# Patient Record
Sex: FEMALE | Race: BLACK OR AFRICAN AMERICAN | NOT HISPANIC OR LATINO | Employment: UNEMPLOYED | URBAN - METROPOLITAN AREA
[De-identification: names, ages, dates, MRNs, and addresses within clinical notes are randomized per-mention and may not be internally consistent; named-entity substitution may affect disease eponyms.]

---

## 2018-05-15 ENCOUNTER — HOSPITAL ENCOUNTER (EMERGENCY)
Facility: HOSPITAL | Age: 68
Discharge: HOME | End: 2018-05-15
Attending: EMERGENCY MEDICINE
Payer: MEDICARE

## 2018-05-15 VITALS
OXYGEN SATURATION: 100 % | WEIGHT: 225 LBS | BODY MASS INDEX: 36.16 KG/M2 | SYSTOLIC BLOOD PRESSURE: 169 MMHG | TEMPERATURE: 99 F | HEART RATE: 82 BPM | RESPIRATION RATE: 20 BRPM | HEIGHT: 66 IN | DIASTOLIC BLOOD PRESSURE: 78 MMHG

## 2018-05-15 DIAGNOSIS — B37.9 YEAST INFECTION: ICD-10-CM
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
## 2018-05-15 DIAGNOSIS — R53.1 WEAKNESS: ICD-10-CM

## 2018-05-15 LAB
ANION GAP SERPL CALC-SCNC: 9 MMOL/L
BACTERIA #/AREA URNS HPF: ABNORMAL /HPF
BASOPHILS # BLD AUTO: 0.01 K/UL
BASOPHILS NFR BLD: 0.1 %
BILIRUB UR QL STRIP: NEGATIVE
BNP SERPL-MCNC: 25 PG/ML
BUN SERPL-MCNC: 22 MG/DL
CALCIUM SERPL-MCNC: 9.6 MG/DL
CHLORIDE SERPL-SCNC: 108 MMOL/L
CLARITY UR: CLEAR
CO2 SERPL-SCNC: 24 MMOL/L
COLOR UR: YELLOW
CREAT SERPL-MCNC: 1 MG/DL
DIFFERENTIAL METHOD: ABNORMAL
EOSINOPHIL # BLD AUTO: 0 K/UL
EOSINOPHIL NFR BLD: 0.4 %
ERYTHROCYTE [DISTWIDTH] IN BLOOD BY AUTOMATED COUNT: 13.8 %
EST. GFR  (AFRICAN AMERICAN): >60 ML/MIN/1.73 M^2
EST. GFR  (NON AFRICAN AMERICAN): 59 ML/MIN/1.73 M^2
GLUCOSE SERPL-MCNC: 118 MG/DL
GLUCOSE UR QL STRIP: NEGATIVE
HCT VFR BLD AUTO: 37 %
HGB BLD-MCNC: 12.7 G/DL
HGB UR QL STRIP: ABNORMAL
KETONES UR QL STRIP: NEGATIVE
LEUKOCYTE ESTERASE UR QL STRIP: ABNORMAL
LYMPHOCYTES # BLD AUTO: 2 K/UL
LYMPHOCYTES NFR BLD: 23.9 %
MAGNESIUM SERPL-MCNC: 2.1 MG/DL
MCH RBC QN AUTO: 26.7 PG
MCHC RBC AUTO-ENTMCNC: 34.3 G/DL
MCV RBC AUTO: 78 FL
MICROSCOPIC COMMENT: ABNORMAL
MONOCYTES # BLD AUTO: 0.5 K/UL
MONOCYTES NFR BLD: 6.4 %
NEUTROPHILS # BLD AUTO: 5.8 K/UL
NEUTROPHILS NFR BLD: 69.2 %
NITRITE UR QL STRIP: NEGATIVE
PH UR STRIP: 6 [PH] (ref 5–8)
PLATELET # BLD AUTO: 269 K/UL
PMV BLD AUTO: 9.8 FL
POTASSIUM SERPL-SCNC: 3.7 MMOL/L
PROT UR QL STRIP: NEGATIVE
RBC # BLD AUTO: 4.75 M/UL
RBC #/AREA URNS HPF: 2 /HPF (ref 0–4)
SODIUM SERPL-SCNC: 141 MMOL/L
SP GR UR STRIP: 1.02 (ref 1–1.03)
TROPONIN I SERPL DL<=0.01 NG/ML-MCNC: <0.006 NG/ML
URN SPEC COLLECT METH UR: ABNORMAL
UROBILINOGEN UR STRIP-ACNC: NEGATIVE EU/DL
WBC # BLD AUTO: 8.32 K/UL
WBC #/AREA URNS HPF: 1 /HPF (ref 0–5)
YEAST URNS QL MICRO: ABNORMAL

## 2018-05-15 PROCEDURE — 25000003 PHARM REV CODE 250: Performed by: EMERGENCY MEDICINE

## 2018-05-15 PROCEDURE — 85025 COMPLETE CBC W/AUTO DIFF WBC: CPT

## 2018-05-15 PROCEDURE — 93010 ELECTROCARDIOGRAM REPORT: CPT | Mod: ,,, | Performed by: INTERNAL MEDICINE

## 2018-05-15 PROCEDURE — 83880 ASSAY OF NATRIURETIC PEPTIDE: CPT

## 2018-05-15 PROCEDURE — 84484 ASSAY OF TROPONIN QUANT: CPT

## 2018-05-15 PROCEDURE — 99284 EMERGENCY DEPT VISIT MOD MDM: CPT | Mod: 25

## 2018-05-15 PROCEDURE — 83735 ASSAY OF MAGNESIUM: CPT

## 2018-05-15 PROCEDURE — 93005 ELECTROCARDIOGRAM TRACING: CPT

## 2018-05-15 PROCEDURE — 81000 URINALYSIS NONAUTO W/SCOPE: CPT

## 2018-05-15 PROCEDURE — 51701 INSERT BLADDER CATHETER: CPT

## 2018-05-15 PROCEDURE — 80048 BASIC METABOLIC PNL TOTAL CA: CPT

## 2018-05-15 RX ORDER — FLUCONAZOLE 100 MG/1
200 TABLET ORAL
Status: COMPLETED | OUTPATIENT
Start: 2018-05-15 | End: 2018-05-15

## 2018-05-15 RX ADMIN — FLUCONAZOLE 200 MG: 100 TABLET ORAL at 08:05

## 2018-05-15 NOTE — ED PROVIDER NOTES
"Encounter Date: 5/15/2018    SCRIBE #1 NOTE: I, Deb Minal, am scribing for, and in the presence of,  Aleida Ashraf MD. I have scribed the following portions of the note - Other sections scribed: HPI, ROS, PE.       History     Chief Complaint   Patient presents with    Fall     fell from WC onto her butt today. No pain. No LOC. Left sided deficit from previosu CVA.      CC: Fall    HPI: This 67 y.o. Female, with a medical history of essential hypertension, obesity and stroke, presents to the ED via EMS transportation accompanied by her daughter s/p a fall that occurred today. Pt states, "I fell on my but", noting that "my legs gave out; they got heavy and numb". Pt's daughter notes that pt was holding onto the wall while walking away from the toilet when she fell. Pt denies pain at this time. Daughter reports that pt is experiencing chronic leg swelling. She notes that she is concerned as pt has not taken her blood pressure medication for over 1x year and does not consume water regularly. Pt denies nausea, fever, chills, lightheadedness, dizziness, chest pain, shortness of breath, numbness, urinary symptoms, hematuria and blood in stool. No other associated symptoms.         The history is provided by the patient and a relative. No  was used.     Review of patient's allergies indicates:  No Known Allergies  Past Medical History:   Diagnosis Date    Essential hypertension 6/12/2015    Obesity (BMI 35.0-39.9) 6/12/2015    Stroke      Past Surgical History:   Procedure Laterality Date    HYSTERECTOMY       Family History   Problem Relation Age of Onset    Stroke Mother     Cancer Father      Social History   Substance Use Topics    Smoking status: Never Smoker    Smokeless tobacco: Never Used    Alcohol use No     Review of Systems   Cardiovascular: Positive for leg swelling.   All other systems reviewed and are negative.   Review of Systems as per HPI " otherwise:  Constitutional: Negative for activity change, appetite change, diaphoresis and unexpected weight change.   HENT: Negative for dental problem, drooling, ear pain and hearing loss.    Eyes: Negative for pain, discharge, redness and itching.   Musculoskeletal: Negative for arthralgias, gait problem, joint swelling and neck stiffness.   Skin: Negative for color change, pallor, rash and wound.   Allergic/Immunologic: Negative for environmental allergies, food allergies and immunocompromised state.   Neurological: Negative for tremors, seizures, facial asymmetry and speech difficulty.   Hematological: Negative for adenopathy. Does not bruise/bleed easily.   Psychiatric/Behavioral: Negative for agitation and dysphoric mood.   All other systems reviewed and are negative.    Physical Exam     Initial Vitals [05/15/18 1637]   BP Pulse Resp Temp SpO2   (!) 142/84 100 18 -- 100 %      MAP       103.33         Physical Exam    Vital signs and nursing assessment noted:  Mildly elevated blood pressure    GEN:   NAD, A & Ox3, atraumatic, well appearing, nontoxic appearing  HEENT:  PERRLA, EOMI, moist membranes, nl conjunctiva, no scleral icterus, no nystagmus, no nodes/nodules, soft, supple, FROM, no tracheal deviation, nexus negative  CV:   RRR no m/r/g, 2+ radial pulses, <2sec cap refill, no obvious JVD  RESP:  CTA B, no w/r/r, equal and bilateral chest rise, no respiratory distress  ABD:   soft, Nontender, Nondistended, +BS, no guarding/rebound  BACK:  FROM, no midline tenderness, no paraspinal tenderness  :   Deferred  EXT:   FROM, MUNOZ x 4, lymphedema of the lower extremities, no calf tenderness  LYMPH:  no gross adenopathy  NEURO:  CN II-XII grossly intact, no obvious motor/sensory deficit, no tremor, negative Romberg,  nl gait/coordination  PSYCH:   no SI/HI, no anxiety, nl mood/affect, nl judgement/thought process  SKIN:  Warm, dry, intact, no rashes/lesions or masses, nl color, no pallor  ED Course    Procedures  Labs Reviewed   BASIC METABOLIC PANEL - Abnormal; Notable for the following:        Result Value    Glucose 118 (*)     eGFR if non  59 (*)     All other components within normal limits   CBC W/ AUTO DIFFERENTIAL - Abnormal; Notable for the following:     MCV 78 (*)     MCH 26.7 (*)     All other components within normal limits   URINALYSIS - Abnormal; Notable for the following:     Occult Blood UA 1+ (*)     Leukocytes, UA 1+ (*)     All other components within normal limits   URINALYSIS MICROSCOPIC - Abnormal; Notable for the following:     Bacteria, UA Moderate (*)     Yeast, UA Few (*)     All other components within normal limits   MAGNESIUM   TROPONIN I   B-TYPE NATRIURETIC PEPTIDE                        Scribe Attestation:   Scribe #1: I performed the above scribed service and the documentation accurately describes the services I performed. I attest to the accuracy of the note.    Attending Attestation:           Physician Attestation for Scribe:  Physician Attestation Statement for Scribe #1: I, Aleida Ashraf MD, reviewed documentation, as scribed by Deb Fontaine in my presence, and it is both accurate and complete.          MEDICAL DECISION MAKIN-year-old female with past medical history of hypertension presents with what appears to be a mechanical fall from her wheelchair when she felt like her legs gave out from underneath her.  Patient is without complaints but family is insisting on having the patient evaluated. Exam is benign and nonfocal.    Weakness differential includes but not exclusive to: anemia, metabolic disturbances,  poisoning, medication side effect, UTI, myocardial infarction, dehydration, peripheral nerve disorder, deconditioning, malingering.  Unlikely CVA.    Treatment plan includes physical exam, cardiac monitoring, labs, imaging studies,  and supportive care.  Labs  reviewed and independently interpreted:        ED Course as of May 15 1953    Tue May 15, 2018   1826 Troponin I: <0.006 [NO]   1826 Unremarkable BNP: 25 [NO]   1826 Unremarkable BMP Creatinine: 1.0 [NO]   1826 Unremarkable Hemoglobin: 12.7 [NO]   1826 Unremarkable CBC WBC: 8.32 [NO]   1952 Abnormal Yeast, UA: (!) Few [NO]   1952 Abnormal Leukocytes, UA: (!) 1+ [NO]   1953 Abnormal Bacteria, UA: (!) Moderate [NO]      ED Course User Index  [NO] Aleida Ashraf MD     Patient improved after treatment and tolerating PO.    Family and patient updated on care.  Pt agrees with assessment, disposition and treatment plan and has no further questions or complaints at this time. Given return precautions and demonstrates understanding.    Patient will  follow up with primary care physician as an outpatient.  Prescription given:  None (Educated about over-the-counter medications that can be used for discomfort.)    Clinical Impression:   The primary encounter diagnosis was Fall, initial encounter. Diagnoses of Weakness and Yeast infection were also pertinent to this visit.    Disposition:   Disposition: Discharged  Condition: Stable                        Aleida Ashraf MD  05/15/18 1953

## 2018-05-15 NOTE — ED TRIAGE NOTES
Pt arrived to ED via EMS after fall occurring today. Pt states she went to sit in her wheelchair and missed, landing on her sacrum. Pt denies any pain or LOC. She is aao x 4 and in no apparent distress.

## 2018-05-16 ENCOUNTER — HOSPITAL ENCOUNTER (EMERGENCY)
Facility: HOSPITAL | Age: 68
Discharge: HOME | End: 2018-05-16
Attending: EMERGENCY MEDICINE
Payer: MEDICARE

## 2018-05-16 VITALS
SYSTOLIC BLOOD PRESSURE: 162 MMHG | TEMPERATURE: 98 F | WEIGHT: 225 LBS | BODY MASS INDEX: 36.16 KG/M2 | HEART RATE: 76 BPM | HEIGHT: 66 IN | DIASTOLIC BLOOD PRESSURE: 79 MMHG | OXYGEN SATURATION: 96 % | RESPIRATION RATE: 18 BRPM
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
## 2018-05-16 LAB
ANION GAP SERPL CALC-SCNC: 8 MMOL/L
BASOPHILS # BLD AUTO: 0.01 K/UL
BASOPHILS NFR BLD: 0.2 %
BUN SERPL-MCNC: 13 MG/DL
CALCIUM SERPL-MCNC: 9.8 MG/DL
CHLORIDE SERPL-SCNC: 106 MMOL/L
CO2 SERPL-SCNC: 27 MMOL/L
CREAT SERPL-MCNC: 0.8 MG/DL
DIFFERENTIAL METHOD: ABNORMAL
EOSINOPHIL # BLD AUTO: 0.1 K/UL
EOSINOPHIL NFR BLD: 1 %
ERYTHROCYTE [DISTWIDTH] IN BLOOD BY AUTOMATED COUNT: 13.8 %
EST. GFR  (AFRICAN AMERICAN): >60 ML/MIN/1.73 M^2
EST. GFR  (NON AFRICAN AMERICAN): >60 ML/MIN/1.73 M^2
GLUCOSE SERPL-MCNC: 92 MG/DL
HCT VFR BLD AUTO: 37 %
HGB BLD-MCNC: 12.8 G/DL
INR PPP: 1
LYMPHOCYTES # BLD AUTO: 2.1 K/UL
LYMPHOCYTES NFR BLD: 33.2 %
MAGNESIUM SERPL-MCNC: 2.2 MG/DL
MCH RBC QN AUTO: 26.7 PG
MCHC RBC AUTO-ENTMCNC: 34.6 G/DL
MCV RBC AUTO: 77 FL
MONOCYTES # BLD AUTO: 0.6 K/UL
MONOCYTES NFR BLD: 10 %
NEUTROPHILS # BLD AUTO: 3.5 K/UL
NEUTROPHILS NFR BLD: 55.6 %
PLATELET # BLD AUTO: 255 K/UL
PMV BLD AUTO: 9.4 FL
POTASSIUM SERPL-SCNC: 3.7 MMOL/L
PROTHROMBIN TIME: 10.7 SEC
RBC # BLD AUTO: 4.79 M/UL
SODIUM SERPL-SCNC: 141 MMOL/L
TROPONIN I SERPL DL<=0.01 NG/ML-MCNC: <0.006 NG/ML
WBC # BLD AUTO: 6.23 K/UL

## 2018-05-16 PROCEDURE — 85025 COMPLETE CBC W/AUTO DIFF WBC: CPT

## 2018-05-16 PROCEDURE — 99285 EMERGENCY DEPT VISIT HI MDM: CPT | Mod: 25

## 2018-05-16 PROCEDURE — 87086 URINE CULTURE/COLONY COUNT: CPT

## 2018-05-16 PROCEDURE — 83735 ASSAY OF MAGNESIUM: CPT

## 2018-05-16 PROCEDURE — 25000003 PHARM REV CODE 250: Performed by: EMERGENCY MEDICINE

## 2018-05-16 PROCEDURE — 85610 PROTHROMBIN TIME: CPT

## 2018-05-16 PROCEDURE — 84484 ASSAY OF TROPONIN QUANT: CPT

## 2018-05-16 PROCEDURE — 87186 SC STD MICRODIL/AGAR DIL: CPT | Mod: 59

## 2018-05-16 PROCEDURE — 80048 BASIC METABOLIC PNL TOTAL CA: CPT

## 2018-05-16 PROCEDURE — 87088 URINE BACTERIA CULTURE: CPT

## 2018-05-16 PROCEDURE — 87077 CULTURE AEROBIC IDENTIFY: CPT

## 2018-05-16 RX ORDER — HYDROCODONE BITARTRATE AND ACETAMINOPHEN 5; 325 MG/1; MG/1
1 TABLET ORAL
Status: COMPLETED | OUTPATIENT
Start: 2018-05-16 | End: 2018-05-16

## 2018-05-16 RX ORDER — AMLODIPINE BESYLATE 5 MG/1
5 TABLET ORAL DAILY
Qty: 30 TABLET | Refills: 0 | OUTPATIENT
Start: 2018-05-16 | End: 2018-11-09

## 2018-05-16 RX ADMIN — HYDROCODONE BITARTRATE AND ACETAMINOPHEN 1 TABLET: 5; 325 TABLET ORAL at 03:05

## 2018-05-16 NOTE — PLAN OF CARE
Patient from home with daughter and needs assistance with ADLs.  Patient has a RW which she does not use per daughter in that she prefers to use her wheelchair.  Patient had not seen a PCP in 2 years per daughter.  KATARZYNA contacted [facility] ([phone]) and scheduled patient's ED followup visit for Friday, 5/18/2018 at 9:00 a.m.  Appt date and time reviewed with patient and daughter.  Patient's preferred pharmacy is [facility].  Daughter is caregiver and helps at home.  Patient needs wheelchair repaired.  Patient's daughter will call KATARZYNA tomorrow with the name of the DME provider so that  might determine if patient can get wheelchair repaired or replaced.         05/16/18 2744   Discharge Assessment   Assessment Type Discharge Planning Assessment   Confirmed/corrected address and phone number on facesheet? Yes   Assessment information obtained from? Caregiver;Patient   Prior to hospitilization cognitive status: Alert/Oriented   Prior to hospitalization functional status: Needs Assistance;Assistive Equipment   Current cognitive status: Alert/Oriented   Current Functional Status: Assistive Equipment;Needs Assistance   Facility Arrived From: home   Lives With child(marvin), adult   Able to Return to Prior Arrangements yes   Is patient able to care for self after discharge? No  (Needs assistance with ADLs.)   Patient's perception of discharge disposition home or selfcare   Readmission Within The Last 30 Days no previous admission in last 30 days   Patient currently being followed by outpatient case management? No   Patient currently receives any other outside agency services? No   Equipment Currently Used at Home bedside commode;wheelchair;walker, rolling   Do you have any problems affording any of your prescribed medications? No   Is the patient taking medications as prescribed? yes   Does the patient have transportation home? Yes   Transportation Available family or friend will provide    Dialysis Name and Scheduled days n/a   Discharge Plan A Home with family   Discharge Plan B Home with family   Patient/Family In Agreement With Plan yes   Soco Lima LMSW, ACEDDA-KATARZYNA, CCM  5/16/2018

## 2018-05-16 NOTE — ED PROVIDER NOTES
"Encounter Date: 5/16/2018    SCRIBE #1 NOTE: I, Zelalem Mandujano, am scribing for, and in the presence of,  Vance Man MD. I have scribed the following portions of the note - Other sections scribed: ROS, HPI.       History     Chief Complaint   Patient presents with    Loss of Consciousness     Daughter states heard loud bump in room.  Daughter found mom on floor unresponsive.  EMS called.  Upon EMS arrival they found pt A&O x 2 normal mentation.  complaints of left knee pain.     CC: Loss of Consciousness    HPI: Patient is a 67 y.o. F with a past medical history of frequent falls, Essential hypertension; Obesity; and Stroke who presents to the ED via EMS for evaluation after an unwitnessed syncopal episode prior to arrival. Patient's daughter reports hearing a loud "thud" while she was in the kitchen, and found her mother unconscious on her bedroom floor. Patient complains of a 1-month history of bilateral lower extremity edema. She also reports intermittent chest pain and shortness of breath, but denies experiencing these symptoms currently and prior to losing consciousness. No symptomatic treatment prior to arrival. Patient denies fever, chest pain, shortness of breath, dizziness, light-headedness, and/or headache.        The history is provided by the patient and a relative. No  was used.     Review of patient's allergies indicates:  No Known Allergies  Past Medical History:   Diagnosis Date    Essential hypertension 6/12/2015    Obesity (BMI 35.0-39.9) 6/12/2015    Stroke      Past Surgical History:   Procedure Laterality Date    HYSTERECTOMY       Family History   Problem Relation Age of Onset    Stroke Mother     Cancer Father      Social History   Substance Use Topics    Smoking status: Never Smoker    Smokeless tobacco: Never Used    Alcohol use No     Review of Systems   Constitutional: Negative for fever.   HENT: Negative for sore throat.    Eyes: Negative for visual " disturbance.   Respiratory: Negative for shortness of breath.    Cardiovascular: Positive for leg swelling (bilaterally). Negative for chest pain.   Gastrointestinal: Negative for abdominal pain, diarrhea, nausea and vomiting.   Genitourinary: Negative for dysuria.   Musculoskeletal: Negative for back pain.   Skin: Negative for wound.   Neurological: Positive for syncope. Negative for dizziness, light-headedness and headaches.       Physical Exam     Initial Vitals [05/16/18 1418]   BP Pulse Resp Temp SpO2   (!) 162/84 99 18 98.1 °F (36.7 °C) 97 %      MAP       110         Physical Exam    Nursing note and vitals reviewed.  Constitutional: She appears well-developed and well-nourished. She is not diaphoretic. No distress.   HENT:   Head: Normocephalic and atraumatic.   Nose: Nose normal.   Eyes: EOM are normal. Pupils are equal, round, and reactive to light. No scleral icterus.   Neck: Normal range of motion. Neck supple.   Cardiovascular: Normal rate, regular rhythm, normal heart sounds and intact distal pulses. Exam reveals no gallop and no friction rub.    No murmur heard.  Pulmonary/Chest: Breath sounds normal. No stridor. No respiratory distress. She has no wheezes. She has no rhonchi. She has no rales.   Abdominal: Soft. Normal appearance and bowel sounds are normal. She exhibits no distension. There is no tenderness. There is no rebound and no guarding.   Musculoskeletal: Normal range of motion. She exhibits edema (symmetrical pitting BLE edema). She exhibits no tenderness.        Left knee: She exhibits no deformity (able to flex and extend).   Neurological: She is oriented to person, place, and time. No cranial nerve deficit.   Skin: Skin is warm and dry. No rash noted.   Psychiatric: She has a normal mood and affect. Her behavior is normal.         ED Course   Procedures  Labs Reviewed   CBC W/ AUTO DIFFERENTIAL - Abnormal; Notable for the following:        Result Value    MCV 77 (*)     MCH 26.7 (*)      All other components within normal limits   CULTURE, URINE   BASIC METABOLIC PANEL   TROPONIN I   MAGNESIUM   PROTIME-INR          X-Rays:   Independently Interpreted Readings:   Head CT: No hemorrhage.  No skull fracture.  No acute stroke.     Medical Decision Making:   History:   Old Medical Records: I decided to obtain old medical records.  Differential Diagnosis:   Mechanical fall  CVA  Electrolyte abnormality  arrhythmia  ACS  Independently Interpreted Test(s):   I have ordered and independently interpreted X-rays - see prior notes.  Clinical Tests:   Lab Tests: Ordered and Reviewed  Radiological Study: Ordered and Reviewed  Medical Tests: Reviewed  ED Management:  Afebrile, NAD, no acute neurodefecits,  CT head negative, labs within acceptable limits No fractures on xrays. Discussed extensively with daughter strategies for preventing falls and the importance of close follow up with patients PMD for further assistance with assistive devices and PT referral etc.             Scribe Attestation:   Scribe #1: I performed the above scribed service and the documentation accurately describes the services I performed. I attest to the accuracy of the note.    Attending Attestation:           Physician Attestation for Scribe:  Physician Attestation Statement for Scribe #1: I, Vance Man MD, reviewed documentation, as scribed by Zelalem Mandujano in my presence, and it is both accurate and complete.                    Clinical Impression:   The encounter diagnosis was Fall.    Disposition:   Disposition: Discharged  Condition: Stable                        Vance Man MD  05/25/18 1310

## 2018-05-16 NOTE — ED TRIAGE NOTES
Pt to the ED via EMS with c/o LOC. Daughter reports she was lying on the floor with a small tic, and eyes were rolled back. Pt is unavailable to recall event. Pt denies tripping, reports fall. Odor noted to pt. Upon current assessment pt is AAOx3. No acute distress noted. Pt placed on cardiac monitor.  Pt denies SOB, chest pain, or dizziness. Pt was seen yesterday in the ED with c/o fall. Dr. Man at bedside.

## 2018-05-16 NOTE — DISCHARGE INSTRUCTIONS
CT scan of the brain did not demonstrate intracranial injury. Lab values are within acceptable limits.  Is very important that she make an appointment to see her primary physician as soon as possible for a general checkup.  She should resume taking her blood pressure medication as it was prescribed.  Return to the emergency room for changes in mental status, fever or any new or worsening symptoms.

## 2018-05-16 NOTE — ED NOTES
No iv present to L hand, noted, 2x2 with tape to area, family reports iv come out when she came into room with nurse

## 2018-05-16 NOTE — PROGRESS NOTES
See your followup appointment at [facility]:    Follow-up Information     [facility]. Go on 5/18/2018.    Why:  9:00 a.m. go to Primary Care for your followup appointment. Please arrive 15 minutes early and bring your ID and insurance card.  Contact information:  [address]  [place]  [address] [phone]               If you have any questions, please contact me at [phone].    Sincerely,    Soco   II  [facility] [address] [place]  [id number]

## 2018-05-18 ENCOUNTER — TELEPHONE (OUTPATIENT)
Dept: EMERGENCY MEDICINE | Facility: HOSPITAL | Age: 68
End: 2018-05-18

## 2018-05-18 LAB — BACTERIA UR CULT: NORMAL

## 2018-05-18 RX ORDER — SULFAMETHOXAZOLE AND TRIMETHOPRIM 800; 160 MG/1; MG/1
1 TABLET ORAL 2 TIMES DAILY
Qty: 10 TABLET | Refills: 0 | Status: SHIPPED | OUTPATIENT
Start: 2018-05-18 | End: 2018-05-23

## 2018-05-18 NOTE — TELEPHONE ENCOUNTER
Urine culture with E. Cloacae.  Patient discharged home without any antibiotics.  Called and discussed case with patient's daughter, Lana.  Will treat patient's UTI with Bactrim.

## 2018-11-09 ENCOUNTER — HOSPITAL ENCOUNTER (EMERGENCY)
Facility: HOSPITAL | Age: 68
Discharge: HOME | End: 2018-11-09
Attending: EMERGENCY MEDICINE
Payer: MEDICARE

## 2018-11-09 VITALS
HEART RATE: 81 BPM | RESPIRATION RATE: 18 BRPM | DIASTOLIC BLOOD PRESSURE: 76 MMHG | BODY MASS INDEX: 36.32 KG/M2 | WEIGHT: 225 LBS | OXYGEN SATURATION: 99 % | SYSTOLIC BLOOD PRESSURE: 162 MMHG | TEMPERATURE: 98 F

## 2018-11-09 DIAGNOSIS — I10 HYPERTENSION, UNSPECIFIED TYPE: Primary | ICD-10-CM

## 2018-11-09 DIAGNOSIS — M79.89 RIGHT LEG SWELLING: ICD-10-CM

## 2018-11-09 LAB
ALBUMIN SERPL BCP-MCNC: 3.7 G/DL
ALP SERPL-CCNC: 108 U/L
ALT SERPL W/O P-5'-P-CCNC: 13 U/L
ANION GAP SERPL CALC-SCNC: 8 MMOL/L
AST SERPL-CCNC: 17 U/L
BACTERIA #/AREA URNS HPF: ABNORMAL /HPF
BASOPHILS # BLD AUTO: 0.02 K/UL
BASOPHILS NFR BLD: 0.2 %
BILIRUB SERPL-MCNC: 0.3 MG/DL
BILIRUB UR QL STRIP: NEGATIVE
BUN SERPL-MCNC: 15 MG/DL
CALCIUM SERPL-MCNC: 9.5 MG/DL
CHLORIDE SERPL-SCNC: 105 MMOL/L
CLARITY UR: ABNORMAL
CO2 SERPL-SCNC: 27 MMOL/L
COLOR UR: YELLOW
CREAT SERPL-MCNC: 0.8 MG/DL
DIFFERENTIAL METHOD: ABNORMAL
EOSINOPHIL # BLD AUTO: 0.1 K/UL
EOSINOPHIL NFR BLD: 1.2 %
ERYTHROCYTE [DISTWIDTH] IN BLOOD BY AUTOMATED COUNT: 14 %
EST. GFR  (AFRICAN AMERICAN): >60 ML/MIN/1.73 M^2
EST. GFR  (NON AFRICAN AMERICAN): >60 ML/MIN/1.73 M^2
GLUCOSE SERPL-MCNC: 106 MG/DL
GLUCOSE UR QL STRIP: NEGATIVE
HCT VFR BLD AUTO: 37 %
HGB BLD-MCNC: 12.6 G/DL
HGB UR QL STRIP: ABNORMAL
KETONES UR QL STRIP: NEGATIVE
LEUKOCYTE ESTERASE UR QL STRIP: ABNORMAL
LYMPHOCYTES # BLD AUTO: 2.6 K/UL
LYMPHOCYTES NFR BLD: 28.2 %
MCH RBC QN AUTO: 26.1 PG
MCHC RBC AUTO-ENTMCNC: 34.1 G/DL
MCV RBC AUTO: 77 FL
MICROSCOPIC COMMENT: ABNORMAL
MONOCYTES # BLD AUTO: 0.9 K/UL
MONOCYTES NFR BLD: 9.3 %
NEUTROPHILS # BLD AUTO: 5.6 K/UL
NEUTROPHILS NFR BLD: 60.8 %
NITRITE UR QL STRIP: NEGATIVE
PH UR STRIP: 6 [PH] (ref 5–8)
PLATELET # BLD AUTO: 284 K/UL
PMV BLD AUTO: 9.9 FL
POTASSIUM SERPL-SCNC: 3.5 MMOL/L
PROT SERPL-MCNC: 7.8 G/DL
PROT UR QL STRIP: NEGATIVE
RBC # BLD AUTO: 4.83 M/UL
RBC #/AREA URNS HPF: 3 /HPF (ref 0–4)
SODIUM SERPL-SCNC: 140 MMOL/L
SP GR UR STRIP: 1.02 (ref 1–1.03)
SQUAMOUS #/AREA URNS HPF: 6 /HPF
TRICHOMONAS UR QL MICRO: ABNORMAL
URN SPEC COLLECT METH UR: ABNORMAL
UROBILINOGEN UR STRIP-ACNC: ABNORMAL EU/DL
WBC # BLD AUTO: 9.24 K/UL
WBC #/AREA URNS HPF: 3 /HPF (ref 0–5)
YEAST URNS QL MICRO: ABNORMAL

## 2018-11-09 PROCEDURE — 99284 EMERGENCY DEPT VISIT MOD MDM: CPT

## 2018-11-09 PROCEDURE — 81000 URINALYSIS NONAUTO W/SCOPE: CPT

## 2018-11-09 PROCEDURE — 25000003 PHARM REV CODE 250: Performed by: EMERGENCY MEDICINE

## 2018-11-09 PROCEDURE — 80053 COMPREHEN METABOLIC PANEL: CPT

## 2018-11-09 PROCEDURE — 85025 COMPLETE CBC W/AUTO DIFF WBC: CPT

## 2018-11-09 RX ORDER — AMLODIPINE BESYLATE 5 MG/1
5 TABLET ORAL DAILY
Qty: 30 TABLET | Refills: 0 | Status: SHIPPED | OUTPATIENT
Start: 2018-11-09 | End: 2018-11-09 | Stop reason: SDUPTHER

## 2018-11-09 RX ORDER — AMLODIPINE BESYLATE 5 MG/1
5 TABLET ORAL DAILY
Qty: 30 TABLET | Refills: 0 | Status: ON HOLD | OUTPATIENT
Start: 2018-11-09 | End: 2019-05-27 | Stop reason: HOSPADM

## 2018-11-09 RX ORDER — HYDROCODONE BITARTRATE AND ACETAMINOPHEN 5; 325 MG/1; MG/1
1 TABLET ORAL EVERY 4 HOURS PRN
Qty: 30 TABLET | Refills: 0 | Status: SHIPPED | OUTPATIENT
Start: 2018-11-09 | End: 2020-01-04 | Stop reason: CLARIF

## 2018-11-09 RX ORDER — CETIRIZINE HYDROCHLORIDE 10 MG/1
10 TABLET ORAL DAILY
Qty: 30 TABLET | Refills: 0 | Status: ON HOLD | OUTPATIENT
Start: 2018-11-09 | End: 2021-09-10 | Stop reason: CLARIF

## 2018-11-09 RX ORDER — ASPIRIN 325 MG
325 TABLET ORAL DAILY
Qty: 30 TABLET | Refills: 11 | Status: SHIPPED | OUTPATIENT
Start: 2018-11-09 | End: 2018-11-09 | Stop reason: SDUPTHER

## 2018-11-09 RX ORDER — ATORVASTATIN CALCIUM 40 MG/1
40 TABLET, FILM COATED ORAL DAILY
Qty: 30 TABLET | Refills: 11 | Status: SHIPPED | OUTPATIENT
Start: 2018-11-09 | End: 2018-11-09 | Stop reason: SDUPTHER

## 2018-11-09 RX ORDER — ATORVASTATIN CALCIUM 40 MG/1
40 TABLET, FILM COATED ORAL DAILY
Qty: 30 TABLET | Refills: 11 | Status: SHIPPED | OUTPATIENT
Start: 2018-11-09 | End: 2023-12-12 | Stop reason: DRUGHIGH

## 2018-11-09 RX ORDER — ASPIRIN 325 MG
325 TABLET ORAL DAILY
Qty: 30 TABLET | Refills: 11 | Status: ON HOLD | OUTPATIENT
Start: 2018-11-09 | End: 2021-09-12 | Stop reason: HOSPADM

## 2018-11-09 RX ORDER — AMLODIPINE BESYLATE 5 MG/1
5 TABLET ORAL
Status: COMPLETED | OUTPATIENT
Start: 2018-11-09 | End: 2018-11-09

## 2018-11-09 RX ADMIN — AMLODIPINE BESYLATE 5 MG: 5 TABLET ORAL at 07:11

## 2018-11-09 NOTE — ED PROVIDER NOTES
Encounter Date: 11/9/2018    SCRIBE #1 NOTE: I, Clement Wolfe, am scribing for, and in the presence of,  Zoey Fitzgerald MD. I have scribed the following portions of the note - Other sections scribed: HPI/ROS.       History     Chief Complaint   Patient presents with    Leg Swelling     Bilateral leg swelling since 2013. Pt also reports HTN. Denies SOB, chest pain     CC: Leg Swelling     HPI: This 68 y.o. female with a medical hx of essential HTN, obesity, and stroke presents to the ED via EMS accompanied by daughter for an evaluation of acute onset R leg swelling with associated pain worse x 2 days. Pt states that she has to sleep with her R leg hanging off of the bed. Pt reports she has been falling frequently for the last couple of months. Daughter reports pt had a stroke recently and is noncompliant with medications and therapy. She is uncooperative and eats with foods with salt. She states that the pt does not want to do anything. Daughter reports that patient refuses to go to doctor so does not have a primary care.  On the contrary, pt reports she has been out of medications which is why she has not taken them. No alleviating factors. No prior tx. Otherwise, pt denies fever, chills, abdominal pain, n/v/d, and any other associated symptoms.      The history is provided by the patient and a relative. No  was used.     Review of patient's allergies indicates:  No Known Allergies  Past Medical History:   Diagnosis Date    Essential hypertension 6/12/2015    Obesity (BMI 35.0-39.9) 6/12/2015    Stroke      Past Surgical History:   Procedure Laterality Date    HYSTERECTOMY       Family History   Problem Relation Age of Onset    Stroke Mother     Cancer Father      Social History     Tobacco Use    Smoking status: Never Smoker    Smokeless tobacco: Never Used   Substance Use Topics    Alcohol use: No    Drug use: No     Review of Systems   Constitutional: Negative for chills and  fever.   HENT: Negative for congestion, ear pain, rhinorrhea and sore throat.    Eyes: Negative for pain and visual disturbance.   Respiratory: Negative for cough and shortness of breath.    Cardiovascular: Positive for leg swelling (R; with pain). Negative for chest pain.   Gastrointestinal: Negative for abdominal pain, diarrhea, nausea and vomiting.   Genitourinary: Negative for dysuria.   Musculoskeletal: Negative for back pain and neck pain.   Skin: Negative for rash.   Neurological: Negative for headaches.   All other systems reviewed and are negative.      Physical Exam     Initial Vitals [11/09/18 1313]   BP Pulse Resp Temp SpO2   122/89 88 16 98.3 °F (36.8 °C) 100 %      MAP       --         Physical Exam   Constitutional: Well-developed, obese, No acute distressed, Alert  HENT: Normocephalic, Atraumatic, Moist mucous membranes  Eyes: Conjunctiva normal  Neck: Supple, ROM normal  Cardiac: RRR  Pulmonary/Chest wall: No respiratory distress, CTAB, no chest wall tenderness  Abdomen: Soft, nontender, nondistended, no rebound, no guarding  Musc: Normal ROM, No obvious joint swelling  Lymph: + lower extremity edema (R>>L)  Neuro: oriented x 3, L sided residual weakness  Skin: Pink, warm, dry.  No rashes  Psych: Behavior normal, Mood and affect normal    Previous medical record and nursing documentation reviewed where available.          ED Course   Procedures  Labs Reviewed   CBC W/ AUTO DIFFERENTIAL - Abnormal; Notable for the following components:       Result Value    MCV 77 (*)     MCH 26.1 (*)     All other components within normal limits   URINALYSIS - Abnormal; Notable for the following components:    Appearance, UA Cloudy (*)     Occult Blood UA 1+ (*)     Urobilinogen, UA 4.0-6.0 (*)     Leukocytes, UA Trace (*)     All other components within normal limits   URINALYSIS MICROSCOPIC - Abnormal; Notable for the following components:    Bacteria, UA Few (*)     Yeast, UA Occasional (*)     Trichomonas, UA  Few (*)     All other components within normal limits   COMPREHENSIVE METABOLIC PANEL    Narrative:     Recoll. [id number] by TWW at 11/09/2018 18:22, reason: Specimen   grossly hemolyzed  Tube has been discarded          Imaging Results          US Lower Extremity Veins Right (Final result)  Result time 11/09/18 15:38:41    Final result by Steve Galvan MD (11/09/18 15:38:41)                 Impression:      No evidence of deep venous thrombosis in the right lower extremity.      Electronically signed by: Steve Galvan MD  Date:    11/09/2018  Time:    15:38             Narrative:    EXAMINATION:  US LOWER EXTREMITY VEINS RIGHT    CLINICAL HISTORY:  Other specified soft tissue disorders    TECHNIQUE:  Duplex and color flow Doppler evaluation and graded compression of the right lower extremity veins was performed.    COMPARISON:  None    FINDINGS:  Right thigh veins: The common femoral, femoral, popliteal, upper greater saphenous, and deep femoral veins are patent and free of thrombus. The veins are normally compressible and have normal phasic flow and augmentation response.    Right calf veins: The visualized calf veins are patent.    Miscellaneous: None                                 Medical Decision Making:   Clinical Tests:   Lab Tests: Ordered and Reviewed  Radiological Study: Ordered and Reviewed  ED Management:  Patient is a 68 year old female with history of previous CVA with residual L sided weakness who presents to the ED with RLE swelling and pain.  Symptoms seem to be somewhat worsened over the last few days to months but have been present ultimately for years.  Daughter ultimately brings her here because she does not know what to do with her mom - patient apparently refuses to take her medication, refuses therapy, refuses to go to primary care doctor.  Daughter can force meds as long as she has them but then she runs out and patient refuses to go to PCP.  I unfortunately cannot make the patient  compliant with treatment.  Blood pressure is slightly high today but no signs of end organ dysfunction or hypertensive urgency.  RLE is swollen but no signs of cellulitis or DVT.  Screening labs are essentially unremarkable.  I have consulted  to visit with daughter and discuss resources available including wheelchair transport.  Home health and further therapy may be an option but would be best managed by PCP.  I have encouraged patient to take medication as prescribed and to establish with primary care.             Scribe Attestation:   Scribe #1: I performed the above scribed service and the documentation accurately describes the services I performed. I attest to the accuracy of the note.    Attending Attestation:           Physician Attestation for Scribe:  Physician Attestation Statement for Scribe #1: I, Zoey Fitzgerald MD, reviewed documentation, as scribed by Clement Wolfe in my presence, and it is both accurate and complete.                    Clinical Impression:   The primary encounter diagnosis was Hypertension, unspecified type. A diagnosis of Right leg swelling was also pertinent to this visit.                             Zoey Fitzgerald MD  11/09/18 7129

## 2018-11-09 NOTE — CONSULTS
KATARZYNA met with pt and pt daughter Lana to discuss discharge plans. Lana asked about resources to assist pt with home care services. KATARZYNA provided pt with a resources guide but explained, most of the resources in the guide are out of pocket pay. KATARZYNA also explained Medicaid Long term care assistance. KATARZYNA explained Medicaid provides PCA's for pt needing extra assistance. KATARZYNA explained, Medicaid usually provides six visits but pt will then be required to submit medical documentation supporting the need for a PCA. Lana verbalized understanding, and KATARZYNA wrote information for medicaid in resource guide.

## 2019-01-21 ENCOUNTER — HOSPITAL ENCOUNTER (EMERGENCY)
Facility: HOSPITAL | Age: 69
Discharge: HOME | End: 2019-01-21
Attending: EMERGENCY MEDICINE
Payer: MEDICARE

## 2019-01-21 VITALS
HEIGHT: 67 IN | TEMPERATURE: 98 F | RESPIRATION RATE: 20 BRPM | SYSTOLIC BLOOD PRESSURE: 175 MMHG | DIASTOLIC BLOOD PRESSURE: 98 MMHG | OXYGEN SATURATION: 99 % | BODY MASS INDEX: 35.31 KG/M2 | HEART RATE: 81 BPM | WEIGHT: 225 LBS

## 2019-01-21 DIAGNOSIS — R51.9 ACUTE NONINTRACTABLE HEADACHE, UNSPECIFIED HEADACHE TYPE: ICD-10-CM

## 2019-01-21 DIAGNOSIS — R07.9 CHEST PAIN: ICD-10-CM

## 2019-01-21 DIAGNOSIS — R11.2 NON-INTRACTABLE VOMITING WITH NAUSEA, UNSPECIFIED VOMITING TYPE: Primary | ICD-10-CM

## 2019-01-21 LAB
ALBUMIN SERPL BCP-MCNC: 4 G/DL
ALP SERPL-CCNC: 137 U/L
ALT SERPL W/O P-5'-P-CCNC: 22 U/L
ANION GAP SERPL CALC-SCNC: 12 MMOL/L
AST SERPL-CCNC: 22 U/L
BACTERIA #/AREA URNS HPF: ABNORMAL /HPF
BASOPHILS # BLD AUTO: 0.01 K/UL
BASOPHILS NFR BLD: 0.1 %
BILIRUB SERPL-MCNC: 0.7 MG/DL
BILIRUB UR QL STRIP: NEGATIVE
BNP SERPL-MCNC: 37 PG/ML
BUN SERPL-MCNC: 13 MG/DL
CALCIUM SERPL-MCNC: 9.6 MG/DL
CHLORIDE SERPL-SCNC: 102 MMOL/L
CLARITY UR: CLEAR
CO2 SERPL-SCNC: 27 MMOL/L
COLOR UR: YELLOW
CREAT SERPL-MCNC: 0.8 MG/DL
DIFFERENTIAL METHOD: ABNORMAL
EOSINOPHIL # BLD AUTO: 0 K/UL
EOSINOPHIL NFR BLD: 0.1 %
ERYTHROCYTE [DISTWIDTH] IN BLOOD BY AUTOMATED COUNT: 14.1 %
EST. GFR  (AFRICAN AMERICAN): >60 ML/MIN/1.73 M^2
EST. GFR  (NON AFRICAN AMERICAN): >60 ML/MIN/1.73 M^2
GLUCOSE SERPL-MCNC: 102 MG/DL
GLUCOSE UR QL STRIP: NEGATIVE
HCT VFR BLD AUTO: 39.6 %
HGB BLD-MCNC: 13.6 G/DL
HGB UR QL STRIP: ABNORMAL
INR PPP: 1
KETONES UR QL STRIP: NEGATIVE
LEUKOCYTE ESTERASE UR QL STRIP: ABNORMAL
LYMPHOCYTES # BLD AUTO: 1.2 K/UL
LYMPHOCYTES NFR BLD: 11.2 %
MCH RBC QN AUTO: 26.5 PG
MCHC RBC AUTO-ENTMCNC: 34.3 G/DL
MCV RBC AUTO: 77 FL
MICROSCOPIC COMMENT: ABNORMAL
MONOCYTES # BLD AUTO: 0.5 K/UL
MONOCYTES NFR BLD: 5.2 %
NEUTROPHILS # BLD AUTO: 8.6 K/UL
NEUTROPHILS NFR BLD: 83.4 %
NITRITE UR QL STRIP: NEGATIVE
PH UR STRIP: 5 [PH] (ref 5–8)
PLATELET # BLD AUTO: 264 K/UL
PMV BLD AUTO: 9.4 FL
POTASSIUM SERPL-SCNC: 3.4 MMOL/L
PROT SERPL-MCNC: 8.5 G/DL
PROT UR QL STRIP: NEGATIVE
PROTHROMBIN TIME: 10.9 SEC
RBC # BLD AUTO: 5.13 M/UL
RBC #/AREA URNS HPF: 2 /HPF (ref 0–4)
SODIUM SERPL-SCNC: 141 MMOL/L
SP GR UR STRIP: 1.02 (ref 1–1.03)
SQUAMOUS #/AREA URNS HPF: 4 /HPF
TROPONIN I SERPL DL<=0.01 NG/ML-MCNC: <0.006 NG/ML
URN SPEC COLLECT METH UR: ABNORMAL
UROBILINOGEN UR STRIP-ACNC: ABNORMAL EU/DL
WBC # BLD AUTO: 10.28 K/UL
WBC #/AREA URNS HPF: 15 /HPF (ref 0–5)

## 2019-01-21 PROCEDURE — 96360 HYDRATION IV INFUSION INIT: CPT

## 2019-01-21 PROCEDURE — 85610 PROTHROMBIN TIME: CPT

## 2019-01-21 PROCEDURE — 85025 COMPLETE CBC W/AUTO DIFF WBC: CPT

## 2019-01-21 PROCEDURE — 80053 COMPREHEN METABOLIC PANEL: CPT

## 2019-01-21 PROCEDURE — 93010 ELECTROCARDIOGRAM REPORT: CPT | Mod: ,,, | Performed by: INTERNAL MEDICINE

## 2019-01-21 PROCEDURE — 87086 URINE CULTURE/COLONY COUNT: CPT

## 2019-01-21 PROCEDURE — 81000 URINALYSIS NONAUTO W/SCOPE: CPT

## 2019-01-21 PROCEDURE — 87077 CULTURE AEROBIC IDENTIFY: CPT

## 2019-01-21 PROCEDURE — 87147 CULTURE TYPE IMMUNOLOGIC: CPT

## 2019-01-21 PROCEDURE — 87088 URINE BACTERIA CULTURE: CPT

## 2019-01-21 PROCEDURE — 93005 ELECTROCARDIOGRAM TRACING: CPT

## 2019-01-21 PROCEDURE — 93010 EKG 12-LEAD: ICD-10-PCS | Mod: ,,, | Performed by: INTERNAL MEDICINE

## 2019-01-21 PROCEDURE — 25000003 PHARM REV CODE 250: Performed by: EMERGENCY MEDICINE

## 2019-01-21 PROCEDURE — 87186 SC STD MICRODIL/AGAR DIL: CPT

## 2019-01-21 PROCEDURE — 84484 ASSAY OF TROPONIN QUANT: CPT

## 2019-01-21 PROCEDURE — 83880 ASSAY OF NATRIURETIC PEPTIDE: CPT

## 2019-01-21 PROCEDURE — 99284 EMERGENCY DEPT VISIT MOD MDM: CPT | Mod: 25

## 2019-01-21 RX ORDER — ASPIRIN 325 MG
325 TABLET ORAL
Status: COMPLETED | OUTPATIENT
Start: 2019-01-21 | End: 2019-01-21

## 2019-01-21 RX ORDER — AMLODIPINE BESYLATE 5 MG/1
10 TABLET ORAL DAILY
Qty: 30 TABLET | Refills: 0 | Status: ON HOLD | OUTPATIENT
Start: 2019-01-21 | End: 2021-09-10 | Stop reason: CLARIF

## 2019-01-21 RX ORDER — NITROFURANTOIN 25; 75 MG/1; MG/1
100 CAPSULE ORAL 2 TIMES DAILY
Qty: 14 CAPSULE | Refills: 0 | Status: SHIPPED | OUTPATIENT
Start: 2019-01-21 | End: 2019-01-28

## 2019-01-21 RX ADMIN — ASPIRIN 325 MG ORAL TABLET 325 MG: 325 PILL ORAL at 04:01

## 2019-01-21 RX ADMIN — SODIUM CHLORIDE 500 ML: 0.9 INJECTION, SOLUTION INTRAVENOUS at 05:01

## 2019-01-21 NOTE — ED TRIAGE NOTES
Pt arrived to Ed with c/o chest pain, HA/N/V x 1 day. Pt now denies chest pain. No acute distress noted. Pt connected to continuous cardiac monitor, bp cuff, and pulse ox. Call light within reach.

## 2019-01-21 NOTE — ED PROVIDER NOTES
Encounter Date: 1/21/2019    SCRIBE #1 NOTE: I, YoungSneha Cj, am scribing for, and in the presence of,  Darci Ojeda MD. I have scribed the following portions of the note - Other sections scribed: HPI, ROS, PE.       History     Chief Complaint   Patient presents with    Vomiting     pt reports emesis x 1, HA and weakness this AM when waking    Weakness    Chest Pain     mid-steral CP lasting a few seconds 1 hour PTA     CC: Nausea    69 y/o female with essential HTN, obesity, and stroke presents to the ED via EMS for emergent evaluation of acute onset nausea and emesis that started this morning when she woke up. The patient also reports HA, and dizziness when she woke up that has all since alleviated on its own. The patient only reports emesis currently. The patient has been using a wheelchair for the past 2 yrs. The patient denies abdominal pain, fever, or chills. No other symptoms reported. She states she had some 'chest burning' after vomiting that resolved quickly. No other complaints.       The history is provided by the patient. No  was used.     Review of patient's allergies indicates:  No Known Allergies  Past Medical History:   Diagnosis Date    Essential hypertension 6/12/2015    Obesity (BMI 35.0-39.9) 6/12/2015    Stroke      Past Surgical History:   Procedure Laterality Date    HYSTERECTOMY       Family History   Problem Relation Age of Onset    Stroke Mother     Cancer Father      Social History     Tobacco Use    Smoking status: Never Smoker    Smokeless tobacco: Never Used   Substance Use Topics    Alcohol use: No    Drug use: No     Review of Systems   Constitutional: Negative for chills and fever.   HENT: Negative for congestion, ear pain, rhinorrhea and sore throat.    Eyes: Negative for redness.   Respiratory: Negative for cough and shortness of breath.    Cardiovascular: Chest pain: currently resolved.        Resolved chest burning   Gastrointestinal:  Positive for nausea and vomiting. Negative for abdominal pain and diarrhea.   Genitourinary: Negative for decreased urine volume, difficulty urinating, dysuria, frequency, hematuria and urgency.   Musculoskeletal: Negative for back pain and neck pain.   Skin: Negative for rash.   Neurological: Positive for dizziness (currently resolved) and headaches (currently resolved).   Psychiatric/Behavioral: Negative for confusion.   All other systems reviewed and are negative.      Physical Exam     Initial Vitals [01/21/19 1341]   BP Pulse Resp Temp SpO2   (!) 170/102 97 20 98.5 °F (36.9 °C) 98 %      MAP       --         Physical Exam    Nursing note and vitals reviewed.  Constitutional: She appears well-developed and well-nourished.  Non-toxic appearance. She does not appear ill.   Overweight. Fully lucid and linear. Calm. NAD   HENT:   Head: Normocephalic and atraumatic.   Eyes: Conjunctivae and EOM are normal.   Neck: Neck supple.   Cardiovascular: Normal rate and regular rhythm.   Pulmonary/Chest: Effort normal and breath sounds normal. No respiratory distress. She has no wheezes.   Abdominal: Soft. Normal appearance and bowel sounds are normal. She exhibits no distension. There is no tenderness. There is no rebound.   Musculoskeletal: She exhibits no edema or tenderness.   Neurological: She is alert.   3/5 strength to b/l LE. 5/5 strength to b/l UE.    Skin: Skin is warm and dry.   Psychiatric: She has a normal mood and affect. Thought content normal.         ED Course   Procedures  Labs Reviewed   CBC W/ AUTO DIFFERENTIAL - Abnormal; Notable for the following components:       Result Value    MCV 77 (*)     MCH 26.5 (*)     Gran # (ANC) 8.6 (*)     Gran% 83.4 (*)     Lymph% 11.2 (*)     All other components within normal limits   COMPREHENSIVE METABOLIC PANEL - Abnormal; Notable for the following components:    Potassium 3.4 (*)     Total Protein 8.5 (*)     Alkaline Phosphatase 137 (*)     All other components within  normal limits   URINALYSIS, REFLEX TO URINE CULTURE - Abnormal; Notable for the following components:    Occult Blood UA 2+ (*)     Urobilinogen, UA 2.0-3.0 (*)     Leukocytes, UA 2+ (*)     All other components within normal limits    Narrative:     Preferred Collection Type->Urine, Clean Catch   URINALYSIS MICROSCOPIC - Abnormal; Notable for the following components:    WBC, UA 15 (*)     Bacteria, UA Many (*)     All other components within normal limits    Narrative:     Preferred Collection Type->Urine, Clean Catch   CULTURE, URINE    Narrative:     Preferred Collection Type->Urine, Clean Catch   B-TYPE NATRIURETIC PEPTIDE   TROPONIN I   PROTIME-INR        ECG Results          EKG 12-lead (Final result)  Result time 01/22/19 21:42:39    Final result by Interface, Lab In [facility] (01/22/19 21:42:39)                 Narrative:    Test Reason : R07.9,  Blood Pressure :  mmHG  Vent. Rate : 087 BPM     Atrial Rate : 087 BPM     P-R Int : 166 ms          QRS Dur : 082 ms      QT Int : 414 ms       P-R-T Axes : 017 -16 029 degrees     QTc Int : 498 ms    Normal sinus rhythm  Prolonged QT  Abnormal ECG  When compared with ECG of 15-MAY-2018 17:33,  No significant change was found  Confirmed by Jaya Medina MD ([id number]) on 1/22/2019 9:42:31 PM    Referred By: DEVIN   SELF           Confirmed By:Jaya Medina MD                            Imaging Results          CT Head Without Contrast (Final result)  Result time 01/21/19 16:20:57    Final result by Claude Morrow MD (01/21/19 16:20:57)                 Impression:      No evidence of intracranial hemorrhage or large territory infarction.    There is volume loss and extensive microvascular changes as well as remote infarcts.  If there is concern for acute infarct, MRI recommended.      Electronically signed by: Claude Morrow MD  Date:    01/21/2019  Time:    16:20             Narrative:    EXAMINATION:  CT HEAD WITHOUT CONTRAST    CLINICAL  HISTORY:  Headache, acute, norm neuro exam;HA, nausea, vomiting, dizziness;    TECHNIQUE:  Low dose axial images were obtained through the head.  Coronal and sagittal reformations were also performed. Contrast was not administered.    COMPARISON:  05/16/2018    FINDINGS:  There is diffuse parenchymal volume loss with ex vacuo ventricular dilatation.  No evidence of acute intracranial blood products.  No extra-axial blood or masses.  Extensive periventricular and subcortical white matter hypodensities.  Focal hypodensities in the bilateral basal ganglia, right thalamus and periventricular white matter are unchanged.  No new large territory infarction.    Osseous structures show no acute fracture.  Visualized mastoid air cells and sinuses are clear.                               X-Ray Chest 1 View (Final result)  Result time 01/21/19 15:56:20   Procedure changed from X-Ray Chest PA And Lateral     Final result by Ranjit Powers MD (01/21/19 15:56:20)                 Impression:      Stable chest.  No active process.      Electronically signed by: Ranjit Powers MD  Date:    01/21/2019  Time:    15:56             Narrative:    EXAMINATION:  XR CHEST 1 VIEW    CLINICAL HISTORY:  Chest Pain;    TECHNIQUE:  Single frontal view of the chest was performed.    COMPARISON:  Two thousand fifteen    FINDINGS:  Heart normal.  Lungs clear.  Apical lordotic positioning.                                 Medical Decision Making:   Clinical Tests:   Lab Tests: Ordered and Reviewed  Radiological Study: Ordered and Reviewed  Medical Tests: Ordered and Reviewed  ED Management:  Ms Kidd has been stable during her time in the ER. Her sxs have all resolved and she adamantly wants to go home. We discussed home care and worrisome signs that should prompt need to return to er should they occur. Discussed ddx that includes tia, vertigo, cva, hypoglycemic episode. Family wants to take her home. I am amenable to this. She is happy  and well and at baseline at time of d/c.             Scribe Attestation:   Scribe #1: I performed the above scribed service and the documentation accurately describes the services I performed. I attest to the accuracy of the note.    Attending Attestation:           Physician Attestation for Scribe:  Physician Attestation Statement for Scribe #1: I, Darci Ojeda MD, reviewed documentation, as scribed by Joslyn Corona in my presence, and it is both accurate and complete.                    Clinical Impression:   The primary encounter diagnosis was Non-intractable vomiting with nausea, unspecified vomiting type. Diagnoses of Chest pain and Acute nonintractable headache, unspecified headache type were also pertinent to this visit.                             Darci Ojeda MD  01/25/19 0533

## 2019-01-22 NOTE — DISCHARGE INSTRUCTIONS
Follow a bland diet as discussed. Rest. Drink plenty of fluids including water and sprite. Return for any new or acute problems or concerns.

## 2019-01-23 LAB
BACTERIA UR CULT: NORMAL
BACTERIA UR CULT: NORMAL

## 2019-05-23 ENCOUNTER — HOSPITAL ENCOUNTER (INPATIENT)
Facility: HOSPITAL | Age: 69
LOS: 4 days | Discharge: HOME HEALTH | DRG: 065 | End: 2019-05-27
Attending: EMERGENCY MEDICINE | Admitting: HOSPITALIST
Payer: MEDICARE

## 2019-05-23 DIAGNOSIS — E87.6 HYPOKALEMIA: ICD-10-CM

## 2019-05-23 DIAGNOSIS — I63.9 ACUTE CVA (CEREBROVASCULAR ACCIDENT): ICD-10-CM

## 2019-05-23 DIAGNOSIS — N39.0 URINARY TRACT INFECTION WITHOUT HEMATURIA, SITE UNSPECIFIED: ICD-10-CM

## 2019-05-23 DIAGNOSIS — G45.9 TIA (TRANSIENT ISCHEMIC ATTACK): ICD-10-CM

## 2019-05-23 DIAGNOSIS — I67.9 CEREBROVASCULAR DISEASE: Primary | ICD-10-CM

## 2019-05-23 PROBLEM — N30.00 ACUTE CYSTITIS WITHOUT HEMATURIA: Status: ACTIVE | Noted: 2019-05-23

## 2019-05-23 PROBLEM — Z86.73 HISTORY OF STROKE: Status: ACTIVE | Noted: 2019-05-23

## 2019-05-23 PROBLEM — Z99.3 WHEELCHAIR DEPENDENCE: Status: ACTIVE | Noted: 2019-05-23

## 2019-05-23 LAB
ALBUMIN SERPL BCP-MCNC: 3.4 G/DL (ref 3.5–5.2)
ALP SERPL-CCNC: 96 U/L (ref 55–135)
ALT SERPL W/O P-5'-P-CCNC: 13 U/L (ref 10–44)
ANION GAP SERPL CALC-SCNC: 8 MMOL/L (ref 8–16)
AST SERPL-CCNC: 13 U/L (ref 10–40)
BACTERIA #/AREA URNS HPF: ABNORMAL /HPF
BASOPHILS # BLD AUTO: 0.01 K/UL (ref 0–0.2)
BASOPHILS NFR BLD: 0.2 % (ref 0–1.9)
BILIRUB SERPL-MCNC: 0.3 MG/DL (ref 0.1–1)
BILIRUB UR QL STRIP: NEGATIVE
BUN SERPL-MCNC: 11 MG/DL (ref 8–23)
CALCIUM SERPL-MCNC: 8.7 MG/DL (ref 8.7–10.5)
CHLORIDE SERPL-SCNC: 110 MMOL/L (ref 95–110)
CLARITY UR: ABNORMAL
CO2 SERPL-SCNC: 23 MMOL/L (ref 23–29)
COLOR UR: YELLOW
CREAT SERPL-MCNC: 0.7 MG/DL (ref 0.5–1.4)
DIFFERENTIAL METHOD: ABNORMAL
EOSINOPHIL # BLD AUTO: 0 K/UL (ref 0–0.5)
EOSINOPHIL NFR BLD: 0.3 % (ref 0–8)
ERYTHROCYTE [DISTWIDTH] IN BLOOD BY AUTOMATED COUNT: 13.5 % (ref 11.5–14.5)
EST. GFR  (AFRICAN AMERICAN): >60 ML/MIN/1.73 M^2
EST. GFR  (NON AFRICAN AMERICAN): >60 ML/MIN/1.73 M^2
GLUCOSE SERPL-MCNC: 88 MG/DL (ref 70–110)
GLUCOSE UR QL STRIP: NEGATIVE
HCT VFR BLD AUTO: 29.7 % (ref 37–48.5)
HGB BLD-MCNC: 9.9 G/DL (ref 12–16)
HGB UR QL STRIP: ABNORMAL
HYALINE CASTS #/AREA URNS LPF: 0 /LPF
KETONES UR QL STRIP: NEGATIVE
LEUKOCYTE ESTERASE UR QL STRIP: ABNORMAL
LYMPHOCYTES # BLD AUTO: 1.1 K/UL (ref 1–4.8)
LYMPHOCYTES NFR BLD: 17.9 % (ref 18–48)
MAGNESIUM SERPL-MCNC: 1.9 MG/DL (ref 1.6–2.6)
MCH RBC QN AUTO: 26 PG (ref 27–31)
MCHC RBC AUTO-ENTMCNC: 33.3 G/DL (ref 32–36)
MCV RBC AUTO: 78 FL (ref 82–98)
MICROSCOPIC COMMENT: ABNORMAL
MONOCYTES # BLD AUTO: 0.6 K/UL (ref 0.3–1)
MONOCYTES NFR BLD: 9.7 % (ref 4–15)
NEUTROPHILS # BLD AUTO: 4.4 K/UL (ref 1.8–7.7)
NEUTROPHILS NFR BLD: 71.7 % (ref 38–73)
NITRITE UR QL STRIP: POSITIVE
PH UR STRIP: 5 [PH] (ref 5–8)
PLATELET # BLD AUTO: 252 K/UL (ref 150–350)
PMV BLD AUTO: 9.7 FL (ref 9.2–12.9)
POTASSIUM SERPL-SCNC: 2.9 MMOL/L (ref 3.5–5.1)
PROT SERPL-MCNC: 7.1 G/DL (ref 6–8.4)
PROT UR QL STRIP: ABNORMAL
RBC # BLD AUTO: 3.81 M/UL (ref 4–5.4)
RBC #/AREA URNS HPF: 2 /HPF (ref 0–4)
SODIUM SERPL-SCNC: 141 MMOL/L (ref 136–145)
SP GR UR STRIP: 1.02 (ref 1–1.03)
TROPONIN I SERPL DL<=0.01 NG/ML-MCNC: 0.01 NG/ML (ref 0–0.03)
URN SPEC COLLECT METH UR: ABNORMAL
UROBILINOGEN UR STRIP-ACNC: NEGATIVE EU/DL
WBC # BLD AUTO: 6.16 K/UL (ref 3.9–12.7)
WBC #/AREA URNS HPF: 20 /HPF (ref 0–5)

## 2019-05-23 PROCEDURE — 63600175 PHARM REV CODE 636 W HCPCS: Performed by: EMERGENCY MEDICINE

## 2019-05-23 PROCEDURE — 93010 EKG 12-LEAD: ICD-10-PCS | Mod: ,,, | Performed by: INTERNAL MEDICINE

## 2019-05-23 PROCEDURE — 96365 THER/PROPH/DIAG IV INF INIT: CPT

## 2019-05-23 PROCEDURE — 87186 SC STD MICRODIL/AGAR DIL: CPT

## 2019-05-23 PROCEDURE — 83735 ASSAY OF MAGNESIUM: CPT

## 2019-05-23 PROCEDURE — 93010 ELECTROCARDIOGRAM REPORT: CPT | Mod: ,,, | Performed by: INTERNAL MEDICINE

## 2019-05-23 PROCEDURE — 87147 CULTURE TYPE IMMUNOLOGIC: CPT

## 2019-05-23 PROCEDURE — 81000 URINALYSIS NONAUTO W/SCOPE: CPT

## 2019-05-23 PROCEDURE — 93005 ELECTROCARDIOGRAM TRACING: CPT

## 2019-05-23 PROCEDURE — 84484 ASSAY OF TROPONIN QUANT: CPT

## 2019-05-23 PROCEDURE — 87086 URINE CULTURE/COLONY COUNT: CPT

## 2019-05-23 PROCEDURE — 80053 COMPREHEN METABOLIC PANEL: CPT

## 2019-05-23 PROCEDURE — 87077 CULTURE AEROBIC IDENTIFY: CPT

## 2019-05-23 PROCEDURE — 99285 EMERGENCY DEPT VISIT HI MDM: CPT | Mod: 25

## 2019-05-23 PROCEDURE — 85025 COMPLETE CBC W/AUTO DIFF WBC: CPT

## 2019-05-23 PROCEDURE — 25000003 PHARM REV CODE 250: Performed by: EMERGENCY MEDICINE

## 2019-05-23 PROCEDURE — 11000001 HC ACUTE MED/SURG PRIVATE ROOM

## 2019-05-23 PROCEDURE — 87088 URINE BACTERIA CULTURE: CPT

## 2019-05-23 RX ORDER — SODIUM CHLORIDE 0.9 % (FLUSH) 0.9 %
10 SYRINGE (ML) INJECTION
Status: DISCONTINUED | OUTPATIENT
Start: 2019-05-23 | End: 2019-05-27 | Stop reason: HOSPADM

## 2019-05-23 RX ORDER — ATORVASTATIN CALCIUM 40 MG/1
40 TABLET, FILM COATED ORAL DAILY
Status: DISCONTINUED | OUTPATIENT
Start: 2019-05-24 | End: 2019-05-27 | Stop reason: HOSPADM

## 2019-05-23 RX ORDER — ASPIRIN 325 MG
325 TABLET ORAL DAILY
Status: DISCONTINUED | OUTPATIENT
Start: 2019-05-24 | End: 2019-05-24

## 2019-05-23 RX ORDER — POTASSIUM CHLORIDE 20 MEQ/1
40 TABLET, EXTENDED RELEASE ORAL
Status: COMPLETED | OUTPATIENT
Start: 2019-05-23 | End: 2019-05-23

## 2019-05-23 RX ORDER — AMLODIPINE BESYLATE 5 MG/1
10 TABLET ORAL DAILY
Status: DISCONTINUED | OUTPATIENT
Start: 2019-05-24 | End: 2019-05-27 | Stop reason: HOSPADM

## 2019-05-23 RX ADMIN — POTASSIUM CHLORIDE 40 MEQ: 1500 TABLET, EXTENDED RELEASE ORAL at 02:05

## 2019-05-23 RX ADMIN — CEFTRIAXONE 1 G: 1 INJECTION, SOLUTION INTRAVENOUS at 02:05

## 2019-05-23 NOTE — ED NOTES
Pt resting comfortably and independently repositioned in stretcher with bed locked in lowest position for safety. NAD noted at this time. Respirations even and unlabored and visible chest rise noted. Collected urine sample. Pt instructed to call if assistance is needed. Pt on continuous cardiac, BP, and O2 monitoring. Call light within reach. Family at bedside. No needs at this time. Will continue to monitor.

## 2019-05-23 NOTE — ED TRIAGE NOTES
"Patient reports to ED via EMS from home with c/o unresponsiveness PTA. Per family, patient was sitting in wheelchair, was noted to be awake but started drooling. Family states they attempted to arouse but pt not responding. Pt states she could hear and feel her family but felt "slow to respond." Episode lasted approx 5 minutes. On arrival to ED, patient is at baseline orientation. States, "my head feels funny," otherwise, denies complaints. Family also states that patient has not been taking any of her medications since last visit to hospital.     Patient identifiers verified and correct for Holly Kidd.    LOC: The patient is awake, alert and aware of environment with an appropriate affect, the patient is oriented x 3 and speaking appropriately.  APPEARANCE: Patient resting comfortably and in no acute distress, patient is clean and well groomed, patient's clothing is properly fastened.  SKIN: The skin is warm and dry, color consistent with ethnicity, patient has normal skin turgor and pale and dry mucus membranes, skin intact, no breakdown or bruising noted.  MUSCULOSKELETAL: Patient moving all extremities spontaneously, no obvious swelling or deformities noted. Generalized weakness noted.  RESPIRATORY: Airway is open and patent, respirations are spontaneous, patient has a normal effort and rate, no accessory muscle use noted, bilateral breath sounds diminished.  CARDIAC: Patient has a normal rate and regular rhythm, no periphreal edema noted, capillary refill < 3 seconds.  ABDOMEN: Soft and non tender to palpation, no distention noted, normoactive bowel sounds present in all four quadrants.  NEUROLOGIC: PERRL, 3mm bilaterally, eyes open spontaneously, behavior appropriate to situation, follows commands, facial expression symmetrical, purposeful motor response noted, normal sensation in all extremities when touched with a finger. Residual left sided weakness from previous stroke.     "

## 2019-05-23 NOTE — ED NOTES
Pt resting comfortably and independently repositioned in stretcher with bed locked in lowest position for safety. NAD noted at this time. Respirations even and unlabored and visible chest rise noted.  Patient offered bathroom assistance and denies need at this time. Pt instructed to call if assistance is needed. Pt on continuous cardiac, BP, and O2 monitoring. Call light within reach. No needs at this time. Will continue to monitor.

## 2019-05-23 NOTE — ED PROVIDER NOTES
"Encounter Date: 5/23/2019    SCRIBE #1 NOTE: I, Sandra Leone, am scribing for, and in the presence of,  Isra Arzola MD. I have scribed the following portions of the note - Other sections scribed: HPI, ROS.       History     Chief Complaint   Patient presents with    Behavior Change Episode     pt here from home via [facility] for episode of behavior change. pt reports she was sitting in wheelchair when she began drooling, her family was asking "do you know who I am". pt reports she remembers event, "was just slow to answer. like I felt silly". symptoms resolved. pt aaox4 at this time. hx stroke, weakness.      CC: Behavior Change     HPI: This 68 y.o female who has HTN and CVA presents to the ED for an evaluation for an acute onset change in activity that occurred prior to arrival.  Patient reports while seated in her wheelchair, she suddenly slumped over and reports hearing her family call out to her.  She reports as they were calling her name, she was not able to answer them.  She reports the episode lasting approximately 5 minutes in duration.  Patient reports since the episode she feels her face drooping and reports "feeling silly in the head".  Patient denies fever, chills, nausea, emesis, diarrhea, abdominal pain, chest pain, shortness of breath, dysuria, or any other associated symptoms.  No prior tx.  No exacerbating factors.     The history is provided by the patient. No  was used.     Review of patient's allergies indicates:  No Known Allergies  Past Medical History:   Diagnosis Date    Essential hypertension 6/12/2015    Obesity (BMI 35.0-39.9) 6/12/2015    Stroke      Past Surgical History:   Procedure Laterality Date    HYSTERECTOMY       Family History   Problem Relation Age of Onset    Stroke Mother     Cancer Father      Social History     Tobacco Use    Smoking status: Never Smoker    Smokeless tobacco: Never Used   Substance Use Topics    Alcohol use: No    Drug use: " No     Review of Systems   Constitutional: Positive for activity change. Negative for chills and fever.   HENT: Negative for ear pain and sore throat.    Eyes: Negative for pain.   Respiratory: Negative for cough and shortness of breath.    Cardiovascular: Negative for chest pain.   Gastrointestinal: Negative for abdominal pain, diarrhea, nausea and vomiting.   Genitourinary: Negative for dysuria.   Musculoskeletal: Negative for back pain.   Skin: Negative for rash.   Neurological: Negative for headaches.       Physical Exam     Initial Vitals [05/23/19 1200]   BP Pulse Resp Temp SpO2   (!) 172/78 72 18 98.1 °F (36.7 °C) 95 %      MAP       --         Physical Exam    Nursing note and vitals reviewed.  HENT:   Head: Atraumatic.   Eyes: Conjunctivae and EOM are normal.   Neck: Normal range of motion.   Cardiovascular: Exam reveals no gallop and no friction rub.    No murmur heard.  Pulmonary/Chest: Breath sounds normal. No respiratory distress.   Abdominal: Soft. There is no tenderness.   Musculoskeletal: Normal range of motion. She exhibits no edema.   Neurological: She is alert and oriented to person, place, and time. No cranial nerve deficit or sensory deficit. GCS score is 15. GCS eye subscore is 4. GCS verbal subscore is 5. GCS motor subscore is 6.   Residual L side weakness   Psychiatric: She has a normal mood and affect.         ED Course   Procedures  Labs Reviewed   CBC W/ AUTO DIFFERENTIAL - Abnormal; Notable for the following components:       Result Value    RBC 3.81 (*)     Hemoglobin 9.9 (*)     Hematocrit 29.7 (*)     Mean Corpuscular Volume 78 (*)     Mean Corpuscular Hemoglobin 26.0 (*)     Lymph% 17.9 (*)     All other components within normal limits   COMPREHENSIVE METABOLIC PANEL - Abnormal; Notable for the following components:    Potassium 2.9 (*)     Albumin 3.4 (*)     All other components within normal limits   URINALYSIS, REFLEX TO URINE CULTURE - Abnormal; Notable for the following  components:    Appearance, UA Hazy (*)     Protein, UA 1+ (*)     Occult Blood UA 1+ (*)     Nitrite, UA Positive (*)     Leukocytes, UA 2+ (*)     All other components within normal limits    Narrative:     Preferred Collection Type->Urine, Clean Catch   URINALYSIS MICROSCOPIC - Abnormal; Notable for the following components:    WBC, UA 20 (*)     Bacteria Moderate (*)     All other components within normal limits    Narrative:     Preferred Collection Type->Urine, Clean Catch   CULTURE, URINE   TROPONIN I   MAGNESIUM          Imaging Results          CT Head Without Contrast (Final result)  Result time 05/23/19 13:10:07    Final result by Jonas Milner MD (05/23/19 13:10:07)                 Impression:      #1.  No significant acute intracranial abnormalities are identified.      Electronically signed by: Jonas Milner MD  Date:    05/23/2019  Time:    13:10             Narrative:    EXAMINATION:  CT HEAD WITHOUT CONTRAST    CLINICAL HISTORY:  tia;    TECHNIQUE:  Low dose axial images were obtained through the head.  Coronal and sagittal reformations were also performed. Contrast was not administered.    COMPARISON:  CT head 01/21/2019    FINDINGS:  There is a stable appearing somewhat linear left-sided corona radiata/left basal ganglia infarct.  There is chronic white matter ischemic change.  The brain parenchyma is otherwise unremarkable with no evidence of hemorrhage, mass, or acute infarct.  Other than compensatory enlargement, the ventricular system demonstrates no distortion by mass effect.    No extra-axial hemorrhage or mass.  There is carotid and vertebral artery atherosclerosis.  The extracranial structures are otherwise unremarkable.  The osseous structures are unremarkable.                                 Medical Decision Making:   Initial Assessment:   68-year-old female with a history of stroke presents with a 15 min episode of difficulty getting words out.  On exam now she is able to speak and  answer questions easily.  She has no neurological deficits.  Her abdomen is benign.  Vital signs are unremarkable. EKG reviewed interpreted myself shows no evidence of acute ischemia.  CT brain shows no acute intracranial process.  Urinalysis shows UTI which I will treat with ceftriaxone.  Labs only notable for low potassium with a normal magnesium.  Oral supplementation has been given.  Symptoms suspicious for TIA.  History of stroke.  Will admit.                        Clinical Impression:       ICD-10-CM ICD-9-CM   1. TIA (transient ischemic attack) G45.9 435.9   2. Urinary tract infection without hematuria, site unspecified N39.0 599.0   3. Hypokalemia E87.6 276.8                                Isra Arzola MD  05/23/19 1522       Isra Arzola MD  05/23/19 1621

## 2019-05-24 LAB
ALBUMIN SERPL BCP-MCNC: 3.7 G/DL (ref 3.5–5.2)
ALP SERPL-CCNC: 96 U/L (ref 55–135)
ALT SERPL W/O P-5'-P-CCNC: 11 U/L (ref 10–44)
ANION GAP SERPL CALC-SCNC: 9 MMOL/L (ref 8–16)
AORTIC ROOT ANNULUS: 3.65 CM
AORTIC VALVE CUSP SEPERATION: 1.99 CM
ASCENDING AORTA: 2.89 CM
AST SERPL-CCNC: 14 U/L (ref 10–40)
AV INDEX (PROSTH): 0.87
AV MEAN GRADIENT: 3.89 MMHG
AV PEAK GRADIENT: 6.86 MMHG
AV VALVE AREA: 2.76 CM2
AV VELOCITY RATIO: 0.86
BASOPHILS # BLD AUTO: 0.01 K/UL (ref 0–0.2)
BASOPHILS NFR BLD: 0.1 % (ref 0–1.9)
BILIRUB SERPL-MCNC: 0.5 MG/DL (ref 0.1–1)
BSA FOR ECHO PROCEDURE: 2.2 M2
BUN SERPL-MCNC: 11 MG/DL (ref 8–23)
CALCIUM SERPL-MCNC: 9.6 MG/DL (ref 8.7–10.5)
CHLORIDE SERPL-SCNC: 106 MMOL/L (ref 95–110)
CHOLEST SERPL-MCNC: 150 MG/DL (ref 120–199)
CHOLEST/HDLC SERPL: 4.5 {RATIO} (ref 2–5)
CO2 SERPL-SCNC: 25 MMOL/L (ref 23–29)
CREAT SERPL-MCNC: 0.8 MG/DL (ref 0.5–1.4)
CV ECHO LV RWT: 0.69 CM
DIFFERENTIAL METHOD: ABNORMAL
DOP CALC AO PEAK VEL: 1.31 M/S
DOP CALC AO VTI: 26.2 CM
DOP CALC LVOT AREA: 3.17 CM2
DOP CALC LVOT DIAMETER: 2.01 CM
DOP CALC LVOT PEAK VEL: 1.13 M/S
DOP CALC LVOT STROKE VOLUME: 72.25 CM3
DOP CALCLVOT PEAK VEL VTI: 22.78 CM
E WAVE DECELERATION TIME: 285.58 MSEC
E/A RATIO: 0.71
E/E' RATIO: 12.73
ECHO LV POSTERIOR WALL: 1.33 CM (ref 0.6–1.1)
EOSINOPHIL # BLD AUTO: 0 K/UL (ref 0–0.5)
EOSINOPHIL NFR BLD: 0.5 % (ref 0–8)
ERYTHROCYTE [DISTWIDTH] IN BLOOD BY AUTOMATED COUNT: 13.7 % (ref 11.5–14.5)
EST. GFR  (AFRICAN AMERICAN): >60 ML/MIN/1.73 M^2
EST. GFR  (NON AFRICAN AMERICAN): >60 ML/MIN/1.73 M^2
ESTIMATED AVG GLUCOSE: 120 MG/DL (ref 68–131)
FRACTIONAL SHORTENING: 34 % (ref 28–44)
GLUCOSE SERPL-MCNC: 100 MG/DL (ref 70–110)
HBA1C MFR BLD HPLC: 5.8 % (ref 4–5.6)
HCT VFR BLD AUTO: 36 % (ref 37–48.5)
HDLC SERPL-MCNC: 33 MG/DL (ref 40–75)
HDLC SERPL: 22 % (ref 20–50)
HGB BLD-MCNC: 12.3 G/DL (ref 12–16)
INTERVENTRICULAR SEPTUM: 1.36 CM (ref 0.6–1.1)
IVRT: 0.12 MSEC
LA MAJOR: 4.78 CM
LA MINOR: 4.88 CM
LA WIDTH: 3.27 CM
LDLC SERPL CALC-MCNC: 98.8 MG/DL (ref 63–159)
LEFT ATRIUM SIZE: 2.47 CM
LEFT ATRIUM VOLUME INDEX: 15.6 ML/M2
LEFT ATRIUM VOLUME: 33.16 CM3
LEFT INTERNAL DIMENSION IN SYSTOLE: 2.52 CM (ref 2.1–4)
LEFT VENTRICLE DIASTOLIC VOLUME INDEX: 29.88 ML/M2
LEFT VENTRICLE DIASTOLIC VOLUME: 63.53 ML
LEFT VENTRICLE MASS INDEX: 87.1 G/M2
LEFT VENTRICLE SYSTOLIC VOLUME INDEX: 10.7 ML/M2
LEFT VENTRICLE SYSTOLIC VOLUME: 22.72 ML
LEFT VENTRICULAR INTERNAL DIMENSION IN DIASTOLE: 3.84 CM (ref 3.5–6)
LEFT VENTRICULAR MASS: 185.16 G
LV LATERAL E/E' RATIO: 10
LV SEPTAL E/E' RATIO: 17.5
LYMPHOCYTES # BLD AUTO: 2.9 K/UL (ref 1–4.8)
LYMPHOCYTES NFR BLD: 36.9 % (ref 18–48)
MAGNESIUM SERPL-MCNC: 2.1 MG/DL (ref 1.6–2.6)
MCH RBC QN AUTO: 26.2 PG (ref 27–31)
MCHC RBC AUTO-ENTMCNC: 34.2 G/DL (ref 32–36)
MCV RBC AUTO: 77 FL (ref 82–98)
MONOCYTES # BLD AUTO: 0.9 K/UL (ref 0.3–1)
MONOCYTES NFR BLD: 12.1 % (ref 4–15)
MV PEAK A VEL: 0.99 M/S
MV PEAK E VEL: 0.7 M/S
NEUTROPHILS # BLD AUTO: 3.9 K/UL (ref 1.8–7.7)
NEUTROPHILS NFR BLD: 50.3 % (ref 38–73)
NONHDLC SERPL-MCNC: 117 MG/DL
PHOSPHATE SERPL-MCNC: 4 MG/DL (ref 2.7–4.5)
PLATELET # BLD AUTO: 333 K/UL (ref 150–350)
PMV BLD AUTO: 10 FL (ref 9.2–12.9)
POTASSIUM SERPL-SCNC: 3.4 MMOL/L (ref 3.5–5.1)
PROT SERPL-MCNC: 7.5 G/DL (ref 6–8.4)
PULM VEIN S/D RATIO: 1.48
PV PEAK D VEL: 0.31 M/S
PV PEAK S VEL: 0.46 M/S
PV PEAK VELOCITY: 0.97 CM/S
RA MAJOR: 4.27 CM
RA WIDTH: 2.18 CM
RBC # BLD AUTO: 4.7 M/UL (ref 4–5.4)
RIGHT VENTRICULAR END-DIASTOLIC DIMENSION: 2.53 CM
RV TISSUE DOPPLER FREE WALL SYSTOLIC VELOCITY 1 (APICAL 4 CHAMBER VIEW): 20.07 M/S
SINUS: 3.61 CM
SODIUM SERPL-SCNC: 140 MMOL/L (ref 136–145)
STJ: 2.92 CM
TDI LATERAL: 0.07
TDI SEPTAL: 0.04
TDI: 0.06
TRICUSPID ANNULAR PLANE SYSTOLIC EXCURSION: 1.95 CM
TRIGL SERPL-MCNC: 91 MG/DL (ref 30–150)
WBC # BLD AUTO: 7.8 K/UL (ref 3.9–12.7)

## 2019-05-24 PROCEDURE — 92610 EVALUATE SWALLOWING FUNCTION: CPT

## 2019-05-24 PROCEDURE — 11000001 HC ACUTE MED/SURG PRIVATE ROOM

## 2019-05-24 PROCEDURE — 85025 COMPLETE CBC W/AUTO DIFF WBC: CPT

## 2019-05-24 PROCEDURE — 99222 PR INITIAL HOSPITAL CARE,LEVL II: ICD-10-PCS | Mod: ,,, | Performed by: PSYCHIATRY & NEUROLOGY

## 2019-05-24 PROCEDURE — 80053 COMPREHEN METABOLIC PANEL: CPT

## 2019-05-24 PROCEDURE — 83036 HEMOGLOBIN GLYCOSYLATED A1C: CPT

## 2019-05-24 PROCEDURE — 63600175 PHARM REV CODE 636 W HCPCS: Performed by: HOSPITALIST

## 2019-05-24 PROCEDURE — 80061 LIPID PANEL: CPT

## 2019-05-24 PROCEDURE — 99222 1ST HOSP IP/OBS MODERATE 55: CPT | Mod: ,,, | Performed by: PSYCHIATRY & NEUROLOGY

## 2019-05-24 PROCEDURE — 84100 ASSAY OF PHOSPHORUS: CPT

## 2019-05-24 PROCEDURE — 36415 COLL VENOUS BLD VENIPUNCTURE: CPT

## 2019-05-24 PROCEDURE — 83735 ASSAY OF MAGNESIUM: CPT

## 2019-05-24 PROCEDURE — 25000003 PHARM REV CODE 250: Performed by: HOSPITALIST

## 2019-05-24 RX ADMIN — AMLODIPINE BESYLATE 10 MG: 5 TABLET ORAL at 09:05

## 2019-05-24 RX ADMIN — CEFTRIAXONE 1 G: 1 INJECTION, SOLUTION INTRAVENOUS at 04:05

## 2019-05-24 RX ADMIN — ATORVASTATIN CALCIUM 40 MG: 40 TABLET, FILM COATED ORAL at 09:05

## 2019-05-24 NOTE — ASSESSMENT & PLAN NOTE
R caudate stroke.  Likely from small vessel disease from hypertension. Stroke work up. Would hold off on ASA or more due to micro hemorrhages. Will follow neuro recs.  Permissive hypertension for now. .

## 2019-05-24 NOTE — HOSPITAL COURSE
Holly Kidd is a 68 y.o. female that (in part)  has a past medical history of Essential hypertension, Obesity (BMI 35.0-39.9), Stroke, and Wheelchair dependence.  has a past surgical history that includes Hysterectomy. Presents to [facility] presents from home by EMS due to acute change in her mental status.  She was sitting in her wheelchair when she slumped over and spaced out.  Patient was experiencing what family describe as expressive aphasia.  Patient found by imaging to have a R caudate acute stroke.  Neuro was consulted as well as PT/OT and speech.  Rec H/H and bed side commode.  The rest of the hospital course was unremarkable and the patient will be discharge to home today.  Diet- low NA. Follow up with Neuro in one week. PCP in one week. Activity as tolerated.

## 2019-05-24 NOTE — PLAN OF CARE
05/24/19 1618   Discharge Assessment   Assessment Type Discharge Planning Assessment   TN to patient 's room to complete Dc needs assessment.  Nurse at doctor at bedside.  TN to follow up at a later time.

## 2019-05-24 NOTE — PLAN OF CARE
Problem: Adult Inpatient Plan of Care  Goal: Plan of Care Review  Outcome: Ongoing (interventions implemented as appropriate)     05/24/19 0332   Plan of Care Review   Plan of Care Reviewed With patient   Pt remains free of falls and injuries. Bed alarm on for safety. Pt AAOx4. Neuro checks intact. No drift noted to upper extrem, no weakness or facial drooping noted. Incontinence care provided as needed. No skin breakdown noted. SCDs to BLE. Denies any pain. No other complaints at this time.

## 2019-05-24 NOTE — PT/OT/SLP PROGRESS
Occupational Therapy      Patient Name:  Holly Kidd   MRN:  [id number]    Patient not seen today secondary to Unavailable (Comment)(off the floor at ECHO). Will follow-up as able.    SOPHIA Rico, MS  5/24/2019

## 2019-05-24 NOTE — NURSING
Pt Holly Kidd MRN [id number] with tele box #[id number] confirmed and verified on tele box and monitor with off going RN.

## 2019-05-24 NOTE — HPI
Holly Kidd is a 68 y.o. female that (in part)  has a past medical history of Essential hypertension, Obesity (BMI 35.0-39.9), Stroke, and Wheelchair dependence.  has a past surgical history that includes Hysterectomy. Presents to [facility] presents from home by EMS due to acute change in her mental status.  She was sitting in her wheelchair when she slumped over and spaced out.  Patient was experiencing what family describe as expressive aphasia.  She was subsequently slow to answer questions and appear confused.  Patient reports recalling this event states she just felt silly.  There was spontaneous resolution of her symptoms prior to the patient arriving at the hospital.  Short duration of approximately 5 min before returning to patient's baseline.  She reports similar feelings in the past when she had previous strokes.  Reports compliance with her home medication regimen, but the family reports that she has not taken any medications since her last visit to the hospital.  Chronically wheelchair dependent due to previous CVA and mobility issues.  No specific exacerbating factors.  No relieving factors.  Generalized radiation and location without focal deficit.  Moderate severely.    In the emergency department routine laboratory studies, urinalysis, and stat head CT were performed.  She had evidence of UTI hypokalemia, but no abnormalities on head CT.  However given her history and presenting symptoms, an MRI of the brain was ordered which revealed evidence of acute stroke and several micro hemorrhages consistent with hypertensive microvascular disease of the brain.    Hospital medicine has been asked to admit for further evaluation and treatment.

## 2019-05-24 NOTE — PT/OT/SLP PROGRESS
Missed Physical Therapy      Patient Name:  Holly Kidd   MRN:  [id number]    Patient not seen today secondary to Unavailable (Comment)(- Pt is Off-of-the-Unit for an Echo (cardio). ).     Nurse Aubree notified.      Will follow-up at later time/day.    Darrell Bernabe, PT   05/24/2019

## 2019-05-24 NOTE — PROGRESS NOTES
[facility] Hospital Medicine  Progress Note    Patient Name: Holly Kidd  MRN: [id number]  Patient Class: IP- Inpatient   Admission Date: 5/23/2019  Length of Stay: 1 days  Attending Physician: Claude Olivarez MD  Primary Care Provider: To Obtain Unable        Subjective:     Principal Problem:Acute ischemic stroke    HPI:  Holly Kidd is a 68 y.o. female that (in part)  has a past medical history of Essential hypertension, Obesity (BMI 35.0-39.9), Stroke, and Wheelchair dependence.  has a past surgical history that includes Hysterectomy. Presents to [facility] Emergency Department presents from home by EMS due to acute change in her mental status.  She was sitting in her wheelchair when she slumped over and spaced out.  Patient was experiencing what family describe as expressive aphasia.  She was subsequently slow to answer questions and appear confused.  Patient reports recalling this event states she just felt silly.  There was spontaneous resolution of her symptoms prior to the patient arriving at the hospital.  Short duration of approximately 5 min before returning to patient's baseline.  She reports similar feelings in the past when she had previous strokes.  Reports compliance with her home medication regimen, but the family reports that she has not taken any medications since her last visit to the hospital.  Chronically wheelchair dependent due to previous CVA and mobility issues.  No specific exacerbating factors.  No relieving factors.  Generalized radiation and location without focal deficit.  Moderate severely.    In the emergency department routine laboratory studies, urinalysis, and stat head CT were performed.  She had evidence of UTI hypokalemia, but no abnormalities on head CT.  However given her history and presenting symptoms, an MRI of the brain was ordered which revealed evidence of acute stroke and several micro hemorrhages consistent  with hypertensive microvascular disease of the brain.    Hospital medicine has been asked to admit for further evaluation and treatment.     Hospital Course:  Holly Kidd is a 68 y.o. female that (in part)  has a past medical history of Essential hypertension, Obesity (BMI 35.0-39.9), Stroke, and Wheelchair dependence.  has a past surgical history that includes Hysterectomy. Presents to [facility] presents from home by EMS due to acute change in her mental status.  She was sitting in her wheelchair when she slumped over and spaced out.  Patient was experiencing what family describe as expressive aphasia.  Patient found by imaging to have a R caudate acute stroke.  Neuro was consulted as well as PT/OT and speech.     Interval History: no new issues.      Review of Systems   Constitutional: Negative for activity change.   HENT: Negative for congestion.    Respiratory: Negative for chest tightness and wheezing.    Cardiovascular: Negative for chest pain.   Genitourinary: Negative for difficulty urinating.   Neurological: Negative for dizziness, speech difficulty, light-headedness and numbness.     Objective:     Vital Signs (Most Recent):  Temp: 97.8 °F (36.6 °C) (05/24/19 0728)  Pulse: 78 (05/24/19 0728)  Resp: 17 (05/24/19 0728)  BP: (!) 153/74 (05/24/19 0728)  SpO2: (!) 94 % (05/24/19 0728) Vital Signs (24h Range):  Temp:  [97.8 °F (36.6 °C)-99.4 °F (37.4 °C)] 97.8 °F (36.6 °C)  Pulse:  [68-83] 78  Resp:  [15-22] 17  SpO2:  [94 %-100 %] 94 %  BP: (135-188)/(68-82) 153/74     Weight: 102.1 kg (225 lb)  Body mass index is 35.24 kg/m².    Intake/Output Summary (Last 24 hours) at 5/24/2019 0902  Last data filed at 5/24/2019 0100  Gross per 24 hour   Intake 530 ml   Output --   Net 530 ml      Physical Exam   Constitutional: She appears well-developed and well-nourished.   HENT:   Head: Normocephalic and atraumatic.   Neurological: She is alert. She displays normal  reflexes. No cranial nerve deficit or sensory deficit.   Skin: Skin is warm and dry.   Psychiatric: She has a normal mood and affect.   Nursing note and vitals reviewed.      Significant Labs:   BMP:   Recent Labs   Lab 05/24/19  0403         K 3.4*      CO2 25   BUN 11   CREATININE 0.8   CALCIUM 9.6   MG 2.1     CBC:   Recent Labs   Lab 05/23/19  1315 05/24/19  0403   WBC 6.16 7.80   HGB 9.9* 12.3   HCT 29.7* 36.0*    333       Significant Imaging: MRI as noted.       Assessment/Plan:      * Acute ischemic stroke  R caudate stroke.  Likely from small vessel disease from hypertension. Stroke work up. Would hold off on ASA or more due to micro hemorrhages. Will follow neuro recs.  Permissive hypertension for now. .       History of stroke  No acute issues       Wheelchair dependence  PT/OT speech       Acute cystitis without hematuria  On Abx. Follow urine cultures       Obesity (BMI 35.0-39.9)  Body mass index is 35.24 kg/m².  Weight loss as out patient      Hypokalemia  Will replace.       Essential hypertension  Permissive hypertension for now.         VTE Risk Mitigation (From admission, onward)        Ordered     IP VTE HIGH RISK PATIENT  Once      05/23/19 1918     Place LUKE hose  Until discontinued      05/23/19 1918     Place sequential compression device  Until discontinued      05/23/19 1918              Claude Herman MD  Department of Hospital Medicine   [facility]

## 2019-05-24 NOTE — SUBJECTIVE & OBJECTIVE
Interval History: no new issues.      Review of Systems   Constitutional: Negative for activity change.   HENT: Negative for congestion.    Respiratory: Negative for chest tightness and wheezing.    Cardiovascular: Negative for chest pain.   Genitourinary: Negative for difficulty urinating.   Neurological: Negative for dizziness, speech difficulty, light-headedness and numbness.     Objective:     Vital Signs (Most Recent):  Temp: 97.8 °F (36.6 °C) (05/24/19 0728)  Pulse: 78 (05/24/19 0728)  Resp: 17 (05/24/19 0728)  BP: (!) 153/74 (05/24/19 0728)  SpO2: (!) 94 % (05/24/19 0728) Vital Signs (24h Range):  Temp:  [97.8 °F (36.6 °C)-99.4 °F (37.4 °C)] 97.8 °F (36.6 °C)  Pulse:  [68-83] 78  Resp:  [15-22] 17  SpO2:  [94 %-100 %] 94 %  BP: (135-188)/(68-82) 153/74     Weight: 102.1 kg (225 lb)  Body mass index is 35.24 kg/m².    Intake/Output Summary (Last 24 hours) at 5/24/2019 0902  Last data filed at 5/24/2019 0100  Gross per 24 hour   Intake 530 ml   Output --   Net 530 ml      Physical Exam   Constitutional: She appears well-developed and well-nourished.   HENT:   Head: Normocephalic and atraumatic.   Neurological: She is alert. She displays normal reflexes. No cranial nerve deficit or sensory deficit.   Skin: Skin is warm and dry.   Psychiatric: She has a normal mood and affect.   Nursing note and vitals reviewed.      Significant Labs:   BMP:   Recent Labs   Lab 05/24/19  0403         K 3.4*      CO2 25   BUN 11   CREATININE 0.8   CALCIUM 9.6   MG 2.1     CBC:   Recent Labs   Lab 05/23/19  1315 05/24/19  0403   WBC 6.16 7.80   HGB 9.9* 12.3   HCT 29.7* 36.0*    333       Significant Imaging: MRI as noted.

## 2019-05-24 NOTE — PT/OT/SLP EVAL
Speech Language Pathology Evaluation  Bedside Swallow    Patient Name:  Holly Kidd   MRN:  [id number]  Admitting Diagnosis: Acute ischemic stroke    Recommendations:                 General Recommendations:  Dysphagia therapy  Diet recommendations:  Regular(per request), Thin   Aspiration Precautions: 1 bite/sip at a time   General Precautions: Standard,    Communication strategies:  provide increased time to answer    History:     Past Medical History:   Diagnosis Date    Essential hypertension 6/12/2015    Obesity (BMI 35.0-39.9) 6/12/2015    Stroke 06/2015    Left side weakness    Wheelchair dependence        Past Surgical History:   Procedure Laterality Date    HYSTERECTOMY       Social History: Patient lives with family.    Modified Barium Swallow: n/a    Chest X-Rays: 5/24/19 Heart normal.  Lungs clear.  Apical lordotic positioning.    Prior diet: unrestricted    Subjective   Pt seen upright in bed, family reporting Pt cognitive linguistic skills are at baseline, family reporting trace slur below baseline.   Patient goals: continue current diet    Pain/Comfort:  · Pain Rating 1: 0/10    Objective:     Oral Musculature Evaluation  · Oral Musculature: general weakness  · Dentition: teeth in poor condition  · Secretion Management: adequate  · Mandibular Strength and Mobility: WNL  · Oral Labial Strength and Mobility: impaired seal  · Lingual Strength and Mobility: WFL  · Velar Elevation: WFL  · Buccal Strength and Mobility: WFL  · Voice Prior to PO Intake: wfl  · Oral Musculature Comments: premorbid left droop     Bedside Swallow Eval:   Consistencies Assessed:  · Thin liquids 4+ via straw  · Puree X3  · Solids X3     Oral Phase:   · WFL    Pharyngeal Phase:   · no overt clinical signs/symptoms of aspiration    Compensatory Strategies  · None    Treatment: please note silent aspiration cannot be r/o at bedside     Assessment:     Holly Kidd is a 68 y.o. female with dx Acute ischemic stroke she  presents with functional swallow at reported baseline level of function and trace dysarthria c/b slur below baseline.     Goals:   Multidisciplinary Problems     SLP Goals        Problem: SLP Goal    Goal Priority Disciplines Outcome   SLP Goal    Low SLP Ongoing (interventions implemented as appropriate)   Description:  STGs  1. Pt will tolerate regular with thin liquids without overt s/s of aspiration.   2. Pt will (I)ly utilized speech intelligibility strategies at level of conversation.                    Plan:     · Patient to be seen:  3 x/week   · Plan of Care reviewed with:  patient   · SLP Follow-Up:  Yes       Discharge recommendations:  other (see comments)(TBD)   Barriers to Discharge:  None    Time Tracking:     SLP Treatment Date:   05/24/19  Speech Start Time:  1230  Speech Stop Time:  1240     Speech Total Time (min):  10 min    Billable Minutes: Eval Swallow and Oral Function 10    Marcie Chase CCC-SLP  05/24/2019

## 2019-05-24 NOTE — PLAN OF CARE
Problem: SLP Goal  Goal: SLP Goal  STGs  1. Pt will tolerate regular with thin liquids without overt s/s of aspiration.   2. Pt will (I)ly utilized speech intelligibility strategies at level of conversation.   Outcome: Ongoing (interventions implemented as appropriate)  5/24/19 ST RECS: continue current diet, ST to follow to ensure diet tolerance. Marcie Chase, CCC-SLP

## 2019-05-24 NOTE — CONSULTS
"[facility]  Neurology  Consult Note    Patient Name: Holly Kidd  MRN: [id number]  Admission Date: 5/23/2019  Hospital Length of Stay: 1 days  Code Status: Full Code   Attending Provider: Claude Olivarez MD   Consulting Provider: Mick Mccallum MD  Primary Care Physician: To Obtain Unable  Principal Problem:Acute ischemic stroke    Consults  Subjective:     Chief Complaint/Reason for consult: Stroke     HPI: 69 y/o female with medical Hx as listed below comes to ED due to AMS. Apparently pt was found slumped over in her wheelchair and staring. Pt slowly recovered but appeared confused. Ms. Kidd says "I don't know why I am here". She does reports a stroke 4 years ago. No headaches, vertigo, unilateral weakness. Pt does not walk and mobilized through wheelchair. No apparent aggravating or alleviating factors.    Past Medical History:   Diagnosis Date    Essential hypertension 6/12/2015    Obesity (BMI 35.0-39.9) 6/12/2015    Stroke 06/2015    Left side weakness    Wheelchair dependence        Past Surgical History:   Procedure Laterality Date    HYSTERECTOMY         Review of patient's allergies indicates:  No Known Allergies    Current Neurological Medications:     No current facility-administered medications on file prior to encounter.      Current Outpatient Medications on File Prior to Encounter   Medication Sig    amLODIPine (NORVASC) 5 MG tablet Take 1 tablet (5 mg total) by mouth once daily.    amLODIPine (NORVASC) 5 MG tablet Take 2 tablets (10 mg total) by mouth once daily.    aspirin 325 MG tablet Take 1 tablet (325 mg total) by mouth once daily.    atorvastatin (LIPITOR) 40 MG tablet Take 1 tablet (40 mg total) by mouth once daily.    cetirizine (ZYRTEC) 10 MG tablet Take 1 tablet (10 mg total) by mouth once daily.    HYDROcodone-acetaminophen (NORCO) 5-325 mg per tablet Take 1 tablet by mouth every 4 (four) hours as needed.      Family History     Problem Relation " (Age of Onset)    Cancer Father    Stroke Mother        Tobacco Use    Smoking status: Never Smoker    Smokeless tobacco: Never Used   Substance and Sexual Activity    Alcohol use: No    Drug use: No    Sexual activity: Not on file     Review of Systems   Constitutional: Negative for fever.   HENT: Negative for trouble swallowing.    Eyes: Negative for photophobia.   Respiratory: Negative for shortness of breath.    Cardiovascular: Negative for chest pain.   Gastrointestinal: Negative for abdominal pain.   Genitourinary: Negative for dysuria.   Musculoskeletal: Negative for back pain.   Neurological: Negative for headaches.     Objective:     Vital Signs (Most Recent):  Temp: 97.8 °F (36.6 °C) (05/24/19 0728)  Pulse: 78 (05/24/19 0728)  Resp: 17 (05/24/19 0728)  BP: (!) 153/74 (05/24/19 0728)  SpO2: (!) 94 % (05/24/19 0728) Vital Signs (24h Range):  Temp:  [97.8 °F (36.6 °C)-99.4 °F (37.4 °C)] 97.8 °F (36.6 °C)  Pulse:  [68-83] 78  Resp:  [15-22] 17  SpO2:  [94 %-100 %] 94 %  BP: (135-188)/(68-82) 153/74     Weight: 102.1 kg (225 lb)  Body mass index is 35.24 kg/m².    Physical Exam   HENT:   Head: Normocephalic.   Eyes: Right eye exhibits no discharge. Left eye exhibits discharge.   Neck: Normal range of motion.   Cardiovascular: Regular rhythm.   Pulmonary/Chest: Breath sounds normal.   Abdominal: Bowel sounds are normal.   Musculoskeletal: She exhibits no tenderness.   Skin: She is not diaphoretic.   Psychiatric: Her speech is normal.       NEUROLOGICAL EXAMINATION:     MENTAL STATUS   Speech: speech is normal   Level of consciousness: alert       Oriented to person, place, not time     CRANIAL NERVES     CN II   Left visual field deficit: upper temporal and lower temporal quadrant(s)    CN III, IV, VI   Right pupil: Shape: regular.   Left pupil: Shape: regular.   Nystagmus: none   Conjugate gaze: present    CN VII   Right facial weakness: none  Left facial weakness: none    CN XII   Tongue deviation:  none    MOTOR EXAM        Elevates UE's and LE's against gravity; no dirft     SENSORY EXAM   Right arm light touch: normal  Left arm light touch: normal  Right leg light touch: normal  Left leg light touch: normal      Significant Labs:   CBC:   Recent Labs   Lab 05/23/19  1315 05/24/19  0403   WBC 6.16 7.80   HGB 9.9* 12.3   HCT 29.7* 36.0*    333     CMP:   Recent Labs   Lab 05/23/19  1315 05/24/19  0403   GLU 88 100    140   K 2.9* 3.4*    106   CO2 23 25   BUN 11 11   CREATININE 0.7 0.8   CALCIUM 8.7 9.6   MG 1.9 2.1   PROT 7.1 7.5   ALBUMIN 3.4* 3.7   BILITOT 0.3 0.5   ALKPHOS 96 96   AST 13 14   ALT 13 11   ANIONGAP 8 9   EGFRNONAA >60 >60       Significant Imaging:    MRI brain  Impression       Focus of diffusion restriction within the right caudate body, consistent with acute infarction.  Hemosiderin deposition in the region of the infarction, suggestive of small associated microhemorrhage.    Extensive areas of microhemorrhages throughout the supratentorial brain.  Overall findings are most suggestive of hypertensive microhemorrhages.    Changes of extensive chronic small vessel ischemic disease and cerebral volume loss.    This report was flagged in Epic as abnormal.      Electronically signed by: Gonzalez Flores MD  Date: 05/23/2019  Time: 16:08           Assessment and Plan:     67 y/o female with acute stroke    1. Stroke: area of infarction on right caudate. No major deficits from this stroke.   There seems to be a very small hemorrhage associated to it. On MRI several areas of hemosiderin deposits throughout hemispherescan be seen on GRE sequences suggestive of amyloid angiopathy. This and extensive microvascular ischemia can be cause of dementia; may put her at risk for lobar hemorrhages as well. Also. Pt seems to have left temporal visual deficit that probably is chronic as a caudate stroke will not result in this deficits.   -OK for -170's.   -Likely ASA in AM.       Active  Diagnoses:    Diagnosis Date Noted POA    PRINCIPAL PROBLEM:  Acute ischemic stroke [I63.9] 05/23/2019 Yes    Acute cystitis without hematuria [N30.00] 05/23/2019 Yes    Wheelchair dependence [Z99.3] 05/23/2019 Not Applicable    History of stroke [Z86.73] 05/23/2019 Not Applicable    Essential hypertension [I10] 06/12/2015 Yes     Chronic    Obesity (BMI 35.0-39.9) [E66.9] 06/12/2015 Yes     Chronic    Hypokalemia [E87.6] 06/12/2015 Yes      Problems Resolved During this Admission:       VTE Risk Mitigation (From admission, onward)        Ordered     IP VTE HIGH RISK PATIENT  Once      05/23/19 1918     Place LUKE hose  Until discontinued      05/23/19 1918     Place sequential compression device  Until discontinued      05/23/19 1918          Thank you for your consult. I will follow-up with patient. Please contact us if you have any additional questions.    Mick Mccallum MD  Neurology  [facility]

## 2019-05-25 LAB
BACTERIA UR CULT: NORMAL
BACTERIA UR CULT: NORMAL
POTASSIUM SERPL-SCNC: 3.5 MMOL/L (ref 3.5–5.1)

## 2019-05-25 PROCEDURE — 36415 COLL VENOUS BLD VENIPUNCTURE: CPT

## 2019-05-25 PROCEDURE — 11000001 HC ACUTE MED/SURG PRIVATE ROOM

## 2019-05-25 PROCEDURE — 25000003 PHARM REV CODE 250: Performed by: INTERNAL MEDICINE

## 2019-05-25 PROCEDURE — 25000003 PHARM REV CODE 250: Performed by: HOSPITALIST

## 2019-05-25 PROCEDURE — 84132 ASSAY OF SERUM POTASSIUM: CPT

## 2019-05-25 RX ORDER — AMOXICILLIN AND CLAVULANATE POTASSIUM 875; 125 MG/1; MG/1
1 TABLET, FILM COATED ORAL EVERY 12 HOURS
Status: DISCONTINUED | OUTPATIENT
Start: 2019-05-25 | End: 2019-05-26

## 2019-05-25 RX ADMIN — AMLODIPINE BESYLATE 10 MG: 5 TABLET ORAL at 08:05

## 2019-05-25 RX ADMIN — AMOXICILLIN AND CLAVULANATE POTASSIUM 1 TABLET: 875; 125 TABLET, FILM COATED ORAL at 10:05

## 2019-05-25 RX ADMIN — ATORVASTATIN CALCIUM 40 MG: 40 TABLET, FILM COATED ORAL at 08:05

## 2019-05-25 RX ADMIN — AMOXICILLIN AND CLAVULANATE POTASSIUM 1 TABLET: 875; 125 TABLET, FILM COATED ORAL at 08:05

## 2019-05-25 NOTE — PLAN OF CARE
Problem: Adult Inpatient Plan of Care  Goal: Plan of Care Review  Outcome: Ongoing (interventions implemented as appropriate)  Patient remains free from injury and falls. Denies pain. AAOx4. Neuro checks performed as ordered with no deficits. Patient assisted with turning every 2 hours. Pure wick catheter in place. Patient resting throughout shift. Current plan of care continued.

## 2019-05-25 NOTE — PROGRESS NOTES
[facility] Hospital Medicine  Progress Note    Patient Name: Holly Kidd  MRN: [id number]  Patient Class: IP- Inpatient   Admission Date: 5/23/2019  Length of Stay: 2 days  Attending Physician: Claude Olivarez MD  Primary Care Provider: To Obtain Unable        Subjective:     Principal Problem:Acute ischemic stroke    HPI:  Holly Kidd is a 68 y.o. female that (in part)  has a past medical history of Essential hypertension, Obesity (BMI 35.0-39.9), Stroke, and Wheelchair dependence.  has a past surgical history that includes Hysterectomy. Presents to [facility] Emergency Department presents from home by EMS due to acute change in her mental status.  She was sitting in her wheelchair when she slumped over and spaced out.  Patient was experiencing what family describe as expressive aphasia.  She was subsequently slow to answer questions and appear confused.  Patient reports recalling this event states she just felt silly.  There was spontaneous resolution of her symptoms prior to the patient arriving at the hospital.  Short duration of approximately 5 min before returning to patient's baseline.  She reports similar feelings in the past when she had previous strokes.  Reports compliance with her home medication regimen, but the family reports that she has not taken any medications since her last visit to the hospital.  Chronically wheelchair dependent due to previous CVA and mobility issues.  No specific exacerbating factors.  No relieving factors.  Generalized radiation and location without focal deficit.  Moderate severely.    In the emergency department routine laboratory studies, urinalysis, and stat head CT were performed.  She had evidence of UTI hypokalemia, but no abnormalities on head CT.  However given her history and presenting symptoms, an MRI of the brain was ordered which revealed evidence of acute stroke and several micro hemorrhages consistent  with hypertensive microvascular disease of the brain.    Hospital medicine has been asked to admit for further evaluation and treatment.     Hospital Course:  Holly Kidd is a 68 y.o. female that (in part)  has a past medical history of Essential hypertension, Obesity (BMI 35.0-39.9), Stroke, and Wheelchair dependence.  has a past surgical history that includes Hysterectomy. Presents to [facility] presents from home by EMS due to acute change in her mental status.  She was sitting in her wheelchair when she slumped over and spaced out.  Patient was experiencing what family describe as expressive aphasia.  Patient found by imaging to have a R caudate acute stroke.  Neuro was consulted as well as PT/OT and speech.      Interval History: No new issues.     Review of Systems   Constitutional: Negative for activity change.   HENT: Negative for congestion.    Respiratory: Negative for chest tightness and wheezing.    Cardiovascular: Negative for chest pain.   Genitourinary: Negative for difficulty urinating.   Neurological: Negative for dizziness, speech difficulty, light-headedness and numbness.     Objective:     Vital Signs (Most Recent):  Temp: 97.9 °F (36.6 °C) (05/25/19 0734)  Pulse: 68 (05/25/19 0734)  Resp: 17 (05/25/19 0734)  BP: 134/67 (05/25/19 0734)  SpO2: 98 % (05/25/19 0734) Vital Signs (24h Range):  Temp:  [97.9 °F (36.6 °C)-98.6 °F (37 °C)] 97.9 °F (36.6 °C)  Pulse:  [68-84] 68  Resp:  [17-20] 17  SpO2:  [96 %-99 %] 98 %  BP: (133-156)/(67-86) 134/67     Weight: 102.1 kg (225 lb)  Body mass index is 35.24 kg/m².    Intake/Output Summary (Last 24 hours) at 5/25/2019 1019  Last data filed at 5/25/2019 1000  Gross per 24 hour   Intake 480 ml   Output 500 ml   Net -20 ml      Physical Exam   Constitutional: She appears well-developed and well-nourished.   HENT:   Head: Normocephalic and atraumatic.   Neurological: She is alert. She displays normal reflexes. No  cranial nerve deficit or sensory deficit.   Skin: Skin is warm and dry.   Psychiatric: She has a normal mood and affect.   Nursing note and vitals reviewed.      Significant Labs:   BMP:   Recent Labs   Lab 05/24/19  0403         K 3.4*      CO2 25   BUN 11   CREATININE 0.8   CALCIUM 9.6   MG 2.1     CBC:   Recent Labs   Lab 05/23/19  1315 05/24/19  0403   WBC 6.16 7.80   HGB 9.9* 12.3   HCT 29.7* 36.0*    333       Significant Imaging:    Assessment/Plan:      * Acute ischemic stroke  R caudate stroke.  Likely from small vessel disease from hypertension. Stroke work up. Would hold off on ASA or more due to micro hemorrhages. Will follow neuro recs.  Permissive hypertension for now. .   Will add back ASA per neuro today       History of stroke  No acute issues       Wheelchair dependence  PT/OT speech       Acute cystitis without hematuria  On Abx. Follow urine cultures   Ecoli and group b strep.  Consider Abx     Obesity (BMI 35.0-39.9)  Body mass index is 35.24 kg/m².  Weight loss as out patient      Hypokalemia  Will replace.       Essential hypertension  Permissive hypertension for now.         VTE Risk Mitigation (From admission, onward)        Ordered     IP VTE HIGH RISK PATIENT  Once      05/23/19 1918     Place LUKE hose  Until discontinued      05/23/19 1918     Place sequential compression device  Until discontinued      05/23/19 1918        Add ASA. PT/OT consult pending      Claude Herman MD  Department of Hospital Medicine   [facility]

## 2019-05-25 NOTE — ASSESSMENT & PLAN NOTE
R caudate stroke.  Likely from small vessel disease from hypertension. Stroke work up. Would hold off on ASA or more due to micro hemorrhages. Will follow neuro recs.  Permissive hypertension for now. .   Will add back ASA per neuro today

## 2019-05-25 NOTE — PT/OT/SLP PROGRESS
Physical Therapy      Patient Name:  Holly Kidd   MRN:  [id number]    Patient not seen today secondary to Patient unwilling to participate(- patient is currently sleeping.  ).     Nurse Vera notified.     Will follow-up at later time/day.    Darrell Bernabe, PT   05/25/2019

## 2019-05-25 NOTE — PT/OT/SLP PROGRESS
Physical Therapy      Patient Name:  Holly Kidd   MRN:  [id number]    Patient not seen today secondary to Nursing care(- pt is currently on a bed prieto.  ).     Nurse Vera notified.      Will follow-up at later time/day.    Darrell Bernabe, PT   05/25/2019

## 2019-05-25 NOTE — PLAN OF CARE
Problem: Adult Inpatient Plan of Care  Goal: Plan of Care Review  Outcome: Ongoing (interventions implemented as appropriate)  Pt free from falls, injury or any further trauma throughout shift. AAOx2. Continued medications as ordered. No complaints of pain throughout shift. Pt turned q2hrs. Pt in no distress. Will cont to monitor.    05/25/19 8926   Plan of Care Review   Plan of Care Reviewed With patient

## 2019-05-25 NOTE — SUBJECTIVE & OBJECTIVE
Interval History: No new issues.     Review of Systems   Constitutional: Negative for activity change.   HENT: Negative for congestion.    Respiratory: Negative for chest tightness and wheezing.    Cardiovascular: Negative for chest pain.   Genitourinary: Negative for difficulty urinating.   Neurological: Negative for dizziness, speech difficulty, light-headedness and numbness.     Objective:     Vital Signs (Most Recent):  Temp: 97.9 °F (36.6 °C) (05/25/19 0734)  Pulse: 68 (05/25/19 0734)  Resp: 17 (05/25/19 0734)  BP: 134/67 (05/25/19 0734)  SpO2: 98 % (05/25/19 0734) Vital Signs (24h Range):  Temp:  [97.9 °F (36.6 °C)-98.6 °F (37 °C)] 97.9 °F (36.6 °C)  Pulse:  [68-84] 68  Resp:  [17-20] 17  SpO2:  [96 %-99 %] 98 %  BP: (133-156)/(67-86) 134/67     Weight: 102.1 kg (225 lb)  Body mass index is 35.24 kg/m².    Intake/Output Summary (Last 24 hours) at 5/25/2019 1019  Last data filed at 5/25/2019 1000  Gross per 24 hour   Intake 480 ml   Output 500 ml   Net -20 ml      Physical Exam   Constitutional: She appears well-developed and well-nourished.   HENT:   Head: Normocephalic and atraumatic.   Neurological: She is alert. She displays normal reflexes. No cranial nerve deficit or sensory deficit.   Skin: Skin is warm and dry.   Psychiatric: She has a normal mood and affect.   Nursing note and vitals reviewed.      Significant Labs:   BMP:   Recent Labs   Lab 05/24/19  0403         K 3.4*      CO2 25   BUN 11   CREATININE 0.8   CALCIUM 9.6   MG 2.1     CBC:   Recent Labs   Lab 05/23/19  1315 05/24/19  0403   WBC 6.16 7.80   HGB 9.9* 12.3   HCT 29.7* 36.0*    333       Significant Imaging:

## 2019-05-25 NOTE — PLAN OF CARE
Problem: Fall Injury Risk  Goal: Absence of Fall and Fall-Related Injury  Outcome: Ongoing (interventions implemented as appropriate)  Intervention: Identify and Manage Contributors to Fall Injury Risk  No falls or injuries sits at side of bed with assistance      Problem: Adult Inpatient Plan of Care  Goal: Plan of Care Review     05/24/19 1700   Plan of Care Review   Plan of Care Reviewed With patient   Progress improving   Patient awake,oriented x 4. Denies pain  vital signs stable.follows simple command    Problem: Skin Injury Risk Increased  Goal: Skin Health and Integrity  Outcome: Ongoing (interventions implemented as appropriate)  Skin intact.turned every 2 hours and PRN. No skin issues noted    Problem: Adjustment to Illness (Stroke, Ischemic/Transient Ischemic Attack)  Goal: Optimal Coping  Outcome: Ongoing (interventions implemented as appropriate)  Calm able to express feelings    Problem: Eating/Swallowing Impairment (Stroke, Ischemic/Transient Ischemic Attack)  Goal: Oral Intake without Aspiration  Outcome: Ongoing (interventions implemented as appropriate)  Eats well.Swallow without problems    Comments: No falls or injuries

## 2019-05-25 NOTE — PT/OT/SLP PROGRESS
Occupational Therapy      Patient Name:  Holly Kidd   MRN:  [id number]    Patient not seen today secondary to Unavailable (Comment)(multiple attempts- 1042 on bedpan, 1206 still on bedpan, 1518 patient asleep unable to arouse). Will follow-up as able.    SOPHIA Rico, MS  5/25/2019

## 2019-05-25 NOTE — PT/OT/SLP PROGRESS
Missed Physical Therapy      Patient Name:  Holly Kidd   MRN:  [id number]    Patient not seen today secondary to Nursing care(- Pt is still on bed pain.  pt reports not using bed pan yet, but still needs to go. ).     Nurse Vera notified of 2nd attempt.      Will follow-up at later time/day.    Darrell Bernabe, PT   05/25/2019

## 2019-05-26 PROCEDURE — 99232 SBSQ HOSP IP/OBS MODERATE 35: CPT | Mod: ,,, | Performed by: PSYCHIATRY & NEUROLOGY

## 2019-05-26 PROCEDURE — 63600175 PHARM REV CODE 636 W HCPCS: Performed by: INTERNAL MEDICINE

## 2019-05-26 PROCEDURE — 97165 OT EVAL LOW COMPLEX 30 MIN: CPT

## 2019-05-26 PROCEDURE — 99232 PR SUBSEQUENT HOSPITAL CARE,LEVL II: ICD-10-PCS | Mod: ,,, | Performed by: PSYCHIATRY & NEUROLOGY

## 2019-05-26 PROCEDURE — 25000003 PHARM REV CODE 250: Performed by: HOSPITALIST

## 2019-05-26 PROCEDURE — 11000001 HC ACUTE MED/SURG PRIVATE ROOM

## 2019-05-26 PROCEDURE — 25000003 PHARM REV CODE 250: Performed by: INTERNAL MEDICINE

## 2019-05-26 PROCEDURE — 97161 PT EVAL LOW COMPLEX 20 MIN: CPT | Performed by: PHYSICAL THERAPIST

## 2019-05-26 RX ORDER — CIPROFLOXACIN 500 MG/1
500 TABLET ORAL EVERY 12 HOURS
Status: DISCONTINUED | OUTPATIENT
Start: 2019-05-26 | End: 2019-05-27 | Stop reason: HOSPADM

## 2019-05-26 RX ORDER — NAPROXEN SODIUM 220 MG/1
81 TABLET, FILM COATED ORAL DAILY
Status: DISCONTINUED | OUTPATIENT
Start: 2019-05-27 | End: 2019-05-27 | Stop reason: HOSPADM

## 2019-05-26 RX ORDER — ENOXAPARIN SODIUM 100 MG/ML
40 INJECTION SUBCUTANEOUS EVERY 24 HOURS
Status: DISCONTINUED | OUTPATIENT
Start: 2019-05-26 | End: 2019-05-27 | Stop reason: HOSPADM

## 2019-05-26 RX ORDER — POLYETHYLENE GLYCOL 3350 17 G/17G
17 POWDER, FOR SOLUTION ORAL 2 TIMES DAILY
Status: DISCONTINUED | OUTPATIENT
Start: 2019-05-26 | End: 2019-05-27 | Stop reason: HOSPADM

## 2019-05-26 RX ADMIN — ATORVASTATIN CALCIUM 40 MG: 40 TABLET, FILM COATED ORAL at 08:05

## 2019-05-26 RX ADMIN — POLYETHYLENE GLYCOL 3350 17 G: 17 POWDER, FOR SOLUTION ORAL at 08:05

## 2019-05-26 RX ADMIN — ENOXAPARIN SODIUM 40 MG: 100 INJECTION SUBCUTANEOUS at 05:05

## 2019-05-26 RX ADMIN — CIPROFLOXACIN HYDROCHLORIDE 500 MG: 500 TABLET, FILM COATED ORAL at 08:05

## 2019-05-26 RX ADMIN — AMLODIPINE BESYLATE 10 MG: 5 TABLET ORAL at 08:05

## 2019-05-26 NOTE — NURSING
MD notified that pt has not had a BM since prior to admit, new orders given. Will cont to monitor.

## 2019-05-26 NOTE — PLAN OF CARE
Problem: Physical Therapy Goal  Goal: Physical Therapy Goal  Goals to be met by: 2019     Patient will increase functional independence with mobility by performin. Supine to sit with Modified [place]  2. Sit to supine with Modified [place]  3. Sit to stand transfer with Minimal Assistance  4. Bed to chair transfer with Minimal Assistance using HHA  5. Sitting at edge of bed x25 minutes with [place]  6. Lower extremity exercise program x25 reps per handout, with supervision  7. Wheelchair = to be assess at later time.     Recommend: HHPT at time of discharge.       Outcome: Ongoing (interventions implemented as appropriate)  Darrell Bernabe,PT  2019

## 2019-05-26 NOTE — PLAN OF CARE
Problem: Occupational Therapy Goal  Goal: Occupational Therapy Goal  Goals to be met by: 6/9/19    Patient will increase functional independence with ADLs by performing:    Feeding with Modified [place] and Set-up Assistance.  UE Dressing with Contact Guard Assistance.  Grooming while seated with Modified [place] and Set-up Assistance.  Toileting from bedside commode with Moderate Assistance for hygiene and clothing management.   Supine to sit with Contact Guard Assistance.  Squat pivot transfers with Minimal Assistance.  Toilet transfer to bedside commode with Minimal Assistance.  Upper extremity exercise program x10 reps per handout, with assistance as needed.    Outcome: Ongoing (interventions implemented as appropriate)  Patient will benefit from OT to address functional deficits.    Comments: The patient will benefit from  OT.

## 2019-05-26 NOTE — PLAN OF CARE
05/26/19 0818   Discharge Assessment   Assessment Type Discharge Planning Assessment   Confirmed/corrected address and phone number on facesheet? Yes   Assessment information obtained from? Patient;Medical Record   Prior to hospitilization cognitive status: Alert/Oriented   Prior to hospitalization functional status: Wheelchair Bound   Current cognitive status: Alert/Oriented   Current Functional Status: Wheelchair Bound   Lives With child(marvin), adult   Able to Return to Prior Arrangements yes   Is patient able to care for self after discharge? No   Who are your caregiver(s) and their phone number(s)? Lana Malone [phone]   Patient's perception of discharge disposition admitted as an inpatient   Readmission Within the Last 30 Days no previous admission in last 30 days   Patient currently being followed by outpatient case management? No   Patient currently receives any other outside agency services? No   Equipment Currently Used at Home wheelchair   Do you have any problems affording any of your prescribed medications? No   Is the patient taking medications as prescribed? yes   Does the patient have transportation home? Yes   Does the patient receive services at the [facility]? No   Discharge Plan A Home with family   Discharge Plan B Home with family   DME Needed Upon Discharge  wheelchair   Patient/Family in Agreement with Plan yes

## 2019-05-26 NOTE — PLAN OF CARE
Problem: Adult Inpatient Plan of Care  Goal: Plan of Care Review  Outcome: Ongoing (interventions implemented as appropriate)  Patient remains free from injury and falls. Denies pain. Patient assisted with turning throughout shift. Medications administered as ordered. Resting throughout shift. Current plan of care continued.

## 2019-05-26 NOTE — SUBJECTIVE & OBJECTIVE
Interval History: No new complaints. Awake and alert     Review of Systems   Constitutional: Negative for activity change.   HENT: Negative for congestion.    Respiratory: Negative for chest tightness and wheezing.    Cardiovascular: Negative for chest pain.   Genitourinary: Negative for difficulty urinating.   Neurological: Negative for dizziness, speech difficulty, light-headedness and numbness.     Objective:     Vital Signs (Most Recent):  Temp: 97.8 °F (36.6 °C) (05/26/19 0424)  Pulse: 80 (05/26/19 0424)  Resp: 20 (05/26/19 0424)  BP: 128/77 (05/26/19 0424)  SpO2: 97 % (05/26/19 0424) Vital Signs (24h Range):  Temp:  [97.8 °F (36.6 °C)-98.3 °F (36.8 °C)] 97.8 °F (36.6 °C)  Pulse:  [68-86] 80  Resp:  [17-20] 20  SpO2:  [97 %-100 %] 97 %  BP: (120-154)/(67-84) 128/77     Weight: 102.1 kg (225 lb)  Body mass index is 35.24 kg/m².    Intake/Output Summary (Last 24 hours) at 5/26/2019 0653  Last data filed at 5/26/2019 0600  Gross per 24 hour   Intake 720 ml   Output 800 ml   Net -80 ml      Physical Exam   Constitutional: She appears well-developed and well-nourished.   HENT:   Head: Normocephalic and atraumatic.   Neurological: She is alert. She displays normal reflexes. No cranial nerve deficit or sensory deficit.   Skin: Skin is warm and dry.   Psychiatric: She has a normal mood and affect.   Nursing note and vitals reviewed.      Significant Labs:   BMP:   Recent Labs   Lab 05/25/19  1246   K 3.5     CBC: No results for input(s): WBC, HGB, HCT, PLT in the last 48 hours.    Significant Imaging:

## 2019-05-26 NOTE — PT/OT/SLP EVAL
Physical Therapy Evaluation    Patient Name:  Holly Kidd   MRN:  [id number]    Recommendations:     Discharge Recommendations:  home health PT   Discharge Equipment Recommendations: none   Barriers to discharge: Inaccessible home    Assessment:     Holly Kidd is a 68 y.o. female admitted with a medical diagnosis of Acute ischemic stroke.  She presents with the following impairments/functional limitations:  weakness, impaired endurance, impaired functional mobilty, impaired balance, decreased coordination, decreased safety awareness, decreased lower extremity function, edema.    Rehab Prognosis: Good; patient would benefit from acute skilled PT services to address these deficits and reach maximum level of function.    Recent Surgery: * No surgery found *      Plan:     During this hospitalization, patient to be seen 3 x/week to address the identified rehab impairments via therapeutic activities, therapeutic exercises, wheelchair management/training and progress toward the following goals:    · Plan of Care Expires:  06/08/19    Subjective     Chief Complaint: I don't wanna fall.  Pt with a hx of multiple falls with transfers while at home.   Patient/Family Comments/goals: to return to PLOF    Pain/Comfort:  · Pain Rating 1: 0/10    Patients cultural, spiritual, Islam conflicts given the current situation:      Living Environment:  Pt lives with Daughter in a mobile home with 5 VANDANA and bilateral hand rails.   Prior to admission, patients level of function was SBA for wheelchair mobility.  Equipment used at home: wheelchair, 3-in-1 commode.  DME owned (not currently used): none.  Upon discharge, patient will have assistance from daughter and self.    Objective:     Communicated with Nurse Gamez prior to session.  Patient found supine with SCD, telemetry, peripheral IV  upon PT entry to room.    General Precautions: Standard, fall   Orthopedic Precautions:Full weight bearing   Braces: N/A      Exams:  · Cognitive Exam:  Patient is oriented to Person, Place and Situation  · Gross Motor Coordination:  WFL  · Postural Exam:  Patient presented with the following abnormalities:    · -       Rounded shoulders  · -       Forward head  · -       Abnormal trunk flexion  · Skin Integrity/Edema:      · -       Skin integrity: Visible skin intact  · -       Edema: Mild B LE; L>R  · RLE ROM: WFL  · RLE Strength: Deficits: Quad = 4-/5  · LLE ROM: WFL  · LLE Strength: Deficits: Quad = 4/5    Functional Mobility:  · Bed Mobility:     · Rolling Left:  minimum assistance  · Rolling Right: minimum assistance  · Supine to Sit: moderate assistance  · Sit to Supine: moderate assistance  · Transfers:     · Bed to Chair: maximal assistance with  hand-held assist  using  Stand Pivot  · Wheelchair Propulsion: to be determined at a later time    AM-PAC 6 CLICK MOBILITY  Total Score:10     Patient left supine with all lines intact and call button in reach.    GOALS:   Multidisciplinary Problems     Physical Therapy Goals        Problem: Physical Therapy Goal    Goal Priority Disciplines Outcome Goal Variances Interventions   Physical Therapy Goal     PT, PT/OT Ongoing (interventions implemented as appropriate)     Description:  Goals to be met by: 2019     Patient will increase functional independence with mobility by performin. Supine to sit with Modified [place]  2. Sit to supine with Modified [place]  3. Sit to stand transfer with Minimal Assistance  4. Bed to chair transfer with Minimal Assistance using HHA  5. Sitting at edge of bed x25 minutes with [place]  6. Lower extremity exercise program x25 reps per handout, with supervision  7. Wheelchair = to be assess at later time.     Recommend: HHPT at time of discharge.                         History:     Past Medical History:   Diagnosis Date    Essential hypertension 2015    Obesity (BMI 35.0-39.9) 2015    Stroke 2015    Left side  weakness    Wheelchair dependence        Past Surgical History:   Procedure Laterality Date    HYSTERECTOMY         Time Tracking:     PT Received On: 05/26/19(- co treated with OT. )  PT Start Time: 1053     PT Stop Time: 1106  PT Total Time (min): 13 min     Billable Minutes: Evaluation 13 min      Darrell Bernabe, PT  05/26/2019

## 2019-05-26 NOTE — PT/OT/SLP EVAL
Occupational Therapy   Evaluation    Name: Holly Kidd  MRN: [id number]  Admitting Diagnosis:  Acute ischemic stroke      Recommendations:     Discharge Recommendations: home health OT(with family assist)  Discharge [place] Recommendations[place]  [place]  Barriers to discharge:  None    Assessment:     Holly Kidd is a 68 y.o. female with a medical diagnosis of Acute ischemic stroke.  She presents with self care and functional mobility deficits. The patient will benefit from OT to maximize the patient's ability to participate in self care. Performance deficits affecting function: weakness, impaired endurance, impaired functional mobilty, impaired self care skills, decreased upper extremity function, impaired balance, decreased safety awareness, impaired cardiopulmonary response to activity, edema.      Rehab Prognosis: Fair; patient would benefit from acute skilled OT services to address these deficits and reach maximum level of function.       Plan:     Patient to be seen 3 x/week to address the above listed problems via self-care/home management, therapeutic activities, therapeutic exercises  · Plan of Care Expires: 06/09/19  · Plan of Care Reviewed with: patient    Subjective     Chief Complaint: fear of falling  Patient/Family Comments/goals: to return to PLOF    Occupational Profile:  Living Environment: Pt lives with Daughter in a mobile home with 5 VANDANA and bilateral hand rails.   Previous level of function: The patient received assist to bathe and dress from her daughter. The patient required assist to get in/out of bed and to transfer to a W/C.  Roles and Routines: The patient required assist from her daughter and grandchildren to propel her W/C  Equipment Used at Home:  raised toilet, wheelchair  Assistance upon Discharge: daughter    Pain/Comfort:  · Pain Rating 1: 0/10    Patients cultural, spiritual, Taoist conflicts given the current situation: no    Objective:     Communicated with: nurse prior to  session.  Patient found HOB elevated with bed alarm, peripheral IV, telemetry, SCD upon OT entry to room.    General Precautions: Standard, fall   Orthopedic Precautions:N/A   Braces: N/A     Occupational Performance:    Bed Mobility:    · Patient completed Rolling/Turning to Left with  minimum assistance  · Patient completed Rolling/Turning to Right with minimum assistance  · Patient completed Supine to Sit with moderate assistance  · Patient completed Sit to Supine with moderate assistance and with leg lift    Functional Mobility/Transfers:  · Patient completed Bed <> Chair Transfer using Stand Pivot technique with maximal assistance with hand-held assist  · Functional Mobility: bed<>chair only    Activities of Daily Living:  · Upper Body Dressing: maximal assistance    · Lower Body Dressing: dependence      Cognitive/Visual Perceptual:  Cognitive/Psychosocial Skills:     -       Oriented to: Person   -       Follows Commands/attention:Follows one-step commands  -       Communication: clear/fluent  -       Memory: Impaired STM  -       Safety awareness/insight to disability: impaired   -       Mood/Affect/Coping skills/emotional control: Appropriate to situation  Visual/Perceptual:      -Intact      Physical Exam:  Balance: -       fair sitting  Postural examination/scapula alignment:    -       Rounded shoulders  -       Forward head  Skin integrity: Visible skin intact  Edema:  None noted  Upper Extremity Range of Motion:     -       Right Upper Extremity: WFL  -       Left Upper Extremity: WFL  Upper Extremity Strength:    -       Right Upper Extremity: WFL  -       Left Upper Extremity: WFL    AMPAC 6 Click ADL:  AMPAC Total Score: 15    Treatment & Education:  The patient participated in OT eval. No family was present to provide a functional history.  Education:    Patient left HOB elevated with all lines intact, call button in reach and bed alarm on    GOALS:   Multidisciplinary Problems     Occupational  Therapy Goals        Problem: Occupational Therapy Goal    Goal Priority Disciplines Outcome Interventions   Occupational Therapy Goal     OT, PT/OT Ongoing (interventions implemented as appropriate)    Description:  Goals to be met by: 6/9/19    Patient will increase functional independence with ADLs by performing:    Feeding with Modified [place] and Set-up Assistance.  UE Dressing with Contact Guard Assistance.  Grooming while seated with Modified [place] and Set-up Assistance.  Toileting from bedside commode with Moderate Assistance for hygiene and clothing management.   Supine to sit with Contact Guard Assistance.  Squat pivot transfers with Minimal Assistance.  Toilet transfer to bedside commode with Minimal Assistance.  Upper extremity exercise program x10 reps per handout, with assistance as needed.                      History:     Past Medical History:   Diagnosis Date    Essential hypertension 6/12/2015    Obesity (BMI 35.0-39.9) 6/12/2015    Stroke 06/2015    Left side weakness    Wheelchair dependence        Past Surgical History:   Procedure Laterality Date    HYSTERECTOMY         Time Tracking:     OT Date of Treatment: 05/26/19  OT Start Time: 1054  OT Stop Time: 1105  OT Total Time (min): 11 min    Billable Minutes:Evaluation 11 (with PT)    Melyssa Myles OT  5/26/2019

## 2019-05-26 NOTE — PROGRESS NOTES
[facility] Hospital Medicine  Progress Note    Patient Name: Holly Kidd  MRN: [id number]  Patient Class: IP- Inpatient   Admission Date: 5/23/2019  Length of Stay: 3 days  Attending Physician: Claude Olivarez MD  Primary Care Provider: To Obtain Unable        Subjective:     Principal Problem:Acute ischemic stroke    HPI:  Holly Kidd is a 68 y.o. female that (in part)  has a past medical history of Essential hypertension, Obesity (BMI 35.0-39.9), Stroke, and Wheelchair dependence.  has a past surgical history that includes Hysterectomy. Presents to [facility] Emergency Department presents from home by EMS due to acute change in her mental status.  She was sitting in her wheelchair when she slumped over and spaced out.  Patient was experiencing what family describe as expressive aphasia.  She was subsequently slow to answer questions and appear confused.  Patient reports recalling this event states she just felt silly.  There was spontaneous resolution of her symptoms prior to the patient arriving at the hospital.  Short duration of approximately 5 min before returning to patient's baseline.  She reports similar feelings in the past when she had previous strokes.  Reports compliance with her home medication regimen, but the family reports that she has not taken any medications since her last visit to the hospital.  Chronically wheelchair dependent due to previous CVA and mobility issues.  No specific exacerbating factors.  No relieving factors.  Generalized radiation and location without focal deficit.  Moderate severely.    In the emergency department routine laboratory studies, urinalysis, and stat head CT were performed.  She had evidence of UTI hypokalemia, but no abnormalities on head CT.  However given her history and presenting symptoms, an MRI of the brain was ordered which revealed evidence of acute stroke and several micro hemorrhages consistent  with hypertensive microvascular disease of the brain.    Hospital medicine has been asked to admit for further evaluation and treatment.     Hospital Course:  Holly Kidd is a 68 y.o. female that (in part)  has a past medical history of Essential hypertension, Obesity (BMI 35.0-39.9), Stroke, and Wheelchair dependence.  has a past surgical history that includes Hysterectomy. Presents to [facility] presents from home by EMS due to acute change in her mental status.  She was sitting in her wheelchair when she slumped over and spaced out.  Patient was experiencing what family describe as expressive aphasia.  Patient found by imaging to have a R caudate acute stroke.  Neuro was consulted as well as PT/OT and speech.      Interval History: No new complaints. Awake and alert     Review of Systems   Constitutional: Negative for activity change.   HENT: Negative for congestion.    Respiratory: Negative for chest tightness and wheezing.    Cardiovascular: Negative for chest pain.   Genitourinary: Negative for difficulty urinating.   Neurological: Negative for dizziness, speech difficulty, light-headedness and numbness.     Objective:     Vital Signs (Most Recent):  Temp: 97.8 °F (36.6 °C) (05/26/19 0424)  Pulse: 80 (05/26/19 0424)  Resp: 20 (05/26/19 0424)  BP: 128/77 (05/26/19 0424)  SpO2: 97 % (05/26/19 0424) Vital Signs (24h Range):  Temp:  [97.8 °F (36.6 °C)-98.3 °F (36.8 °C)] 97.8 °F (36.6 °C)  Pulse:  [68-86] 80  Resp:  [17-20] 20  SpO2:  [97 %-100 %] 97 %  BP: (120-154)/(67-84) 128/77     Weight: 102.1 kg (225 lb)  Body mass index is 35.24 kg/m².    Intake/Output Summary (Last 24 hours) at 5/26/2019 0653  Last data filed at 5/26/2019 0600  Gross per 24 hour   Intake 720 ml   Output 800 ml   Net -80 ml      Physical Exam   Constitutional: She appears well-developed and well-nourished.   HENT:   Head: Normocephalic and atraumatic.   Neurological: She is alert. She  displays normal reflexes. No cranial nerve deficit or sensory deficit.   Skin: Skin is warm and dry.   Psychiatric: She has a normal mood and affect.   Nursing note and vitals reviewed.      Significant Labs:   BMP:   Recent Labs   Lab 05/25/19  1246   K 3.5     CBC: No results for input(s): WBC, HGB, HCT, PLT in the last 48 hours.    Significant Imaging:     Assessment/Plan:      * Acute ischemic stroke  R caudate stroke.  Likely from small vessel disease from hypertension. Stroke work up. Would hold off on ASA or more due to micro hemorrhages. Will follow neuro recs.  Permissive hypertension for now. .   Will add back ASA per neuro today     Neuro following.   PT/OT consults pending Likely will need SNF       History of stroke  No acute issues       Wheelchair dependence  PT/OT speech       Acute cystitis without hematuria  On Abx. Follow urine cultures   Ecoli and group b strep.  Consider Abx   Cipro for now.     Obesity (BMI 35.0-39.9)  Body mass index is 35.24 kg/m².  Weight loss as out patient      Hypokalemia  Will replace.       Essential hypertension  Permissive hypertension for now.         VTE Risk Mitigation (From admission, onward)        Ordered     IP VTE HIGH RISK PATIENT  Once      05/23/19 1918     Place LUKE hose  Until discontinued      05/23/19 1918     Place sequential compression device  Until discontinued      05/23/19 1918              Claude Herman MD  Department of Hospital Medicine   [facility]

## 2019-05-26 NOTE — ASSESSMENT & PLAN NOTE
R caudate stroke.  Likely from small vessel disease from hypertension. Stroke work up. Would hold off on ASA or more due to micro hemorrhages. Will follow neuro recs.  Permissive hypertension for now. .   Will add back ASA per neuro today     Neuro following.   PT/OT consults pending Likely will need SNF

## 2019-05-26 NOTE — PLAN OF CARE
Problem: Adult Inpatient Plan of Care  Goal: Plan of Care Review  Outcome: Ongoing (interventions implemented as appropriate)  Pt free from falls, injury or any further trauma throughout shift. AAOx2. Continued medications as ordered. No complaints of pain throughout shift. Pt turned q2hrs. Purewick in use. Pt in no distress. Will cont to monitor.    05/26/19 5267   Plan of Care Review   Plan of Care Reviewed With patient

## 2019-05-26 NOTE — PROGRESS NOTES
"[facility]  Neurology  Progress Note    Patient Name: Holly Kidd  MRN: [id number]  Admission Date: 5/23/2019  Hospital Length of Stay: 3 days  Code Status: Full Code   Attending Provider: Claude Olivarez MD  Primary Care Physician: To Obtain Unable   Principal Problem:Acute ischemic stroke    Subjective:     Interval History: 69 y/o female with medical Hx as listed below comes to ED due to AMS. Apparently pt was found slumped over in her wheelchair and staring. Pt slowly recovered but appeared confused. Ms. Kidd says "I don't know why I am here". She does reports a stroke 4 years ago. No headaches, vertigo, unilateral weakness. Pt does not walk and mobilized through wheelchair. No apparent aggravating or alleviating factors.    -5/26/19: Pt with no acute issues. Denies new weakness or numbness. No headaches or speech difficulties.    Current Neurological Medications:     Current Facility-Administered Medications   Medication Dose Route Frequency Provider Last Rate Last Dose    amLODIPine tablet 10 mg  10 mg Oral Daily Jaya Unger MD   10 mg at 05/26/19 0849    atorvastatin tablet 40 mg  40 mg Oral Daily Jaya Unger MD   40 mg at 05/26/19 0849    ciprofloxacin HCl tablet 500 mg  500 mg Oral Q12H Claude Olivarez MD   500 mg at 05/26/19 0849    enoxaparin injection 40 mg  40 mg Subcutaneous Daily Claude Olivarez MD        polyethylene glycol packet 17 g  17 g Oral BID Claude Olivarez MD   17 g at 05/26/19 0849    sodium chloride 0.9% flush 10 mL  10 mL Intravenous PRN Jaya Unger MD           Review of Systems   Constitutional: Negative for fever.   HENT: Negative for trouble swallowing.    Eyes: Negative for photophobia.   Respiratory: Negative for shortness of breath.    Cardiovascular: Negative for chest pain.   Gastrointestinal: Negative for abdominal pain.   Genitourinary: Negative for dysuria.   Musculoskeletal: Negative for back pain. "   Neurological: Negative for headaches.     Objective:     Vital Signs (Most Recent):  Temp: 98 °F (36.7 °C) (05/26/19 1155)  Pulse: 77 (05/26/19 1155)  Resp: 18 (05/26/19 1155)  BP: 132/71 (05/26/19 1155)  SpO2: 99 % (05/26/19 1155) Vital Signs (24h Range):  Temp:  [97.8 °F (36.6 °C)-98.3 °F (36.8 °C)] 98 °F (36.7 °C)  Pulse:  [75-86] 77  Resp:  [18-20] 18  SpO2:  [97 %-100 %] 99 %  BP: (120-154)/(71-87) 132/71     Weight: 102.1 kg (225 lb)  Body mass index is 35.24 kg/m².    Physical Exam  HENT:   Head: Normocephalic.   Eyes: Right eye exhibits no discharge. Left eye exhibits discharge.   Neck: Normal range of motion.   Cardiovascular: Regular rhythm.   Pulmonary/Chest: Breath sounds normal.   Abdominal: Bowel sounds are normal.   Musculoskeletal: She exhibits no tenderness.   Skin: She is not diaphoretic.   Psychiatric: Her speech is normal.         NEUROLOGICAL EXAMINATION:      MENTAL STATUS   Speech: speech is normal   Level of consciousness: alert       Oriented to person, place, not time      CRANIAL NERVES      CN II   Left visual field deficit: upper temporal and lower temporal quadrant(s)     CN III, IV, VI   Right pupil: Shape: regular.   Left pupil: Shape: regular.   Nystagmus: none   Conjugate gaze: present     CN VII   Right facial weakness: none  Left facial weakness: none     CN XII   Tongue deviation: none     MOTOR EXAM        Elevates UE's and LE's against gravity; no dirft      SENSORY EXAM   Right arm light touch: normal  Left arm light touch: normal  Right leg light touch: normal  Left leg light touch: normal          Significant Labs:   CBC: No results for input(s): WBC, HGB, HCT, PLT in the last 48 hours.  CMP:   Recent Labs   Lab 05/25/19  1246   K 3.5         Assessment and Plan:     69 y/o female with acute stroke     1. Stroke: area of infarction on right caudate. No major deficits from this stroke.           There seems to be a very small hemorrhage associated to it. On MRI several  areas of hemosiderin deposits throughout hemispherescan be seen on GRE sequences suggestive of amyloid angiopathy. This and extensive microvascular ischemia can be cause of dementia; may put her at risk for lobar hemorrhages as well. Also. Pt seems to have left temporal visual deficit that probably is chronic as a caudate stroke will not result in this deficits.           -OK for -170's.           -ASA and statin.   -PT/OT: home health.    Active Diagnoses:    Diagnosis Date Noted POA    PRINCIPAL PROBLEM:  Acute ischemic stroke [I63.9] 05/23/2019 Yes    Acute cystitis without hematuria [N30.00] 05/23/2019 Yes    Wheelchair dependence [Z99.3] 05/23/2019 Not Applicable    History of stroke [Z86.73] 05/23/2019 Not Applicable    Essential hypertension [I10] 06/12/2015 Yes     Chronic    Obesity (BMI 35.0-39.9) [E66.9] 06/12/2015 Yes     Chronic    Hypokalemia [E87.6] 06/12/2015 Yes      Problems Resolved During this Admission:       VTE Risk Mitigation (From admission, onward)        Ordered     enoxaparin injection 40 mg  Daily      05/26/19 0655     IP VTE HIGH RISK PATIENT  Once      05/23/19 1918     Place LUKE hose  Until discontinued      05/23/19 1918     Place sequential compression device  Until discontinued      05/23/19 1918          Mick Mccallum MD  Neurology  [facility] [place]

## 2019-05-27 VITALS
WEIGHT: 225 LBS | BODY MASS INDEX: 35.31 KG/M2 | TEMPERATURE: 98 F | HEART RATE: 75 BPM | OXYGEN SATURATION: 98 % | DIASTOLIC BLOOD PRESSURE: 69 MMHG | HEIGHT: 67 IN | SYSTOLIC BLOOD PRESSURE: 132 MMHG | RESPIRATION RATE: 19 BRPM

## 2019-05-27 PROBLEM — N30.00 ACUTE CYSTITIS WITHOUT HEMATURIA: Status: RESOLVED | Noted: 2019-05-23 | Resolved: 2019-05-27

## 2019-05-27 PROCEDURE — 25000003 PHARM REV CODE 250: Performed by: PSYCHIATRY & NEUROLOGY

## 2019-05-27 PROCEDURE — 25000003 PHARM REV CODE 250: Performed by: INTERNAL MEDICINE

## 2019-05-27 PROCEDURE — 25000003 PHARM REV CODE 250: Performed by: HOSPITALIST

## 2019-05-27 PROCEDURE — 92526 ORAL FUNCTION THERAPY: CPT

## 2019-05-27 RX ADMIN — AMLODIPINE BESYLATE 10 MG: 5 TABLET ORAL at 08:05

## 2019-05-27 RX ADMIN — CIPROFLOXACIN HYDROCHLORIDE 500 MG: 500 TABLET, FILM COATED ORAL at 08:05

## 2019-05-27 RX ADMIN — ASPIRIN 81 MG 81 MG: 81 TABLET ORAL at 08:05

## 2019-05-27 RX ADMIN — ATORVASTATIN CALCIUM 40 MG: 40 TABLET, FILM COATED ORAL at 08:05

## 2019-05-27 NOTE — PLAN OF CARE
Problem: Adult Inpatient Plan of Care  Goal: Plan of Care Review  Outcome: Ongoing (interventions implemented as appropriate)  Patient remains free from injury and falls. Denies pain. Patient assisted with turning throughout shift. Medications administered as ordered. Pure wick catheter in place. Resting throughout shift. Hopefully home today with home health. Current plan of care continued.

## 2019-05-27 NOTE — NURSING
Spoke with patients mackenzie briseno whom stated patients sister would be there to pick patient up at dinnertime.

## 2019-05-27 NOTE — ASSESSMENT & PLAN NOTE
R caudate stroke.  Likely from small vessel disease from hypertension. Stroke work up. Would hold off on ASA or more due to micro hemorrhages. Will follow neuro recs.  Permissive hypertension for now. .   Will add back ASA per neuro today     Neuro following.   PT/OT consults pending Likely will need SNF     Rec.  H/H and BSC.

## 2019-05-27 NOTE — PLAN OF CARE
05/27/19 1155   Medicare Message   Important Message from Medicare regarding Discharge Appeal Rights Given to patient/caregiver;Explained to patient/caregiver;Signed/date by patient/caregiver

## 2019-05-27 NOTE — PT/OT/SLP PROGRESS
Speech Language Pathology Treatment    Patient Name:  Holly Kidd   MRN:  [id number]  Admitting Diagnosis: Acute ischemic stroke    Recommendations:                 General Recommendations:  Follow-up not indicated  Diet recommendations:  Regular(per request), Liquid Diet Level: Thin   Aspiration Precautions: 1 bite/sip at a time   General Precautions: Standard,    Communication strategies:  provide increased time to answer    Subjective   Pt seen upright in bed self feeding diet of regular consistencies with thin liquids.   Patient goals: discharge    Pain/Comfort:  ·  0    Objective:     Has the patient been evaluated by SLP for swallowing?   Yes  Keep patient NPO? No   Current Respiratory Status: nasal cannula      Multiple trials of self presented thin liquids and solids were unremarkable. Pt with changes of extensive chronic small vessel ischemic disease on MRI and PMHx of CVA, speech was grossly intelligible, slur was not noted, Pt reports variable slur worse with fatigue but given PMHx this is likely baseline level of function, no further ST is warranted.     Assessment:     Holly Kidd is a 68 y.o. female with dx of CVA she presents with functional oral and pharyngeal dysphagia and trace dysarthria at reported baseline level of function. Family reports mild cognitive deficits are at baseline.     Goals:   Multidisciplinary Problems     SLP Goals     Not on file          Multidisciplinary Problems (Resolved)        Problem: SLP Goal    Goal Priority Disciplines Outcome   SLP Goal   (Resolved)    Low SLP Outcome(s) achieved   Description:  STGs  1. Pt will tolerate regular with thin liquids without overt s/s of aspiration. Goal met 5/27/19  2. Pt will (I)ly utilized speech intelligibility strategies at level of conversation. Goal met 5/27/19                    Plan:     · Patient to be seen:  3 x/week   · Plan of Care reviewed with:  patient   · SLP Follow-Up:  Yes       Discharge recommendations:  other  (see comments)(TBD)   Barriers to Discharge:  None    Time Tracking:     SLP Treatment Date:   05/27/19  Speech Start Time:  1310  Speech Stop Time:  1318     Speech Total Time (min):  8 min    Billable Minutes: Treatment Swallowing Dysfunction 8    Marcie Chase CCC-SLP  05/27/2019

## 2019-05-27 NOTE — NURSING
Patient taken home at 1600 by family. Iv removed, catheter intact. Tele removed. Patient requested wheelchair escort to car and was provided with this. Patient had all belongings, verbalizing understanding of all discharge instruction. Patient instructed to call pharmacy to refill medication as needed at home as there were no changed to medication regimen.

## 2019-05-27 NOTE — PLAN OF CARE
[facility]    HOME HEALTH ORDERS  FACE TO FACE ENCOUNTER    Patient Name: Holly Kidd  YOB: 1950    PCP: To Obtain Unable   PCP Address: None  PCP Phone Number: None  PCP Fax: None    Encounter Date: 05/27/2019    Admit to Home Health    Diagnoses:  Active Hospital Problems    Diagnosis  POA    *Acute ischemic stroke [I63.9]  Yes     Priority: 1 - High    Wheelchair dependence [Z99.3]  Not Applicable    History of stroke [Z86.73]  Not Applicable    Essential hypertension [I10]  Yes     Chronic    Obesity (BMI 35.0-39.9) [E66.9]  Yes     Chronic      Resolved Hospital Problems    Diagnosis Date Resolved POA    Acute cystitis without hematuria [N30.00] 05/27/2019 Yes    Hypokalemia [E87.6] 05/27/2019 Yes       No future appointments.  Follow-up Information     To Obtain Unable In 1 week.           Gerry Mayes MD In 1 week.    Specialty:  Neurology  Contact information:  [address] [facility] [address]  [address]  [facility] [id number] [phone]                     I have seen and examined this patient face to face today. My clinical findings that support the need for the home health skilled services and home bound status are the following:  Weakness/numbness causing balance and gait disturbance due to Stroke making it taxing to leave home.    Allergies:Review of patient's allergies indicates:  No Known Allergies    Diet: 2 gram sodium diet    Activities: activity as tolerated    Nursing:   SN to complete comprehensive assessment including routine vital signs. Instruct on disease process and s/s of complications to report to MD. Review/verify medication list sent home with the patient at time of discharge  and instruct patient/caregiver as needed. Frequency may be adjusted depending on start of care date.    Notify MD if SBP > 160 or < 90; DBP > 90 or < 50; HR > 120 or < 50; Temp > 101; Other:       CONSULTS:    Physical Therapy to evaluate and treat. Evaluate for home safety and  equipment needs; Establish/upgrade home exercise program. Perform / instruct on therapeutic exercises, gait training, transfer training, and Range of Motion.  Occupational Therapy to evaluate and treat. Evaluate home environment for safety and equipment needs. Perform/Instruct on transfers, ADL training, ROM, and therapeutic exercises.  Aide to provide assistance with personal care, ADLs, and vital signs.          Medications: Review discharge medications with patient and family and provide education.      Current Discharge Medication List      CONTINUE these medications which have NOT CHANGED    Details   amLODIPine (NORVASC) 5 MG tablet Take 2 tablets (10 mg total) by mouth once daily.  Qty: 30 tablet, Refills: 0      aspirin 325 MG tablet Take 1 tablet (325 mg total) by mouth once daily.  Qty: 30 tablet, Refills: 11      atorvastatin (LIPITOR) 40 MG tablet Take 1 tablet (40 mg total) by mouth once daily.  Qty: 30 tablet, Refills: 11      cetirizine (ZYRTEC) 10 MG tablet Take 1 tablet (10 mg total) by mouth once daily.  Qty: 30 tablet, Refills: 0      HYDROcodone-acetaminophen (NORCO) 5-325 mg per tablet Take 1 tablet by mouth every 4 (four) hours as needed.  Qty: 30 tablet, Refills: 0             I certify that this patient is confined to her home and needs intermittent skilled nursing care, physical therapy and occupational therapy.

## 2019-05-27 NOTE — PLAN OF CARE
Problem: SLP Goal  Goal: SLP Goal  STGs  1. Pt will tolerate regular with thin liquids without overt s/s of aspiration. Goal met 5/27/19  2. Pt will (I)ly utilized speech intelligibility strategies at level of conversation. Goal met 5/27/19  Outcome: Outcome(s) achieved Date Met: 05/27/19  Goals met

## 2019-05-27 NOTE — PROGRESS NOTES
[facility] CASE MANAGEMENT                  WRITTEN DISCHARGE INFORMATION      APPOINTMENTS AND RESOURCES TO HELP YOU MANAGE YOUR CARE AT HOME BASED ON YOUR PREFERENCES:  (If an appointment is not scheduled for you when you leave the hospital, call your doctor to schedule a follow up visit within a week)    Follow-up Information     [facility].    Specialties:  DME Provider, Home Health Services  Why:  Home Health  Contact information:  [address]  [address]  [place] [address]  [phone]             Cady Blanchard,  On 6/5/2019.    Specialty:  Neurology  Why:  @8:40am FOR NEUROLOGY  FOLLOW UP  Contact information:  [address]  [address]  [place] [address]  [phone]             To Obtain Unable. Schedule an appointment as soon as possible for a visit in 1 week.    Why:  for Hospital Follow up                 Healthy Living Instructions to HELP MANAGE YOUR CARE AT HOME:  Things You are responsible for:  1.    Getting your prescriptions filled   2.    Taking your medications as directed, DO NOT MISS ANY DOSES!  3.    Following the diet and exercise recommended by your doctor  4.    Going to your follow-up doctor appointment. This is important because it allows the doctor to monitor your progress and determine if any changes need to made to your treatment plan.  5. If you have any questions about MANAGING YOUR CARE AT HOME Call the Nurse Care Line for 24/7 Assistance [phone]       Please answer any calls you may receive from [facility]. We want to continue to support you as you manage your healthcare needs. [facility] is happy to have the opportunity to serve you.      Thank you for choosing [facility] for your healthcare needs!  Your [facility] Case Management Team,

## 2019-05-27 NOTE — PLAN OF CARE
05/27/19 1508   Final Note   Assessment Type Final Discharge Note   Anticipated Discharge Disposition Home-Health   What phone number can be called within the next 1-3 days to see how you are doing after discharge?   ([phone])   Hospital Follow Up  Appt(s) scheduled? Yes   Discharge plans and expectations educations in teach back method with documentation complete? Yes   Right Care Referral Info   Post Acute Recommendation Home-care   Referral Type    Facility Name [facility]

## 2019-05-27 NOTE — SUBJECTIVE & OBJECTIVE
Interval History:  No new issues.       Review of Systems   Constitutional: Negative for activity change.   HENT: Negative for congestion.    Respiratory: Negative for chest tightness and wheezing.    Cardiovascular: Negative for chest pain.   Genitourinary: Negative for difficulty urinating.   Neurological: Negative for dizziness, speech difficulty, light-headedness and numbness.     Objective:     Vital Signs (Most Recent):  Temp: 97.5 °F (36.4 °C) (05/27/19 0724)  Pulse: 67 (05/27/19 0724)  Resp: 19 (05/27/19 0724)  BP: (!) 154/89 (05/27/19 0724)  SpO2: 97 % (05/27/19 0724) Vital Signs (24h Range):  Temp:  [97.5 °F (36.4 °C)-98.2 °F (36.8 °C)] 97.5 °F (36.4 °C)  Pulse:  [67-83] 67  Resp:  [18-20] 19  SpO2:  [97 %-99 %] 97 %  BP: (127-159)/(63-89) 154/89     Weight: 102.1 kg (225 lb)  Body mass index is 35.24 kg/m².    Intake/Output Summary (Last 24 hours) at 5/27/2019 0838  Last data filed at 5/27/2019 0538  Gross per 24 hour   Intake 360 ml   Output 400 ml   Net -40 ml      Physical Exam   Constitutional: She appears well-developed and well-nourished.   HENT:   Head: Normocephalic and atraumatic.   Neurological: She is alert. She displays normal reflexes. No cranial nerve deficit or sensory deficit.   Skin: Skin is warm and dry.   Psychiatric: She has a normal mood and affect.   Nursing note and vitals reviewed.      Significant Labs:   BMP:   Recent Labs   Lab 05/25/19  1246   K 3.5     CBC: No results for input(s): WBC, HGB, HCT, PLT in the last 48 hours.    Significant Imaging:

## 2019-05-27 NOTE — PROGRESS NOTES
[facility] Hospital Medicine  Progress Note    Patient Name: Holly Kidd  MRN: [id number]  Patient Class: IP- Inpatient   Admission Date: 5/23/2019  Length of Stay: 4 days  Attending Physician: Claude Olivarez MD  Primary Care Provider: To Obtain Unable        Subjective:     Principal Problem:Acute ischemic stroke    HPI:  Holly Kidd is a 68 y.o. female that (in part)  has a past medical history of Essential hypertension, Obesity (BMI 35.0-39.9), Stroke, and Wheelchair dependence.  has a past surgical history that includes Hysterectomy. Presents to [facility] Emergency Department presents from home by EMS due to acute change in her mental status.  She was sitting in her wheelchair when she slumped over and spaced out.  Patient was experiencing what family describe as expressive aphasia.  She was subsequently slow to answer questions and appear confused.  Patient reports recalling this event states she just felt silly.  There was spontaneous resolution of her symptoms prior to the patient arriving at the hospital.  Short duration of approximately 5 min before returning to patient's baseline.  She reports similar feelings in the past when she had previous strokes.  Reports compliance with her home medication regimen, but the family reports that she has not taken any medications since her last visit to the hospital.  Chronically wheelchair dependent due to previous CVA and mobility issues.  No specific exacerbating factors.  No relieving factors.  Generalized radiation and location without focal deficit.  Moderate severely.    In the emergency department routine laboratory studies, urinalysis, and stat head CT were performed.  She had evidence of UTI hypokalemia, but no abnormalities on head CT.  However given her history and presenting symptoms, an MRI of the brain was ordered which revealed evidence of acute stroke and several micro hemorrhages consistent  with hypertensive microvascular disease of the brain.    Hospital medicine has been asked to admit for further evaluation and treatment.     Hospital Course:  Holly Kidd is a 68 y.o. female that (in part)  has a past medical history of Essential hypertension, Obesity (BMI 35.0-39.9), Stroke, and Wheelchair dependence.  has a past surgical history that includes Hysterectomy. Presents to [facility] Emergency Department presents from home by EMS due to acute change in her mental status.  She was sitting in her wheelchair when she slumped over and spaced out.  Patient was experiencing what family describe as expressive aphasia.  Patient found by imaging to have a R caudate acute stroke.  Neuro was consulted as well as PT/OT and speech.  Rec H/H and bed side commode.  The rest of the hospital course was unremarkable and the patient will be discharge to home today.  Diet- low NA. Follow up with Neuro in one week. PCP in one week. Activity as tolerated.      Interval History:  No new issues.       Review of Systems   Constitutional: Negative for activity change.   HENT: Negative for congestion.    Respiratory: Negative for chest tightness and wheezing.    Cardiovascular: Negative for chest pain.   Genitourinary: Negative for difficulty urinating.   Neurological: Negative for dizziness, speech difficulty, light-headedness and numbness.     Objective:     Vital Signs (Most Recent):  Temp: 97.5 °F (36.4 °C) (05/27/19 0724)  Pulse: 67 (05/27/19 0724)  Resp: 19 (05/27/19 0724)  BP: (!) 154/89 (05/27/19 0724)  SpO2: 97 % (05/27/19 0724) Vital Signs (24h Range):  Temp:  [97.5 °F (36.4 °C)-98.2 °F (36.8 °C)] 97.5 °F (36.4 °C)  Pulse:  [67-83] 67  Resp:  [18-20] 19  SpO2:  [97 %-99 %] 97 %  BP: (127-159)/(63-89) 154/89     Weight: 102.1 kg (225 lb)  Body mass index is 35.24 kg/m².    Intake/Output Summary (Last 24 hours) at 5/27/2019 0838  Last data filed at 5/27/2019 0538  Gross per 24 hour   Intake  360 ml   Output 400 ml   Net -40 ml      Physical Exam   Constitutional: She appears well-developed and well-nourished.   HENT:   Head: Normocephalic and atraumatic.   Neurological: She is alert. She displays normal reflexes. No cranial nerve deficit or sensory deficit.   Skin: Skin is warm and dry.   Psychiatric: She has a normal mood and affect.   Nursing note and vitals reviewed.      Significant Labs:   BMP:   Recent Labs   Lab 05/25/19  1246   K 3.5     CBC: No results for input(s): WBC, HGB, HCT, PLT in the last 48 hours.    Significant Imaging:     Assessment/Plan:      * Acute ischemic stroke  R caudate stroke.  Likely from small vessel disease from hypertension. Stroke work up. Would hold off on ASA or more due to micro hemorrhages. Will follow neuro recs.  Permissive hypertension for now. .   Will add back ASA per neuro today     Neuro following.   PT/OT consults pending Likely will need SNF     Rec.  H/H and BSC.       History of stroke  No acute issues       Wheelchair dependence  PT/OT speech       Acute cystitis without hematuria  On Abx. Follow urine cultures   Ecoli and group b strep.  Consider Abx   Cipro for now.   Treated.    Obesity (BMI 35.0-39.9)  Body mass index is 35.24 kg/m².  Weight loss as out patient      Hypokalemia  Will replace.       Essential hypertension  Permissive hypertension for now.         VTE Risk Mitigation (From admission, onward)        Ordered     enoxaparin injection 40 mg  Daily      05/26/19 0655     IP VTE HIGH RISK PATIENT  Once      05/23/19 1918     Place LUKE hose  Until discontinued      05/23/19 1918     Place sequential compression device  Until discontinued      05/23/19 1918        Will d/c to home.         Claude Herman MD  Department of Hospital Medicine   [facility]

## 2019-05-27 NOTE — PLAN OF CARE
"   05/27/19 1008   Post-Acute Status   Post-Acute Authorization Home Health/Hospice   Home Health/Hospice Status Referrals Sent   Order received for HH.  Patient choice form completed, original to chart, copy to patient.  Referral sent to [facility] as requested via right care.    1125:  No reply in right care.  TN called answering service an was connected to Ana who stated that patient is accepted for services.   BSC also ordered.  OK to pull from the depot.    1140:  No BSC in depot.  TN called Massiel at [facility] who will have BSC delivered tot the patient's home    1155:  NurseSkylar notified that all CM needs are met  EDUCATION:  Ms Kidd provided with educational information on stroke.  Information reviewed and placed in :My Healthcare Packet" to be brought home for her and family to use as resource after discharge.  Information included:  signs and symptoms to look for and call the doctor if experiencing, and symptoms that may indicate a medical emergency: CALL 911.      All questions answered.  Teach back method used.    Patient stated, "I will give that to my son and sisters to read".    Patient does not know the name of her PCP         "

## 2019-05-27 NOTE — DISCHARGE SUMMARY
[facility] Hospital Medicine  Discharge Summary      Patient Name: Holly Kidd  MRN: [id number]  Admission Date: 5/23/2019  Hospital Length of Stay: 4 days  Discharge Date and Time:  05/27/2019 8:41 AM  Attending Physician: Claude Olivarez MD   Discharging Provider: Claude Olivarez MD  Primary Care Provider: To Obtain Unable      HPI:   Holly Kidd is a 68 y.o. female that (in part)  has a past medical history of Essential hypertension, Obesity (BMI 35.0-39.9), Stroke, and Wheelchair dependence.  has a past surgical history that includes Hysterectomy. Presents to [facility] presents from home by EMS due to acute change in her mental status.  She was sitting in her wheelchair when she slumped over and spaced out.  Patient was experiencing what family describe as expressive aphasia.  She was subsequently slow to answer questions and appear confused.  Patient reports recalling this event states she just felt silly.  There was spontaneous resolution of her symptoms prior to the patient arriving at the hospital.  Short duration of approximately 5 min before returning to patient's baseline.  She reports similar feelings in the past when she had previous strokes.  Reports compliance with her home medication regimen, but the family reports that she has not taken any medications since her last visit to the hospital.  Chronically wheelchair dependent due to previous CVA and mobility issues.  No specific exacerbating factors.  No relieving factors.  Generalized radiation and location without focal deficit.  Moderate severely.    In the emergency department routine laboratory studies, urinalysis, and stat head CT were performed.  She had evidence of UTI hypokalemia, but no abnormalities on head CT.  However given her history and presenting symptoms, an MRI of the brain was ordered which revealed evidence of acute stroke and several micro  hemorrhages consistent with hypertensive microvascular disease of the brain.    Hospital medicine has been asked to admit for further evaluation and treatment.     * No surgery found *      Hospital Course:   Holly Kidd is a 68 y.o. female that (in part)  has a past medical history of Essential hypertension, Obesity (BMI 35.0-39.9), Stroke, and Wheelchair dependence.  has a past surgical history that includes Hysterectomy. Presents to [facility] presents from home by EMS due to acute change in her mental status.  She was sitting in her wheelchair when she slumped over and spaced out.  Patient was experiencing what family describe as expressive aphasia.  Patient found by imaging to have a R caudate acute stroke.  Neuro was consulted as well as PT/OT and speech.  Rec H/H and bed side commode.  The rest of the hospital course was unremarkable and the patient will be discharge to home today.  Diet- low NA. Follow up with Neuro in one week. PCP in one week. Activity as tolerated.       Consults:   Consults (From admission, onward)        Status Ordering Provider     Inpatient consult to Neurology  Once     Provider:  Mick Mccallum MD    Completed ROBERT CERVANTES     IP consult to case management  Once     Provider:  (Not yet assigned)    Acknowledged CARROLL MORALES          No new Assessment & Plan notes have been filed under this hospital service since the last note was generated.  Service: Hospital Medicine    Final Active Diagnoses:    Diagnosis Date Noted POA    PRINCIPAL PROBLEM:  Acute ischemic stroke [I63.9] 05/23/2019 Yes    Wheelchair dependence [Z99.3] 05/23/2019 Not Applicable    History of stroke [Z86.73] 05/23/2019 Not Applicable    Essential hypertension [I10] 06/12/2015 Yes     Chronic    Obesity (BMI 35.0-39.9) [E66.9] 06/12/2015 Yes     Chronic      Problems Resolved During this Admission:    Diagnosis Date Noted Date Resolved POA     "Acute cystitis without hematuria [N30.00] 05/23/2019 05/27/2019 Yes    Hypokalemia [E87.6] 06/12/2015 05/27/2019 Yes       Discharged Condition: good    Disposition: Home with H/lH     Follow Up:  Follow-up Information     To Obtain Unable In 1 week.           Gerry Mayes MD In 1 week.    Specialty:  Neurology  Contact information:  [address] [facility] [address]  [address]  [place] [id number] [phone]                 Patient Instructions:      COMMODE FOR HOME USE     Order Specific Question Answer Comments   Type: Standard    Height: 5' 7" (1.702 m)    Weight: 102.1 kg (225 lb)    Does patient have medical equipment at home? raised toilet    Does patient have medical equipment at home? wheelchair    Length of need (1-99 months): 99        Significant Diagnostic Studies:     Pending Diagnostic Studies:     None         Medications:  Reconciled Home Medications:      Medication List      CHANGE how you take these medications    amLODIPine 5 MG tablet  Commonly known as:  NORVASC  Take 2 tablets (10 mg total) by mouth once daily.  What changed:  Another medication with the same name was removed. Continue taking this medication, and follow the directions you see here.        CONTINUE taking these medications    aspirin 325 MG tablet  Take 1 tablet (325 mg total) by mouth once daily.     atorvastatin 40 MG tablet  Commonly known as:  LIPITOR  Take 1 tablet (40 mg total) by mouth once daily.     cetirizine 10 MG tablet  Commonly known as:  ZYRTEC  Take 1 tablet (10 mg total) by mouth once daily.     HYDROcodone-acetaminophen 5-325 mg per tablet  Commonly known as:  NORCO  Take 1 tablet by mouth every 4 (four) hours as needed.            Indwelling Lines/Drains at time of discharge:   Lines/Drains/Airways     Drain            Female External Urinary Catheter 05/24/19 3 days                Time spent on the discharge of patient: > 30- minutes  Patient was seen and examined on the date of discharge and determined to be suitable " for discharge.         Claude Herman MD  Department of Hospital Medicine  [facility]

## 2019-05-27 NOTE — PT/OT/SLP DISCHARGE
Occupational Therapy Discharge Summary    Holly Kidd  MRN: [id number]   Principal Problem: Acute ischemic stroke      Patient Discharged from acute Occupational Therapy on 5/27/19.  Please refer to prior OT note dated 5/26/19 for functional status.    Assessment:      Patient appropriate for care in another setting.    Objective:     GOALS:   Multidisciplinary Problems     Occupational Therapy Goals     Not on file          Multidisciplinary Problems (Resolved)        Problem: Occupational Therapy Goal    Goal Priority Disciplines Outcome Interventions   Occupational Therapy Goal   (Resolved)     OT, PT/OT Outcome(s) achieved    Description:  Goals to be met by: 6/9/19    Patient will increase functional independence with ADLs by performing:    Feeding with Modified [place] and Set-up Assistance.  UE Dressing with Contact Guard Assistance.  Grooming while seated with Modified [place] and Set-up Assistance.  Toileting from bedside commode with Moderate Assistance for hygiene and clothing management.   Supine to sit with Contact Guard Assistance.  Squat pivot transfers with Minimal Assistance.  Toilet transfer to bedside commode with Minimal Assistance.  Upper extremity exercise program x10 reps per handout, with assistance as needed.                      Reasons for Discontinuation of Therapy Services  Transfer to alternate level of care.      Plan:     Patient Discharged to: Home with Home Health Service    Melyssa Myles, OT  5/27/2019

## 2019-05-27 NOTE — ASSESSMENT & PLAN NOTE
On Abx. Follow urine cultures   Ecoli and group b strep.  Consider Abx   Cipro for now.   Treated.

## 2019-06-06 ENCOUNTER — EXTERNAL HOME HEALTH (OUTPATIENT)
Dept: HOME HEALTH SERVICES | Facility: HOSPITAL | Age: 69
End: 2019-06-06
Payer: MEDICARE

## 2020-01-04 ENCOUNTER — HOSPITAL ENCOUNTER (EMERGENCY)
Facility: HOSPITAL | Age: 70
Discharge: HOME | End: 2020-01-05
Attending: EMERGENCY MEDICINE
Payer: MEDICARE

## 2020-01-04 VITALS
BODY MASS INDEX: 41.32 KG/M2 | DIASTOLIC BLOOD PRESSURE: 64 MMHG | HEIGHT: 65 IN | WEIGHT: 248 LBS | SYSTOLIC BLOOD PRESSURE: 127 MMHG | OXYGEN SATURATION: 97 % | HEART RATE: 75 BPM | TEMPERATURE: 98 F | RESPIRATION RATE: 17 BRPM

## 2020-01-04 DIAGNOSIS — N30.01 ACUTE CYSTITIS WITH HEMATURIA: ICD-10-CM

## 2020-01-04 DIAGNOSIS — R55 SYNCOPE: Primary | ICD-10-CM

## 2020-01-04 LAB
ALBUMIN SERPL BCP-MCNC: 4.2 G/DL (ref 3.5–5.2)
ALP SERPL-CCNC: 125 U/L (ref 55–135)
ALT SERPL W/O P-5'-P-CCNC: 11 U/L (ref 10–44)
ANION GAP SERPL CALC-SCNC: 13 MMOL/L (ref 8–16)
AST SERPL-CCNC: 15 U/L (ref 10–40)
BACTERIA #/AREA URNS HPF: ABNORMAL /HPF
BASOPHILS # BLD AUTO: 0.03 K/UL (ref 0–0.2)
BASOPHILS NFR BLD: 0.3 % (ref 0–1.9)
BILIRUB SERPL-MCNC: 0.4 MG/DL (ref 0.1–1)
BILIRUB UR QL STRIP: NEGATIVE
BNP SERPL-MCNC: 36 PG/ML (ref 0–99)
BUN SERPL-MCNC: 19 MG/DL (ref 8–23)
CALCIUM SERPL-MCNC: 10.1 MG/DL (ref 8.7–10.5)
CHLORIDE SERPL-SCNC: 103 MMOL/L (ref 95–110)
CLARITY UR: ABNORMAL
CO2 SERPL-SCNC: 24 MMOL/L (ref 23–29)
COLOR UR: YELLOW
CREAT SERPL-MCNC: 0.9 MG/DL (ref 0.5–1.4)
DIFFERENTIAL METHOD: ABNORMAL
EOSINOPHIL # BLD AUTO: 0 K/UL (ref 0–0.5)
EOSINOPHIL NFR BLD: 0.3 % (ref 0–8)
ERYTHROCYTE [DISTWIDTH] IN BLOOD BY AUTOMATED COUNT: 14.5 % (ref 11.5–14.5)
EST. GFR  (AFRICAN AMERICAN): >60 ML/MIN/1.73 M^2
EST. GFR  (NON AFRICAN AMERICAN): >60 ML/MIN/1.73 M^2
GLUCOSE SERPL-MCNC: 112 MG/DL (ref 70–110)
GLUCOSE UR QL STRIP: NEGATIVE
GRAN CASTS #/AREA URNS LPF: 1 /LPF
HCT VFR BLD AUTO: 40.1 % (ref 37–48.5)
HGB BLD-MCNC: 13 G/DL (ref 12–16)
HGB UR QL STRIP: ABNORMAL
HYALINE CASTS #/AREA URNS LPF: 2 /LPF
IMM GRANULOCYTES # BLD AUTO: 0.06 K/UL (ref 0–0.04)
IMM GRANULOCYTES NFR BLD AUTO: 0.5 % (ref 0–0.5)
KETONES UR QL STRIP: NEGATIVE
LEUKOCYTE ESTERASE UR QL STRIP: ABNORMAL
LYMPHOCYTES # BLD AUTO: 1.6 K/UL (ref 1–4.8)
LYMPHOCYTES NFR BLD: 13.4 % (ref 18–48)
MCH RBC QN AUTO: 25.2 PG (ref 27–31)
MCHC RBC AUTO-ENTMCNC: 32.4 G/DL (ref 32–36)
MCV RBC AUTO: 78 FL (ref 82–98)
MICROSCOPIC COMMENT: ABNORMAL
MONOCYTES # BLD AUTO: 0.9 K/UL (ref 0.3–1)
MONOCYTES NFR BLD: 8 % (ref 4–15)
NEUTROPHILS # BLD AUTO: 9.1 K/UL (ref 1.8–7.7)
NEUTROPHILS NFR BLD: 77.5 % (ref 38–73)
NITRITE UR QL STRIP: NEGATIVE
NRBC BLD-RTO: 0 /100 WBC
PH UR STRIP: 5 [PH] (ref 5–8)
PLATELET # BLD AUTO: 335 K/UL (ref 150–350)
PMV BLD AUTO: 9.4 FL (ref 9.2–12.9)
POCT GLUCOSE: 124 MG/DL (ref 70–110)
POTASSIUM SERPL-SCNC: 3.6 MMOL/L (ref 3.5–5.1)
PROT SERPL-MCNC: 8.8 G/DL (ref 6–8.4)
PROT UR QL STRIP: ABNORMAL
RBC # BLD AUTO: 5.16 M/UL (ref 4–5.4)
RBC #/AREA URNS HPF: 3 /HPF (ref 0–4)
SODIUM SERPL-SCNC: 140 MMOL/L (ref 136–145)
SP GR UR STRIP: 1.02 (ref 1–1.03)
SQUAMOUS #/AREA URNS HPF: 20 /HPF
TRICHOMONAS UR QL MICRO: ABNORMAL
TROPONIN I SERPL DL<=0.01 NG/ML-MCNC: 0.01 NG/ML (ref 0–0.03)
TROPONIN I SERPL DL<=0.01 NG/ML-MCNC: <0.006 NG/ML (ref 0–0.03)
TSH SERPL DL<=0.005 MIU/L-ACNC: 1.96 UIU/ML (ref 0.4–4)
URN SPEC COLLECT METH UR: ABNORMAL
UROBILINOGEN UR STRIP-ACNC: ABNORMAL EU/DL
WBC # BLD AUTO: 11.74 K/UL (ref 3.9–12.7)
WBC #/AREA URNS HPF: 30 /HPF (ref 0–5)

## 2020-01-04 PROCEDURE — 87186 SC STD MICRODIL/AGAR DIL: CPT

## 2020-01-04 PROCEDURE — 81000 URINALYSIS NONAUTO W/SCOPE: CPT

## 2020-01-04 PROCEDURE — 87086 URINE CULTURE/COLONY COUNT: CPT

## 2020-01-04 PROCEDURE — 99284 EMERGENCY DEPT VISIT MOD MDM: CPT | Mod: 25

## 2020-01-04 PROCEDURE — 87077 CULTURE AEROBIC IDENTIFY: CPT

## 2020-01-04 PROCEDURE — 83880 ASSAY OF NATRIURETIC PEPTIDE: CPT

## 2020-01-04 PROCEDURE — 87088 URINE BACTERIA CULTURE: CPT

## 2020-01-04 PROCEDURE — 84443 ASSAY THYROID STIM HORMONE: CPT

## 2020-01-04 PROCEDURE — 63600175 PHARM REV CODE 636 W HCPCS: Performed by: EMERGENCY MEDICINE

## 2020-01-04 PROCEDURE — 84484 ASSAY OF TROPONIN QUANT: CPT

## 2020-01-04 PROCEDURE — 80053 COMPREHEN METABOLIC PANEL: CPT

## 2020-01-04 PROCEDURE — 82962 GLUCOSE BLOOD TEST: CPT

## 2020-01-04 PROCEDURE — 96374 THER/PROPH/DIAG INJ IV PUSH: CPT

## 2020-01-04 PROCEDURE — 85025 COMPLETE CBC W/AUTO DIFF WBC: CPT

## 2020-01-04 PROCEDURE — P9612 CATHETERIZE FOR URINE SPEC: HCPCS

## 2020-01-04 RX ORDER — CEPHALEXIN 500 MG/1
500 CAPSULE ORAL EVERY 8 HOURS
Qty: 30 CAPSULE | Refills: 0 | Status: SHIPPED | OUTPATIENT
Start: 2020-01-04 | End: 2020-01-14

## 2020-01-04 RX ADMIN — CEFTRIAXONE 1 G: 1 INJECTION, SOLUTION INTRAVENOUS at 10:01

## 2020-01-04 NOTE — LETTER
01/09/2020      Holly Kidd  [id number] [address]  [place] [id number]      Visit Date: 1/4/2020        Multiple attempts to contact you via the provided telephone have been unsuccessful. This written correspondence is to inform you that your test results from your recent visit to [facility] have abnormal results. Please return to the Emergency Department immediately or call [phone] and ask to speak with a provider for further information.        Thank you,  Emergency Department Staff  [facility] [address]  ?  [place] [address]  ?  phone [phone] ?   ?  fax [phone]  ?  [email or web address]

## 2020-01-05 NOTE — ED TRIAGE NOTES
Per daughter patient had a syncope episode while in her wheel chair. Daughter reports patient was foaming by the mouth and eyes rolling to the back of her head. Daughter also reports patient was making gurgling sounds. Patient daughter reports she did not hit her head or hit the floor. Family report temp in the room was 84 degrees F. Reports vomiting

## 2020-01-06 LAB — BACTERIA UR CULT: ABNORMAL

## 2021-08-08 ENCOUNTER — HOSPITAL ENCOUNTER (EMERGENCY)
Facility: HOSPITAL | Age: 71
Discharge: HOME | End: 2021-08-09
Attending: EMERGENCY MEDICINE
Payer: MEDICARE

## 2021-08-08 DIAGNOSIS — N30.01 ACUTE CYSTITIS WITH HEMATURIA: ICD-10-CM

## 2021-08-08 DIAGNOSIS — R11.10 VOMITING: ICD-10-CM

## 2021-08-08 DIAGNOSIS — R06.02 SHORTNESS OF BREATH: ICD-10-CM

## 2021-08-08 DIAGNOSIS — R53.1 GENERALIZED WEAKNESS: Primary | ICD-10-CM

## 2021-08-08 DIAGNOSIS — R55 NEAR SYNCOPE: ICD-10-CM

## 2021-08-08 DIAGNOSIS — R10.9 ABDOMINAL PAIN, UNSPECIFIED ABDOMINAL LOCATION: ICD-10-CM

## 2021-08-08 DIAGNOSIS — E87.6 HYPOKALEMIA: ICD-10-CM

## 2021-08-08 LAB
ALBUMIN SERPL BCP-MCNC: 3.8 G/DL (ref 3.5–5.2)
ALP SERPL-CCNC: 125 U/L (ref 55–135)
ALT SERPL W/O P-5'-P-CCNC: 11 U/L (ref 10–44)
ANION GAP SERPL CALC-SCNC: 10 MMOL/L (ref 8–16)
AST SERPL-CCNC: 16 U/L (ref 10–40)
BASOPHILS # BLD AUTO: 0.02 K/UL (ref 0–0.2)
BASOPHILS NFR BLD: 0.2 % (ref 0–1.9)
BILIRUB SERPL-MCNC: 0.5 MG/DL (ref 0.1–1)
BNP SERPL-MCNC: <10 PG/ML (ref 0–99)
BUN SERPL-MCNC: 13 MG/DL (ref 8–23)
CALCIUM SERPL-MCNC: 9.6 MG/DL (ref 8.7–10.5)
CHLORIDE SERPL-SCNC: 109 MMOL/L (ref 95–110)
CO2 SERPL-SCNC: 23 MMOL/L (ref 23–29)
CREAT SERPL-MCNC: 0.7 MG/DL (ref 0.5–1.4)
CTP QC/QA: YES
DIFFERENTIAL METHOD: ABNORMAL
EOSINOPHIL # BLD AUTO: 0.1 K/UL (ref 0–0.5)
EOSINOPHIL NFR BLD: 1.1 % (ref 0–8)
ERYTHROCYTE [DISTWIDTH] IN BLOOD BY AUTOMATED COUNT: 13.9 % (ref 11.5–14.5)
EST. GFR  (AFRICAN AMERICAN): >60 ML/MIN/1.73 M^2
EST. GFR  (NON AFRICAN AMERICAN): >60 ML/MIN/1.73 M^2
GLUCOSE SERPL-MCNC: 132 MG/DL (ref 70–110)
HCT VFR BLD AUTO: 35.5 % (ref 37–48.5)
HGB BLD-MCNC: 12.2 G/DL (ref 12–16)
IMM GRANULOCYTES # BLD AUTO: 0.03 K/UL (ref 0–0.04)
IMM GRANULOCYTES NFR BLD AUTO: 0.3 % (ref 0–0.5)
LIPASE SERPL-CCNC: 12 U/L (ref 4–60)
LYMPHOCYTES # BLD AUTO: 1.2 K/UL (ref 1–4.8)
LYMPHOCYTES NFR BLD: 13.1 % (ref 18–48)
MAGNESIUM SERPL-MCNC: 2.1 MG/DL (ref 1.6–2.6)
MCH RBC QN AUTO: 26.1 PG (ref 27–31)
MCHC RBC AUTO-ENTMCNC: 34.4 G/DL (ref 32–36)
MCV RBC AUTO: 76 FL (ref 82–98)
MONOCYTES # BLD AUTO: 0.5 K/UL (ref 0.3–1)
MONOCYTES NFR BLD: 5.5 % (ref 4–15)
NEUTROPHILS # BLD AUTO: 7.1 K/UL (ref 1.8–7.7)
NEUTROPHILS NFR BLD: 79.8 % (ref 38–73)
NRBC BLD-RTO: 0 /100 WBC
PLATELET # BLD AUTO: 315 K/UL (ref 150–450)
PMV BLD AUTO: 8.8 FL (ref 9.2–12.9)
POTASSIUM SERPL-SCNC: 3.1 MMOL/L (ref 3.5–5.1)
PROT SERPL-MCNC: 8.2 G/DL (ref 6–8.4)
RBC # BLD AUTO: 4.67 M/UL (ref 4–5.4)
SARS-COV-2 RDRP RESP QL NAA+PROBE: NEGATIVE
SODIUM SERPL-SCNC: 142 MMOL/L (ref 136–145)
TROPONIN I SERPL DL<=0.01 NG/ML-MCNC: <0.006 NG/ML (ref 0–0.03)
TSH SERPL DL<=0.005 MIU/L-ACNC: 1.36 UIU/ML (ref 0.4–4)
WBC # BLD AUTO: 8.93 K/UL (ref 3.9–12.7)

## 2021-08-08 PROCEDURE — 99285 EMERGENCY DEPT VISIT HI MDM: CPT | Mod: 25

## 2021-08-08 PROCEDURE — U0002 COVID-19 LAB TEST NON-CDC: HCPCS | Performed by: EMERGENCY MEDICINE

## 2021-08-08 PROCEDURE — 93010 ELECTROCARDIOGRAM REPORT: CPT | Mod: ,,, | Performed by: INTERNAL MEDICINE

## 2021-08-08 PROCEDURE — 93010 EKG 12-LEAD: ICD-10-PCS | Mod: ,,, | Performed by: INTERNAL MEDICINE

## 2021-08-08 PROCEDURE — 25000003 PHARM REV CODE 250: Performed by: EMERGENCY MEDICINE

## 2021-08-08 PROCEDURE — 84484 ASSAY OF TROPONIN QUANT: CPT | Performed by: EMERGENCY MEDICINE

## 2021-08-08 PROCEDURE — 84443 ASSAY THYROID STIM HORMONE: CPT | Performed by: EMERGENCY MEDICINE

## 2021-08-08 PROCEDURE — 83690 ASSAY OF LIPASE: CPT | Performed by: EMERGENCY MEDICINE

## 2021-08-08 PROCEDURE — 80053 COMPREHEN METABOLIC PANEL: CPT | Performed by: EMERGENCY MEDICINE

## 2021-08-08 PROCEDURE — 96361 HYDRATE IV INFUSION ADD-ON: CPT

## 2021-08-08 PROCEDURE — 93005 ELECTROCARDIOGRAM TRACING: CPT

## 2021-08-08 PROCEDURE — 83735 ASSAY OF MAGNESIUM: CPT | Performed by: EMERGENCY MEDICINE

## 2021-08-08 PROCEDURE — 85025 COMPLETE CBC W/AUTO DIFF WBC: CPT | Performed by: EMERGENCY MEDICINE

## 2021-08-08 PROCEDURE — 83880 ASSAY OF NATRIURETIC PEPTIDE: CPT | Performed by: EMERGENCY MEDICINE

## 2021-08-08 RX ORDER — ONDANSETRON 2 MG/ML
4 INJECTION INTRAMUSCULAR; INTRAVENOUS
Status: DISCONTINUED | OUTPATIENT
Start: 2021-08-08 | End: 2021-08-09 | Stop reason: HOSPADM

## 2021-08-08 RX ADMIN — SODIUM CHLORIDE 1000 ML: 0.9 INJECTION, SOLUTION INTRAVENOUS at 11:08

## 2021-08-09 VITALS
OXYGEN SATURATION: 99 % | HEART RATE: 78 BPM | RESPIRATION RATE: 18 BRPM | WEIGHT: 200 LBS | TEMPERATURE: 98 F | HEIGHT: 66 IN | BODY MASS INDEX: 32.14 KG/M2 | DIASTOLIC BLOOD PRESSURE: 70 MMHG | SYSTOLIC BLOOD PRESSURE: 129 MMHG

## 2021-08-09 LAB
BACTERIA #/AREA URNS HPF: ABNORMAL /HPF
BILIRUB UR QL STRIP: NEGATIVE
CLARITY UR: CLEAR
COLOR UR: YELLOW
GLUCOSE UR QL STRIP: NEGATIVE
HGB UR QL STRIP: ABNORMAL
KETONES UR QL STRIP: NEGATIVE
LEUKOCYTE ESTERASE UR QL STRIP: ABNORMAL
MICROSCOPIC COMMENT: ABNORMAL
NITRITE UR QL STRIP: POSITIVE
PH UR STRIP: 6 [PH] (ref 5–8)
PROT UR QL STRIP: NEGATIVE
RBC #/AREA URNS HPF: 1 /HPF (ref 0–4)
SP GR UR STRIP: 1.02 (ref 1–1.03)
SQUAMOUS #/AREA URNS HPF: 1 /HPF
TROPONIN I SERPL DL<=0.01 NG/ML-MCNC: <0.006 NG/ML (ref 0–0.03)
URN SPEC COLLECT METH UR: ABNORMAL
UROBILINOGEN UR STRIP-ACNC: NEGATIVE EU/DL
WBC #/AREA URNS HPF: 10 /HPF (ref 0–5)

## 2021-08-09 PROCEDURE — 84484 ASSAY OF TROPONIN QUANT: CPT | Performed by: EMERGENCY MEDICINE

## 2021-08-09 PROCEDURE — 81000 URINALYSIS NONAUTO W/SCOPE: CPT | Performed by: EMERGENCY MEDICINE

## 2021-08-09 PROCEDURE — 63600175 PHARM REV CODE 636 W HCPCS: Performed by: EMERGENCY MEDICINE

## 2021-08-09 PROCEDURE — 25000003 PHARM REV CODE 250: Performed by: EMERGENCY MEDICINE

## 2021-08-09 PROCEDURE — 96365 THER/PROPH/DIAG IV INF INIT: CPT

## 2021-08-09 RX ORDER — POTASSIUM CHLORIDE 20 MEQ/1
60 TABLET, EXTENDED RELEASE ORAL
Status: COMPLETED | OUTPATIENT
Start: 2021-08-09 | End: 2021-08-09

## 2021-08-09 RX ORDER — POTASSIUM CHLORIDE 20 MEQ/1
20 TABLET, EXTENDED RELEASE ORAL DAILY
Qty: 5 TABLET | Refills: 0 | Status: SHIPPED | OUTPATIENT
Start: 2021-08-09 | End: 2021-08-14

## 2021-08-09 RX ORDER — CEPHALEXIN 500 MG/1
500 CAPSULE ORAL EVERY 6 HOURS
Qty: 40 CAPSULE | Refills: 0 | Status: SHIPPED | OUTPATIENT
Start: 2021-08-09 | End: 2021-08-19

## 2021-08-09 RX ORDER — ONDANSETRON 4 MG/1
4 TABLET, ORALLY DISINTEGRATING ORAL EVERY 4 HOURS PRN
Qty: 20 TABLET | Refills: 1 | Status: ON HOLD | OUTPATIENT
Start: 2021-08-09 | End: 2023-12-19 | Stop reason: HOSPADM

## 2021-08-09 RX ADMIN — POTASSIUM CHLORIDE 60 MEQ: 20 TABLET, EXTENDED RELEASE ORAL at 03:08

## 2021-08-09 RX ADMIN — CEFTRIAXONE 1 G: 1 INJECTION, SOLUTION INTRAVENOUS at 02:08

## 2021-09-09 ENCOUNTER — HOSPITAL ENCOUNTER (INPATIENT)
Facility: HOSPITAL | Age: 71
LOS: 2 days | Discharge: HOME | DRG: 177 | End: 2021-09-12
Attending: EMERGENCY MEDICINE | Admitting: INTERNAL MEDICINE
Payer: MEDICARE

## 2021-09-09 DIAGNOSIS — J96.01 ACUTE HYPOXEMIC RESPIRATORY FAILURE DUE TO COVID-19: ICD-10-CM

## 2021-09-09 DIAGNOSIS — U07.1 ACUTE HYPOXEMIC RESPIRATORY FAILURE DUE TO COVID-19: ICD-10-CM

## 2021-09-09 DIAGNOSIS — Z20.822 SUSPECTED COVID-19 VIRUS INFECTION: ICD-10-CM

## 2021-09-09 DIAGNOSIS — U07.1 COVID-19 VIRUS DETECTED: ICD-10-CM

## 2021-09-09 DIAGNOSIS — R55 SYNCOPE, UNSPECIFIED SYNCOPE TYPE: Primary | ICD-10-CM

## 2021-09-09 DIAGNOSIS — R06.02 SOB (SHORTNESS OF BREATH): ICD-10-CM

## 2021-09-09 LAB
ALBUMIN SERPL BCP-MCNC: 3.6 G/DL (ref 3.5–5.2)
ALP SERPL-CCNC: 122 U/L (ref 55–135)
ALT SERPL W/O P-5'-P-CCNC: 24 U/L (ref 10–44)
ANION GAP SERPL CALC-SCNC: 14 MMOL/L (ref 8–16)
AST SERPL-CCNC: 38 U/L (ref 10–40)
BASOPHILS # BLD AUTO: 0.02 K/UL (ref 0–0.2)
BASOPHILS NFR BLD: 0.5 % (ref 0–1.9)
BILIRUB SERPL-MCNC: 0.3 MG/DL (ref 0.1–1)
BNP SERPL-MCNC: <10 PG/ML (ref 0–99)
BUN SERPL-MCNC: 15 MG/DL (ref 8–23)
CALCIUM SERPL-MCNC: 9.7 MG/DL (ref 8.7–10.5)
CHLORIDE SERPL-SCNC: 103 MMOL/L (ref 95–110)
CK SERPL-CCNC: 129 U/L (ref 20–180)
CO2 SERPL-SCNC: 20 MMOL/L (ref 23–29)
CREAT SERPL-MCNC: 0.8 MG/DL (ref 0.5–1.4)
CRP SERPL-MCNC: 14.4 MG/L (ref 0–8.2)
CTP QC/QA: YES
DIFFERENTIAL METHOD: ABNORMAL
EOSINOPHIL # BLD AUTO: 0 K/UL (ref 0–0.5)
EOSINOPHIL NFR BLD: 0 % (ref 0–8)
ERYTHROCYTE [DISTWIDTH] IN BLOOD BY AUTOMATED COUNT: 14 % (ref 11.5–14.5)
EST. GFR  (AFRICAN AMERICAN): >60 ML/MIN/1.73 M^2
EST. GFR  (NON AFRICAN AMERICAN): >60 ML/MIN/1.73 M^2
FERRITIN SERPL-MCNC: 228 NG/ML (ref 20–300)
GLUCOSE SERPL-MCNC: 132 MG/DL (ref 70–110)
HCT VFR BLD AUTO: 38.4 % (ref 37–48.5)
HGB BLD-MCNC: 13.1 G/DL (ref 12–16)
IMM GRANULOCYTES # BLD AUTO: 0.02 K/UL (ref 0–0.04)
IMM GRANULOCYTES NFR BLD AUTO: 0.5 % (ref 0–0.5)
LACTATE SERPL-SCNC: 2.4 MMOL/L (ref 0.5–2.2)
LDH SERPL L TO P-CCNC: 400 U/L (ref 110–260)
LYMPHOCYTES # BLD AUTO: 1.8 K/UL (ref 1–4.8)
LYMPHOCYTES NFR BLD: 43.6 % (ref 18–48)
MCH RBC QN AUTO: 26 PG (ref 27–31)
MCHC RBC AUTO-ENTMCNC: 34.1 G/DL (ref 32–36)
MCV RBC AUTO: 76 FL (ref 82–98)
MONOCYTES # BLD AUTO: 0.7 K/UL (ref 0.3–1)
MONOCYTES NFR BLD: 16.8 % (ref 4–15)
NEUTROPHILS # BLD AUTO: 1.6 K/UL (ref 1.8–7.7)
NEUTROPHILS NFR BLD: 38.6 % (ref 38–73)
NRBC BLD-RTO: 0 /100 WBC
PLATELET # BLD AUTO: 294 K/UL (ref 150–450)
PMV BLD AUTO: 9.5 FL (ref 9.2–12.9)
POTASSIUM SERPL-SCNC: 3.6 MMOL/L (ref 3.5–5.1)
PROCALCITONIN SERPL IA-MCNC: 0.06 NG/ML
PROT SERPL-MCNC: 8.4 G/DL (ref 6–8.4)
RBC # BLD AUTO: 5.04 M/UL (ref 4–5.4)
SARS-COV-2 RDRP RESP QL NAA+PROBE: POSITIVE
SODIUM SERPL-SCNC: 137 MMOL/L (ref 136–145)
TROPONIN I SERPL DL<=0.01 NG/ML-MCNC: 0.01 NG/ML (ref 0–0.03)
WBC # BLD AUTO: 4.22 K/UL (ref 3.9–12.7)

## 2021-09-09 PROCEDURE — 96372 THER/PROPH/DIAG INJ SC/IM: CPT | Mod: 59

## 2021-09-09 PROCEDURE — 93005 ELECTROCARDIOGRAM TRACING: CPT

## 2021-09-09 PROCEDURE — 99285 EMERGENCY DEPT VISIT HI MDM: CPT | Mod: 25

## 2021-09-09 PROCEDURE — 93010 ELECTROCARDIOGRAM REPORT: CPT | Mod: ,,, | Performed by: INTERNAL MEDICINE

## 2021-09-09 PROCEDURE — 82728 ASSAY OF FERRITIN: CPT | Performed by: EMERGENCY MEDICINE

## 2021-09-09 PROCEDURE — 83615 LACTATE (LD) (LDH) ENZYME: CPT | Performed by: EMERGENCY MEDICINE

## 2021-09-09 PROCEDURE — 83880 ASSAY OF NATRIURETIC PEPTIDE: CPT | Performed by: EMERGENCY MEDICINE

## 2021-09-09 PROCEDURE — 83605 ASSAY OF LACTIC ACID: CPT | Performed by: EMERGENCY MEDICINE

## 2021-09-09 PROCEDURE — 86140 C-REACTIVE PROTEIN: CPT | Performed by: EMERGENCY MEDICINE

## 2021-09-09 PROCEDURE — 84484 ASSAY OF TROPONIN QUANT: CPT | Performed by: PHYSICIAN ASSISTANT

## 2021-09-09 PROCEDURE — G0378 HOSPITAL OBSERVATION PER HR: HCPCS

## 2021-09-09 PROCEDURE — 85025 COMPLETE CBC W/AUTO DIFF WBC: CPT | Performed by: PHYSICIAN ASSISTANT

## 2021-09-09 PROCEDURE — 25500020 PHARM REV CODE 255: Performed by: EMERGENCY MEDICINE

## 2021-09-09 PROCEDURE — 93010 EKG 12-LEAD: ICD-10-PCS | Mod: ,,, | Performed by: INTERNAL MEDICINE

## 2021-09-09 PROCEDURE — U0002 COVID-19 LAB TEST NON-CDC: HCPCS | Performed by: EMERGENCY MEDICINE

## 2021-09-09 PROCEDURE — 82550 ASSAY OF CK (CPK): CPT | Performed by: EMERGENCY MEDICINE

## 2021-09-09 PROCEDURE — 87040 BLOOD CULTURE FOR BACTERIA: CPT | Mod: 59 | Performed by: EMERGENCY MEDICINE

## 2021-09-09 PROCEDURE — 84145 PROCALCITONIN (PCT): CPT | Performed by: EMERGENCY MEDICINE

## 2021-09-09 PROCEDURE — 93010 ELECTROCARDIOGRAM REPORT: CPT | Mod: 76,,, | Performed by: INTERNAL MEDICINE

## 2021-09-09 PROCEDURE — 80053 COMPREHEN METABOLIC PANEL: CPT | Performed by: PHYSICIAN ASSISTANT

## 2021-09-09 RX ORDER — ACETAMINOPHEN 325 MG/1
650 TABLET ORAL EVERY 4 HOURS PRN
Status: DISCONTINUED | OUTPATIENT
Start: 2021-09-09 | End: 2021-09-12 | Stop reason: HOSPADM

## 2021-09-09 RX ORDER — AMOXICILLIN 250 MG
1 CAPSULE ORAL 2 TIMES DAILY
Status: DISCONTINUED | OUTPATIENT
Start: 2021-09-09 | End: 2021-09-12 | Stop reason: HOSPADM

## 2021-09-09 RX ORDER — ALBUTEROL SULFATE 90 UG/1
2 AEROSOL, METERED RESPIRATORY (INHALATION) EVERY 6 HOURS
Status: DISCONTINUED | OUTPATIENT
Start: 2021-09-10 | End: 2021-09-10

## 2021-09-09 RX ORDER — IBUPROFEN 200 MG
24 TABLET ORAL
Status: DISCONTINUED | OUTPATIENT
Start: 2021-09-09 | End: 2021-09-12 | Stop reason: HOSPADM

## 2021-09-09 RX ORDER — GUAIFENESIN/DEXTROMETHORPHAN 100-10MG/5
10 SYRUP ORAL EVERY 4 HOURS PRN
Status: DISCONTINUED | OUTPATIENT
Start: 2021-09-09 | End: 2021-09-12 | Stop reason: HOSPADM

## 2021-09-09 RX ORDER — GLUCAGON 1 MG
1 KIT INJECTION
Status: DISCONTINUED | OUTPATIENT
Start: 2021-09-09 | End: 2021-09-12 | Stop reason: HOSPADM

## 2021-09-09 RX ORDER — SODIUM CHLORIDE 0.9 % (FLUSH) 0.9 %
10 SYRINGE (ML) INJECTION
Status: DISCONTINUED | OUTPATIENT
Start: 2021-09-09 | End: 2021-09-12 | Stop reason: HOSPADM

## 2021-09-09 RX ORDER — ASCORBIC ACID 500 MG
500 TABLET ORAL 2 TIMES DAILY
Status: DISCONTINUED | OUTPATIENT
Start: 2021-09-09 | End: 2021-09-12 | Stop reason: HOSPADM

## 2021-09-09 RX ORDER — IBUPROFEN 200 MG
16 TABLET ORAL
Status: DISCONTINUED | OUTPATIENT
Start: 2021-09-09 | End: 2021-09-12 | Stop reason: HOSPADM

## 2021-09-09 RX ORDER — ENOXAPARIN SODIUM 150 MG/ML
1 INJECTION SUBCUTANEOUS 2 TIMES DAILY
Status: DISCONTINUED | OUTPATIENT
Start: 2021-09-09 | End: 2021-09-12 | Stop reason: HOSPADM

## 2021-09-09 RX ORDER — TALC
6 POWDER (GRAM) TOPICAL NIGHTLY PRN
Status: DISCONTINUED | OUTPATIENT
Start: 2021-09-09 | End: 2021-09-12 | Stop reason: HOSPADM

## 2021-09-09 RX ADMIN — IOHEXOL 75 ML: 350 INJECTION, SOLUTION INTRAVENOUS at 08:09

## 2021-09-10 PROBLEM — G93.40 ACUTE ENCEPHALOPATHY: Status: ACTIVE | Noted: 2021-09-10

## 2021-09-10 PROBLEM — E55.9 VITAMIN D DEFICIENCY: Status: ACTIVE | Noted: 2021-09-10

## 2021-09-10 PROBLEM — Z86.73 HISTORY OF STROKE: Chronic | Status: ACTIVE | Noted: 2019-05-23

## 2021-09-10 PROBLEM — J96.01 ACUTE HYPOXEMIC RESPIRATORY FAILURE DUE TO COVID-19: Status: ACTIVE | Noted: 2021-09-10

## 2021-09-10 PROBLEM — U07.1 COVID-19: Status: ACTIVE | Noted: 2021-09-10

## 2021-09-10 PROBLEM — Z99.3 WHEELCHAIR DEPENDENCE: Chronic | Status: ACTIVE | Noted: 2019-05-23

## 2021-09-10 PROBLEM — U07.1 COVID-19: Status: RESOLVED | Noted: 2021-09-10 | Resolved: 2021-09-10

## 2021-09-10 LAB
25(OH)D3+25(OH)D2 SERPL-MCNC: 6 NG/ML (ref 30–96)
APTT BLDCRRT: 29.5 SEC (ref 21–32)
BACTERIA #/AREA URNS HPF: ABNORMAL /HPF
BASOPHILS # BLD AUTO: 0.02 K/UL (ref 0–0.2)
BASOPHILS NFR BLD: 0.4 % (ref 0–1.9)
BILIRUB UR QL STRIP: NEGATIVE
CHOLEST SERPL-MCNC: 131 MG/DL (ref 120–199)
CHOLEST/HDLC SERPL: 4.7 {RATIO} (ref 2–5)
CLARITY UR: CLEAR
COLOR UR: YELLOW
DIFFERENTIAL METHOD: ABNORMAL
EOSINOPHIL # BLD AUTO: 0 K/UL (ref 0–0.5)
EOSINOPHIL NFR BLD: 0 % (ref 0–8)
ERYTHROCYTE [DISTWIDTH] IN BLOOD BY AUTOMATED COUNT: 14.1 % (ref 11.5–14.5)
ERYTHROCYTE [SEDIMENTATION RATE] IN BLOOD BY WESTERGREN METHOD: 34 MM/HR (ref 0–20)
GLUCOSE UR QL STRIP: NEGATIVE
HCT VFR BLD AUTO: 37.4 % (ref 37–48.5)
HDLC SERPL-MCNC: 28 MG/DL (ref 40–75)
HDLC SERPL: 21.4 % (ref 20–50)
HGB BLD-MCNC: 12.8 G/DL (ref 12–16)
HGB UR QL STRIP: ABNORMAL
HYALINE CASTS #/AREA URNS LPF: 0 /LPF
IMM GRANULOCYTES # BLD AUTO: 0.01 K/UL (ref 0–0.04)
IMM GRANULOCYTES NFR BLD AUTO: 0.2 % (ref 0–0.5)
INR PPP: 1 (ref 0.8–1.2)
KETONES UR QL STRIP: NEGATIVE
LACTATE SERPL-SCNC: 1.5 MMOL/L (ref 0.5–2.2)
LDLC SERPL CALC-MCNC: 87.2 MG/DL (ref 63–159)
LEUKOCYTE ESTERASE UR QL STRIP: ABNORMAL
LYMPHOCYTES # BLD AUTO: 2.2 K/UL (ref 1–4.8)
LYMPHOCYTES NFR BLD: 44.1 % (ref 18–48)
MAGNESIUM SERPL-MCNC: 2.3 MG/DL (ref 1.6–2.6)
MCH RBC QN AUTO: 26.1 PG (ref 27–31)
MCHC RBC AUTO-ENTMCNC: 34.2 G/DL (ref 32–36)
MCV RBC AUTO: 76 FL (ref 82–98)
MICROSCOPIC COMMENT: ABNORMAL
MONOCYTES # BLD AUTO: 0.9 K/UL (ref 0.3–1)
MONOCYTES NFR BLD: 18 % (ref 4–15)
NEUTROPHILS # BLD AUTO: 1.9 K/UL (ref 1.8–7.7)
NEUTROPHILS NFR BLD: 37.3 % (ref 38–73)
NITRITE UR QL STRIP: NEGATIVE
NONHDLC SERPL-MCNC: 103 MG/DL
NRBC BLD-RTO: 0 /100 WBC
PH UR STRIP: 7 [PH] (ref 5–8)
PHOSPHATE SERPL-MCNC: 4.4 MG/DL (ref 2.7–4.5)
PLATELET # BLD AUTO: 253 K/UL (ref 150–450)
PMV BLD AUTO: 9.5 FL (ref 9.2–12.9)
POCT GLUCOSE: 147 MG/DL (ref 70–110)
PROT UR QL STRIP: ABNORMAL
PROTHROMBIN TIME: 11 SEC (ref 9–12.5)
RBC # BLD AUTO: 4.9 M/UL (ref 4–5.4)
RBC #/AREA URNS HPF: 25 /HPF (ref 0–4)
SP GR UR STRIP: >1.03 (ref 1–1.03)
TRIGL SERPL-MCNC: 79 MG/DL (ref 30–150)
TROPONIN I SERPL DL<=0.01 NG/ML-MCNC: 0.01 NG/ML (ref 0–0.03)
TROPONIN I SERPL DL<=0.01 NG/ML-MCNC: <0.006 NG/ML (ref 0–0.03)
TSH SERPL DL<=0.005 MIU/L-ACNC: 2.37 UIU/ML (ref 0.4–4)
URN SPEC COLLECT METH UR: ABNORMAL
UROBILINOGEN UR STRIP-ACNC: NEGATIVE EU/DL
WBC # BLD AUTO: 5.06 K/UL (ref 3.9–12.7)
WBC #/AREA URNS HPF: 5 /HPF (ref 0–5)

## 2021-09-10 PROCEDURE — 63600175 PHARM REV CODE 636 W HCPCS: Performed by: HOSPITALIST

## 2021-09-10 PROCEDURE — 84484 ASSAY OF TROPONIN QUANT: CPT | Mod: 91 | Performed by: HOSPITALIST

## 2021-09-10 PROCEDURE — 85652 RBC SED RATE AUTOMATED: CPT | Performed by: HOSPITALIST

## 2021-09-10 PROCEDURE — 25000242 PHARM REV CODE 250 ALT 637 W/ HCPCS: Performed by: PHYSICIAN ASSISTANT

## 2021-09-10 PROCEDURE — 99222 PR INITIAL HOSPITAL CARE,LEVL II: ICD-10-PCS | Mod: CR,,, | Performed by: PSYCHIATRY & NEUROLOGY

## 2021-09-10 PROCEDURE — 94760 N-INVAS EAR/PLS OXIMETRY 1: CPT

## 2021-09-10 PROCEDURE — 94761 N-INVAS EAR/PLS OXIMETRY MLT: CPT

## 2021-09-10 PROCEDURE — 84443 ASSAY THYROID STIM HORMONE: CPT | Performed by: HOSPITALIST

## 2021-09-10 PROCEDURE — 83605 ASSAY OF LACTIC ACID: CPT | Performed by: HOSPITALIST

## 2021-09-10 PROCEDURE — 99222 1ST HOSP IP/OBS MODERATE 55: CPT | Mod: CR,,, | Performed by: PSYCHIATRY & NEUROLOGY

## 2021-09-10 PROCEDURE — 94640 AIRWAY INHALATION TREATMENT: CPT

## 2021-09-10 PROCEDURE — 27100098 HC SPACER

## 2021-09-10 PROCEDURE — 25000242 PHARM REV CODE 250 ALT 637 W/ HCPCS: Performed by: HOSPITALIST

## 2021-09-10 PROCEDURE — 96372 THER/PROPH/DIAG INJ SC/IM: CPT | Mod: 59

## 2021-09-10 PROCEDURE — 81000 URINALYSIS NONAUTO W/SCOPE: CPT | Performed by: PHYSICIAN ASSISTANT

## 2021-09-10 PROCEDURE — 83735 ASSAY OF MAGNESIUM: CPT | Performed by: HOSPITALIST

## 2021-09-10 PROCEDURE — 85610 PROTHROMBIN TIME: CPT | Performed by: HOSPITALIST

## 2021-09-10 PROCEDURE — 27000207 HC ISOLATION

## 2021-09-10 PROCEDURE — 96366 THER/PROPH/DIAG IV INF ADDON: CPT

## 2021-09-10 PROCEDURE — 85025 COMPLETE CBC W/AUTO DIFF WBC: CPT | Performed by: HOSPITALIST

## 2021-09-10 PROCEDURE — 99226 PR SUBSEQUENT OBSERVATION CARE,LEVEL III: ICD-10-PCS | Mod: CR,,, | Performed by: PHYSICIAN ASSISTANT

## 2021-09-10 PROCEDURE — 84100 ASSAY OF PHOSPHORUS: CPT | Performed by: HOSPITALIST

## 2021-09-10 PROCEDURE — 84484 ASSAY OF TROPONIN QUANT: CPT | Performed by: HOSPITALIST

## 2021-09-10 PROCEDURE — 36415 COLL VENOUS BLD VENIPUNCTURE: CPT | Performed by: HOSPITALIST

## 2021-09-10 PROCEDURE — 85730 THROMBOPLASTIN TIME PARTIAL: CPT | Performed by: HOSPITALIST

## 2021-09-10 PROCEDURE — 82306 VITAMIN D 25 HYDROXY: CPT | Performed by: HOSPITALIST

## 2021-09-10 PROCEDURE — 99226 PR SUBSEQUENT OBSERVATION CARE,LEVEL III: CPT | Mod: CR,,, | Performed by: PHYSICIAN ASSISTANT

## 2021-09-10 PROCEDURE — 21400001 HC TELEMETRY ROOM

## 2021-09-10 PROCEDURE — 80061 LIPID PANEL: CPT | Performed by: HOSPITALIST

## 2021-09-10 PROCEDURE — 25000003 PHARM REV CODE 250: Performed by: HOSPITALIST

## 2021-09-10 PROCEDURE — 96365 THER/PROPH/DIAG IV INF INIT: CPT

## 2021-09-10 PROCEDURE — 25000003 PHARM REV CODE 250: Performed by: PHYSICIAN ASSISTANT

## 2021-09-10 RX ORDER — ALBUTEROL SULFATE 90 UG/1
2 AEROSOL, METERED RESPIRATORY (INHALATION)
Status: DISCONTINUED | OUTPATIENT
Start: 2021-09-10 | End: 2021-09-12 | Stop reason: HOSPADM

## 2021-09-10 RX ORDER — CETIRIZINE HYDROCHLORIDE 10 MG/1
10 TABLET ORAL DAILY
Status: DISCONTINUED | OUTPATIENT
Start: 2021-09-10 | End: 2021-09-12 | Stop reason: HOSPADM

## 2021-09-10 RX ORDER — ALBUTEROL SULFATE 90 UG/1
2 AEROSOL, METERED RESPIRATORY (INHALATION)
Status: DISCONTINUED | OUTPATIENT
Start: 2021-09-10 | End: 2021-09-10

## 2021-09-10 RX ORDER — ERGOCALCIFEROL 1.25 MG/1
50000 CAPSULE ORAL
Status: DISCONTINUED | OUTPATIENT
Start: 2021-09-10 | End: 2021-09-12 | Stop reason: HOSPADM

## 2021-09-10 RX ORDER — ATORVASTATIN CALCIUM 40 MG/1
40 TABLET, FILM COATED ORAL DAILY
Status: DISCONTINUED | OUTPATIENT
Start: 2021-09-10 | End: 2021-09-12 | Stop reason: HOSPADM

## 2021-09-10 RX ORDER — ASPIRIN 81 MG/1
81 TABLET ORAL DAILY
Status: DISCONTINUED | OUTPATIENT
Start: 2021-09-10 | End: 2021-09-12 | Stop reason: HOSPADM

## 2021-09-10 RX ORDER — AMLODIPINE BESYLATE 10 MG/1
10 TABLET ORAL DAILY
Status: ON HOLD | COMMUNITY
Start: 2021-08-09 | End: 2021-09-18 | Stop reason: HOSPADM

## 2021-09-10 RX ORDER — CHOLECALCIFEROL (VITAMIN D3) 25 MCG
1000 TABLET ORAL DAILY
Status: DISCONTINUED | OUTPATIENT
Start: 2021-09-11 | End: 2021-09-12 | Stop reason: HOSPADM

## 2021-09-10 RX ORDER — AMLODIPINE BESYLATE 5 MG/1
10 TABLET ORAL DAILY
Status: DISCONTINUED | OUTPATIENT
Start: 2021-09-10 | End: 2021-09-12 | Stop reason: HOSPADM

## 2021-09-10 RX ADMIN — ERGOCALCIFEROL 50000 UNITS: 1.25 CAPSULE ORAL at 05:09

## 2021-09-10 RX ADMIN — ENOXAPARIN SODIUM 120 MG: 120 INJECTION SUBCUTANEOUS at 12:09

## 2021-09-10 RX ADMIN — CETIRIZINE HYDROCHLORIDE 10 MG: 10 TABLET, FILM COATED ORAL at 09:09

## 2021-09-10 RX ADMIN — REMDESIVIR 200 MG: 100 INJECTION, POWDER, LYOPHILIZED, FOR SOLUTION INTRAVENOUS at 06:09

## 2021-09-10 RX ADMIN — OXYCODONE HYDROCHLORIDE AND ACETAMINOPHEN 500 MG: 500 TABLET ORAL at 09:09

## 2021-09-10 RX ADMIN — ASPIRIN 81 MG: 81 TABLET, COATED ORAL at 09:09

## 2021-09-10 RX ADMIN — ALBUTEROL SULFATE 2 PUFF: 108 INHALANT RESPIRATORY (INHALATION) at 12:09

## 2021-09-10 RX ADMIN — DEXAMETHASONE 6 MG: 2 TABLET ORAL at 09:09

## 2021-09-10 RX ADMIN — ALBUTEROL SULFATE 2 PUFF: 90 AEROSOL, METERED RESPIRATORY (INHALATION) at 08:09

## 2021-09-10 RX ADMIN — DOCUSATE SODIUM 50 MG AND SENNOSIDES 8.6 MG 1 TABLET: 8.6; 5 TABLET, FILM COATED ORAL at 10:09

## 2021-09-10 RX ADMIN — ALBUTEROL SULFATE 2 PUFF: 108 INHALANT RESPIRATORY (INHALATION) at 08:09

## 2021-09-10 RX ADMIN — ENOXAPARIN SODIUM 120 MG: 120 INJECTION SUBCUTANEOUS at 10:09

## 2021-09-10 RX ADMIN — THERA TABS 1 TABLET: TAB at 09:09

## 2021-09-10 RX ADMIN — OXYCODONE HYDROCHLORIDE AND ACETAMINOPHEN 500 MG: 500 TABLET ORAL at 10:09

## 2021-09-10 RX ADMIN — ATORVASTATIN CALCIUM 40 MG: 40 TABLET, FILM COATED ORAL at 09:09

## 2021-09-10 RX ADMIN — DOCUSATE SODIUM 50 MG AND SENNOSIDES 8.6 MG 1 TABLET: 8.6; 5 TABLET, FILM COATED ORAL at 09:09

## 2021-09-10 RX ADMIN — AMLODIPINE BESYLATE 10 MG: 5 TABLET ORAL at 09:09

## 2021-09-11 LAB
ALBUMIN SERPL BCP-MCNC: 3.2 G/DL (ref 3.5–5.2)
ALP SERPL-CCNC: 98 U/L (ref 55–135)
ALT SERPL W/O P-5'-P-CCNC: 22 U/L (ref 10–44)
ANION GAP SERPL CALC-SCNC: 9 MMOL/L (ref 8–16)
AST SERPL-CCNC: 30 U/L (ref 10–40)
BASOPHILS # BLD AUTO: 0 K/UL (ref 0–0.2)
BASOPHILS NFR BLD: 0 % (ref 0–1.9)
BILIRUB SERPL-MCNC: 0.3 MG/DL (ref 0.1–1)
BUN SERPL-MCNC: 23 MG/DL (ref 8–23)
CALCIUM SERPL-MCNC: 9.3 MG/DL (ref 8.7–10.5)
CHLORIDE SERPL-SCNC: 108 MMOL/L (ref 95–110)
CO2 SERPL-SCNC: 22 MMOL/L (ref 23–29)
CREAT SERPL-MCNC: 0.7 MG/DL (ref 0.5–1.4)
CRP SERPL-MCNC: 6.7 MG/L (ref 0–8.2)
DIFFERENTIAL METHOD: ABNORMAL
EOSINOPHIL # BLD AUTO: 0 K/UL (ref 0–0.5)
EOSINOPHIL NFR BLD: 0 % (ref 0–8)
ERYTHROCYTE [DISTWIDTH] IN BLOOD BY AUTOMATED COUNT: 13.9 % (ref 11.5–14.5)
EST. GFR  (AFRICAN AMERICAN): >60 ML/MIN/1.73 M^2
EST. GFR  (NON AFRICAN AMERICAN): >60 ML/MIN/1.73 M^2
FOLATE SERPL-MCNC: 10.1 NG/ML (ref 4–24)
GLUCOSE SERPL-MCNC: 118 MG/DL (ref 70–110)
HCT VFR BLD AUTO: 36 % (ref 37–48.5)
HGB BLD-MCNC: 12 G/DL (ref 12–16)
IMM GRANULOCYTES # BLD AUTO: 0.01 K/UL (ref 0–0.04)
IMM GRANULOCYTES NFR BLD AUTO: 0.3 % (ref 0–0.5)
IRON SERPL-MCNC: 70 UG/DL (ref 30–160)
LDH SERPL L TO P-CCNC: 179 U/L (ref 110–260)
LYMPHOCYTES # BLD AUTO: 1.4 K/UL (ref 1–4.8)
LYMPHOCYTES NFR BLD: 36.3 % (ref 18–48)
MCH RBC QN AUTO: 25.8 PG (ref 27–31)
MCHC RBC AUTO-ENTMCNC: 33.3 G/DL (ref 32–36)
MCV RBC AUTO: 77 FL (ref 82–98)
MONOCYTES # BLD AUTO: 0.6 K/UL (ref 0.3–1)
MONOCYTES NFR BLD: 16.9 % (ref 4–15)
NEUTROPHILS # BLD AUTO: 1.7 K/UL (ref 1.8–7.7)
NEUTROPHILS NFR BLD: 46.5 % (ref 38–73)
NRBC BLD-RTO: 0 /100 WBC
PLATELET # BLD AUTO: 291 K/UL (ref 150–450)
PMV BLD AUTO: 10.2 FL (ref 9.2–12.9)
POTASSIUM SERPL-SCNC: 3.3 MMOL/L (ref 3.5–5.1)
PROT SERPL-MCNC: 7.4 G/DL (ref 6–8.4)
RBC # BLD AUTO: 4.66 M/UL (ref 4–5.4)
SATURATED IRON: 25 % (ref 20–50)
SODIUM SERPL-SCNC: 139 MMOL/L (ref 136–145)
TOTAL IRON BINDING CAPACITY: 275 UG/DL (ref 250–450)
TRANSFERRIN SERPL-MCNC: 186 MG/DL (ref 200–375)
VIT B12 SERPL-MCNC: 727 PG/ML (ref 210–950)
WBC # BLD AUTO: 3.72 K/UL (ref 3.9–12.7)

## 2021-09-11 PROCEDURE — 99233 SBSQ HOSP IP/OBS HIGH 50: CPT | Mod: CR,,, | Performed by: FAMILY MEDICINE

## 2021-09-11 PROCEDURE — 99232 SBSQ HOSP IP/OBS MODERATE 35: CPT | Mod: CR,,, | Performed by: PSYCHIATRY & NEUROLOGY

## 2021-09-11 PROCEDURE — 82746 ASSAY OF FOLIC ACID SERUM: CPT | Performed by: PHYSICIAN ASSISTANT

## 2021-09-11 PROCEDURE — 63600175 PHARM REV CODE 636 W HCPCS: Performed by: HOSPITALIST

## 2021-09-11 PROCEDURE — 94640 AIRWAY INHALATION TREATMENT: CPT

## 2021-09-11 PROCEDURE — 27000207 HC ISOLATION

## 2021-09-11 PROCEDURE — 83615 LACTATE (LD) (LDH) ENZYME: CPT | Performed by: PHYSICIAN ASSISTANT

## 2021-09-11 PROCEDURE — 25000242 PHARM REV CODE 250 ALT 637 W/ HCPCS: Performed by: HOSPITALIST

## 2021-09-11 PROCEDURE — 85025 COMPLETE CBC W/AUTO DIFF WBC: CPT | Performed by: PHYSICIAN ASSISTANT

## 2021-09-11 PROCEDURE — 25000003 PHARM REV CODE 250: Performed by: HOSPITALIST

## 2021-09-11 PROCEDURE — 80053 COMPREHEN METABOLIC PANEL: CPT | Performed by: PHYSICIAN ASSISTANT

## 2021-09-11 PROCEDURE — 21400001 HC TELEMETRY ROOM

## 2021-09-11 PROCEDURE — 99232 PR SUBSEQUENT HOSPITAL CARE,LEVL II: ICD-10-PCS | Mod: CR,,, | Performed by: PSYCHIATRY & NEUROLOGY

## 2021-09-11 PROCEDURE — 25000003 PHARM REV CODE 250: Performed by: FAMILY MEDICINE

## 2021-09-11 PROCEDURE — 84466 ASSAY OF TRANSFERRIN: CPT | Performed by: PHYSICIAN ASSISTANT

## 2021-09-11 PROCEDURE — 99233 PR SUBSEQUENT HOSPITAL CARE,LEVL III: ICD-10-PCS | Mod: CR,,, | Performed by: FAMILY MEDICINE

## 2021-09-11 PROCEDURE — 86140 C-REACTIVE PROTEIN: CPT | Performed by: PHYSICIAN ASSISTANT

## 2021-09-11 PROCEDURE — 25000003 PHARM REV CODE 250: Performed by: PHYSICIAN ASSISTANT

## 2021-09-11 PROCEDURE — 94761 N-INVAS EAR/PLS OXIMETRY MLT: CPT

## 2021-09-11 PROCEDURE — 82607 VITAMIN B-12: CPT | Performed by: PHYSICIAN ASSISTANT

## 2021-09-11 RX ORDER — POTASSIUM CHLORIDE 20 MEQ/1
40 TABLET, EXTENDED RELEASE ORAL ONCE
Status: COMPLETED | OUTPATIENT
Start: 2021-09-11 | End: 2021-09-11

## 2021-09-11 RX ADMIN — OXYCODONE HYDROCHLORIDE AND ACETAMINOPHEN 500 MG: 500 TABLET ORAL at 10:09

## 2021-09-11 RX ADMIN — DOCUSATE SODIUM 50 MG AND SENNOSIDES 8.6 MG 1 TABLET: 8.6; 5 TABLET, FILM COATED ORAL at 10:09

## 2021-09-11 RX ADMIN — ALBUTEROL SULFATE 2 PUFF: 108 INHALANT RESPIRATORY (INHALATION) at 08:09

## 2021-09-11 RX ADMIN — ENOXAPARIN SODIUM 120 MG: 120 INJECTION SUBCUTANEOUS at 10:09

## 2021-09-11 RX ADMIN — CETIRIZINE HYDROCHLORIDE 10 MG: 10 TABLET, FILM COATED ORAL at 09:09

## 2021-09-11 RX ADMIN — OXYCODONE HYDROCHLORIDE AND ACETAMINOPHEN 500 MG: 500 TABLET ORAL at 09:09

## 2021-09-11 RX ADMIN — DEXAMETHASONE 6 MG: 2 TABLET ORAL at 09:09

## 2021-09-11 RX ADMIN — THERA TABS 1 TABLET: TAB at 09:09

## 2021-09-11 RX ADMIN — ATORVASTATIN CALCIUM 40 MG: 40 TABLET, FILM COATED ORAL at 09:09

## 2021-09-11 RX ADMIN — ASPIRIN 81 MG: 81 TABLET, COATED ORAL at 09:09

## 2021-09-11 RX ADMIN — CHOLECALCIFEROL TAB 25 MCG (1000 UNIT) 1000 UNITS: 25 TAB at 09:09

## 2021-09-11 RX ADMIN — ALBUTEROL SULFATE 2 PUFF: 108 INHALANT RESPIRATORY (INHALATION) at 02:09

## 2021-09-11 RX ADMIN — AMLODIPINE BESYLATE 10 MG: 5 TABLET ORAL at 09:09

## 2021-09-11 RX ADMIN — DOCUSATE SODIUM 50 MG AND SENNOSIDES 8.6 MG 1 TABLET: 8.6; 5 TABLET, FILM COATED ORAL at 09:09

## 2021-09-11 RX ADMIN — REMDESIVIR 100 MG: 100 INJECTION, POWDER, LYOPHILIZED, FOR SOLUTION INTRAVENOUS at 09:09

## 2021-09-11 RX ADMIN — POTASSIUM CHLORIDE 40 MEQ: 20 TABLET, EXTENDED RELEASE ORAL at 09:09

## 2021-09-12 VITALS
SYSTOLIC BLOOD PRESSURE: 125 MMHG | HEIGHT: 67 IN | WEIGHT: 225.06 LBS | TEMPERATURE: 98 F | DIASTOLIC BLOOD PRESSURE: 65 MMHG | OXYGEN SATURATION: 96 % | BODY MASS INDEX: 35.33 KG/M2 | RESPIRATION RATE: 20 BRPM | HEART RATE: 68 BPM

## 2021-09-12 PROBLEM — G93.40 ACUTE ENCEPHALOPATHY: Status: RESOLVED | Noted: 2021-09-10 | Resolved: 2021-09-12

## 2021-09-12 LAB
ALBUMIN SERPL BCP-MCNC: 3.1 G/DL (ref 3.5–5.2)
ALP SERPL-CCNC: 94 U/L (ref 55–135)
ALT SERPL W/O P-5'-P-CCNC: 26 U/L (ref 10–44)
ANION GAP SERPL CALC-SCNC: 9 MMOL/L (ref 8–16)
AST SERPL-CCNC: 33 U/L (ref 10–40)
BASOPHILS # BLD AUTO: 0 K/UL (ref 0–0.2)
BASOPHILS NFR BLD: 0 % (ref 0–1.9)
BILIRUB SERPL-MCNC: 0.2 MG/DL (ref 0.1–1)
BUN SERPL-MCNC: 22 MG/DL (ref 8–23)
CALCIUM SERPL-MCNC: 9 MG/DL (ref 8.7–10.5)
CHLORIDE SERPL-SCNC: 111 MMOL/L (ref 95–110)
CO2 SERPL-SCNC: 22 MMOL/L (ref 23–29)
CREAT SERPL-MCNC: 0.6 MG/DL (ref 0.5–1.4)
DIFFERENTIAL METHOD: ABNORMAL
EOSINOPHIL # BLD AUTO: 0 K/UL (ref 0–0.5)
EOSINOPHIL NFR BLD: 0 % (ref 0–8)
ERYTHROCYTE [DISTWIDTH] IN BLOOD BY AUTOMATED COUNT: 13.7 % (ref 11.5–14.5)
EST. GFR  (AFRICAN AMERICAN): >60 ML/MIN/1.73 M^2
EST. GFR  (NON AFRICAN AMERICAN): >60 ML/MIN/1.73 M^2
GLUCOSE SERPL-MCNC: 123 MG/DL (ref 70–110)
HCT VFR BLD AUTO: 35.4 % (ref 37–48.5)
HGB BLD-MCNC: 11.8 G/DL (ref 12–16)
IMM GRANULOCYTES # BLD AUTO: 0.01 K/UL (ref 0–0.04)
IMM GRANULOCYTES NFR BLD AUTO: 0.2 % (ref 0–0.5)
LYMPHOCYTES # BLD AUTO: 1.8 K/UL (ref 1–4.8)
LYMPHOCYTES NFR BLD: 37.7 % (ref 18–48)
MCH RBC QN AUTO: 25.9 PG (ref 27–31)
MCHC RBC AUTO-ENTMCNC: 33.3 G/DL (ref 32–36)
MCV RBC AUTO: 78 FL (ref 82–98)
MONOCYTES # BLD AUTO: 0.6 K/UL (ref 0.3–1)
MONOCYTES NFR BLD: 13.1 % (ref 4–15)
NEUTROPHILS # BLD AUTO: 2.3 K/UL (ref 1.8–7.7)
NEUTROPHILS NFR BLD: 49 % (ref 38–73)
NRBC BLD-RTO: 0 /100 WBC
PLATELET # BLD AUTO: 274 K/UL (ref 150–450)
PMV BLD AUTO: 9.5 FL (ref 9.2–12.9)
POTASSIUM SERPL-SCNC: 3.6 MMOL/L (ref 3.5–5.1)
PROT SERPL-MCNC: 7 G/DL (ref 6–8.4)
RBC # BLD AUTO: 4.56 M/UL (ref 4–5.4)
SODIUM SERPL-SCNC: 142 MMOL/L (ref 136–145)
WBC # BLD AUTO: 4.72 K/UL (ref 3.9–12.7)

## 2021-09-12 PROCEDURE — 63600175 PHARM REV CODE 636 W HCPCS: Performed by: HOSPITALIST

## 2021-09-12 PROCEDURE — 25000242 PHARM REV CODE 250 ALT 637 W/ HCPCS: Performed by: HOSPITALIST

## 2021-09-12 PROCEDURE — 25000003 PHARM REV CODE 250: Performed by: HOSPITALIST

## 2021-09-12 PROCEDURE — 85025 COMPLETE CBC W/AUTO DIFF WBC: CPT | Performed by: PHYSICIAN ASSISTANT

## 2021-09-12 PROCEDURE — 94640 AIRWAY INHALATION TREATMENT: CPT

## 2021-09-12 PROCEDURE — 80053 COMPREHEN METABOLIC PANEL: CPT | Performed by: PHYSICIAN ASSISTANT

## 2021-09-12 PROCEDURE — 36415 COLL VENOUS BLD VENIPUNCTURE: CPT | Performed by: PHYSICIAN ASSISTANT

## 2021-09-12 PROCEDURE — 99232 SBSQ HOSP IP/OBS MODERATE 35: CPT | Mod: CR,,, | Performed by: FAMILY MEDICINE

## 2021-09-12 PROCEDURE — 94760 N-INVAS EAR/PLS OXIMETRY 1: CPT

## 2021-09-12 PROCEDURE — 99232 PR SUBSEQUENT HOSPITAL CARE,LEVL II: ICD-10-PCS | Mod: CR,,, | Performed by: FAMILY MEDICINE

## 2021-09-12 PROCEDURE — 25000003 PHARM REV CODE 250: Performed by: PHYSICIAN ASSISTANT

## 2021-09-12 RX ORDER — ERGOCALCIFEROL 1.25 MG/1
50000 CAPSULE ORAL
Qty: 12 CAPSULE | Refills: 0 | Status: ON HOLD | OUTPATIENT
Start: 2021-09-17 | End: 2023-12-19

## 2021-09-12 RX ORDER — ASPIRIN 81 MG/1
81 TABLET ORAL DAILY
Qty: 30 TABLET | Refills: 1 | Status: SHIPPED | OUTPATIENT
Start: 2021-09-12 | End: 2022-07-13

## 2021-09-12 RX ORDER — CETIRIZINE HYDROCHLORIDE 10 MG/1
10 TABLET ORAL DAILY
Qty: 30 TABLET | Refills: 0 | Status: SHIPPED | OUTPATIENT
Start: 2021-09-12 | End: 2023-12-12

## 2021-09-12 RX ADMIN — DOCUSATE SODIUM 50 MG AND SENNOSIDES 8.6 MG 1 TABLET: 8.6; 5 TABLET, FILM COATED ORAL at 10:09

## 2021-09-12 RX ADMIN — OXYCODONE HYDROCHLORIDE AND ACETAMINOPHEN 500 MG: 500 TABLET ORAL at 10:09

## 2021-09-12 RX ADMIN — DEXAMETHASONE 6 MG: 2 TABLET ORAL at 10:09

## 2021-09-12 RX ADMIN — ALBUTEROL SULFATE 2 PUFF: 108 INHALANT RESPIRATORY (INHALATION) at 08:09

## 2021-09-12 RX ADMIN — ATORVASTATIN CALCIUM 40 MG: 40 TABLET, FILM COATED ORAL at 10:09

## 2021-09-12 RX ADMIN — CETIRIZINE HYDROCHLORIDE 10 MG: 10 TABLET, FILM COATED ORAL at 10:09

## 2021-09-12 RX ADMIN — ALBUTEROL SULFATE 2 PUFF: 108 INHALANT RESPIRATORY (INHALATION) at 02:09

## 2021-09-12 RX ADMIN — REMDESIVIR 100 MG: 100 INJECTION, POWDER, LYOPHILIZED, FOR SOLUTION INTRAVENOUS at 10:09

## 2021-09-12 RX ADMIN — THERA TABS 1 TABLET: TAB at 10:09

## 2021-09-12 RX ADMIN — AMLODIPINE BESYLATE 10 MG: 5 TABLET ORAL at 10:09

## 2021-09-12 RX ADMIN — ENOXAPARIN SODIUM 120 MG: 120 INJECTION SUBCUTANEOUS at 10:09

## 2021-09-12 RX ADMIN — ASPIRIN 81 MG: 81 TABLET, COATED ORAL at 10:09

## 2021-09-12 RX ADMIN — CHOLECALCIFEROL TAB 25 MCG (1000 UNIT) 1000 UNITS: 25 TAB at 10:09

## 2021-09-13 LAB
BACTERIA BLD CULT: ABNORMAL
BACTERIA BLD CULT: NORMAL

## 2021-09-17 ENCOUNTER — HOSPITAL ENCOUNTER (OUTPATIENT)
Facility: HOSPITAL | Age: 71
Discharge: HOME | End: 2021-09-18
Attending: EMERGENCY MEDICINE | Admitting: EMERGENCY MEDICINE
Payer: MEDICARE

## 2021-09-17 DIAGNOSIS — R47.81 SLURRED SPEECH: ICD-10-CM

## 2021-09-17 DIAGNOSIS — R29.810 FACIAL DROOP: Primary | ICD-10-CM

## 2021-09-17 DIAGNOSIS — N30.00 ACUTE CYSTITIS WITHOUT HEMATURIA: ICD-10-CM

## 2021-09-17 DIAGNOSIS — I63.9 STROKE: ICD-10-CM

## 2021-09-17 DIAGNOSIS — R53.1 LEFT-SIDED WEAKNESS: ICD-10-CM

## 2021-09-17 LAB
ALBUMIN SERPL BCP-MCNC: 3.4 G/DL (ref 3.5–5.2)
ALP SERPL-CCNC: 131 U/L (ref 55–135)
ALT SERPL W/O P-5'-P-CCNC: 40 U/L (ref 10–44)
ANION GAP SERPL CALC-SCNC: 12 MMOL/L (ref 8–16)
AST SERPL-CCNC: 28 U/L (ref 10–40)
BACTERIA #/AREA URNS HPF: ABNORMAL /HPF
BASOPHILS # BLD AUTO: 0.03 K/UL (ref 0–0.2)
BASOPHILS NFR BLD: 0.3 % (ref 0–1.9)
BILIRUB SERPL-MCNC: 0.8 MG/DL (ref 0.1–1)
BILIRUB UR QL STRIP: NEGATIVE
BUN SERPL-MCNC: 11 MG/DL (ref 8–23)
CALCIUM SERPL-MCNC: 9.6 MG/DL (ref 8.7–10.5)
CHLORIDE SERPL-SCNC: 103 MMOL/L (ref 95–110)
CHOLEST SERPL-MCNC: 92 MG/DL (ref 120–199)
CHOLEST/HDLC SERPL: 3.2 {RATIO} (ref 2–5)
CLARITY UR: CLEAR
CO2 SERPL-SCNC: 27 MMOL/L (ref 23–29)
COLOR UR: YELLOW
CREAT SERPL-MCNC: 0.7 MG/DL (ref 0.5–1.4)
DIFFERENTIAL METHOD: ABNORMAL
EOSINOPHIL # BLD AUTO: 0 K/UL (ref 0–0.5)
EOSINOPHIL NFR BLD: 0.3 % (ref 0–8)
ERYTHROCYTE [DISTWIDTH] IN BLOOD BY AUTOMATED COUNT: 14.2 % (ref 11.5–14.5)
EST. GFR  (AFRICAN AMERICAN): >60 ML/MIN/1.73 M^2
EST. GFR  (NON AFRICAN AMERICAN): >60 ML/MIN/1.73 M^2
GLUCOSE SERPL-MCNC: 114 MG/DL (ref 70–110)
GLUCOSE UR QL STRIP: NEGATIVE
HCT VFR BLD AUTO: 39.9 % (ref 37–48.5)
HDLC SERPL-MCNC: 29 MG/DL (ref 40–75)
HDLC SERPL: 31.5 % (ref 20–50)
HGB BLD-MCNC: 13.7 G/DL (ref 12–16)
HGB UR QL STRIP: NEGATIVE
HYALINE CASTS #/AREA URNS LPF: 0 /LPF
IMM GRANULOCYTES # BLD AUTO: 0.07 K/UL (ref 0–0.04)
IMM GRANULOCYTES NFR BLD AUTO: 0.6 % (ref 0–0.5)
KETONES UR QL STRIP: NEGATIVE
LDLC SERPL CALC-MCNC: 48 MG/DL (ref 63–159)
LEUKOCYTE ESTERASE UR QL STRIP: ABNORMAL
LYMPHOCYTES # BLD AUTO: 2.3 K/UL (ref 1–4.8)
LYMPHOCYTES NFR BLD: 21.4 % (ref 18–48)
MCH RBC QN AUTO: 26.1 PG (ref 27–31)
MCHC RBC AUTO-ENTMCNC: 34.3 G/DL (ref 32–36)
MCV RBC AUTO: 76 FL (ref 82–98)
MICROSCOPIC COMMENT: ABNORMAL
MONOCYTES # BLD AUTO: 1.6 K/UL (ref 0.3–1)
MONOCYTES NFR BLD: 14.8 % (ref 4–15)
NEUTROPHILS # BLD AUTO: 6.8 K/UL (ref 1.8–7.7)
NEUTROPHILS NFR BLD: 62.6 % (ref 38–73)
NITRITE UR QL STRIP: POSITIVE
NONHDLC SERPL-MCNC: 63 MG/DL
NRBC BLD-RTO: 0 /100 WBC
PH UR STRIP: 7 [PH] (ref 5–8)
PLATELET # BLD AUTO: 296 K/UL (ref 150–450)
PMV BLD AUTO: 10.4 FL (ref 9.2–12.9)
POCT GLUCOSE: 118 MG/DL (ref 70–110)
POTASSIUM SERPL-SCNC: 3.4 MMOL/L (ref 3.5–5.1)
PROT SERPL-MCNC: 8 G/DL (ref 6–8.4)
PROT UR QL STRIP: ABNORMAL
RBC # BLD AUTO: 5.25 M/UL (ref 4–5.4)
RBC #/AREA URNS HPF: 12 /HPF (ref 0–4)
SODIUM SERPL-SCNC: 142 MMOL/L (ref 136–145)
SP GR UR STRIP: >1.03 (ref 1–1.03)
SQUAMOUS #/AREA URNS HPF: 2 /HPF
TRIGL SERPL-MCNC: 75 MG/DL (ref 30–150)
TROPONIN I SERPL DL<=0.01 NG/ML-MCNC: 0.01 NG/ML (ref 0–0.03)
TSH SERPL DL<=0.005 MIU/L-ACNC: 1.67 UIU/ML (ref 0.4–4)
URN SPEC COLLECT METH UR: ABNORMAL
UROBILINOGEN UR STRIP-ACNC: ABNORMAL EU/DL
WBC # BLD AUTO: 10.91 K/UL (ref 3.9–12.7)
WBC #/AREA URNS HPF: 38 /HPF (ref 0–5)

## 2021-09-17 PROCEDURE — 87088 URINE BACTERIA CULTURE: CPT | Performed by: PHYSICIAN ASSISTANT

## 2021-09-17 PROCEDURE — 84443 ASSAY THYROID STIM HORMONE: CPT | Performed by: EMERGENCY MEDICINE

## 2021-09-17 PROCEDURE — 85025 COMPLETE CBC W/AUTO DIFF WBC: CPT | Performed by: EMERGENCY MEDICINE

## 2021-09-17 PROCEDURE — 84484 ASSAY OF TROPONIN QUANT: CPT | Performed by: EMERGENCY MEDICINE

## 2021-09-17 PROCEDURE — 99291 CRITICAL CARE FIRST HOUR: CPT | Mod: 25

## 2021-09-17 PROCEDURE — 25500020 PHARM REV CODE 255: Performed by: EMERGENCY MEDICINE

## 2021-09-17 PROCEDURE — 83036 HEMOGLOBIN GLYCOSYLATED A1C: CPT | Performed by: PHYSICIAN ASSISTANT

## 2021-09-17 PROCEDURE — 87186 SC STD MICRODIL/AGAR DIL: CPT | Performed by: PHYSICIAN ASSISTANT

## 2021-09-17 PROCEDURE — 99499 UNLISTED E&M SERVICE: CPT | Mod: 95,,, | Performed by: PSYCHIATRY & NEUROLOGY

## 2021-09-17 PROCEDURE — 87086 URINE CULTURE/COLONY COUNT: CPT | Performed by: PHYSICIAN ASSISTANT

## 2021-09-17 PROCEDURE — 25000003 PHARM REV CODE 250: Performed by: PHYSICIAN ASSISTANT

## 2021-09-17 PROCEDURE — 82962 GLUCOSE BLOOD TEST: CPT

## 2021-09-17 PROCEDURE — 99499 NO LOS: ICD-10-PCS | Mod: 95,,, | Performed by: PSYCHIATRY & NEUROLOGY

## 2021-09-17 PROCEDURE — 80053 COMPREHEN METABOLIC PANEL: CPT | Performed by: EMERGENCY MEDICINE

## 2021-09-17 PROCEDURE — G0378 HOSPITAL OBSERVATION PER HR: HCPCS

## 2021-09-17 PROCEDURE — 93010 ELECTROCARDIOGRAM REPORT: CPT | Mod: ,,, | Performed by: INTERNAL MEDICINE

## 2021-09-17 PROCEDURE — 81000 URINALYSIS NONAUTO W/SCOPE: CPT | Performed by: PHYSICIAN ASSISTANT

## 2021-09-17 PROCEDURE — 93005 ELECTROCARDIOGRAM TRACING: CPT

## 2021-09-17 PROCEDURE — 80061 LIPID PANEL: CPT | Performed by: PHYSICIAN ASSISTANT

## 2021-09-17 PROCEDURE — 93010 EKG 12-LEAD: ICD-10-PCS | Mod: ,,, | Performed by: INTERNAL MEDICINE

## 2021-09-17 PROCEDURE — 87077 CULTURE AEROBIC IDENTIFY: CPT | Performed by: PHYSICIAN ASSISTANT

## 2021-09-17 RX ORDER — BENZONATATE 100 MG/1
100 CAPSULE ORAL 3 TIMES DAILY PRN
Status: DISCONTINUED | OUTPATIENT
Start: 2021-09-17 | End: 2021-09-18 | Stop reason: HOSPADM

## 2021-09-17 RX ORDER — CLOPIDOGREL BISULFATE 75 MG/1
75 TABLET ORAL DAILY
Qty: 19 TABLET | Refills: 0 | Status: SHIPPED | OUTPATIENT
Start: 2021-09-18 | End: 2021-09-18

## 2021-09-17 RX ORDER — AMOXICILLIN 250 MG
1 CAPSULE ORAL DAILY PRN
Status: DISCONTINUED | OUTPATIENT
Start: 2021-09-17 | End: 2021-09-18 | Stop reason: HOSPADM

## 2021-09-17 RX ORDER — ENOXAPARIN SODIUM 100 MG/ML
40 INJECTION SUBCUTANEOUS EVERY 24 HOURS
Status: DISCONTINUED | OUTPATIENT
Start: 2021-09-18 | End: 2021-09-18 | Stop reason: HOSPADM

## 2021-09-17 RX ORDER — ACETAMINOPHEN 500 MG
500 TABLET ORAL EVERY 6 HOURS PRN
Status: DISCONTINUED | OUTPATIENT
Start: 2021-09-17 | End: 2021-09-18 | Stop reason: HOSPADM

## 2021-09-17 RX ORDER — CLOPIDOGREL BISULFATE 75 MG/1
300 TABLET ORAL ONCE
Status: COMPLETED | OUTPATIENT
Start: 2021-09-17 | End: 2021-09-17

## 2021-09-17 RX ORDER — HYDRALAZINE HYDROCHLORIDE 25 MG/1
25 TABLET, FILM COATED ORAL EVERY 8 HOURS PRN
Status: DISCONTINUED | OUTPATIENT
Start: 2021-09-17 | End: 2021-09-18 | Stop reason: HOSPADM

## 2021-09-17 RX ORDER — ASPIRIN 81 MG/1
81 TABLET ORAL DAILY
Status: DISCONTINUED | OUTPATIENT
Start: 2021-09-18 | End: 2021-09-18 | Stop reason: HOSPADM

## 2021-09-17 RX ORDER — TALC
6 POWDER (GRAM) TOPICAL NIGHTLY PRN
Status: DISCONTINUED | OUTPATIENT
Start: 2021-09-17 | End: 2021-09-18 | Stop reason: HOSPADM

## 2021-09-17 RX ORDER — CLOPIDOGREL BISULFATE 75 MG/1
75 TABLET ORAL DAILY
Status: DISCONTINUED | OUTPATIENT
Start: 2021-09-18 | End: 2021-09-18 | Stop reason: HOSPADM

## 2021-09-17 RX ORDER — SODIUM CHLORIDE 0.9 % (FLUSH) 0.9 %
10 SYRINGE (ML) INJECTION
Status: DISCONTINUED | OUTPATIENT
Start: 2021-09-17 | End: 2021-09-18 | Stop reason: HOSPADM

## 2021-09-17 RX ORDER — ATORVASTATIN CALCIUM 40 MG/1
40 TABLET, FILM COATED ORAL DAILY
Status: DISCONTINUED | OUTPATIENT
Start: 2021-09-18 | End: 2021-09-18 | Stop reason: HOSPADM

## 2021-09-17 RX ADMIN — IOHEXOL 75 ML: 350 INJECTION, SOLUTION INTRAVENOUS at 02:09

## 2021-09-17 RX ADMIN — CLOPIDOGREL 300 MG: 75 TABLET, FILM COATED ORAL at 05:09

## 2021-09-18 VITALS
HEIGHT: 67 IN | SYSTOLIC BLOOD PRESSURE: 120 MMHG | DIASTOLIC BLOOD PRESSURE: 63 MMHG | OXYGEN SATURATION: 95 % | HEART RATE: 79 BPM | RESPIRATION RATE: 18 BRPM | WEIGHT: 200.63 LBS | TEMPERATURE: 99 F | BODY MASS INDEX: 31.49 KG/M2

## 2021-09-18 LAB
ALBUMIN SERPL BCP-MCNC: 2.9 G/DL (ref 3.5–5.2)
ALP SERPL-CCNC: 112 U/L (ref 55–135)
ALT SERPL W/O P-5'-P-CCNC: 31 U/L (ref 10–44)
ANION GAP SERPL CALC-SCNC: 10 MMOL/L (ref 8–16)
APTT BLDCRRT: 27.8 SEC (ref 21–32)
AST SERPL-CCNC: 22 U/L (ref 10–40)
BASOPHILS # BLD AUTO: 0.01 K/UL (ref 0–0.2)
BASOPHILS NFR BLD: 0.1 % (ref 0–1.9)
BILIRUB SERPL-MCNC: 0.6 MG/DL (ref 0.1–1)
BUN SERPL-MCNC: 12 MG/DL (ref 8–23)
CALCIUM SERPL-MCNC: 9 MG/DL (ref 8.7–10.5)
CHLORIDE SERPL-SCNC: 103 MMOL/L (ref 95–110)
CK MB SERPL-MCNC: 0.7 NG/ML (ref 0.1–6.5)
CK MB SERPL-RTO: 1.5 % (ref 0–5)
CK SERPL-CCNC: 46 U/L (ref 20–180)
CO2 SERPL-SCNC: 26 MMOL/L (ref 23–29)
CREAT SERPL-MCNC: 0.6 MG/DL (ref 0.5–1.4)
DIFFERENTIAL METHOD: ABNORMAL
EOSINOPHIL # BLD AUTO: 0.1 K/UL (ref 0–0.5)
EOSINOPHIL NFR BLD: 0.9 % (ref 0–8)
ERYTHROCYTE [DISTWIDTH] IN BLOOD BY AUTOMATED COUNT: 14 % (ref 11.5–14.5)
EST. GFR  (AFRICAN AMERICAN): >60 ML/MIN/1.73 M^2
EST. GFR  (NON AFRICAN AMERICAN): >60 ML/MIN/1.73 M^2
ESTIMATED AVG GLUCOSE: 123 MG/DL (ref 68–131)
GLUCOSE SERPL-MCNC: 102 MG/DL (ref 70–110)
HBA1C MFR BLD: 5.9 % (ref 4–5.6)
HCT VFR BLD AUTO: 35.7 % (ref 37–48.5)
HGB BLD-MCNC: 12.1 G/DL (ref 12–16)
IMM GRANULOCYTES # BLD AUTO: 0.04 K/UL (ref 0–0.04)
IMM GRANULOCYTES NFR BLD AUTO: 0.6 % (ref 0–0.5)
INR PPP: 1 (ref 0.8–1.2)
LYMPHOCYTES # BLD AUTO: 2.3 K/UL (ref 1–4.8)
LYMPHOCYTES NFR BLD: 33.8 % (ref 18–48)
MAGNESIUM SERPL-MCNC: 2.3 MG/DL (ref 1.6–2.6)
MCH RBC QN AUTO: 26 PG (ref 27–31)
MCHC RBC AUTO-ENTMCNC: 33.9 G/DL (ref 32–36)
MCV RBC AUTO: 77 FL (ref 82–98)
MONOCYTES # BLD AUTO: 1 K/UL (ref 0.3–1)
MONOCYTES NFR BLD: 15 % (ref 4–15)
NEUTROPHILS # BLD AUTO: 3.3 K/UL (ref 1.8–7.7)
NEUTROPHILS NFR BLD: 49.6 % (ref 38–73)
NRBC BLD-RTO: 0 /100 WBC
PHOSPHATE SERPL-MCNC: 2.9 MG/DL (ref 2.7–4.5)
PLATELET # BLD AUTO: 270 K/UL (ref 150–450)
PMV BLD AUTO: 10.6 FL (ref 9.2–12.9)
POCT GLUCOSE: 123 MG/DL (ref 70–110)
POTASSIUM SERPL-SCNC: 3 MMOL/L (ref 3.5–5.1)
PROT SERPL-MCNC: 6.8 G/DL (ref 6–8.4)
PROTHROMBIN TIME: 10.9 SEC (ref 9–12.5)
RBC # BLD AUTO: 4.66 M/UL (ref 4–5.4)
SODIUM SERPL-SCNC: 139 MMOL/L (ref 136–145)
TROPONIN I SERPL DL<=0.01 NG/ML-MCNC: <0.006 NG/ML (ref 0–0.03)
WBC # BLD AUTO: 6.68 K/UL (ref 3.9–12.7)

## 2021-09-18 PROCEDURE — 36415 COLL VENOUS BLD VENIPUNCTURE: CPT | Performed by: PHYSICIAN ASSISTANT

## 2021-09-18 PROCEDURE — 83735 ASSAY OF MAGNESIUM: CPT | Performed by: PHYSICIAN ASSISTANT

## 2021-09-18 PROCEDURE — 25000003 PHARM REV CODE 250: Performed by: PHYSICIAN ASSISTANT

## 2021-09-18 PROCEDURE — 84100 ASSAY OF PHOSPHORUS: CPT | Performed by: PHYSICIAN ASSISTANT

## 2021-09-18 PROCEDURE — 85025 COMPLETE CBC W/AUTO DIFF WBC: CPT | Performed by: PHYSICIAN ASSISTANT

## 2021-09-18 PROCEDURE — 92523 SPEECH SOUND LANG COMPREHEN: CPT

## 2021-09-18 PROCEDURE — 92610 EVALUATE SWALLOWING FUNCTION: CPT

## 2021-09-18 PROCEDURE — 97161 PT EVAL LOW COMPLEX 20 MIN: CPT

## 2021-09-18 PROCEDURE — 82553 CREATINE MB FRACTION: CPT | Performed by: PHYSICIAN ASSISTANT

## 2021-09-18 PROCEDURE — G0378 HOSPITAL OBSERVATION PER HR: HCPCS

## 2021-09-18 PROCEDURE — 85610 PROTHROMBIN TIME: CPT | Performed by: PHYSICIAN ASSISTANT

## 2021-09-18 PROCEDURE — 84484 ASSAY OF TROPONIN QUANT: CPT | Performed by: PHYSICIAN ASSISTANT

## 2021-09-18 PROCEDURE — 85730 THROMBOPLASTIN TIME PARTIAL: CPT | Performed by: PHYSICIAN ASSISTANT

## 2021-09-18 PROCEDURE — 97165 OT EVAL LOW COMPLEX 30 MIN: CPT

## 2021-09-18 PROCEDURE — 80053 COMPREHEN METABOLIC PANEL: CPT | Performed by: PHYSICIAN ASSISTANT

## 2021-09-18 RX ORDER — AMLODIPINE BESYLATE 10 MG/1
10 TABLET ORAL DAILY
Qty: 30 TABLET | Refills: 11 | Status: SHIPPED | OUTPATIENT
Start: 2021-09-18 | End: 2023-12-12

## 2021-09-18 RX ORDER — AMLODIPINE BESYLATE 10 MG/1
10 TABLET ORAL DAILY
Qty: 30 TABLET | Refills: 11 | Status: SHIPPED | OUTPATIENT
Start: 2021-09-18 | End: 2021-09-18 | Stop reason: SDUPTHER

## 2021-09-18 RX ORDER — CLOPIDOGREL BISULFATE 75 MG/1
75 TABLET ORAL DAILY
Qty: 21 TABLET | Refills: 0 | Status: SHIPPED | OUTPATIENT
Start: 2021-09-18 | End: 2022-07-13

## 2021-09-18 RX ORDER — CLOPIDOGREL BISULFATE 75 MG/1
75 TABLET ORAL DAILY
Qty: 21 TABLET | Refills: 0 | Status: SHIPPED | OUTPATIENT
Start: 2021-09-18 | End: 2021-09-18

## 2021-09-18 RX ORDER — AMOXICILLIN AND CLAVULANATE POTASSIUM 875; 125 MG/1; MG/1
1 TABLET, FILM COATED ORAL EVERY 12 HOURS
Qty: 10 TABLET | Refills: 0 | Status: SHIPPED | OUTPATIENT
Start: 2021-09-18 | End: 2021-09-23

## 2021-09-18 RX ORDER — AMOXICILLIN AND CLAVULANATE POTASSIUM 875; 125 MG/1; MG/1
1 TABLET, FILM COATED ORAL EVERY 12 HOURS
Qty: 10 TABLET | Refills: 0 | Status: SHIPPED | OUTPATIENT
Start: 2021-09-18 | End: 2021-09-18

## 2021-09-18 RX ORDER — AMOXICILLIN AND CLAVULANATE POTASSIUM 875; 125 MG/1; MG/1
1 TABLET, FILM COATED ORAL EVERY 12 HOURS
Status: DISCONTINUED | OUTPATIENT
Start: 2021-09-18 | End: 2021-09-18 | Stop reason: HOSPADM

## 2021-09-18 RX ADMIN — CLOPIDOGREL 75 MG: 75 TABLET, FILM COATED ORAL at 08:09

## 2021-09-18 RX ADMIN — ASPIRIN 81 MG: 81 TABLET, COATED ORAL at 08:09

## 2021-09-18 RX ADMIN — ATORVASTATIN CALCIUM 40 MG: 40 TABLET, FILM COATED ORAL at 08:09

## 2021-09-19 LAB — BACTERIA UR CULT: ABNORMAL

## 2021-09-30 ENCOUNTER — HOSPITAL ENCOUNTER (OUTPATIENT)
Facility: HOSPITAL | Age: 71
Discharge: HOME | End: 2021-10-01
Attending: EMERGENCY MEDICINE | Admitting: HOSPITALIST
Payer: MEDICARE

## 2021-09-30 DIAGNOSIS — R07.9 CHEST PAIN: ICD-10-CM

## 2021-09-30 DIAGNOSIS — J18.9 PNEUMONIA OF RIGHT UPPER LOBE DUE TO INFECTIOUS ORGANISM: Primary | ICD-10-CM

## 2021-09-30 LAB
ALBUMIN SERPL BCP-MCNC: 3.3 G/DL (ref 3.5–5.2)
ALP SERPL-CCNC: 127 U/L (ref 55–135)
ALT SERPL W/O P-5'-P-CCNC: 19 U/L (ref 10–44)
ANION GAP SERPL CALC-SCNC: 11 MMOL/L (ref 8–16)
AST SERPL-CCNC: 23 U/L (ref 10–40)
BACTERIA #/AREA URNS HPF: ABNORMAL /HPF
BASOPHILS # BLD AUTO: 0.01 K/UL (ref 0–0.2)
BASOPHILS NFR BLD: 0.1 % (ref 0–1.9)
BILIRUB SERPL-MCNC: 0.6 MG/DL (ref 0.1–1)
BILIRUB UR QL STRIP: NEGATIVE
BNP SERPL-MCNC: <10 PG/ML (ref 0–99)
BUN SERPL-MCNC: 13 MG/DL (ref 8–23)
CALCIUM SERPL-MCNC: 9.7 MG/DL (ref 8.7–10.5)
CHLORIDE SERPL-SCNC: 104 MMOL/L (ref 95–110)
CLARITY UR: ABNORMAL
CO2 SERPL-SCNC: 26 MMOL/L (ref 23–29)
COLOR UR: YELLOW
CREAT SERPL-MCNC: 0.7 MG/DL (ref 0.5–1.4)
D DIMER PPP IA.FEU-MCNC: 0.85 MG/L FEU
DIFFERENTIAL METHOD: ABNORMAL
EOSINOPHIL # BLD AUTO: 0 K/UL (ref 0–0.5)
EOSINOPHIL NFR BLD: 0.4 % (ref 0–8)
ERYTHROCYTE [DISTWIDTH] IN BLOOD BY AUTOMATED COUNT: 14 % (ref 11.5–14.5)
EST. GFR  (AFRICAN AMERICAN): >60 ML/MIN/1.73 M^2
EST. GFR  (NON AFRICAN AMERICAN): >60 ML/MIN/1.73 M^2
GLUCOSE SERPL-MCNC: 124 MG/DL (ref 70–110)
GLUCOSE UR QL STRIP: NEGATIVE
HCT VFR BLD AUTO: 36.2 % (ref 37–48.5)
HGB BLD-MCNC: 12.5 G/DL (ref 12–16)
HGB UR QL STRIP: ABNORMAL
IMM GRANULOCYTES # BLD AUTO: 0.04 K/UL (ref 0–0.04)
IMM GRANULOCYTES NFR BLD AUTO: 0.4 % (ref 0–0.5)
KETONES UR QL STRIP: NEGATIVE
LEUKOCYTE ESTERASE UR QL STRIP: ABNORMAL
LYMPHOCYTES # BLD AUTO: 2.2 K/UL (ref 1–4.8)
LYMPHOCYTES NFR BLD: 24.2 % (ref 18–48)
MCH RBC QN AUTO: 26.5 PG (ref 27–31)
MCHC RBC AUTO-ENTMCNC: 34.5 G/DL (ref 32–36)
MCV RBC AUTO: 77 FL (ref 82–98)
MICROSCOPIC COMMENT: ABNORMAL
MONOCYTES # BLD AUTO: 0.6 K/UL (ref 0.3–1)
MONOCYTES NFR BLD: 6.5 % (ref 4–15)
NEUTROPHILS # BLD AUTO: 6.2 K/UL (ref 1.8–7.7)
NEUTROPHILS NFR BLD: 68.4 % (ref 38–73)
NITRITE UR QL STRIP: NEGATIVE
NRBC BLD-RTO: 0 /100 WBC
PH UR STRIP: 6 [PH] (ref 5–8)
PLATELET # BLD AUTO: 302 K/UL (ref 150–450)
PMV BLD AUTO: 9.8 FL (ref 9.2–12.9)
POTASSIUM SERPL-SCNC: 3.1 MMOL/L (ref 3.5–5.1)
PROT SERPL-MCNC: 7.7 G/DL (ref 6–8.4)
PROT UR QL STRIP: ABNORMAL
RBC # BLD AUTO: 4.72 M/UL (ref 4–5.4)
RBC #/AREA URNS HPF: 14 /HPF (ref 0–4)
SODIUM SERPL-SCNC: 141 MMOL/L (ref 136–145)
SP GR UR STRIP: 1.02 (ref 1–1.03)
SQUAMOUS #/AREA URNS HPF: 3 /HPF
TROPONIN I SERPL DL<=0.01 NG/ML-MCNC: 0.01 NG/ML (ref 0–0.03)
URN SPEC COLLECT METH UR: ABNORMAL
UROBILINOGEN UR STRIP-ACNC: NEGATIVE EU/DL
WBC # BLD AUTO: 9.12 K/UL (ref 3.9–12.7)
WBC #/AREA URNS HPF: 74 /HPF (ref 0–5)
WBC CLUMPS URNS QL MICRO: ABNORMAL

## 2021-09-30 PROCEDURE — 85379 FIBRIN DEGRADATION QUANT: CPT | Performed by: EMERGENCY MEDICINE

## 2021-09-30 PROCEDURE — 84484 ASSAY OF TROPONIN QUANT: CPT | Mod: 91 | Performed by: PHYSICIAN ASSISTANT

## 2021-09-30 PROCEDURE — 99285 EMERGENCY DEPT VISIT HI MDM: CPT | Mod: 25

## 2021-09-30 PROCEDURE — G0378 HOSPITAL OBSERVATION PER HR: HCPCS

## 2021-09-30 PROCEDURE — 85025 COMPLETE CBC W/AUTO DIFF WBC: CPT | Performed by: EMERGENCY MEDICINE

## 2021-09-30 PROCEDURE — 25500020 PHARM REV CODE 255: Performed by: EMERGENCY MEDICINE

## 2021-09-30 PROCEDURE — 83880 ASSAY OF NATRIURETIC PEPTIDE: CPT | Performed by: EMERGENCY MEDICINE

## 2021-09-30 PROCEDURE — 93005 ELECTROCARDIOGRAM TRACING: CPT

## 2021-09-30 PROCEDURE — 81000 URINALYSIS NONAUTO W/SCOPE: CPT | Performed by: EMERGENCY MEDICINE

## 2021-09-30 PROCEDURE — 80053 COMPREHEN METABOLIC PANEL: CPT | Performed by: EMERGENCY MEDICINE

## 2021-09-30 PROCEDURE — 96365 THER/PROPH/DIAG IV INF INIT: CPT | Mod: 59

## 2021-09-30 PROCEDURE — 93010 ELECTROCARDIOGRAM REPORT: CPT | Mod: 76,,, | Performed by: INTERNAL MEDICINE

## 2021-09-30 PROCEDURE — 25000003 PHARM REV CODE 250: Performed by: EMERGENCY MEDICINE

## 2021-09-30 PROCEDURE — 84484 ASSAY OF TROPONIN QUANT: CPT | Performed by: EMERGENCY MEDICINE

## 2021-09-30 PROCEDURE — 93010 EKG 12-LEAD: ICD-10-PCS | Mod: ,,, | Performed by: INTERNAL MEDICINE

## 2021-09-30 PROCEDURE — 93010 ELECTROCARDIOGRAM REPORT: CPT | Mod: ,,, | Performed by: INTERNAL MEDICINE

## 2021-09-30 PROCEDURE — 63600175 PHARM REV CODE 636 W HCPCS: Performed by: EMERGENCY MEDICINE

## 2021-09-30 PROCEDURE — 96367 TX/PROPH/DG ADDL SEQ IV INF: CPT

## 2021-09-30 RX ORDER — CLOPIDOGREL BISULFATE 75 MG/1
75 TABLET ORAL DAILY
Status: DISCONTINUED | OUTPATIENT
Start: 2021-10-01 | End: 2021-10-01 | Stop reason: HOSPADM

## 2021-09-30 RX ORDER — ATORVASTATIN CALCIUM 40 MG/1
40 TABLET, FILM COATED ORAL DAILY
Status: DISCONTINUED | OUTPATIENT
Start: 2021-10-01 | End: 2021-10-01 | Stop reason: HOSPADM

## 2021-09-30 RX ORDER — SODIUM CHLORIDE 0.9 % (FLUSH) 0.9 %
10 SYRINGE (ML) INJECTION
Status: DISCONTINUED | OUTPATIENT
Start: 2021-09-30 | End: 2021-10-01 | Stop reason: HOSPADM

## 2021-09-30 RX ORDER — AMLODIPINE BESYLATE 5 MG/1
10 TABLET ORAL DAILY
Status: DISCONTINUED | OUTPATIENT
Start: 2021-10-01 | End: 2021-10-01 | Stop reason: HOSPADM

## 2021-09-30 RX ORDER — TALC
6 POWDER (GRAM) TOPICAL NIGHTLY PRN
Status: DISCONTINUED | OUTPATIENT
Start: 2021-09-30 | End: 2021-10-01 | Stop reason: HOSPADM

## 2021-09-30 RX ORDER — NAPROXEN SODIUM 220 MG/1
81 TABLET, FILM COATED ORAL DAILY
Status: DISCONTINUED | OUTPATIENT
Start: 2021-10-01 | End: 2021-10-01 | Stop reason: HOSPADM

## 2021-09-30 RX ORDER — AMOXICILLIN 250 MG
1 CAPSULE ORAL DAILY PRN
Status: DISCONTINUED | OUTPATIENT
Start: 2021-09-30 | End: 2021-10-01 | Stop reason: HOSPADM

## 2021-09-30 RX ORDER — IBUPROFEN 200 MG
24 TABLET ORAL
Status: DISCONTINUED | OUTPATIENT
Start: 2021-09-30 | End: 2021-10-01 | Stop reason: HOSPADM

## 2021-09-30 RX ORDER — LANOLIN ALCOHOL/MO/W.PET/CERES
800 CREAM (GRAM) TOPICAL
Status: DISCONTINUED | OUTPATIENT
Start: 2021-09-30 | End: 2021-10-01 | Stop reason: HOSPADM

## 2021-09-30 RX ORDER — ACETAMINOPHEN 325 MG/1
650 TABLET ORAL EVERY 4 HOURS PRN
Status: DISCONTINUED | OUTPATIENT
Start: 2021-09-30 | End: 2021-10-01 | Stop reason: HOSPADM

## 2021-09-30 RX ORDER — NAPROXEN SODIUM 220 MG/1
81 TABLET, FILM COATED ORAL
Status: DISCONTINUED | OUTPATIENT
Start: 2021-09-30 | End: 2021-09-30

## 2021-09-30 RX ORDER — AMOXICILLIN 250 MG
1 CAPSULE ORAL 2 TIMES DAILY
Status: DISCONTINUED | OUTPATIENT
Start: 2021-09-30 | End: 2021-09-30

## 2021-09-30 RX ORDER — NALOXONE HCL 0.4 MG/ML
0.02 VIAL (ML) INJECTION
Status: DISCONTINUED | OUTPATIENT
Start: 2021-09-30 | End: 2021-10-01 | Stop reason: HOSPADM

## 2021-09-30 RX ORDER — AZITHROMYCIN 250 MG/1
250 TABLET, FILM COATED ORAL DAILY
Status: DISCONTINUED | OUTPATIENT
Start: 2021-10-01 | End: 2021-10-01 | Stop reason: HOSPADM

## 2021-09-30 RX ORDER — IBUPROFEN 200 MG
16 TABLET ORAL
Status: DISCONTINUED | OUTPATIENT
Start: 2021-09-30 | End: 2021-10-01 | Stop reason: HOSPADM

## 2021-09-30 RX ORDER — GLUCAGON 1 MG
1 KIT INJECTION
Status: DISCONTINUED | OUTPATIENT
Start: 2021-09-30 | End: 2021-10-01 | Stop reason: HOSPADM

## 2021-09-30 RX ORDER — ENOXAPARIN SODIUM 100 MG/ML
40 INJECTION SUBCUTANEOUS EVERY 24 HOURS
Status: DISCONTINUED | OUTPATIENT
Start: 2021-10-01 | End: 2021-10-01 | Stop reason: HOSPADM

## 2021-09-30 RX ORDER — SODIUM CHLORIDE 0.9 % (FLUSH) 0.9 %
10 SYRINGE (ML) INJECTION EVERY 8 HOURS
Status: DISCONTINUED | OUTPATIENT
Start: 2021-09-30 | End: 2021-09-30

## 2021-09-30 RX ADMIN — NITROGLYCERIN 0.5 INCH: 20 OINTMENT TOPICAL at 01:09

## 2021-09-30 RX ADMIN — AZITHROMYCIN MONOHYDRATE 500 MG: 500 INJECTION, POWDER, LYOPHILIZED, FOR SOLUTION INTRAVENOUS at 07:09

## 2021-09-30 RX ADMIN — IOHEXOL 75 ML: 350 INJECTION, SOLUTION INTRAVENOUS at 04:09

## 2021-09-30 RX ADMIN — CEFTRIAXONE SODIUM 1 G: 1 INJECTION, POWDER, FOR SOLUTION INTRAMUSCULAR; INTRAVENOUS at 05:09

## 2021-10-01 VITALS
HEART RATE: 101 BPM | BODY MASS INDEX: 32.49 KG/M2 | WEIGHT: 207 LBS | SYSTOLIC BLOOD PRESSURE: 116 MMHG | TEMPERATURE: 99 F | RESPIRATION RATE: 18 BRPM | DIASTOLIC BLOOD PRESSURE: 65 MMHG | HEIGHT: 67 IN | OXYGEN SATURATION: 95 %

## 2021-10-01 LAB
ALBUMIN SERPL BCP-MCNC: 3 G/DL (ref 3.5–5.2)
ALP SERPL-CCNC: 108 U/L (ref 55–135)
ALT SERPL W/O P-5'-P-CCNC: 17 U/L (ref 10–44)
ANION GAP SERPL CALC-SCNC: 12 MMOL/L (ref 8–16)
AST SERPL-CCNC: 22 U/L (ref 10–40)
AV INDEX (PROSTH): 1.05
AV MEAN GRADIENT: 3 MMHG
AV PEAK GRADIENT: 4 MMHG
AV VALVE AREA: 3.47 CM2
AV VELOCITY RATIO: 1.04
BASOPHILS # BLD AUTO: 0.02 K/UL (ref 0–0.2)
BASOPHILS NFR BLD: 0.2 % (ref 0–1.9)
BILIRUB SERPL-MCNC: 0.5 MG/DL (ref 0.1–1)
BSA FOR ECHO PROCEDURE: 2.11 M2
BUN SERPL-MCNC: 11 MG/DL (ref 8–23)
CALCIUM SERPL-MCNC: 9.5 MG/DL (ref 8.7–10.5)
CHLORIDE SERPL-SCNC: 105 MMOL/L (ref 95–110)
CO2 SERPL-SCNC: 24 MMOL/L (ref 23–29)
CREAT SERPL-MCNC: 0.6 MG/DL (ref 0.5–1.4)
CV ECHO LV RWT: 0.54 CM
CV STRESS BASE HR: 92 BPM
DIASTOLIC BLOOD PRESSURE: 89 MMHG
DIFFERENTIAL METHOD: ABNORMAL
DOP CALC AO PEAK VEL: 1.05 M/S
DOP CALC AO VTI: 20.24 CM
DOP CALC LVOT AREA: 3.3 CM2
DOP CALC LVOT DIAMETER: 2.05 CM
DOP CALC LVOT PEAK VEL: 1.09 M/S
DOP CALC LVOT STROKE VOLUME: 70.33 CM3
DOP CALCLVOT PEAK VEL VTI: 21.32 CM
E WAVE DECELERATION TIME: 119.35 MSEC
E/A RATIO: 0.68
E/E' RATIO: 12.91 M/S
ECHO LV POSTERIOR WALL: 0.95 CM (ref 0.6–1.1)
EJECTION FRACTION: 60 %
EOSINOPHIL # BLD AUTO: 0.1 K/UL (ref 0–0.5)
EOSINOPHIL NFR BLD: 0.9 % (ref 0–8)
ERYTHROCYTE [DISTWIDTH] IN BLOOD BY AUTOMATED COUNT: 14.5 % (ref 11.5–14.5)
EST. GFR  (AFRICAN AMERICAN): >60 ML/MIN/1.73 M^2
EST. GFR  (NON AFRICAN AMERICAN): >60 ML/MIN/1.73 M^2
FRACTIONAL SHORTENING: 25 % (ref 28–44)
GLUCOSE SERPL-MCNC: 115 MG/DL (ref 70–110)
HCT VFR BLD AUTO: 33.4 % (ref 37–48.5)
HGB BLD-MCNC: 11.3 G/DL (ref 12–16)
IMM GRANULOCYTES # BLD AUTO: 0.02 K/UL (ref 0–0.04)
IMM GRANULOCYTES NFR BLD AUTO: 0.2 % (ref 0–0.5)
INR PPP: 1.1 (ref 0.8–1.2)
INTERVENTRICULAR SEPTUM: 1.08 CM (ref 0.6–1.1)
IVRT: 128.03 MSEC
LA MAJOR: 5.97 CM
LA MINOR: 5.46 CM
LA WIDTH: 3.8 CM
LEFT ATRIUM SIZE: 3.24 CM
LEFT ATRIUM VOLUME INDEX: 29.1 ML/M2
LEFT ATRIUM VOLUME: 59.69 CM3
LEFT INTERNAL DIMENSION IN SYSTOLE: 2.66 CM (ref 2.1–4)
LEFT VENTRICLE DIASTOLIC VOLUME INDEX: 25.39 ML/M2
LEFT VENTRICLE DIASTOLIC VOLUME: 52.04 ML
LEFT VENTRICLE MASS INDEX: 52 G/M2
LEFT VENTRICLE SYSTOLIC VOLUME INDEX: 12.6 ML/M2
LEFT VENTRICLE SYSTOLIC VOLUME: 25.92 ML
LEFT VENTRICULAR INTERNAL DIMENSION IN DIASTOLE: 3.53 CM (ref 3.5–6)
LEFT VENTRICULAR MASS: 107.01 G
LV LATERAL E/E' RATIO: 10.14 M/S
LV SEPTAL E/E' RATIO: 17.75 M/S
LYMPHOCYTES # BLD AUTO: 2.3 K/UL (ref 1–4.8)
LYMPHOCYTES NFR BLD: 27 % (ref 18–48)
MAGNESIUM SERPL-MCNC: 2.1 MG/DL (ref 1.6–2.6)
MCH RBC QN AUTO: 26.3 PG (ref 27–31)
MCHC RBC AUTO-ENTMCNC: 33.8 G/DL (ref 32–36)
MCV RBC AUTO: 78 FL (ref 82–98)
MONOCYTES # BLD AUTO: 0.9 K/UL (ref 0.3–1)
MONOCYTES NFR BLD: 10 % (ref 4–15)
MV PEAK A VEL: 1.05 M/S
MV PEAK E VEL: 0.71 M/S
NEUTROPHILS # BLD AUTO: 5.2 K/UL (ref 1.8–7.7)
NEUTROPHILS NFR BLD: 61.7 % (ref 38–73)
NRBC BLD-RTO: 0 /100 WBC
NUC STRESS EJECTION FRACTION: 84 %
OHS CV CPX 85 PERCENT MAX PREDICTED HEART RATE MALE: 123
OHS CV CPX MAX PREDICTED HEART RATE: 145
OHS CV CPX PATIENT IS FEMALE: 1
OHS CV CPX PATIENT IS MALE: 0
OHS CV CPX PEAK DIASTOLIC BLOOD PRESSURE: 78 MMHG
OHS CV CPX PEAK HEAR RATE: 113 BPM
OHS CV CPX PEAK RATE PRESSURE PRODUCT: NORMAL
OHS CV CPX PEAK SYSTOLIC BLOOD PRESSURE: 120 MMHG
OHS CV CPX PERCENT MAX PREDICTED HEART RATE ACHIEVED: 78
OHS CV CPX RATE PRESSURE PRODUCT PRESENTING: NORMAL
PLATELET # BLD AUTO: 293 K/UL (ref 150–450)
PMV BLD AUTO: 9.5 FL (ref 9.2–12.9)
POTASSIUM SERPL-SCNC: 3.1 MMOL/L (ref 3.5–5.1)
PROT SERPL-MCNC: 7 G/DL (ref 6–8.4)
PROTHROMBIN TIME: 11.2 SEC (ref 9–12.5)
PV PEAK VELOCITY: 0.81 CM/S
RA PRESSURE: 3 MMHG
RBC # BLD AUTO: 4.29 M/UL (ref 4–5.4)
RV TISSUE DOPPLER FREE WALL SYSTOLIC VELOCITY 1 (APICAL 4 CHAMBER VIEW): 12.78 CM/S
SINUS: 3.5 CM
SODIUM SERPL-SCNC: 141 MMOL/L (ref 136–145)
STJ: 2.7 CM
SYSTOLIC BLOOD PRESSURE: 143 MMHG
TDI LATERAL: 0.07 M/S
TDI SEPTAL: 0.04 M/S
TDI: 0.06 M/S
TRICUSPID ANNULAR PLANE SYSTOLIC EXCURSION: 2.03 CM
TROPONIN I SERPL DL<=0.01 NG/ML-MCNC: 0.01 NG/ML (ref 0–0.03)
WBC # BLD AUTO: 8.49 K/UL (ref 3.9–12.7)

## 2021-10-01 PROCEDURE — 99204 PR OFFICE/OUTPT VISIT, NEW, LEVL IV, 45-59 MIN: ICD-10-PCS | Mod: 25,,, | Performed by: INTERNAL MEDICINE

## 2021-10-01 PROCEDURE — 85025 COMPLETE CBC W/AUTO DIFF WBC: CPT | Performed by: PHYSICIAN ASSISTANT

## 2021-10-01 PROCEDURE — G0378 HOSPITAL OBSERVATION PER HR: HCPCS

## 2021-10-01 PROCEDURE — 85610 PROTHROMBIN TIME: CPT | Performed by: PHYSICIAN ASSISTANT

## 2021-10-01 PROCEDURE — 25000003 PHARM REV CODE 250: Performed by: PHYSICIAN ASSISTANT

## 2021-10-01 PROCEDURE — 63700000 PHARM REV CODE 250 ALT 637 W/O HCPCS: Performed by: PHYSICIAN ASSISTANT

## 2021-10-01 PROCEDURE — 25000003 PHARM REV CODE 250: Performed by: NURSE PRACTITIONER

## 2021-10-01 PROCEDURE — 63600175 PHARM REV CODE 636 W HCPCS: Performed by: PHYSICIAN ASSISTANT

## 2021-10-01 PROCEDURE — 99204 OFFICE O/P NEW MOD 45 MIN: CPT | Mod: 25,,, | Performed by: INTERNAL MEDICINE

## 2021-10-01 PROCEDURE — 36415 COLL VENOUS BLD VENIPUNCTURE: CPT | Performed by: PHYSICIAN ASSISTANT

## 2021-10-01 PROCEDURE — 96376 TX/PRO/DX INJ SAME DRUG ADON: CPT | Mod: 59

## 2021-10-01 PROCEDURE — 84484 ASSAY OF TROPONIN QUANT: CPT | Performed by: PHYSICIAN ASSISTANT

## 2021-10-01 PROCEDURE — 83735 ASSAY OF MAGNESIUM: CPT | Performed by: PHYSICIAN ASSISTANT

## 2021-10-01 PROCEDURE — 63600175 PHARM REV CODE 636 W HCPCS: Performed by: INTERNAL MEDICINE

## 2021-10-01 PROCEDURE — 80053 COMPREHEN METABOLIC PANEL: CPT | Performed by: PHYSICIAN ASSISTANT

## 2021-10-01 RX ORDER — REGADENOSON 0.08 MG/ML
0.4 INJECTION, SOLUTION INTRAVENOUS ONCE
Status: DISCONTINUED | OUTPATIENT
Start: 2021-10-01 | End: 2021-10-01 | Stop reason: HOSPADM

## 2021-10-01 RX ORDER — REGADENOSON 0.08 MG/ML
0.4 INJECTION, SOLUTION INTRAVENOUS ONCE
Status: COMPLETED | OUTPATIENT
Start: 2021-10-01 | End: 2021-10-01

## 2021-10-01 RX ORDER — POTASSIUM CHLORIDE 20 MEQ/1
40 TABLET, EXTENDED RELEASE ORAL ONCE
Status: COMPLETED | OUTPATIENT
Start: 2021-10-01 | End: 2021-10-01

## 2021-10-01 RX ORDER — PANTOPRAZOLE SODIUM 40 MG/1
40 TABLET, DELAYED RELEASE ORAL DAILY
Qty: 30 TABLET | Refills: 3 | Status: SHIPPED | OUTPATIENT
Start: 2021-10-01 | End: 2022-10-01

## 2021-10-01 RX ORDER — POTASSIUM CHLORIDE 20 MEQ/1
40 TABLET, EXTENDED RELEASE ORAL ONCE
Status: DISCONTINUED | OUTPATIENT
Start: 2021-10-01 | End: 2021-10-01

## 2021-10-01 RX ADMIN — CEFTRIAXONE SODIUM 1 G: 1 INJECTION, POWDER, FOR SOLUTION INTRAMUSCULAR; INTRAVENOUS at 01:10

## 2021-10-01 RX ADMIN — ATORVASTATIN CALCIUM 40 MG: 40 TABLET, FILM COATED ORAL at 09:10

## 2021-10-01 RX ADMIN — AZITHROMYCIN MONOHYDRATE 250 MG: 250 TABLET ORAL at 09:10

## 2021-10-01 RX ADMIN — CLOPIDOGREL 75 MG: 75 TABLET, FILM COATED ORAL at 09:10

## 2021-10-01 RX ADMIN — POTASSIUM CHLORIDE 40 MEQ: 1500 TABLET, EXTENDED RELEASE ORAL at 01:10

## 2021-10-01 RX ADMIN — ACETAMINOPHEN 650 MG: 325 TABLET ORAL at 01:10

## 2021-10-01 RX ADMIN — ASPIRIN 81 MG CHEWABLE TABLET 81 MG: 81 TABLET CHEWABLE at 09:10

## 2021-10-01 RX ADMIN — AMLODIPINE BESYLATE 10 MG: 5 TABLET ORAL at 09:10

## 2021-10-01 RX ADMIN — DOCUSATE SODIUM 50 MG AND SENNOSIDES 8.6 MG 1 TABLET: 8.6; 5 TABLET, FILM COATED ORAL at 03:10

## 2021-10-01 RX ADMIN — REGADENOSON 0.4 MG: 0.08 INJECTION, SOLUTION INTRAVENOUS at 11:10

## 2021-10-20 RX ORDER — CETIRIZINE HYDROCHLORIDE 10 MG/1
TABLET ORAL
Qty: 30 TABLET | Refills: 0 | OUTPATIENT
Start: 2021-10-20

## 2021-11-25 ENCOUNTER — HOSPITAL ENCOUNTER (EMERGENCY)
Facility: HOSPITAL | Age: 71
Discharge: HOME | End: 2021-11-25
Attending: EMERGENCY MEDICINE
Payer: MEDICARE

## 2021-11-25 VITALS
WEIGHT: 260 LBS | OXYGEN SATURATION: 97 % | RESPIRATION RATE: 14 BRPM | SYSTOLIC BLOOD PRESSURE: 120 MMHG | HEIGHT: 70 IN | BODY MASS INDEX: 37.22 KG/M2 | HEART RATE: 91 BPM | DIASTOLIC BLOOD PRESSURE: 68 MMHG | TEMPERATURE: 98 F

## 2021-11-25 DIAGNOSIS — R55 SYNCOPE: ICD-10-CM

## 2021-11-25 DIAGNOSIS — N30.00 ACUTE CYSTITIS WITHOUT HEMATURIA: Primary | ICD-10-CM

## 2021-11-25 LAB
ALBUMIN SERPL BCP-MCNC: 4.3 G/DL (ref 3.5–5.2)
ALP SERPL-CCNC: 116 U/L (ref 55–135)
ALT SERPL W/O P-5'-P-CCNC: 14 U/L (ref 10–44)
ANION GAP SERPL CALC-SCNC: 16 MMOL/L (ref 8–16)
AST SERPL-CCNC: 21 U/L (ref 10–40)
BACTERIA #/AREA URNS HPF: ABNORMAL /HPF
BASOPHILS # BLD AUTO: 0.04 K/UL (ref 0–0.2)
BASOPHILS NFR BLD: 0.4 % (ref 0–1.9)
BILIRUB SERPL-MCNC: 0.6 MG/DL (ref 0.1–1)
BILIRUB UR QL STRIP: NEGATIVE
BNP SERPL-MCNC: <10 PG/ML (ref 0–99)
BUN SERPL-MCNC: 17 MG/DL (ref 8–23)
CALCIUM SERPL-MCNC: 9.8 MG/DL (ref 8.7–10.5)
CHLORIDE SERPL-SCNC: 105 MMOL/L (ref 95–110)
CLARITY UR: ABNORMAL
CO2 SERPL-SCNC: 22 MMOL/L (ref 23–29)
COLOR UR: YELLOW
CREAT SERPL-MCNC: 0.8 MG/DL (ref 0.5–1.4)
CTP QC/QA: YES
CTP QC/QA: YES
DIFFERENTIAL METHOD: ABNORMAL
EOSINOPHIL # BLD AUTO: 0.1 K/UL (ref 0–0.5)
EOSINOPHIL NFR BLD: 0.6 % (ref 0–8)
ERYTHROCYTE [DISTWIDTH] IN BLOOD BY AUTOMATED COUNT: 14 % (ref 11.5–14.5)
EST. GFR  (AFRICAN AMERICAN): >60 ML/MIN/1.73 M^2
EST. GFR  (NON AFRICAN AMERICAN): >60 ML/MIN/1.73 M^2
GLUCOSE SERPL-MCNC: 105 MG/DL (ref 70–110)
GLUCOSE UR QL STRIP: NEGATIVE
GRAN CASTS #/AREA URNS LPF: 7 /LPF
HCT VFR BLD AUTO: 43.8 % (ref 37–48.5)
HGB BLD-MCNC: 14.6 G/DL (ref 12–16)
HGB UR QL STRIP: ABNORMAL
HYALINE CASTS #/AREA URNS LPF: 0 /LPF
IMM GRANULOCYTES # BLD AUTO: 0.02 K/UL (ref 0–0.04)
IMM GRANULOCYTES NFR BLD AUTO: 0.2 % (ref 0–0.5)
KETONES UR QL STRIP: NEGATIVE
LEUKOCYTE ESTERASE UR QL STRIP: ABNORMAL
LYMPHOCYTES # BLD AUTO: 1.4 K/UL (ref 1–4.8)
LYMPHOCYTES NFR BLD: 15.5 % (ref 18–48)
MCH RBC QN AUTO: 26.4 PG (ref 27–31)
MCHC RBC AUTO-ENTMCNC: 33.3 G/DL (ref 32–36)
MCV RBC AUTO: 79 FL (ref 82–98)
MICROSCOPIC COMMENT: ABNORMAL
MONOCYTES # BLD AUTO: 0.6 K/UL (ref 0.3–1)
MONOCYTES NFR BLD: 6.4 % (ref 4–15)
NEUTROPHILS # BLD AUTO: 7 K/UL (ref 1.8–7.7)
NEUTROPHILS NFR BLD: 76.9 % (ref 38–73)
NITRITE UR QL STRIP: NEGATIVE
NON-SQ EPI CELLS #/AREA URNS HPF: 1 /HPF
NRBC BLD-RTO: 0 /100 WBC
PH UR STRIP: 6 [PH] (ref 5–8)
PLATELET # BLD AUTO: 289 K/UL (ref 150–450)
PMV BLD AUTO: 9.2 FL (ref 9.2–12.9)
POC MOLECULAR INFLUENZA A AGN: NEGATIVE
POC MOLECULAR INFLUENZA B AGN: NEGATIVE
POTASSIUM SERPL-SCNC: 3.7 MMOL/L (ref 3.5–5.1)
PROT SERPL-MCNC: 8.4 G/DL (ref 6–8.4)
PROT UR QL STRIP: ABNORMAL
RBC # BLD AUTO: 5.54 M/UL (ref 4–5.4)
RBC #/AREA URNS HPF: 6 /HPF (ref 0–4)
SARS-COV-2 RDRP RESP QL NAA+PROBE: NEGATIVE
SODIUM SERPL-SCNC: 143 MMOL/L (ref 136–145)
SP GR UR STRIP: 1.03 (ref 1–1.03)
SQUAMOUS #/AREA URNS HPF: 7 /HPF
URN SPEC COLLECT METH UR: ABNORMAL
UROBILINOGEN UR STRIP-ACNC: ABNORMAL EU/DL
WBC # BLD AUTO: 9.04 K/UL (ref 3.9–12.7)
WBC #/AREA URNS HPF: >100 /HPF (ref 0–5)
WBC CLUMPS URNS QL MICRO: ABNORMAL

## 2021-11-25 PROCEDURE — 25000003 PHARM REV CODE 250: Performed by: EMERGENCY MEDICINE

## 2021-11-25 PROCEDURE — 87186 SC STD MICRODIL/AGAR DIL: CPT | Mod: 59 | Performed by: EMERGENCY MEDICINE

## 2021-11-25 PROCEDURE — 87077 CULTURE AEROBIC IDENTIFY: CPT | Mod: 59 | Performed by: EMERGENCY MEDICINE

## 2021-11-25 PROCEDURE — 93005 ELECTROCARDIOGRAM TRACING: CPT

## 2021-11-25 PROCEDURE — U0002 COVID-19 LAB TEST NON-CDC: HCPCS | Performed by: EMERGENCY MEDICINE

## 2021-11-25 PROCEDURE — 87088 URINE BACTERIA CULTURE: CPT | Performed by: EMERGENCY MEDICINE

## 2021-11-25 PROCEDURE — 93010 EKG 12-LEAD: ICD-10-PCS | Mod: ,,, | Performed by: INTERNAL MEDICINE

## 2021-11-25 PROCEDURE — 81000 URINALYSIS NONAUTO W/SCOPE: CPT | Performed by: EMERGENCY MEDICINE

## 2021-11-25 PROCEDURE — 87086 URINE CULTURE/COLONY COUNT: CPT | Performed by: EMERGENCY MEDICINE

## 2021-11-25 PROCEDURE — 85025 COMPLETE CBC W/AUTO DIFF WBC: CPT | Performed by: EMERGENCY MEDICINE

## 2021-11-25 PROCEDURE — 87502 INFLUENZA DNA AMP PROBE: CPT

## 2021-11-25 PROCEDURE — 93010 ELECTROCARDIOGRAM REPORT: CPT | Mod: ,,, | Performed by: INTERNAL MEDICINE

## 2021-11-25 PROCEDURE — 80053 COMPREHEN METABOLIC PANEL: CPT | Performed by: EMERGENCY MEDICINE

## 2021-11-25 PROCEDURE — 99285 EMERGENCY DEPT VISIT HI MDM: CPT | Mod: 25

## 2021-11-25 PROCEDURE — 83880 ASSAY OF NATRIURETIC PEPTIDE: CPT | Performed by: EMERGENCY MEDICINE

## 2021-11-25 PROCEDURE — 81003 URINALYSIS AUTO W/O SCOPE: CPT | Performed by: EMERGENCY MEDICINE

## 2021-11-25 RX ORDER — NITROFURANTOIN 25; 75 MG/1; MG/1
100 CAPSULE ORAL 2 TIMES DAILY
Qty: 20 CAPSULE | Refills: 0 | Status: SHIPPED | OUTPATIENT
Start: 2021-11-25 | End: 2021-12-05

## 2021-11-25 RX ORDER — NITROFURANTOIN 25; 75 MG/1; MG/1
100 CAPSULE ORAL EVERY 12 HOURS
Status: DISCONTINUED | OUTPATIENT
Start: 2021-11-25 | End: 2021-11-25 | Stop reason: HOSPADM

## 2021-11-25 RX ORDER — NITROFURANTOIN 25; 75 MG/1; MG/1
100 CAPSULE ORAL EVERY 12 HOURS
Status: DISCONTINUED | OUTPATIENT
Start: 2021-11-25 | End: 2021-11-25

## 2021-11-25 RX ADMIN — NITROFURANTOIN (MONOHYDRATE/MACROCRYSTALS) 100 MG: 75; 25 CAPSULE ORAL at 05:11

## 2021-11-27 LAB — BACTERIA UR CULT: ABNORMAL

## 2022-02-18 ENCOUNTER — HOSPITAL ENCOUNTER (OUTPATIENT)
Facility: HOSPITAL | Age: 72
Discharge: HOME | End: 2022-02-19
Attending: EMERGENCY MEDICINE | Admitting: HOSPITALIST
Payer: MEDICARE

## 2022-02-18 DIAGNOSIS — E66.9 OBESITY (BMI 35.0-39.9 WITHOUT COMORBIDITY): Chronic | ICD-10-CM

## 2022-02-18 DIAGNOSIS — R07.9 CHEST PAIN: Primary | ICD-10-CM

## 2022-02-18 DIAGNOSIS — I10 ESSENTIAL HYPERTENSION: Chronic | ICD-10-CM

## 2022-02-18 DIAGNOSIS — I67.9 CEREBROVASCULAR DISEASE: ICD-10-CM

## 2022-02-18 DIAGNOSIS — D72.829 LEUKOCYTOSIS, UNSPECIFIED TYPE: ICD-10-CM

## 2022-02-18 DIAGNOSIS — R29.810 FACIAL DROOP: ICD-10-CM

## 2022-02-18 DIAGNOSIS — Z86.73 HISTORY OF STROKE: Chronic | ICD-10-CM

## 2022-02-18 DIAGNOSIS — R54 AGE-RELATED PHYSICAL DEBILITY: ICD-10-CM

## 2022-02-18 PROBLEM — R10.9 PAIN IN THE ABDOMEN: Status: ACTIVE | Noted: 2022-02-18

## 2022-02-18 LAB
ALBUMIN SERPL BCP-MCNC: 3.5 G/DL (ref 3.5–5.2)
ALP SERPL-CCNC: 117 U/L (ref 55–135)
ALT SERPL W/O P-5'-P-CCNC: 19 U/L (ref 10–44)
ANION GAP SERPL CALC-SCNC: 13 MMOL/L (ref 8–16)
AST SERPL-CCNC: 27 U/L (ref 10–40)
BASOPHILS # BLD AUTO: 0.04 K/UL (ref 0–0.2)
BASOPHILS NFR BLD: 0.2 % (ref 0–1.9)
BILIRUB SERPL-MCNC: 0.9 MG/DL (ref 0.1–1)
BILIRUB UR QL STRIP: NEGATIVE
BUN SERPL-MCNC: 14 MG/DL (ref 8–23)
CALCIUM SERPL-MCNC: 9.5 MG/DL (ref 8.7–10.5)
CHLORIDE SERPL-SCNC: 104 MMOL/L (ref 95–110)
CLARITY UR: CLEAR
CO2 SERPL-SCNC: 24 MMOL/L (ref 23–29)
COLOR UR: YELLOW
CREAT SERPL-MCNC: 0.6 MG/DL (ref 0.5–1.4)
CTP QC/QA: YES
DIFFERENTIAL METHOD: ABNORMAL
EOSINOPHIL # BLD AUTO: 0 K/UL (ref 0–0.5)
EOSINOPHIL NFR BLD: 0.2 % (ref 0–8)
ERYTHROCYTE [DISTWIDTH] IN BLOOD BY AUTOMATED COUNT: 14.6 % (ref 11.5–14.5)
EST. GFR  (AFRICAN AMERICAN): >60 ML/MIN/1.73 M^2
EST. GFR  (NON AFRICAN AMERICAN): >60 ML/MIN/1.73 M^2
GLUCOSE SERPL-MCNC: 127 MG/DL (ref 70–110)
GLUCOSE UR QL STRIP: NEGATIVE
HCT VFR BLD AUTO: 39 % (ref 37–48.5)
HGB BLD-MCNC: 13.1 G/DL (ref 12–16)
HGB UR QL STRIP: ABNORMAL
IMM GRANULOCYTES # BLD AUTO: 0.09 K/UL (ref 0–0.04)
IMM GRANULOCYTES NFR BLD AUTO: 0.5 % (ref 0–0.5)
KETONES UR QL STRIP: ABNORMAL
LEUKOCYTE ESTERASE UR QL STRIP: NEGATIVE
LIPASE SERPL-CCNC: 9 U/L (ref 4–60)
LYMPHOCYTES # BLD AUTO: 2.1 K/UL (ref 1–4.8)
LYMPHOCYTES NFR BLD: 12.3 % (ref 18–48)
MCH RBC QN AUTO: 26.4 PG (ref 27–31)
MCHC RBC AUTO-ENTMCNC: 33.6 G/DL (ref 32–36)
MCV RBC AUTO: 79 FL (ref 82–98)
MONOCYTES # BLD AUTO: 1.5 K/UL (ref 0.3–1)
MONOCYTES NFR BLD: 8.7 % (ref 4–15)
NEUTROPHILS # BLD AUTO: 13.4 K/UL (ref 1.8–7.7)
NEUTROPHILS NFR BLD: 78.1 % (ref 38–73)
NITRITE UR QL STRIP: NEGATIVE
NRBC BLD-RTO: 0 /100 WBC
PH UR STRIP: 6 [PH] (ref 5–8)
PLATELET # BLD AUTO: 367 K/UL (ref 150–450)
PMV BLD AUTO: 9.2 FL (ref 9.2–12.9)
POTASSIUM SERPL-SCNC: 3.2 MMOL/L (ref 3.5–5.1)
PROT SERPL-MCNC: 7.7 G/DL (ref 6–8.4)
PROT UR QL STRIP: ABNORMAL
RBC # BLD AUTO: 4.97 M/UL (ref 4–5.4)
SARS-COV-2 RDRP RESP QL NAA+PROBE: NEGATIVE
SODIUM SERPL-SCNC: 141 MMOL/L (ref 136–145)
SP GR UR STRIP: 1.02 (ref 1–1.03)
TROPONIN I SERPL DL<=0.01 NG/ML-MCNC: 0.01 NG/ML (ref 0–0.03)
TROPONIN I SERPL DL<=0.01 NG/ML-MCNC: 0.01 NG/ML (ref 0–0.03)
TSH SERPL DL<=0.005 MIU/L-ACNC: 1.65 UIU/ML (ref 0.4–4)
URN SPEC COLLECT METH UR: ABNORMAL
UROBILINOGEN UR STRIP-ACNC: NEGATIVE EU/DL
WBC # BLD AUTO: 17.19 K/UL (ref 3.9–12.7)

## 2022-02-18 PROCEDURE — 93005 ELECTROCARDIOGRAM TRACING: CPT

## 2022-02-18 PROCEDURE — 93010 ELECTROCARDIOGRAM REPORT: CPT | Mod: ,,, | Performed by: INTERNAL MEDICINE

## 2022-02-18 PROCEDURE — 85025 COMPLETE CBC W/AUTO DIFF WBC: CPT | Performed by: EMERGENCY MEDICINE

## 2022-02-18 PROCEDURE — 84484 ASSAY OF TROPONIN QUANT: CPT | Performed by: EMERGENCY MEDICINE

## 2022-02-18 PROCEDURE — 96361 HYDRATE IV INFUSION ADD-ON: CPT

## 2022-02-18 PROCEDURE — 99285 EMERGENCY DEPT VISIT HI MDM: CPT | Mod: 25

## 2022-02-18 PROCEDURE — 25000003 PHARM REV CODE 250: Performed by: EMERGENCY MEDICINE

## 2022-02-18 PROCEDURE — U0002 COVID-19 LAB TEST NON-CDC: HCPCS | Performed by: EMERGENCY MEDICINE

## 2022-02-18 PROCEDURE — 81003 URINALYSIS AUTO W/O SCOPE: CPT | Performed by: EMERGENCY MEDICINE

## 2022-02-18 PROCEDURE — 83690 ASSAY OF LIPASE: CPT | Performed by: EMERGENCY MEDICINE

## 2022-02-18 PROCEDURE — G0378 HOSPITAL OBSERVATION PER HR: HCPCS

## 2022-02-18 PROCEDURE — 25500020 PHARM REV CODE 255: Performed by: EMERGENCY MEDICINE

## 2022-02-18 PROCEDURE — 80053 COMPREHEN METABOLIC PANEL: CPT | Performed by: EMERGENCY MEDICINE

## 2022-02-18 PROCEDURE — 84443 ASSAY THYROID STIM HORMONE: CPT | Performed by: EMERGENCY MEDICINE

## 2022-02-18 PROCEDURE — 93010 EKG 12-LEAD: ICD-10-PCS | Mod: ,,, | Performed by: INTERNAL MEDICINE

## 2022-02-18 RX ORDER — SODIUM CHLORIDE 9 MG/ML
1000 INJECTION, SOLUTION INTRAVENOUS
Status: COMPLETED | OUTPATIENT
Start: 2022-02-18 | End: 2022-02-18

## 2022-02-18 RX ORDER — CLOPIDOGREL BISULFATE 75 MG/1
75 TABLET ORAL DAILY
Status: DISCONTINUED | OUTPATIENT
Start: 2022-02-19 | End: 2022-02-19 | Stop reason: HOSPADM

## 2022-02-18 RX ORDER — ONDANSETRON 2 MG/ML
4 INJECTION INTRAMUSCULAR; INTRAVENOUS EVERY 6 HOURS PRN
Status: DISCONTINUED | OUTPATIENT
Start: 2022-02-19 | End: 2022-02-19 | Stop reason: HOSPADM

## 2022-02-18 RX ORDER — NALOXONE HCL 0.4 MG/ML
0.02 VIAL (ML) INJECTION
Status: DISCONTINUED | OUTPATIENT
Start: 2022-02-19 | End: 2022-02-19 | Stop reason: HOSPADM

## 2022-02-18 RX ORDER — PANTOPRAZOLE SODIUM 40 MG/1
40 TABLET, DELAYED RELEASE ORAL DAILY
Status: DISCONTINUED | OUTPATIENT
Start: 2022-02-19 | End: 2022-02-19 | Stop reason: HOSPADM

## 2022-02-18 RX ORDER — ATORVASTATIN CALCIUM 40 MG/1
40 TABLET, FILM COATED ORAL DAILY
Status: DISCONTINUED | OUTPATIENT
Start: 2022-02-19 | End: 2022-02-19 | Stop reason: HOSPADM

## 2022-02-18 RX ORDER — ASPIRIN 81 MG/1
81 TABLET ORAL DAILY
Status: DISCONTINUED | OUTPATIENT
Start: 2022-02-19 | End: 2022-02-19 | Stop reason: HOSPADM

## 2022-02-18 RX ORDER — SODIUM CHLORIDE 0.9 % (FLUSH) 0.9 %
10 SYRINGE (ML) INJECTION EVERY 12 HOURS PRN
Status: DISCONTINUED | OUTPATIENT
Start: 2022-02-19 | End: 2022-02-19 | Stop reason: HOSPADM

## 2022-02-18 RX ORDER — ASPIRIN 325 MG
325 TABLET ORAL
Status: ACTIVE | OUTPATIENT
Start: 2022-02-18 | End: 2022-02-19

## 2022-02-18 RX ORDER — AMLODIPINE BESYLATE 5 MG/1
10 TABLET ORAL DAILY
Status: DISCONTINUED | OUTPATIENT
Start: 2022-02-19 | End: 2022-02-19 | Stop reason: HOSPADM

## 2022-02-18 RX ORDER — ACETAMINOPHEN 325 MG/1
650 TABLET ORAL EVERY 6 HOURS PRN
Status: DISCONTINUED | OUTPATIENT
Start: 2022-02-19 | End: 2022-02-19 | Stop reason: HOSPADM

## 2022-02-18 RX ORDER — ENOXAPARIN SODIUM 100 MG/ML
40 INJECTION SUBCUTANEOUS EVERY 24 HOURS
Status: DISCONTINUED | OUTPATIENT
Start: 2022-02-19 | End: 2022-02-19 | Stop reason: HOSPADM

## 2022-02-18 RX ADMIN — SODIUM CHLORIDE 1000 ML: 0.9 INJECTION, SOLUTION INTRAVENOUS at 07:02

## 2022-02-18 RX ADMIN — IOHEXOL 85 ML: 350 INJECTION, SOLUTION INTRAVENOUS at 09:02

## 2022-02-18 NOTE — ED PROVIDER NOTES
Encounter Date: 2/18/2022       History     Chief Complaint   Patient presents with    Abdominal Pain     Pt presents via EMS from residence c/o abd pain since this morning.  Pt is alert and baseline per EMS, resp easy,  pt denies any pain or concerns during triage.       71-year-old  female presents to the emergency department due to chest pain.  Patient states that she has been having some chest pain earlier today.  She states that the chest pain resolved prior to arrival in the emergency department.  She denies any shortness of breath, nausea vomiting, lightheadedness or dizziness.  She also states that she was having some pain in her abdominal area.  She states that that has also resolved.  However due to the patient having these issues her family a activated EMS and brought her into the emergency department.        Review of patient's allergies indicates:  No Known Allergies  Past Medical History:   Diagnosis Date    COVID-19 09/09/2021    Essential hypertension 6/12/2015    Obese     Obesity (BMI 35.0-39.9) 6/12/2015    Stroke 06/2015    Left side weakness    Stroke-like symptoms 09/17/2021    NEW LEFT FACIAL DROOP, LEFT SIDE WEAKNESS & SLURRED SPEECH    Wheelchair dependence     Wheelchair dependence      Past Surgical History:   Procedure Laterality Date    HYSTERECTOMY       Family History   Problem Relation Age of Onset    Stroke Mother     Cancer Father      Social History     Tobacco Use    Smoking status: Never Smoker    Smokeless tobacco: Never Used   Substance Use Topics    Alcohol use: No    Drug use: No     Review of Systems   Constitutional: Negative for activity change and appetite change.   HENT: Negative for congestion and ear pain.    Eyes: Negative for pain and redness.   Respiratory: Negative for cough and shortness of breath.    Cardiovascular: Positive for chest pain. Negative for leg swelling.   Gastrointestinal: Positive for abdominal pain and nausea.  Negative for abdominal distention.   Genitourinary: Negative for difficulty urinating and dysuria.   Musculoskeletal: Negative for arthralgias and back pain.   Skin: Negative for color change and pallor.   Neurological: Negative for light-headedness and headaches.   All other systems reviewed and are negative.      Physical Exam     Initial Vitals [02/18/22 1609]   BP Pulse Resp Temp SpO2   118/64 110 16 98.7 °F (37.1 °C) 98 %      MAP       --         Physical Exam    Nursing note and vitals reviewed.  Constitutional: She appears well-developed and well-nourished. She is not diaphoretic. No distress.   HENT:   Head: Normocephalic and atraumatic.   Right Ear: External ear normal.   Left Ear: External ear normal.   Mouth/Throat: No oropharyngeal exudate.   Eyes: Conjunctivae and EOM are normal. Pupils are equal, round, and reactive to light.   Neck: Neck supple. No JVD present.   Normal range of motion.  Cardiovascular: Normal rate, regular rhythm, normal heart sounds and intact distal pulses. Exam reveals no friction rub.    No murmur heard.  Pulmonary/Chest: Breath sounds normal. No stridor. No respiratory distress. She has no wheezes. She has no rales.   Abdominal: Abdomen is soft. She exhibits no distension. There is no abdominal tenderness. There is no rebound.   Musculoskeletal:         General: No tenderness or edema. Normal range of motion.      Cervical back: Normal range of motion and neck supple.     Neurological: She is alert and oriented to person, place, and time. She has normal strength.   Skin: No rash and no abscess noted. No erythema. No pallor.         ED Course   Procedures  Labs Reviewed   CBC W/ AUTO DIFFERENTIAL - Abnormal; Notable for the following components:       Result Value    WBC 17.19 (*)     MCV 79 (*)     MCH 26.4 (*)     RDW 14.6 (*)     Gran # (ANC) 13.4 (*)     Immature Grans (Abs) 0.09 (*)     Mono # 1.5 (*)     Gran % 78.1 (*)     Lymph % 12.3 (*)     All other components within  normal limits   COMPREHENSIVE METABOLIC PANEL - Abnormal; Notable for the following components:    Potassium 3.2 (*)     Glucose 127 (*)     All other components within normal limits   URINALYSIS, REFLEX TO URINE CULTURE - Abnormal; Notable for the following components:    Protein, UA Trace (*)     Ketones, UA Trace (*)     Occult Blood UA Trace (*)     All other components within normal limits    Narrative:     Specimen Source->Urine   TROPONIN I   TSH   LIPASE   TROPONIN I   SARS-COV-2 RDRP GENE     EKG Readings: (Independently Interpreted)   Sinus tachycardia rate of 110 beats per minute, pr interval 158, QRS 72, . No STEMI noted.  Left ventricular hypertrophy noted.         Imaging Results          CT Abdomen Pelvis With Contrast (Final result)  Result time 02/18/22 21:42:53    Final result by Jailyn Benitez MD (02/18/22 21:42:53)                 Impression:      No evidence of acute pulmonary embolism. Moderate hiatal hernia.    No acute findings in the abdomen pelvis on CT abdomen pelvis.    Colonic diverticulosis.      Electronically signed by: Jailyn Benitez  Date:    02/18/2022  Time:    21:42             Narrative:    EXAMINATION:  CT PULMONARY ANGIOGRAM WITH CONTRAST    CLINICAL HISTORY:  Abdominal pain since this morning.  Chest pain.    TECHNIQUE:  CT of the chest with intravenous contrast for pulmonary artery angiogram was performed. Contiguous axial 1.25 mm images followed by 10 mm reconstructions with multiplanar and MIP reformations of the pulmonary arteries. No 3D post-angiographic imaging was performed on an independent workstation and reviewed.  Eighty-five ml of Omnipaque 350 was injected.    COMPARISON:  CTA chest 09/30/2021    FINDINGS:  In the chest, there is no evidence of pulmonary artery filling defect to suggest pulmonary embolism. There is no aortic aneurysm or aortic dissection. Moderate bibasilar atelectasis is present. There is no evidence of mediastinal, hilar, or  axillary adenopathy. There is no pleural or pericardial effusion. The heart size is within normal limits.  There is a moderate hiatal hernia.    In the abdomen, the liver, spleen, pancreas, adrenal glands, kidneys, and gallbladder are unremarkable.  There is a small fat containing umbilical hernia. There is no abdominal ascites or adenopathy.    In the pelvis, there is descending and sigmoid colon diverticulosis.  The appendix is not inflamed.  The rectum is patulous.  Presacral edema is present.  The uterus is surgically absent.  There is moderate kyphoscoliosis with convexity to the right.  Spondylitic changes are seen.                               CTA Chest Non-Coronary (PE Study) (Final result)  Result time 02/18/22 21:42:53    Final result by Jailyn Benitez MD (02/18/22 21:42:53)                 Impression:      No evidence of acute pulmonary embolism. Moderate hiatal hernia.    No acute findings in the abdomen pelvis on CT abdomen pelvis.    Colonic diverticulosis.      Electronically signed by: Jailyn Benitez  Date:    02/18/2022  Time:    21:42             Narrative:    EXAMINATION:  CT PULMONARY ANGIOGRAM WITH CONTRAST    CLINICAL HISTORY:  Abdominal pain since this morning.  Chest pain.    TECHNIQUE:  CT of the chest with intravenous contrast for pulmonary artery angiogram was performed. Contiguous axial 1.25 mm images followed by 10 mm reconstructions with multiplanar and MIP reformations of the pulmonary arteries. No 3D post-angiographic imaging was performed on an independent workstation and reviewed.  Eighty-five ml of Omnipaque 350 was injected.    COMPARISON:  CTA chest 09/30/2021    FINDINGS:  In the chest, there is no evidence of pulmonary artery filling defect to suggest pulmonary embolism. There is no aortic aneurysm or aortic dissection. Moderate bibasilar atelectasis is present. There is no evidence of mediastinal, hilar, or axillary adenopathy. There is no pleural or pericardial  effusion. The heart size is within normal limits.  There is a moderate hiatal hernia.    In the abdomen, the liver, spleen, pancreas, adrenal glands, kidneys, and gallbladder are unremarkable.  There is a small fat containing umbilical hernia. There is no abdominal ascites or adenopathy.    In the pelvis, there is descending and sigmoid colon diverticulosis.  The appendix is not inflamed.  The rectum is patulous.  Presacral edema is present.  The uterus is surgically absent.  There is moderate kyphoscoliosis with convexity to the right.  Spondylitic changes are seen.                               CT Head Without Contrast (Final result)  Result time 02/18/22 21:22:48    Final result by Jailyn Benitez MD (02/18/22 21:22:48)                 Impression:      No acute intracranial abnormality detected.  No significant change.  If persistent neurological changes, recommend MRI of the brain with diffusion-weighted sequencing.      Electronically signed by: Jailyn Benitez  Date:    02/18/2022  Time:    21:22             Narrative:    EXAMINATION:  CT OF THE HEAD WITHOUT    CLINICAL HISTORY:  facical droop;    TECHNIQUE:  5 mm unenhanced axial images were obtained from the skull base to the vertex.    COMPARISON:  MRI of the brain 09/17/2021    FINDINGS:  Stable cerebral atrophy and chronic small vessel ischemic changes are present.  There are remote infarcts of bilateral basal ganglia there is no acute intracranial hemorrhage, territorial infarct or mass effect, or midline shift. The visualized paranasal sinuses and mastoid air cells are clear.                               X-Ray Chest AP Portable (Final result)  Result time 02/18/22 17:32:30    Final result by Jailyn Benitez MD (02/18/22 17:32:30)                 Impression:      No acute intrathoracic abnormality detected.      Electronically signed by: Jailyn Benitez  Date:    02/18/2022  Time:    17:32             Narrative:    EXAMINATION:  AP PORTABLE  CHEST    CLINICAL HISTORY:  Chest pain, unspecified    TECHNIQUE:  AP portable chest radiograph was submitted.    COMPARISON:  11/25/2021    FINDINGS:  AP portable chest radiograph demonstrates a cardiac silhouette within normal limits.  There is no focal consolidation, pneumothorax, or pleural effusion.                                 Medications   0.9%  NaCl infusion (0 mLs Intravenous Stopped 2/18/22 2154)   iohexoL (OMNIPAQUE 350) injection 85 mL (85 mLs Intravenous Given 2/18/22 2112)     Medical Decision Making:   Initial Assessment:   71-year-old with chest and abdominal pain.  Differential diagnosis includes but is not limited to gastritis, gastroenteritis, myocardial infarction, pneumonia, upper respiratory infection.  Workup-CBC, CMP, EKG, chest x-ray.  Results significant for white blood count 17.19, patient afebrile, no anemia, no significant electrolyte abnormality, troponin within normal limits, TSH within normal limits, lipase within normal limits.  Chest x-ray shows no signs of any pneumonia or pneumothorax.  Patient initially heart rate of 110, due to mild increased tachycardia now heart rate in the 120s, patient receiving fluids.  Will order CT abdomen pelvis along with a CT scan chest.  [facility] Emergency Medicine  02/18/2022 7:26 PM            Update-   Patient's daughter now in the room she states that she thinks that her mother's face looks droop to the left.  She states that earlier today she started having some difficulties with her words.  She was unable to get her to the emergency department at that time because the mother did not want to come in.  She does not have a last known normal.  However out of concern for possible stroke I did discuss this case with Neurology, Neurology recommended that we do a CT scan of the head, no stroke activation at this time as there is an unclear time of onset.  Patient has a history of stroke with left-sided deficit.  [facility]  Medicine  02/18/2022 8:59 PM    Update-   CT scans are negative.  Out of concern for patient's neurological issues and her chest pain, I did discuss the case with hospitalist for possible observation.  Hospitalist recommended to repeat troponin and get an MRI and re-evaluate.  Currently awaiting MRI and repeat troponin.  [facility]  02/18/2022 10:34 PM    Update-   Repeat troponin within normal limits however will place in observation for MRI in the morning.  [facility]  02/18/2022 11:16 PM        Additional MDM:   Heart Score:    History:          Slightly suspicious.  ECG:             Normal  Age:               >65 years  Risk factors: >= 3 risk factors or history of atherosclerotic disease  Troponin:       Less than or equal to normal limit  Final Score: 4                       Clinical Impression:   Final diagnoses:  [R07.9] Chest pain (Primary)  [R29.810] Facial droop          ED Disposition Condition    Observation               [facility] MD  02/18/22 [id number]

## 2022-02-18 NOTE — ED TRIAGE NOTES
EMS reports pt /co of abdominal pain and weakness, poor PO intake started today. EMS reports hx of multiple strokes with left side residual weakness. Field glucose 257.    Pt reports midsternum chest pain and epigastric abdominal pain. EKG obtained.

## 2022-02-19 VITALS
RESPIRATION RATE: 16 BRPM | OXYGEN SATURATION: 94 % | BODY MASS INDEX: 33.83 KG/M2 | TEMPERATURE: 98 F | HEART RATE: 88 BPM | DIASTOLIC BLOOD PRESSURE: 63 MMHG | WEIGHT: 190.94 LBS | HEIGHT: 63 IN | SYSTOLIC BLOOD PRESSURE: 121 MMHG

## 2022-02-19 LAB
ANION GAP SERPL CALC-SCNC: 12 MMOL/L (ref 8–16)
BASOPHILS # BLD AUTO: 0.04 K/UL (ref 0–0.2)
BASOPHILS NFR BLD: 0.3 % (ref 0–1.9)
BUN SERPL-MCNC: 12 MG/DL (ref 8–23)
CALCIUM SERPL-MCNC: 9 MG/DL (ref 8.7–10.5)
CHLORIDE SERPL-SCNC: 107 MMOL/L (ref 95–110)
CHOLEST SERPL-MCNC: 103 MG/DL (ref 120–199)
CHOLEST/HDLC SERPL: 3.3 {RATIO} (ref 2–5)
CO2 SERPL-SCNC: 23 MMOL/L (ref 23–29)
CREAT SERPL-MCNC: 0.5 MG/DL (ref 0.5–1.4)
DIFFERENTIAL METHOD: ABNORMAL
EOSINOPHIL # BLD AUTO: 0 K/UL (ref 0–0.5)
EOSINOPHIL NFR BLD: 0.1 % (ref 0–8)
ERYTHROCYTE [DISTWIDTH] IN BLOOD BY AUTOMATED COUNT: 14.6 % (ref 11.5–14.5)
ERYTHROCYTE [DISTWIDTH] IN BLOOD BY AUTOMATED COUNT: 14.9 % (ref 11.5–14.5)
EST. GFR  (AFRICAN AMERICAN): >60 ML/MIN/1.73 M^2
EST. GFR  (NON AFRICAN AMERICAN): >60 ML/MIN/1.73 M^2
GLUCOSE SERPL-MCNC: 107 MG/DL (ref 70–110)
HCT VFR BLD AUTO: 34.1 % (ref 37–48.5)
HCT VFR BLD AUTO: 37.1 % (ref 37–48.5)
HDLC SERPL-MCNC: 31 MG/DL (ref 40–75)
HDLC SERPL: 30.1 % (ref 20–50)
HGB BLD-MCNC: 11.5 G/DL (ref 12–16)
HGB BLD-MCNC: 12 G/DL (ref 12–16)
IMM GRANULOCYTES # BLD AUTO: 0.11 K/UL (ref 0–0.04)
IMM GRANULOCYTES NFR BLD AUTO: 0.7 % (ref 0–0.5)
LDLC SERPL CALC-MCNC: 60.2 MG/DL (ref 63–159)
LYMPHOCYTES # BLD AUTO: 2.8 K/UL (ref 1–4.8)
LYMPHOCYTES NFR BLD: 18.1 % (ref 18–48)
MCH RBC QN AUTO: 26.4 PG (ref 27–31)
MCH RBC QN AUTO: 26.7 PG (ref 27–31)
MCHC RBC AUTO-ENTMCNC: 32.3 G/DL (ref 32–36)
MCHC RBC AUTO-ENTMCNC: 33.7 G/DL (ref 32–36)
MCV RBC AUTO: 79 FL (ref 82–98)
MCV RBC AUTO: 82 FL (ref 82–98)
MONOCYTES # BLD AUTO: 2 K/UL (ref 0.3–1)
MONOCYTES NFR BLD: 12.7 % (ref 4–15)
NEUTROPHILS # BLD AUTO: 10.6 K/UL (ref 1.8–7.7)
NEUTROPHILS NFR BLD: 68.1 % (ref 38–73)
NONHDLC SERPL-MCNC: 72 MG/DL
NRBC BLD-RTO: 0 /100 WBC
PLATELET # BLD AUTO: 290 K/UL (ref 150–450)
PLATELET # BLD AUTO: 304 K/UL (ref 150–450)
PMV BLD AUTO: 9.1 FL (ref 9.2–12.9)
PMV BLD AUTO: 9.6 FL (ref 9.2–12.9)
POCT GLUCOSE: 143 MG/DL (ref 70–110)
POTASSIUM SERPL-SCNC: 3.3 MMOL/L (ref 3.5–5.1)
RBC # BLD AUTO: 4.31 M/UL (ref 4–5.4)
RBC # BLD AUTO: 4.55 M/UL (ref 4–5.4)
SODIUM SERPL-SCNC: 142 MMOL/L (ref 136–145)
TRIGL SERPL-MCNC: 59 MG/DL (ref 30–150)
WBC # BLD AUTO: 13.34 K/UL (ref 3.9–12.7)
WBC # BLD AUTO: 15.63 K/UL (ref 3.9–12.7)

## 2022-02-19 PROCEDURE — 99214 PR OFFICE/OUTPT VISIT, EST, LEVL IV, 30-39 MIN: ICD-10-PCS | Mod: ,,, | Performed by: PSYCHIATRY & NEUROLOGY

## 2022-02-19 PROCEDURE — 85027 COMPLETE CBC AUTOMATED: CPT | Mod: 91 | Performed by: NURSE PRACTITIONER

## 2022-02-19 PROCEDURE — 63600175 PHARM REV CODE 636 W HCPCS: Performed by: NURSE PRACTITIONER

## 2022-02-19 PROCEDURE — 36415 COLL VENOUS BLD VENIPUNCTURE: CPT | Performed by: NURSE PRACTITIONER

## 2022-02-19 PROCEDURE — G0425 PR INPT TELEHEALTH CONSULT 30M: ICD-10-PCS | Mod: 95,G0,, | Performed by: STUDENT IN AN ORGANIZED HEALTH CARE EDUCATION/TRAINING PROGRAM

## 2022-02-19 PROCEDURE — 82962 GLUCOSE BLOOD TEST: CPT

## 2022-02-19 PROCEDURE — 99214 OFFICE O/P EST MOD 30 MIN: CPT | Mod: ,,, | Performed by: PSYCHIATRY & NEUROLOGY

## 2022-02-19 PROCEDURE — 85025 COMPLETE CBC W/AUTO DIFF WBC: CPT | Performed by: NURSE PRACTITIONER

## 2022-02-19 PROCEDURE — G0378 HOSPITAL OBSERVATION PER HR: HCPCS

## 2022-02-19 PROCEDURE — 96365 THER/PROPH/DIAG IV INF INIT: CPT

## 2022-02-19 PROCEDURE — 25000003 PHARM REV CODE 250: Performed by: NURSE PRACTITIONER

## 2022-02-19 PROCEDURE — G0425 INPT/ED TELECONSULT30: HCPCS | Mod: 95,G0,, | Performed by: STUDENT IN AN ORGANIZED HEALTH CARE EDUCATION/TRAINING PROGRAM

## 2022-02-19 PROCEDURE — 80061 LIPID PANEL: CPT | Performed by: NURSE PRACTITIONER

## 2022-02-19 PROCEDURE — 96361 HYDRATE IV INFUSION ADD-ON: CPT

## 2022-02-19 PROCEDURE — 80048 BASIC METABOLIC PNL TOTAL CA: CPT | Performed by: NURSE PRACTITIONER

## 2022-02-19 RX ORDER — IBUPROFEN 400 MG/1
800 TABLET ORAL EVERY 8 HOURS PRN
Status: DISCONTINUED | OUTPATIENT
Start: 2022-02-19 | End: 2022-02-19 | Stop reason: HOSPADM

## 2022-02-19 RX ORDER — SODIUM CHLORIDE 9 MG/ML
INJECTION, SOLUTION INTRAVENOUS CONTINUOUS
Status: DISCONTINUED | OUTPATIENT
Start: 2022-02-19 | End: 2022-02-19

## 2022-02-19 RX ADMIN — PANTOPRAZOLE SODIUM 40 MG: 40 TABLET, DELAYED RELEASE ORAL at 08:02

## 2022-02-19 RX ADMIN — AMLODIPINE BESYLATE 10 MG: 5 TABLET ORAL at 08:02

## 2022-02-19 RX ADMIN — ATORVASTATIN CALCIUM 40 MG: 40 TABLET, FILM COATED ORAL at 08:02

## 2022-02-19 RX ADMIN — SODIUM CHLORIDE: 0.9 INJECTION, SOLUTION INTRAVENOUS at 12:02

## 2022-02-19 RX ADMIN — SODIUM CHLORIDE: 0.9 INJECTION, SOLUTION INTRAVENOUS at 09:02

## 2022-02-19 RX ADMIN — CLOPIDOGREL 75 MG: 75 TABLET, FILM COATED ORAL at 08:02

## 2022-02-19 RX ADMIN — ASPIRIN 81 MG: 81 TABLET, COATED ORAL at 08:02

## 2022-02-19 RX ADMIN — CEFTRIAXONE 1 G: 1 INJECTION, SOLUTION INTRAVENOUS at 03:02

## 2022-02-19 NOTE — CONSULTS
[facility]  Neurology  Consult Note    Patient Name: Holly Kidd  MRN: [id number]  Admission Date: 2/18/2022  Hospital Length of Stay: 0 days  Code Status: Full Code   Attending Provider: Tracy Tirado MD   Consulting Provider: Mick Mccallum MD  Primary Care Physician: DENYS Sterling  Principal Problem:Chest pain    Consults  Subjective:     Chief Complaint: Stroke? Seizures?    HPI: 72 y/o female was brought to ED due to chest pain. This resolved prior to arrival to hospital. While in ED her daughter arrived and noted a left facial droop. No weakness or sensory symptoms reported by pt. MRI brain was obtained showiing no acute stroke.       Past Medical History:   Diagnosis Date    COVID-19 09/09/2021    Essential hypertension 6/12/2015    Obese     Obesity (BMI 35.0-39.9) 6/12/2015    Stroke 06/2015    Left side weakness    Stroke-like symptoms 09/17/2021    NEW LEFT FACIAL DROOP, LEFT SIDE WEAKNESS & SLURRED SPEECH    Wheelchair dependence     Wheelchair dependence        Past Surgical History:   Procedure Laterality Date    HYSTERECTOMY         Review of patient's allergies indicates:  No Known Allergies    Current Neurological Medications:     No current facility-administered medications on file prior to encounter.     Current Outpatient Medications on File Prior to Encounter   Medication Sig    amLODIPine (NORVASC) 10 MG tablet Take 1 tablet (10 mg total) by mouth once daily. Hold if SBP <120    aspirin (ECOTRIN) 81 MG EC tablet Take 1 tablet (81 mg total) by mouth once daily.    atorvastatin (LIPITOR) 40 MG tablet Take 1 tablet (40 mg total) by mouth once daily.    cetirizine (ZYRTEC) 10 MG tablet Take 1 tablet (10 mg total) by mouth once daily.    clopidogreL (PLAVIX) 75 mg tablet Take 1 tablet (75 mg total) by mouth once daily. for 21 days    ergocalciferol (ERGOCALCIFEROL) 50,000 unit Cap Take 1 capsule (50,000 Units total) by mouth every 7 days.    ondansetron  (ZOFRAN-ODT) 4 MG TbDL Take 1 tablet (4 mg total) by mouth every 4 (four) hours as needed (nausea/vomiting).    pantoprazole (PROTONIX) 40 MG tablet Take 1 tablet (40 mg total) by mouth once daily.    varicella-zoster gE-AS01B, PF, (SHINGRIX) 50 mcg/0.5 mL injection Inject into the muscle.      Family History     Problem Relation (Age of Onset)    Cancer Father    Stroke Mother        Tobacco Use    Smoking status: Never Smoker    Smokeless tobacco: Never Used   Substance and Sexual Activity    Alcohol use: No    Drug use: No    Sexual activity: Not on file     Review of Systems   Constitutional: Negative for fever.   HENT: Negative for trouble swallowing.    Eyes: Negative for photophobia.   Respiratory: Negative for shortness of breath.    Cardiovascular: Negative for chest pain.   Gastrointestinal: Negative for abdominal pain.   Genitourinary: Negative for dysuria.   Musculoskeletal: Negative for back pain.   Neurological: Negative for headaches.     Objective:     Vital Signs (Most Recent):  Temp: (P) 97.6 °F (36.4 °C) (02/19/22 1100)  Pulse: (P) 89 (02/19/22 1100)  Resp: (P) 16 (02/19/22 1100)  BP: (P) 108/65 (02/19/22 1100)  SpO2: (!) (P) 94 % (02/19/22 1100) Vital Signs (24h Range):  Temp:  [97.6 °F (36.4 °C)-98.7 °F (37.1 °C)] (P) 97.6 °F (36.4 °C)  Pulse:  [] (P) 89  Resp:  [14-19] (P) 16  SpO2:  [92 %-100 %] (P) 94 %  BP: (109-128)/(64-79) (P) 108/65     Weight: 86.6 kg (190 lb 14.7 oz)  Body mass index is 33.82 kg/m².    Physical Exam  Constitutional:       General: She is not in acute distress.  HENT:      Head: Normocephalic.   Eyes:      General:         Right eye: No discharge.         Left eye: No discharge.   Cardiovascular:      Rate and Rhythm: Normal rate.   Pulmonary:      Effort: Pulmonary effort is normal.   Abdominal:      Palpations: Abdomen is soft.   Musculoskeletal:         General: No tenderness.      Cervical back: Neck supple.   Skin:     Findings: No rash.        NEUROLOGICAL EXAMINATION:      MENTAL STATUS  Level of consciousness: alert       Follows commands  Oriented to person, place      CRANIAL NERVES      CN II   Right visual field deficit: none  Left visual field deficit: none      CN III, IV, VI   Right pupil: Size: 2 mm. Shape: regular.   Left pupil: Size: 2 mm. Shape: regular.   Nystagmus: none   Conjugate gaze: present     CN V   Right facial sensation deficit: none  Left facial sensation deficit: none     CN VII   Right facial weakness: none  Left facial weakness: central     CN XI   Right trapezius strength: normal  Left trapezius strength: normal     CN XII   Tongue deviation: none     MOTOR EXAM        Limited cooperation for exam. AROM of UE;s against gravity              Significant Labs:   CBC:   Recent Labs   Lab 02/18/22  1654 02/19/22  0637   WBC 17.19* 15.63*   HGB 13.1 12.0   HCT 39.0 37.1    304     CMP:   Recent Labs   Lab 02/18/22  1654 02/19/22  0637   * 107    142   K 3.2* 3.3*    107   CO2 24 23   BUN 14 12   CREATININE 0.6 0.5   CALCIUM 9.5 9.0   PROT 7.7  --    ALBUMIN 3.5  --    BILITOT 0.9  --    ALKPHOS 117  --    AST 27  --    ALT 19  --    ANIONGAP 13 12   EGFRNONAA >60 >60       Significant Imaging: I have reviewed all pertinent imaging results/findings within the past 24 hours.     MRI brain  Impression:     Advanced small vessel involutional changes with associated atrophy and lacunar infarcts.     Scattered susceptibility artifacts appreciated in the inferior aspect of the right left hemisphere and spinal cord of unknown etiology.        Electronically signed by: Jaya Anders  Date:                                            02/19/2022  Time:                                           04:49    Assessment and Plan:     72 y/o female consulted for possible stroke    1. Stroke? No acute stroke on MRI. Facial droop is not new as it evident in pt's old profile picture on Epic. Old notes from previous  encounters also prove the presence old left upper motor neuron facial weakness.   MRI does show multiple old strokes as well as extensive microvacular ischemic changes.   -Continue DAPT   -Statin.   -Ok to D/C from neurology standpoint.    Active Diagnoses:    Diagnosis Date Noted POA    PRINCIPAL PROBLEM:  Chest pain [R07.9] 09/30/2021 Yes    Pain in the abdomen [R10.9] 02/18/2022 Unknown    Leukocytosis [D72.829] 02/18/2022 Unknown    Facial droop [R29.810] 09/17/2021 Yes    Wheelchair dependence [Z99.3] 05/23/2019 Not Applicable     Chronic    History of stroke [Z86.73] 05/23/2019 Not Applicable     Chronic    Essential hypertension [I10] 06/12/2015 Yes     Chronic    Obesity (BMI 35.0-39.9) [E66.9] 06/12/2015 Yes     Chronic    Hypokalemia [E87.6] 06/12/2015 Unknown      Problems Resolved During this Admission:       VTE Risk Mitigation (From admission, onward)         Ordered     enoxaparin injection 40 mg  Daily         02/18/22 2359     IP VTE HIGH RISK PATIENT  Once         02/18/22 2359     Place sequential compression device  Until discontinued         02/18/22 2359                Thank you for your consult. I will follow-up with patient. Please contact us if you have any additional questions.    Mick Mccallum MD  Neurology  [facility]

## 2022-02-19 NOTE — ASSESSMENT & PLAN NOTE
Unknown source of infection... no fever, CXR unremarkable, CT scan did show some Colonic diverticulosis.  Trend level  Start rocephin

## 2022-02-19 NOTE — PLAN OF CARE
[place] [facility]    HOME HEALTH ORDERS  FACE TO FACE ENCOUNTER    Patient Name: Holly Kidd  YOB: 1950    PCP: DENYS Sterling   PCP Address: [address] [place] [address] A / [place] [address]  PCP Phone Number: [phone]  PCP Fax: [phone]       Encounter Date: 02/19/2022    Admit to Home Health    Diagnoses:  Active Hospital Problems    Diagnosis  POA    *Chest pain [R07.9]  Yes    Pain in the abdomen [R10.9]  Unknown    Leukocytosis [D72.829]  Unknown    Facial droop [R29.810]  Yes    Wheelchair dependence [Z99.3]  Not Applicable     Chronic    History of stroke [Z86.73]  Not Applicable     Chronic    Essential hypertension [I10]  Yes     Chronic    Obesity (BMI 35.0-39.9) [E66.9]  Yes     Chronic    Hypokalemia [E87.6]  Unknown      Resolved Hospital Problems   No resolved problems to display.       No future appointments.   Follow-up Information     DENYS Sterling Follow up in 1 week(s).    Specialty: Family Medicine  Contact information:  [address] [place] [address] A  [place] [address]  [phone]                           I have seen and examined this patient face to face today. My clinical findings that support the need for the home health skilled services and home bound status are the following:  Weakness/numbness causing balance and gait disturbance due to Stroke and Weakness/Debility making it taxing to leave home.  Patient with medication mismanagement issues requiring home bound status as evidenced by  Poor understanding of medication regimen/dosage and Poor adherence to medication regimen/dosage.  Medical restrictions requiring assistance of another human to leave home due to  Unstable ambulation, Decreased range of motions in extremities, Morbid Obesity and history of stroke; debility.    Allergies:Review of patient's allergies indicates:  No Known Allergies    Diet: cardiac diet    Activities: activity as tolerated    Nursing:   SN to complete  comprehensive assessment including routine vital signs. Instruct on disease process and s/s of complications to report to MD. Review/verify medication list sent home with the patient at time of discharge  and instruct patient/caregiver as needed. Frequency may be adjusted depending on start of care date.    Notify MD if SBP > 160 or < 90; DBP > 90 or < 50; HR > 120 or < 50; Temp > 101      CONSULTS:    Physical Therapy to evaluate and treat. Evaluate for home safety and equipment needs; Establish/upgrade home exercise program. Perform / instruct on therapeutic exercises, gait training, transfer training, and Range of Motion.  Occupational Therapy to evaluate and treat. Evaluate home environment for safety and equipment needs. Perform/Instruct on transfers, ADL training, ROM, and therapeutic exercises.   to evaluate for community resources/long-range planning.  Aide to provide assistance with personal care, ADLs, and vital signs.    MISCELLANEOUS CARE:      WOUND CARE ORDERS  No wounds; turn q2 at home obesity      Medications: Review discharge medications with patient and family and provide education.      Current Discharge Medication List      CONTINUE these medications which have NOT CHANGED    Details   amLODIPine (NORVASC) 10 MG tablet Take 1 tablet (10 mg total) by mouth once daily. Hold if SBP <120  Qty: 30 tablet, Refills: 11    Comments: .      aspirin (ECOTRIN) 81 MG EC tablet Take 1 tablet (81 mg total) by mouth once daily.  Qty: 30 tablet, Refills: 1      atorvastatin (LIPITOR) 40 MG tablet Take 1 tablet (40 mg total) by mouth once daily.  Qty: 30 tablet, Refills: 11      cetirizine (ZYRTEC) 10 MG tablet Take 1 tablet (10 mg total) by mouth once daily.  Qty: 30 tablet, Refills: 0      clopidogreL (PLAVIX) 75 mg tablet Take 1 tablet (75 mg total) by mouth once daily. for 21 days  Qty: 21 tablet, Refills: 0      ergocalciferol (ERGOCALCIFEROL) 50,000 unit Cap Take 1 capsule (50,000 Units total)  by mouth every 7 days.  Qty: 12 capsule, Refills: 0      ondansetron (ZOFRAN-ODT) 4 MG TbDL Take 1 tablet (4 mg total) by mouth every 4 (four) hours as needed (nausea/vomiting).  Qty: 20 tablet, Refills: 1      pantoprazole (PROTONIX) 40 MG tablet Take 1 tablet (40 mg total) by mouth once daily.  Qty: 30 tablet, Refills: 3      varicella-zoster gE-AS01B, PF, (SHINGRIX) 50 mcg/0.5 mL injection Inject into the muscle.  Qty: 1 each, Refills: 0             I certify that this patient is confined to her home and needs intermittent skilled nursing care and physical therapy , brodie Bradley, DNP, APRN, FNP-C  Department of [facility] Medicine - [facility]  [address] [place] [address]  Office [phone]; Pager [phone]  .

## 2022-02-19 NOTE — ASSESSMENT & PLAN NOTE
On admission in 9/17/2021 patient had this facial droop which was never stated resolved  CT head No acute intracranial abnormality detected.  No significant change.  If persistent neurological changes, recommend MRI of the brain with              V            diffusion-weighted sequencing.  CTA unremarkable   MRI brain in am

## 2022-02-19 NOTE — PLAN OF CARE
[place] [facility]  Discharge Assessment  TN talked with dtr Lana, says she does everything for her mother.  Requesting w/c says other w/c is very old.  [facility].  Agrees to Out Pt Case management.  Primary Care Provider: DENYS Sterling     Discharge Assessment (most recent)     BRIEF DISCHARGE ASSESSMENT - 02/19/22 0942        Discharge Planning    Assessment Type Discharge Planning Brief Assessment     Support Systems Children;Family members     Equipment Currently Used at Home none     Current Living Arrangements home/apartment/condo     Patient/Family Anticipates Transition to home with family     Patient/Family Anticipated Services at Transition outpatient care     DME Needed Upon Discharge  wheelchair     Discharge Plan A Home with family     Discharge Plan B Home with family

## 2022-02-19 NOTE — HOSPITAL COURSE
Patient placed in observation for evaluation of chest pain and ruled out for acute coronary syndrome with serial negative troponin 1 level; TSH NORMAL, SarsCov2 negative. EKG is non ischemic. After workup for chest pain was benign the daughter reported acute onset facial droop. The patient appears to have facial droop on her picture from past admissions and has history of stoke, however, code stroke was called called CT and MRI brain is negative for acute findings. Had white count that improved without treatment, urinalysis unimpressive and cxray without acute infectious processes. Remained afebrile.  Neurology evaluated the patient in clear for discharge from a neurologic perspective.  She is already on low-dose aspirin and statin daily secondary prevention and lipid panel is grossly normal with LDL goal of less than 70 reached her = 60. Patient prepare for discharge and family, daughter who is at the bedside counseled on findings and discharge plan.  Once discharge planning in place the daughter reported lower extremity pain.  Patient started on high-dose ibuprofen as her kidney functions were normal and ultrasound bilateral to rule out DVT ordered.  Important to mention is that patient is bed-bound at home.  Lastly discharge plan discussed with daughter who was advised that if lower extremity ultrasound was negative for DVT there is plans to discharge to home.  This provider was again called to room daughter explained that patient had puffing under her eyes and facial swelling.  Patient's face compared to her picture that is of record expanded and backs appear consistent with old after picture with no change.  No significant swelling noted by this provider.  Patient is alert and appropriate was slow to respond when initially evaluated however when she was distracted by this provider assisting the CNA with turning her she became jovial was laughing and playing with staff.  Discussed case with attending Dr. Blair  who reviewed the patient's chart, labs, medication, all findings, and report and agreed with discharge plan.  Vital signs are stable she appears at her usual mentation and baseline physical assessment.  There is no broken skin noted no adventitious wounds noted no signs of obvious infectious process.  She got a bolus of fluids in early morning and white count thought to be reactive and is near normal.  Renal functions grossly normal, electrolytes normal, afebrile, hepatic functions normal, no obvious bleeding.  She was discharged to home after lower extremity ultrasound and stable condition.  Follow-up in clinic with her usual PCP.  Case management in listed to attempt to locate home health to help the family at home, highly suspect caregiver burnout.

## 2022-02-19 NOTE — DISCHARGE SUMMARY
[facility]  Discharge Summary      Patient Name: Holly Kidd  MRN: [id number]  Patient Class: OP- Observation  Admission Date: 2/18/2022  Hospital Length of Stay: 0 days  Discharge Date and Time:  02/19/2022 5:52 PM  Attending Physician: Tracy Tirado MD   Discharging Provider: DENYS Wyatt  Primary Care Provider: DENYS Sterling      HPI:   71-year-old  female presents to the emergency department due to chest pain.  Patient states that she has been having some chest pain earlier today.  She states that the chest pain resolved prior to arrival in the emergency department.  She denies any shortness of breath, nausea vomiting, lightheadedness or dizziness.  She also states that she was having some pain in her abdominal area.  She states that that has also resolved.  However due to the patient having these issues her family a activated EMS and brought her into the emergency department.       * No surgery found *      Hospital Course:   Patient placed in observation for evaluation of chest pain and ruled out for acute coronary syndrome with serial negative troponin 1 level; TSH NORMAL, SarsCov2 negative. EKG is non ischemic. After workup for chest pain was benign the daughter reported acute onset facial droop. The patient appears to have facial droop on her picture from past admissions and has history of stoke, however, code stroke was called called CT and MRI brain is negative for acute findings. Had white count that improved without treatment, urinalysis unimpressive and cxray without acute infectious processes. Remained afebrile.  Neurology evaluated the patient in clear for discharge from a neurologic perspective.  She is already on low-dose aspirin and statin daily secondary prevention and lipid panel is grossly normal with LDL goal of less than 70 reached her = 60. Patient prepare for discharge and family, daughter who is at the bedside counseled  on findings and discharge plan.  Once discharge planning in place the daughter reported lower extremity pain.  Patient started on high-dose ibuprofen as her kidney functions were normal and ultrasound bilateral to rule out DVT ordered.  Important to mention is that patient is bed-bound at home.  Lastly discharge plan discussed with daughter who was advised that if lower extremity ultrasound was negative for DVT there is plans to discharge to home.  This provider was again called to room daughter explained that patient had puffing under her eyes and facial swelling.  Patient's face compared to her picture that is of record expanded and backs appear consistent with old after picture with no change.  No significant swelling noted by this provider.  Patient is alert and appropriate was slow to respond when initially evaluated however when she was distracted by this provider assisting the CNA with turning her she became jovial was laughing and playing with staff.  Discussed case with attending Dr. Blair who reviewed the patient's chart, labs, medication, all findings, and report and agreed with discharge plan.  Vital signs are stable she appears at her usual mentation and baseline physical assessment.  There is no broken skin noted no adventitious wounds noted no signs of obvious infectious process.  She got a bolus of fluids in early morning and white count thought to be reactive and is near normal.  Renal functions grossly normal, electrolytes normal, afebrile, hepatic functions normal, no obvious bleeding.  She was discharged to home after lower extremity ultrasound and stable condition.  Follow-up in clinic with her usual PCP.  Case management in listed to attempt to locate home health to help the family at home, highly suspect caregiver burnout.       Goals of Care Treatment Preferences:  Code Status: Full Code      Consults:   Consults (From admission, onward)        Status Ordering Provider     Inpatient consult to  "Neurology  Once        Provider:  Mick Mccallum MD    Completed MARGIE JENNINGS     Inpatient consult to Neurology  Once        Provider:  Mick Mccallum MD    Completed JACK UGARTE          Final Active Diagnoses:    Diagnosis Date Noted POA    PRINCIPAL PROBLEM:  Chest pain [R07.9] 09/30/2021 Yes    Pain in the abdomen [R10.9] 02/18/2022 Unknown    Leukocytosis [D72.829] 02/18/2022 Unknown    Facial droop [R29.810] 09/17/2021 Yes    Wheelchair dependence [Z99.3] 05/23/2019 Not Applicable     Chronic    History of stroke [Z86.73] 05/23/2019 Not Applicable     Chronic    Essential hypertension [I10] 06/12/2015 Yes     Chronic    Obesity (BMI 35.0-39.9) [E66.9] 06/12/2015 Yes     Chronic    Hypokalemia [E87.6] 06/12/2015 Unknown      Problems Resolved During this Admission:       Discharged Condition: stable    Disposition: Home-Health Care [facility]    Follow Up:   Follow-up Information     DENYS Sterling Follow up in 1 week(s).    Specialty: Family Medicine  Contact information:  [id number] [place] [address] [id number] [phone]                       Patient Instructions:      WHEELCHAIR FOR HOME USE     Order Specific Question Answer Comments   Hours in W/C per day: 4    Type of Wheelchair: Standard    Size(Width): 18"(STD adult)    Leg Support: STD footrests    Lap Belt: Velcro    Accessories: Safety belt    Cushion: Basic    Height: 5' 3" (1.6 m)    Weight: 86.6 kg (190 lb 14.7 oz)    Does patient have medical equipment at home? none    Length of need (1-99 months): 99    Please check all that apply: Caregiver is capable and willing to operate wheelchair safely.    Please check all that apply: The patient requires the use of a w/c for activities of daily living within the Home.      Diet Cardiac     Activity as tolerated       Significant Diagnostic Studies: Labs:   CMP   Recent Labs   Lab 02/18/22  1654 02/19/22  0637    142   K 3.2* 3.3*    107   CO2 24 23   * " 107   BUN 14 12   CREATININE 0.6 0.5   CALCIUM 9.5 9.0   PROT 7.7  --    ALBUMIN 3.5  --    BILITOT 0.9  --    ALKPHOS 117  --    AST 27  --    ALT 19  --    ANIONGAP 13 12   ESTGFRAFRICA >60 >60   EGFRNONAA >60 >60   , CBC   Recent Labs   Lab 02/18/22  1654 02/19/22  0637 02/19/22  1612   WBC 17.19* 15.63* 13.34*   HGB 13.1 12.0 11.5*   HCT 39.0 37.1 34.1*    304 290   , INR   Lab Results   Component Value Date    INR 1.1 10/01/2021    INR 1.0 09/18/2021    INR 1.0 09/10/2021   , Lipid Panel   Lab Results   Component Value Date    CHOL 103 (L) 02/19/2022    HDL 31 (L) 02/19/2022    LDLCALC 60.2 (L) 02/19/2022    TRIG 59 02/19/2022    CHOLHDL 30.1 02/19/2022   , Troponin   Recent Labs   Lab 02/18/22  1654 02/18/22  2226   TROPONINI 0.014 0.009    and A1C:   Recent Labs   Lab 09/17/21  1330   HGBA1C 5.9*       Pending Diagnostic Studies:     Procedure Component Value Units Date/Time     Lower Extremity Veins Bilateral [[id number]]     Order Status: Sent Lab Status: No result          Medications:  Reconciled Home Medications:      Medication List      CONTINUE taking these medications    amLODIPine 10 MG tablet  Commonly known as: NORVASC  Take 1 tablet (10 mg total) by mouth once daily. Hold if SBP <120     aspirin 81 MG EC tablet  Commonly known as: ECOTRIN  Take 1 tablet (81 mg total) by mouth once daily.     atorvastatin 40 MG tablet  Commonly known as: LIPITOR  Take 1 tablet (40 mg total) by mouth once daily.     cetirizine 10 MG tablet  Commonly known as: ZYRTEC  Take 1 tablet (10 mg total) by mouth once daily.     clopidogreL 75 mg tablet  Commonly known as: PLAVIX  Take 1 tablet (75 mg total) by mouth once daily. for 21 days     ergocalciferol 50,000 unit Cap  Commonly known as: ERGOCALCIFEROL  Take 1 capsule (50,000 Units total) by mouth every 7 days.     ondansetron 4 MG Tbdl  Commonly known as: ZOFRAN-ODT  Take 1 tablet (4 mg total) by mouth every 4 (four) hours as needed (nausea/vomiting).      pantoprazole 40 MG tablet  Commonly known as: PROTONIX  Take 1 tablet (40 mg total) by mouth once daily.     varicella-zoster gE-AS01B (PF) 50 mcg/0.5 mL injection  Commonly known as: SHINGRIX  Inject into the muscle.            Indwelling Lines/Drains at time of discharge:   Lines/Drains/Airways     None                 Time spent on the discharge of patient: 45 minutes       Nicolasa Bradley DNP, APRN, FNP-C  Hospitalist - [facility]  [address] [place] [address] [place] [address]  Office [phone]; Pager [phone]        Note: Portions of this note dictated using MModal Fluency Direct - variations and voice recognition errors may exist and occasionally missed on review due to auto correct and voice representation.

## 2022-02-19 NOTE — HPI
71-year-old  female presents to the emergency department due to chest pain.  Patient states that she has been having some chest pain earlier today.  She states that the chest pain resolved prior to arrival in the emergency department.  She denies any shortness of breath, nausea vomiting, lightheadedness or dizziness.  She also states that she was having some pain in her abdominal area.  She states that that has also resolved.  However due to the patient having these issues her family a activated EMS and brought her into the emergency department.

## 2022-02-19 NOTE — ASSESSMENT & PLAN NOTE
CT head No acute intracranial abnormality detected.  No significant change.  If persistent neurological changes, recommend MRI of the brain with              V            diffusion-weighted sequencing.       CTA unremarkable    History of CVA with residual lower extremity and left sided deficits. Wheel chair bound. Recent TIA.   Continue home aspirin/statin/plavix

## 2022-02-19 NOTE — ED NOTES
0100 transport at bedside to move patient to room upstairs. Pt noted to have change in mentation/ LOC. Not responding or following commands; staring off at this time. Blood glucose noted to be 143 at this time.  0105 /68  97% RA 16 RR  0110 Charge RN  And Dr. Sharp/ Sujatha made aware. Presents to bedside to perform neuro assessment.  0113 stroke alert paged overhead. hospitalist made aware.  0114 #18g placed to R forearm by RAJENDRA Dillon  0117 pt to ct at this time via stretcher with RN; telestroke cart placed at bedside  0123 phone call for tele-stroke consult placed at this time  0126 Pt returned from ct at this time moved to ER Room 3  0134 neurologist called to consult via telecart at this time. hospitalist at bedside.  0139 telestroke exam completed; new orders received for CTA/neck;MRI;MRA  0230 pt to MRI at this time via stretcher with RN, cardiac monitor in place.   0320 patient returned to ER 3 at this time. Back to baseline. AA x 1.  0329 Hospitalist states it is ok for patient to go to room upstairs  0330 update given to RN receiving pt on MS unit

## 2022-02-19 NOTE — NURSING
Pt arrived on the unit via stretcher accompanied by staff. Pt resting with eyes closed. Resp even and unlabored. Pt easy to arouse. Pt max assisted x2 staff with transferring from the stretcher to the bed.

## 2022-02-19 NOTE — SUBJECTIVE & OBJECTIVE
Past Medical History:   Diagnosis Date    COVID-19 09/09/2021    Essential hypertension 6/12/2015    Obese     Obesity (BMI 35.0-39.9) 6/12/2015    Stroke 06/2015    Left side weakness    Stroke-like symptoms 09/17/2021    NEW LEFT FACIAL DROOP, LEFT SIDE WEAKNESS & SLURRED SPEECH    Wheelchair dependence     Wheelchair dependence        Past Surgical History:   Procedure Laterality Date    HYSTERECTOMY         Review of patient's allergies indicates:  No Known Allergies    No current facility-administered medications on file prior to encounter.     Current Outpatient Medications on File Prior to Encounter   Medication Sig    amLODIPine (NORVASC) 10 MG tablet Take 1 tablet (10 mg total) by mouth once daily. Hold if SBP <120    aspirin (ECOTRIN) 81 MG EC tablet Take 1 tablet (81 mg total) by mouth once daily.    atorvastatin (LIPITOR) 40 MG tablet Take 1 tablet (40 mg total) by mouth once daily.    cetirizine (ZYRTEC) 10 MG tablet Take 1 tablet (10 mg total) by mouth once daily.    clopidogreL (PLAVIX) 75 mg tablet Take 1 tablet (75 mg total) by mouth once daily. for 21 days    ergocalciferol (ERGOCALCIFEROL) 50,000 unit Cap Take 1 capsule (50,000 Units total) by mouth every 7 days.    ondansetron (ZOFRAN-ODT) 4 MG TbDL Take 1 tablet (4 mg total) by mouth every 4 (four) hours as needed (nausea/vomiting).    pantoprazole (PROTONIX) 40 MG tablet Take 1 tablet (40 mg total) by mouth once daily.    varicella-zoster gE-AS01B, PF, (SHINGRIX) 50 mcg/0.5 mL injection Inject into the muscle.     Family History     Problem Relation (Age of Onset)    Cancer Father    Stroke Mother        Tobacco Use    Smoking status: Never Smoker    Smokeless tobacco: Never Used   Substance and Sexual Activity    Alcohol use: No    Drug use: No    Sexual activity: Not on file     Review of Systems   Constitutional: Negative for activity change and appetite change.   HENT: Negative for congestion and ear pain.    Eyes:  Negative for pain and redness.   Respiratory: Negative for cough and shortness of breath.    Cardiovascular: Positive for chest pain. Negative for leg swelling.   Gastrointestinal: Positive for abdominal pain and nausea. Negative for abdominal distention.   Genitourinary: Negative for difficulty urinating and dysuria.   Musculoskeletal: Negative for arthralgias and back pain.   Skin: Negative for color change and pallor.   Neurological: Negative for light-headedness and headaches.   All other systems reviewed and are negative.     Objective:     Vital Signs (Most Recent):  Temp: 97.9 °F (36.6 °C) (02/18/22 1940)  Pulse: 99 (02/18/22 2234)  Resp: 16 (02/18/22 2234)  BP: 128/76 (02/18/22 2234)  SpO2: 98 % (02/18/22 2234) Vital Signs (24h Range):  Temp:  [97.9 °F (36.6 °C)-98.7 °F (37.1 °C)] 97.9 °F (36.6 °C)  Pulse:  [] 99  Resp:  [14-19] 16  SpO2:  [98 %-100 %] 98 %  BP: (109-128)/(64-79) 128/76     Weight: 88.5 kg (195 lb)  Body mass index is 34.54 kg/m².    Physical Exam  Nursing note and vitals reviewed.  Constitutional: She appears well-developed and well-nourished. She is not diaphoretic. No distress.   HENT:   Head: Normocephalic and atraumatic.   Right Ear: External ear normal.   Left Ear: External ear normal.   Mouth/Throat: No oropharyngeal exudate.   Eyes: Conjunctivae and EOM are normal. Pupils are equal, round, and reactive to light.   Neck: Neck supple. No JVD present.   Normal range of motion.  Cardiovascular: Normal rate, regular rhythm, normal heart sounds and intact distal pulses. Exam reveals no friction rub.    No murmur heard.  Pulmonary/Chest: Breath sounds normal. No stridor. No respiratory distress. She has no wheezes. She has no rales.   Abdominal: Abdomen is soft. She exhibits no distension. There is no abdominal tenderness. There is no rebound.   Musculoskeletal:         General: No tenderness or edema. Normal range of motion.      Cervical back: Normal range of motion and neck supple.      Neurological: She is alert and oriented to person, place, and time. She has normal strength.   Skin: No rash and no abscess noted. No erythema. No pallor.         Significant Labs: All pertinent labs within the past 24 hours have been reviewed.    Significant Imaging: I have reviewed all pertinent imaging results/findings within the past 24 hours.

## 2022-02-19 NOTE — PLAN OF CARE
KATARZYNA received approval for wc from Abhishek Seymour. SW delivered wc to patient's room. Patient's daughter signed for wc.

## 2022-02-19 NOTE — H&P
[facility]  History & Physical    Patient Name: Holly Kidd  MRN: [id number]  Patient Class: OP- Observation  Admission Date: 2/18/2022  Attending Physician: Colton Maharaj MD   Primary Care Provider: DENYS Sterling         Patient information was obtained from patient, past medical records and ER records.     Subjective:     Principal Problem:Chest pain    Chief Complaint:   Chief Complaint   Patient presents with    Abdominal Pain     Pt presents via EMS from residence c/o abd pain since this morning.  Pt is alert and baseline per EMS, resp easy,  pt denies any pain or concerns during triage.          HPI: 71-year-old  female presents to the emergency department due to chest pain.  Patient states that she has been having some chest pain earlier today.  She states that the chest pain resolved prior to arrival in the emergency department.  She denies any shortness of breath, nausea vomiting, lightheadedness or dizziness.  She also states that she was having some pain in her abdominal area.  She states that that has also resolved.  However due to the patient having these issues her family a activated EMS and brought her into the emergency department.     @0100 Nurse called states they activating code stroke. Patient had AMS changes and her body neglecting to one side of bed. Tele neuro consult was placed. Called Dr. Ashraf and informed her of patient AMS change. Spoke with neurologist Ordered for patient to have stat MRI/MRA brain. Neuro want CTA neck/head but cant be done due to patient having CTA of the chest earlier. Neuro thinks patient was having a seizure.  By the time I assess patient she was more alert and stating she was in pain. Her body was still neglecting to left side. She still had her previous facial droop and right side deficit from old CVA.    Past Medical History:   Diagnosis Date    COVID-19 09/09/2021    Essential hypertension 6/12/2015     Obese     Obesity (BMI 35.0-39.9) 6/12/2015    Stroke 06/2015    Left side weakness    Stroke-like symptoms 09/17/2021    NEW LEFT FACIAL DROOP, LEFT SIDE WEAKNESS & SLURRED SPEECH    Wheelchair dependence     Wheelchair dependence        Past Surgical History:   Procedure Laterality Date    HYSTERECTOMY         Review of patient's allergies indicates:  No Known Allergies    No current facility-administered medications on file prior to encounter.     Current Outpatient Medications on File Prior to Encounter   Medication Sig    amLODIPine (NORVASC) 10 MG tablet Take 1 tablet (10 mg total) by mouth once daily. Hold if SBP <120    aspirin (ECOTRIN) 81 MG EC tablet Take 1 tablet (81 mg total) by mouth once daily.    atorvastatin (LIPITOR) 40 MG tablet Take 1 tablet (40 mg total) by mouth once daily.    cetirizine (ZYRTEC) 10 MG tablet Take 1 tablet (10 mg total) by mouth once daily.    clopidogreL (PLAVIX) 75 mg tablet Take 1 tablet (75 mg total) by mouth once daily. for 21 days    ergocalciferol (ERGOCALCIFEROL) 50,000 unit Cap Take 1 capsule (50,000 Units total) by mouth every 7 days.    ondansetron (ZOFRAN-ODT) 4 MG TbDL Take 1 tablet (4 mg total) by mouth every 4 (four) hours as needed (nausea/vomiting).    pantoprazole (PROTONIX) 40 MG tablet Take 1 tablet (40 mg total) by mouth once daily.    varicella-zoster gE-AS01B, PF, (SHINGRIX) 50 mcg/0.5 mL injection Inject into the muscle.     Family History     Problem Relation (Age of Onset)    Cancer Father    Stroke Mother        Tobacco Use    Smoking status: Never Smoker    Smokeless tobacco: Never Used   Substance and Sexual Activity    Alcohol use: No    Drug use: No    Sexual activity: Not on file     Review of Systems   Constitutional: Negative for activity change and appetite change.   HENT: Negative for congestion and ear pain.    Eyes: Negative for pain and redness.   Respiratory: Negative for cough and shortness of breath.     Cardiovascular: Positive for chest pain. Negative for leg swelling.   Gastrointestinal: Positive for abdominal pain and nausea. Negative for abdominal distention.   Genitourinary: Negative for difficulty urinating and dysuria.   Musculoskeletal: Negative for arthralgias and back pain.   Skin: Negative for color change and pallor.   Neurological: Negative for light-headedness and headaches.   All other systems reviewed and are negative.     Objective:     Vital Signs (Most Recent):  Temp: 97.9 °F (36.6 °C) (02/18/22 1940)  Pulse: 99 (02/18/22 2234)  Resp: 16 (02/18/22 2234)  BP: 128/76 (02/18/22 2234)  SpO2: 98 % (02/18/22 2234) Vital Signs (24h Range):  Temp:  [97.9 °F (36.6 °C)-98.7 °F (37.1 °C)] 97.9 °F (36.6 °C)  Pulse:  [] 99  Resp:  [14-19] 16  SpO2:  [98 %-100 %] 98 %  BP: (109-128)/(64-79) 128/76     Weight: 88.5 kg (195 lb)  Body mass index is 34.54 kg/m².    Physical Exam  Nursing note and vitals reviewed.  Constitutional: She appears well-developed and well-nourished. She is not diaphoretic. No distress.   HENT:   Head: Normocephalic and atraumatic.   Right Ear: External ear normal.   Left Ear: External ear normal.   Mouth/Throat: No oropharyngeal exudate.   Eyes: Conjunctivae and EOM are normal. Pupils are equal, round, and reactive to light.   Neck: Neck supple. No JVD present.   Normal range of motion.  Cardiovascular: Normal rate, regular rhythm, normal heart sounds and intact distal pulses. Exam reveals no friction rub.    No murmur heard.  Pulmonary/Chest: Breath sounds normal. No stridor. No respiratory distress. She has no wheezes. She has no rales.   Abdominal: Abdomen is soft. She exhibits no distension. There is no abdominal tenderness. There is no rebound.   Musculoskeletal:         General: No tenderness or edema. Normal range of motion.      Cervical back: Normal range of motion and neck supple.     Neurological: She is alert and oriented to person, place, and time. She has normal  strength.   Skin: No rash and no abscess noted. No erythema. No pallor.         Significant Labs: All pertinent labs within the past 24 hours have been reviewed.    Significant Imaging: I have reviewed all pertinent imaging results/findings within the past 24 hours.    Assessment/Plan:     * Chest pain  Complained of sternal chest pain she states was a burning. Resolved currently.  Trend Troponin 0.009  continuous tele monitoring  EKG as needed       Leukocytosis  Unknown source of infection... no fever, CXR unremarkable, CT scan did show some Colonic diverticulosis.  Trend level  Start rocephin       Pain in the abdomen  CT abdomen unremarkable   Monitor   Pain control         Facial droop  On admission in 9/17/2021 patient had this facial droop which was never stated resolved  CT head No acute intracranial abnormality detected.  No significant change.  If persistent neurological changes, recommend MRI of the brain with V          diffusion-weighted sequencing.  CTA unremarkable   MRI brain in am           History of stroke      CT head No acute intracranial abnormality detected.  No significant change.  If persistent neurological changes, recommend MRI of the brain with              V            diffusion-weighted sequencing.       CTA unremarkable    History of CVA with residual lower extremity and left sided deficits. Wheel chair bound. Recent TIA.   Continue home aspirin/statin/plavix                 Wheelchair dependence  See above. History of CVA. Has not been as functional at home per daughter.     Obesity (BMI 35.0-39.9)  Body mass index is 34.54 kg/m². Morbid obesity complicates all aspects of disease management from diagnostic modalities to treatment. Weight loss encouraged and health benefits explained to patient.         Hypokalemia  Replete as needed  monitor      Essential hypertension  Resume home meds       VTE Risk Mitigation (From admission, onward)         Ordered     enoxaparin injection 40 mg   Daily         02/18/22 2359     IP VTE HIGH RISK PATIENT  Once         02/18/22 2359     Place sequential compression device  Until discontinued         02/18/22 2359            As clarification, on 12/18/21 @2215, patient should be placed in hospital observation services under my care in collaboration with MD Romario Hunt NP  Department of Hospital Medicine   [facility] [place] [facility] [place] [facility]

## 2022-02-19 NOTE — ASSESSMENT & PLAN NOTE
Complained of sternal chest pain she states was a burning. Resolved currently.  Trend Troponin 0.009  continuous tele monitoring  EKG as needed

## 2022-02-19 NOTE — DISCHARGE INSTRUCTIONS
Complete medications as ordered  Follow all discharge instructions.  Please schedule follow up appointments as necessary      When to Call Your Doctor  Call your doctor immediately if you have any of the following:  Severe headache  Severe dizziness, or fainting  Nausea or vomiting  Fast heartbeat (higher than 100 beats per minute)  Fever or chills  Swollen ankles  Weakness  Chest Pain attacks that last longer, occur more often, or are more severe than in the past

## 2022-02-19 NOTE — ED NOTES
"Reported to Dr. Greenfield the daughter is at the bedside and she is saying the pt's face looks "twisted".   Dr. Greenfield at bedside.       "

## 2022-02-19 NOTE — ASSESSMENT & PLAN NOTE
Body mass index is 34.54 kg/m². Morbid obesity complicates all aspects of disease management from diagnostic modalities to treatment. Weight loss encouraged and health benefits explained to patient.

## 2022-02-20 NOTE — SUBJECTIVE & OBJECTIVE
"  Woke up with symptoms?: no    Recent bleeding noted: no  Does the patient take any Blood Thinners? unknown  Medications: Unknown      Past Medical History: diabetes    Past Surgical History: no relevant surgical history    Family History: hypertension    Social History: unable to obtain    Allergies: No Known Allergies No relevant allergies    Review of Systems  Objective:   Vitals: Blood pressure 121/63, pulse 88, temperature 98.2 °F (36.8 °C), resp. rate 16, height 5' 3" (1.6 m), weight 86.6 kg (190 lb 14.7 oz), SpO2 (!) 94 %, not currently breastfeeding. BP: chart review    CT READ: Yes  No hemmorhage. No mass effect. No early infarct signs.     Physical Exam    No facial droop   No weakness   No numbness   No ataxia  "

## 2022-02-20 NOTE — HPI
Ms Kidd was brought tot he ED for chest pain. She was noted to have left face droop per her daughter. No focal weakness or numbness.

## 2022-02-20 NOTE — NURSING
Pt discharged per MD order. IV removed. Catheter tip intact. No distress noted. Discharge instructions explained. AVS given to patient and placed in Blue Folder. Pt verbalized understanding. VSS. Afebrile. No complaints of pain, N/V, diarrhea, or SOB. Pt has all belongings packed and waiting for family to come pick her up.

## 2022-02-20 NOTE — CONSULTS
[facility]  Vascular Neurology  Comprehensive Stroke Center  TeleVascular Neurology Acute Consultation Note      Consults    Consulting Provider: BOB TRENT  Current Providers  No providers found    Patient Location:  [facility] Emergency Department  Spoke hospital nurse at bedside with patient assisting consultant.     Patient information was obtained from patient.         Assessment/Plan:       Diagnoses:   Facial droop  Left facial droop present in previous notes   No new deficit   No tPA           STROKE DOCUMENTATION     Acute Stroke Times:   Acute Stroke Times   Last Known Normal Date: 02/19/22  Unknown Normal Time: Unknown Time  Symptom Onset Date: 02/19/22  Unknown Symptom Onset Time: Unknown Time  Stroke Team Called Date: 02/20/22  Stroke Team Called Time: 0129  Stroke Team Arrival Date: 02/20/22  Stroke Team Arrival Time: 0135  CT Interpretation Time: 0140  Alteplase Recommended: No  Thrombectomy Recommended: No    NIH Scale:  Interval: baseline  1a. Level of Consciousness: 0-->Alert, keenly responsive  1b. LOC Questions: 1-->Answers one question correctly  1c. LOC Commands: 0-->Performs both tasks correctly  2. Best Gaze: 0-->Normal  3. Visual: 0-->No visual loss  4. Facial Palsy: 0-->Normal symmetrical movements  5a. Motor Arm, Left: 1-->Drift, limb holds 90 (or 45) degrees, but drifts down before full 10 seconds, does not hit bed or other support  5b. Motor Arm, Right: 1-->Drift, limb holds 90 (or 45) degrees, but drifts down before full 10 secs, does not hit bed or other support  6a. Motor Leg, Left: 1-->Drift, leg falls by the end of the 5-sec period but does not hit bed  6b. Motor Leg, Right: 1-->Drift, leg falls by the end of the 5-sec period but does not hit bed  7. Limb Ataxia: 0-->Absent  8. Sensory: 0-->Normal, no sensory loss  9. Best Language: 0-->No aphasia, normal  10. Dysarthria: 1-->Mild-to-moderate dysarthria, patient  "slurs at least some words and, at worst, can be understood with some difficulty  11. Extinction and Inattention (formerly Neglect): 0-->No abnormality  Total (NIH Stroke Scale): 6     Modified [place]    Vincent Coma Scale:    ABCD2 Score:    [id number] Score:   HAS -BLED Score:   ICH Score:   Hunt & Niño Classification:       Blood pressure 121/63, pulse 88, temperature 98.2 °F (36.8 °C), resp. rate 16, height 5' 3" (1.6 m), weight 86.6 kg (190 lb 14.7 oz), SpO2 (!) 94 %, not currently breastfeeding.  Alteplase Eligible?: No  Alteplase Recommendation: Alteplase not recommended due to Suspected stroke mimic  and Patient back to neurological baseline  Possible Interventional Revascularization Candidate? No; no significant neurologic deficit (NIHSS <6)     Disposition Recommendation: do not transfer    Subjective:     History of Present Illness:  Ms Kidd was brought tot he ED for chest pain. She was noted to have left face droop per her daughter. No focal weakness or numbness.         Woke up with symptoms?: no    Recent bleeding noted: no  Does the patient take any Blood Thinners? unknown  Medications: Unknown      Past Medical History: diabetes    Past Surgical History: no relevant surgical history    Family History: hypertension    Social History: unable to obtain    Allergies: No Known Allergies No relevant allergies    Review of Systems  Objective:   Vitals: Blood pressure 121/63, pulse 88, temperature 98.2 °F (36.8 °C), resp. rate 16, height 5' 3" (1.6 m), weight 86.6 kg (190 lb 14.7 oz), SpO2 (!) 94 %, not currently breastfeeding. BP: chart review    CT READ: Yes  No hemmorhage. No mass effect. No early infarct signs.     Physical Exam    No facial droop   No weakness   No numbness   No ataxia      Recommended the emergency room physician to have a brief discussion with the patient and/or family if available regarding the  risks and benefits of treatment, and to briefly document the occurrence of that " discussion in his clinical encounter note.     The attending portion of this evaluation, treatment, and documentation was performed per Hay Elias MD via audiovisual.    Billing code:  (non-stroke, some mimics)    · This patient has neurological symptom(s)/condition/illness, with minimal potential for morbidity and mortality.  · There is a low probability for acute neurological change leading to clinical and possibly life-threatening deterioration requiring highest level of physician preparedness for urgent intervention.  · Care was coordinated with other physicians involved in the patient's care.  · Radiologic studies and laboratory data were reviewed and interpreted, and plan of care was re-assessed based on the results.  · Diagnosis, treatment options and prognosis may have been discussed with the patient and/or family members or caregiver.      In your opinion, this was a: Tier 1 Van Negative    Consult End Time: 1:12 AM     Hay Elias MD  Comprehensive Stroke Center  Vascular Neurology   [facility]

## 2022-02-20 NOTE — PLAN OF CARE
[facility] - Med Surg  Discharge Final Note  TN informed CC Lynette via secure chat that home health SN do not go to [place]. When w/c delivered ready for discharge home with daughter. TN informed SUZIE Baldwin earlier by secure chat that SN Home health providers don't go [place], [place]. SUZIE Baldwin put in home health at 4:41PM on Saturday, TN sent referral through [facility] and will follow up on Monday. TN informed Nicolasa Bradley NP of the above.    Primary Care Provider: DENYS Sterling    Expected Discharge Date: 2/19/2022    Final Discharge Note (most recent)     Final Note - 02/19/22 1800        Final Note    Assessment Type Final Discharge Note     Anticipated Discharge Disposition Home or Self Care     What phone number can be called within the next 1-3 days to see how you are doing after discharge? --   see chart    Hospital Resources/Appts/Education Provided Provided patient/caregiver with written discharge plan information;Appointments scheduled by Navigator/Coordinator        Post-Acute Status    Post-Acute Authorization Home Health   TN informed SUZIE Baldwin earlier by secure chat that SN Home health providers don't go [place]    Home Health Status Referrals Sent   TN informed SUZIE Baldwin earlier by secure chat that SN Home health providers don't go [place], [place]    Discharge Delays None known at this time                 Important Message from Medicare             Contact Info     DENYS Sterling   Specialty: Family Medicine   Relationship: PCP - General    [address] [place] [address] A  [place] [id number]   Phone: [phone]       Next Steps: Follow up in 1 week(s)

## 2022-07-02 ENCOUNTER — HOSPITAL ENCOUNTER (INPATIENT)
Facility: HOSPITAL | Age: 72
LOS: 11 days | Discharge: NURSING, REHAB OR LONG-TERM CARE | DRG: 377 | End: 2022-07-13
Attending: EMERGENCY MEDICINE | Admitting: INTERNAL MEDICINE
Payer: MEDICARE

## 2022-07-02 DIAGNOSIS — I26.99 BILATERAL PULMONARY EMBOLISM: ICD-10-CM

## 2022-07-02 DIAGNOSIS — E87.6 HYPOKALEMIA: ICD-10-CM

## 2022-07-02 DIAGNOSIS — T39.395A GI BLEED DUE TO NSAIDS: ICD-10-CM

## 2022-07-02 DIAGNOSIS — R00.0 TACHYCARDIA: ICD-10-CM

## 2022-07-02 DIAGNOSIS — R07.9 CHEST PAIN: ICD-10-CM

## 2022-07-02 DIAGNOSIS — K92.2 GI BLEED DUE TO NSAIDS: ICD-10-CM

## 2022-07-02 LAB
ALBUMIN SERPL BCP-MCNC: 3.7 G/DL (ref 3.5–5.2)
ALP SERPL-CCNC: 105 U/L (ref 55–135)
ALT SERPL W/O P-5'-P-CCNC: 12 U/L (ref 10–44)
ANION GAP SERPL CALC-SCNC: 14 MMOL/L (ref 8–16)
AST SERPL-CCNC: 17 U/L (ref 10–40)
BACTERIA #/AREA URNS HPF: ABNORMAL /HPF
BASOPHILS # BLD AUTO: 0.01 K/UL (ref 0–0.2)
BASOPHILS # BLD AUTO: 0.03 K/UL (ref 0–0.2)
BASOPHILS NFR BLD: 0.1 % (ref 0–1.9)
BASOPHILS NFR BLD: 0.3 % (ref 0–1.9)
BILIRUB SERPL-MCNC: 0.4 MG/DL (ref 0.1–1)
BILIRUB UR QL STRIP: NEGATIVE
BUN SERPL-MCNC: 16 MG/DL (ref 8–23)
CALCIUM SERPL-MCNC: 10 MG/DL (ref 8.7–10.5)
CAOX CRY URNS QL MICRO: ABNORMAL
CHLORIDE SERPL-SCNC: 101 MMOL/L (ref 95–110)
CK SERPL-CCNC: 31 U/L (ref 20–180)
CLARITY UR: ABNORMAL
CO2 SERPL-SCNC: 24 MMOL/L (ref 23–29)
COLOR UR: YELLOW
CREAT SERPL-MCNC: 0.5 MG/DL (ref 0.5–1.4)
CTP QC/QA: YES
DIFFERENTIAL METHOD: ABNORMAL
DIFFERENTIAL METHOD: ABNORMAL
EOSINOPHIL # BLD AUTO: 0 K/UL (ref 0–0.5)
EOSINOPHIL # BLD AUTO: 0 K/UL (ref 0–0.5)
EOSINOPHIL NFR BLD: 0 % (ref 0–8)
EOSINOPHIL NFR BLD: 0.1 % (ref 0–8)
ERYTHROCYTE [DISTWIDTH] IN BLOOD BY AUTOMATED COUNT: 20.7 % (ref 11.5–14.5)
ERYTHROCYTE [DISTWIDTH] IN BLOOD BY AUTOMATED COUNT: 21.1 % (ref 11.5–14.5)
EST. GFR  (AFRICAN AMERICAN): >60 ML/MIN/1.73 M^2
EST. GFR  (NON AFRICAN AMERICAN): >60 ML/MIN/1.73 M^2
GLUCOSE SERPL-MCNC: 161 MG/DL (ref 70–110)
GLUCOSE UR QL STRIP: NEGATIVE
HCT VFR BLD AUTO: 30.1 % (ref 37–48.5)
HCT VFR BLD AUTO: 33.5 % (ref 37–48.5)
HGB BLD-MCNC: 10.9 G/DL (ref 12–16)
HGB BLD-MCNC: 9.6 G/DL (ref 12–16)
HGB UR QL STRIP: NEGATIVE
HYALINE CASTS #/AREA URNS LPF: 2 /LPF
IMM GRANULOCYTES # BLD AUTO: 0.06 K/UL (ref 0–0.04)
IMM GRANULOCYTES # BLD AUTO: 0.07 K/UL (ref 0–0.04)
IMM GRANULOCYTES NFR BLD AUTO: 0.5 % (ref 0–0.5)
IMM GRANULOCYTES NFR BLD AUTO: 0.5 % (ref 0–0.5)
INR PPP: 1 (ref 0.8–1.2)
IRON SERPL-MCNC: 30 UG/DL (ref 30–160)
KETONES UR QL STRIP: ABNORMAL
LACTATE SERPL-SCNC: 3.8 MMOL/L (ref 0.5–2.2)
LACTATE SERPL-SCNC: 4.6 MMOL/L (ref 0.5–2.2)
LDH SERPL L TO P-CCNC: 257 U/L (ref 110–260)
LEUKOCYTE ESTERASE UR QL STRIP: NEGATIVE
LIPASE SERPL-CCNC: 9 U/L (ref 4–60)
LYMPHOCYTES # BLD AUTO: 1.3 K/UL (ref 1–4.8)
LYMPHOCYTES # BLD AUTO: 2.2 K/UL (ref 1–4.8)
LYMPHOCYTES NFR BLD: 11.5 % (ref 18–48)
LYMPHOCYTES NFR BLD: 14.6 % (ref 18–48)
MAGNESIUM SERPL-MCNC: 2.1 MG/DL (ref 1.6–2.6)
MCH RBC QN AUTO: 21.9 PG (ref 27–31)
MCH RBC QN AUTO: 22.1 PG (ref 27–31)
MCHC RBC AUTO-ENTMCNC: 31.9 G/DL (ref 32–36)
MCHC RBC AUTO-ENTMCNC: 32.5 G/DL (ref 32–36)
MCV RBC AUTO: 68 FL (ref 82–98)
MCV RBC AUTO: 69 FL (ref 82–98)
MICROSCOPIC COMMENT: ABNORMAL
MONOCYTES # BLD AUTO: 1 K/UL (ref 0.3–1)
MONOCYTES # BLD AUTO: 1.2 K/UL (ref 0.3–1)
MONOCYTES NFR BLD: 7.7 % (ref 4–15)
MONOCYTES NFR BLD: 8.6 % (ref 4–15)
NEUTROPHILS # BLD AUTO: 11.6 K/UL (ref 1.8–7.7)
NEUTROPHILS # BLD AUTO: 9.2 K/UL (ref 1.8–7.7)
NEUTROPHILS NFR BLD: 77.1 % (ref 38–73)
NEUTROPHILS NFR BLD: 79 % (ref 38–73)
NITRITE UR QL STRIP: NEGATIVE
NRBC BLD-RTO: 0 /100 WBC
NRBC BLD-RTO: 0 /100 WBC
PH UR STRIP: 6 [PH] (ref 5–8)
PLATELET # BLD AUTO: 427 K/UL (ref 150–450)
PLATELET # BLD AUTO: 528 K/UL (ref 150–450)
PMV BLD AUTO: 8.4 FL (ref 9.2–12.9)
PMV BLD AUTO: 9.1 FL (ref 9.2–12.9)
POTASSIUM SERPL-SCNC: 2.7 MMOL/L (ref 3.5–5.1)
POTASSIUM UR-SCNC: 48 MMOL/L (ref 15–95)
PROT SERPL-MCNC: 7.9 G/DL (ref 6–8.4)
PROT UR QL STRIP: ABNORMAL
PROTHROMBIN TIME: 10.9 SEC (ref 9–12.5)
RBC # BLD AUTO: 4.38 M/UL (ref 4–5.4)
RBC # BLD AUTO: 4.93 M/UL (ref 4–5.4)
RBC #/AREA URNS HPF: 5 /HPF (ref 0–4)
SARS-COV-2 RDRP RESP QL NAA+PROBE: NEGATIVE
SATURATED IRON: 7 % (ref 20–50)
SODIUM SERPL-SCNC: 139 MMOL/L (ref 136–145)
SODIUM UR-SCNC: 73 MMOL/L (ref 20–250)
SP GR UR STRIP: 1.03 (ref 1–1.03)
SQUAMOUS #/AREA URNS HPF: 3 /HPF
TOTAL IRON BINDING CAPACITY: 410 UG/DL (ref 250–450)
TRANSFERRIN SERPL-MCNC: 277 MG/DL (ref 200–375)
TROPONIN I SERPL DL<=0.01 NG/ML-MCNC: 0.01 NG/ML (ref 0–0.03)
URN SPEC COLLECT METH UR: ABNORMAL
UROBILINOGEN UR STRIP-ACNC: ABNORMAL EU/DL
WBC # BLD AUTO: 11.63 K/UL (ref 3.9–12.7)
WBC # BLD AUTO: 14.98 K/UL (ref 3.9–12.7)
WBC #/AREA URNS HPF: 4 /HPF (ref 0–5)

## 2022-07-02 PROCEDURE — 85610 PROTHROMBIN TIME: CPT | Performed by: INTERNAL MEDICINE

## 2022-07-02 PROCEDURE — 83615 LACTATE (LD) (LDH) ENZYME: CPT | Performed by: STUDENT IN AN ORGANIZED HEALTH CARE EDUCATION/TRAINING PROGRAM

## 2022-07-02 PROCEDURE — 84466 ASSAY OF TRANSFERRIN: CPT | Performed by: EMERGENCY MEDICINE

## 2022-07-02 PROCEDURE — 85025 COMPLETE CBC W/AUTO DIFF WBC: CPT | Performed by: INTERNAL MEDICINE

## 2022-07-02 PROCEDURE — 83605 ASSAY OF LACTIC ACID: CPT | Performed by: STUDENT IN AN ORGANIZED HEALTH CARE EDUCATION/TRAINING PROGRAM

## 2022-07-02 PROCEDURE — 93010 EKG 12-LEAD: ICD-10-PCS | Mod: ,,, | Performed by: INTERNAL MEDICINE

## 2022-07-02 PROCEDURE — 82728 ASSAY OF FERRITIN: CPT | Performed by: INTERNAL MEDICINE

## 2022-07-02 PROCEDURE — 85025 COMPLETE CBC W/AUTO DIFF WBC: CPT | Mod: 91 | Performed by: EMERGENCY MEDICINE

## 2022-07-02 PROCEDURE — 82607 VITAMIN B-12: CPT | Performed by: INTERNAL MEDICINE

## 2022-07-02 PROCEDURE — 63600175 PHARM REV CODE 636 W HCPCS: Performed by: INTERNAL MEDICINE

## 2022-07-02 PROCEDURE — 80053 COMPREHEN METABOLIC PANEL: CPT | Performed by: EMERGENCY MEDICINE

## 2022-07-02 PROCEDURE — 96360 HYDRATION IV INFUSION INIT: CPT

## 2022-07-02 PROCEDURE — U0002 COVID-19 LAB TEST NON-CDC: HCPCS | Performed by: EMERGENCY MEDICINE

## 2022-07-02 PROCEDURE — 93010 ELECTROCARDIOGRAM REPORT: CPT | Mod: ,,, | Performed by: INTERNAL MEDICINE

## 2022-07-02 PROCEDURE — 25000003 PHARM REV CODE 250: Performed by: INTERNAL MEDICINE

## 2022-07-02 PROCEDURE — 21400001 HC TELEMETRY ROOM

## 2022-07-02 PROCEDURE — 63600175 PHARM REV CODE 636 W HCPCS: Performed by: EMERGENCY MEDICINE

## 2022-07-02 PROCEDURE — 82550 ASSAY OF CK (CPK): CPT | Performed by: EMERGENCY MEDICINE

## 2022-07-02 PROCEDURE — 83690 ASSAY OF LIPASE: CPT | Performed by: EMERGENCY MEDICINE

## 2022-07-02 PROCEDURE — 81000 URINALYSIS NONAUTO W/SCOPE: CPT | Performed by: EMERGENCY MEDICINE

## 2022-07-02 PROCEDURE — 25000003 PHARM REV CODE 250: Performed by: EMERGENCY MEDICINE

## 2022-07-02 PROCEDURE — 99285 EMERGENCY DEPT VISIT HI MDM: CPT | Mod: 25

## 2022-07-02 PROCEDURE — 84484 ASSAY OF TROPONIN QUANT: CPT | Performed by: EMERGENCY MEDICINE

## 2022-07-02 PROCEDURE — 83735 ASSAY OF MAGNESIUM: CPT | Performed by: EMERGENCY MEDICINE

## 2022-07-02 PROCEDURE — C9113 INJ PANTOPRAZOLE SODIUM, VIA: HCPCS | Performed by: EMERGENCY MEDICINE

## 2022-07-02 PROCEDURE — 63600175 PHARM REV CODE 636 W HCPCS: Performed by: STUDENT IN AN ORGANIZED HEALTH CARE EDUCATION/TRAINING PROGRAM

## 2022-07-02 PROCEDURE — 84133 ASSAY OF URINE POTASSIUM: CPT | Performed by: INTERNAL MEDICINE

## 2022-07-02 PROCEDURE — 93005 ELECTROCARDIOGRAM TRACING: CPT

## 2022-07-02 PROCEDURE — 84300 ASSAY OF URINE SODIUM: CPT | Performed by: INTERNAL MEDICINE

## 2022-07-02 PROCEDURE — 87040 BLOOD CULTURE FOR BACTERIA: CPT | Performed by: EMERGENCY MEDICINE

## 2022-07-02 PROCEDURE — 83605 ASSAY OF LACTIC ACID: CPT | Mod: 91 | Performed by: EMERGENCY MEDICINE

## 2022-07-02 RX ORDER — PANTOPRAZOLE SODIUM 40 MG/1
40 TABLET, DELAYED RELEASE ORAL 2 TIMES DAILY
Status: DISCONTINUED | OUTPATIENT
Start: 2022-07-02 | End: 2022-07-08

## 2022-07-02 RX ORDER — AMLODIPINE BESYLATE 5 MG/1
10 TABLET ORAL DAILY
Status: DISCONTINUED | OUTPATIENT
Start: 2022-07-03 | End: 2022-07-13 | Stop reason: HOSPADM

## 2022-07-02 RX ORDER — PANTOPRAZOLE SODIUM 40 MG/10ML
40 INJECTION, POWDER, LYOPHILIZED, FOR SOLUTION INTRAVENOUS
Status: COMPLETED | OUTPATIENT
Start: 2022-07-02 | End: 2022-07-02

## 2022-07-02 RX ORDER — SODIUM CHLORIDE 0.9 % (FLUSH) 0.9 %
10 SYRINGE (ML) INJECTION
Status: DISCONTINUED | OUTPATIENT
Start: 2022-07-02 | End: 2022-07-13 | Stop reason: HOSPADM

## 2022-07-02 RX ORDER — POTASSIUM CHLORIDE 7.45 MG/ML
10 INJECTION INTRAVENOUS
Status: COMPLETED | OUTPATIENT
Start: 2022-07-02 | End: 2022-07-02

## 2022-07-02 RX ORDER — MAGNESIUM SULFATE HEPTAHYDRATE 40 MG/ML
2 INJECTION, SOLUTION INTRAVENOUS
Status: COMPLETED | OUTPATIENT
Start: 2022-07-02 | End: 2022-07-02

## 2022-07-02 RX ORDER — CETIRIZINE HYDROCHLORIDE 10 MG/1
10 TABLET ORAL DAILY
Status: DISCONTINUED | OUTPATIENT
Start: 2022-07-03 | End: 2022-07-13 | Stop reason: HOSPADM

## 2022-07-02 RX ADMIN — POTASSIUM CHLORIDE 10 MEQ: 7.46 INJECTION, SOLUTION INTRAVENOUS at 07:07

## 2022-07-02 RX ADMIN — POTASSIUM BICARBONATE 25 MEQ: 977.5 TABLET, EFFERVESCENT ORAL at 05:07

## 2022-07-02 RX ADMIN — POTASSIUM CHLORIDE 10 MEQ: 7.46 INJECTION, SOLUTION INTRAVENOUS at 06:07

## 2022-07-02 RX ADMIN — PANTOPRAZOLE SODIUM 40 MG: 40 INJECTION, POWDER, FOR SOLUTION INTRAVENOUS at 05:07

## 2022-07-02 RX ADMIN — SODIUM CHLORIDE 1000 ML: 0.9 INJECTION, SOLUTION INTRAVENOUS at 04:07

## 2022-07-02 RX ADMIN — POTASSIUM CHLORIDE 10 MEQ: 7.46 INJECTION, SOLUTION INTRAVENOUS at 05:07

## 2022-07-02 RX ADMIN — MAGNESIUM SULFATE 2 G: 2 INJECTION INTRAVENOUS at 05:07

## 2022-07-02 RX ADMIN — POTASSIUM CHLORIDE 10 MEQ: 7.46 INJECTION, SOLUTION INTRAVENOUS at 08:07

## 2022-07-02 RX ADMIN — PANTOPRAZOLE SODIUM 40 MG: 40 TABLET, DELAYED RELEASE ORAL at 09:07

## 2022-07-02 NOTE — ED NOTES
Cardiovascular: Regular rhythm and normal heart sounds. Tachycardia present.  Exam reveals no gallop and no friction rub.    No murmur heard.  Pulmonary/Chest: Breath sounds normal. No stridor. No respiratory distress. She has no wheezes. She has no rhonchi. She has no rales.   Abdominal: Abdomen is soft. She exhibits no distension and no mass. There is no abdominal tenderness. There is no rebound and no guarding.   Musculoskeletal:         General: No edema.      Cervical back: Normal range of motion and neck supple.      Comments: Muscle wasting to right upper extremity. Able to move upper extremities, but does not want to. Complains of pain when moving legs. Some contracture to lower extremities.

## 2022-07-02 NOTE — ED PROVIDER NOTES
"Encounter Date: 7/2/2022    SCRIBE #1 NOTE: I, Ena Brandon, am scribing for, and in the presence of,  Jo Ann Winters MD. I have scribed the following portions of the note - Other sections scribed: HPI, ROS, PE.       History     Chief Complaint   Patient presents with    Altered Mental Status     Daughter called EMS due to pt having AMS after A/C in her home not working today. Daughter states pt is normally very alert and verbally responsive. Hx of dementia and left sided deficits from previous stroke. Limited verbal response to patient at this time.      Holly Kidd is a 71 y.o. female, with a past medical history of CVA with subsequent left sided deficits, Dementia, and HTN, who presents to the ED with her daughter complaining of generalized fatigue and malaise onset prior to arrival. Patient and patient's daughter note associated symptoms of decreased po intake, epigastric abdominal pain, and a BM described as "dark" which was passed today. No exacerbating or alleviating factors. Per patient's daughter, the air conditioning in their home was not working prior to arrival. Patient's daughter notes that the patient is currently bed bound. She reports that the patient was ambulatory and received PT/OT until she tested positive for COVID-19, after which she became non-mobile. Per patient's daughter, the patient has exceeded the care that she is able to provide. Patient has not recently been on antibiotics. Patient's daughter denies altered mental status and notes that the patient is agitated and physically/verbally abusive to others at baseline. Patient denies fever, chills, cough, chest pain, shortness of breath, nausea, vomiting, diarrhea, or other associated symptoms.     The history is provided by the patient and a relative.     Review of patient's allergies indicates:  No Known Allergies  Past Medical History:   Diagnosis Date    COVID-19 09/09/2021    Essential hypertension 6/12/2015    Obese  " "   Obesity (BMI 35.0-39.9) 6/12/2015    Stroke 06/2015    Left side weakness    Stroke-like symptoms 09/17/2021    NEW LEFT FACIAL DROOP, LEFT SIDE WEAKNESS & SLURRED SPEECH    Wheelchair dependence     Wheelchair dependence      Past Surgical History:   Procedure Laterality Date    HYSTERECTOMY       Family History   Problem Relation Age of Onset    Stroke Mother     Cancer Father      Social History     Tobacco Use    Smoking status: Never Smoker    Smokeless tobacco: Never Used   Substance Use Topics    Alcohol use: No    Drug use: No     Review of Systems   Constitutional: Positive for appetite change (decreased po intake) and fatigue. Negative for chills and fever.        Positive for malaise.   HENT: Negative for congestion, rhinorrhea and sore throat.    Eyes: Negative for visual disturbance.   Respiratory: Negative for cough and shortness of breath.    Cardiovascular: Negative for chest pain.   Gastrointestinal: Positive for abdominal pain (epigastric). Negative for diarrhea, nausea and vomiting.        Positive for "dark" BM.   Genitourinary: Negative for dysuria, frequency and hematuria.   Musculoskeletal: Negative for back pain.   Skin: Negative for rash.   Neurological: Negative for dizziness, weakness and headaches.   Psychiatric/Behavioral: Negative for agitation.        Negative for altered mental status.       Physical Exam     Initial Vitals [07/02/22 1557]   BP Pulse Resp Temp SpO2   116/72 (!) 114 20 98.6 °F (37 °C) 98 %      MAP       --         Physical Exam    Nursing note and vitals reviewed.  Constitutional: She appears well-developed and well-nourished.   HENT:   Head: Normocephalic and atraumatic.   Mouth/Throat: Oropharynx is clear and moist. Abnormal dentition (missing multiple teeth).   Halitosis.   Eyes: Conjunctivae and EOM are normal.   Neck: Neck supple. No tracheal deviation present.   Normal range of motion.  Cardiovascular: Regular rhythm and normal heart sounds. " Tachycardia present.  Exam reveals no gallop and no friction rub.    No murmur heard.  Pulmonary/Chest: Breath sounds normal. No stridor. No respiratory distress. She has no wheezes. She has no rhonchi. She has no rales.   Abdominal: Abdomen is soft. She exhibits no distension and no mass. There is no abdominal tenderness. There is no rebound and no guarding.   Musculoskeletal:         General: No edema.      Cervical back: Normal range of motion and neck supple.      Comments: Muscle wasting to right upper extremity. Able to move upper extremities, but does not want to. Complains of pain when moving legs. Some contracture to lower extremities.     Neurological: She is alert. GCS score is 15. GCS eye subscore is 4. GCS verbal subscore is 5. GCS motor subscore is 6.   Oriented to person and place.    Skin: Skin is warm and dry. No rash noted. No erythema.   Psychiatric: She has a normal mood and affect.         ED Course   Procedures  Labs Reviewed   CBC W/ AUTO DIFFERENTIAL - Abnormal; Notable for the following components:       Result Value    Hemoglobin 10.9 (*)     Hematocrit 33.5 (*)     MCV 68 (*)     MCH 22.1 (*)     RDW 21.1 (*)     Platelets 528 (*)     MPV 9.1 (*)     Gran # (ANC) 9.2 (*)     Immature Grans (Abs) 0.06 (*)     Gran % 79.0 (*)     Lymph % 11.5 (*)     All other components within normal limits   COMPREHENSIVE METABOLIC PANEL - Abnormal; Notable for the following components:    Potassium 2.7 (*)     Glucose 161 (*)     All other components within normal limits    Narrative:       POTASSIUM critical result(s) called and verbal readback obtained   from SAUL FINNEGAN by LB1 07/02/2022 17:15   URINALYSIS, REFLEX TO URINE CULTURE - Abnormal; Notable for the following components:    Appearance, UA Hazy (*)     Protein, UA 1+ (*)     Ketones, UA Trace (*)     Urobilinogen, UA 4.0-6.0 (*)     All other components within normal limits    Narrative:     Specimen Source->Urine   LACTIC ACID, PLASMA -  Abnormal; Notable for the following components:    Lactate (Lactic Acid) 4.6 (*)     All other components within normal limits    Narrative:     LACTIC ACID critical result(s) called and verbal readback obtained   from SAUL FINNEGAN by LB1 07/02/2022 17:16   URINALYSIS MICROSCOPIC - Abnormal; Notable for the following components:    RBC, UA 5 (*)     Hyaline Casts, UA 2 (*)     All other components within normal limits    Narrative:     Specimen Source->Urine   CULTURE, BLOOD   CULTURE, BLOOD   LIPASE   TROPONIN I   CK   SARS-COV-2 RDRP GENE     EKG Readings: (Independently Interpreted)   Initial Reading: No STEMI. Rhythm: Sinus Tachycardia. Heart Rate: 109. Ectopy: No Ectopy. Conduction: Normal. ST Segments: Normal ST Segments. T Waves: Normal. Axis: Normal.       Imaging Results          X-Ray Chest AP Portable (Final result)  Result time 07/02/22 17:07:45    Final result by James Benitez MD (07/02/22 17:07:45)                 Impression:      No acute cardiopulmonary process identified.      Electronically signed by: James Benitez MD  Date:    07/02/2022  Time:    17:07             Narrative:    EXAMINATION:  XR CHEST AP PORTABLE    CLINICAL HISTORY:  Chest pain, unspecified    TECHNIQUE:  Single frontal view of the chest was performed.    COMPARISON:  02/18/2022.    FINDINGS:  Cardiac silhouette is normal in size.  Lungs are symmetrically expanded.  No evidence of focal consolidative process, pneumothorax, or significant pleural effusion.  No acute osseous abnormality identified.                                 Medications   magnesium sulfate 2g in water 50mL IVPB (premix) (has no administration in time range)   potassium chloride 10 mEq in 100 mL IVPB (has no administration in time range)   potassium bicarbonate disintegrating tablet 25 mEq (has no administration in time range)   sodium chloride 0.9% bolus 1,000 mL (0 mLs Intravenous Stopped 7/2/22 9562)     Medical Decision Making:   History:   Old  Medical Records: I decided to obtain old medical records.  Clinical Tests:   Lab Tests: Ordered and Reviewed  Radiological Study: Ordered and Reviewed  ED Management:  71-year-old female with a history of CVA as well as dementia presents emergency department with her daughter complaining of fatigue and malaise.  Of note the patient's family states that the air conditioning went out at their house.  Patient has had decreased oral intake for the last several days.  On exam she is afebrile, tachycardic with otherwise stable vital signs.  She is nontoxic appearing.  There are no new focal neurologic deficits.  She does appear debilitated.  Her only complaint is epigastric pain.  Differential diagnosis includes but is not limited to peptic ulcer disease, GERD, esophagitis, dehydration, acute coronary syndrome, sepsis.  I considered but do not suspect bowel obstruction, acute hepatobiliary disease, appendicitis, colitis, diverticulitis, perforated viscus.  Of note the patient's daughter states that she feels like she can no longer care for the patient at home.  Labs were reviewed and reveal significant hypokalemia.  Patient will be given oral and IV potassium for replacement as well as IV magnesium.  Plan to admit to medicine for the above.          Scribe Attestation:   Scribe #1: I performed the above scribed service and the documentation accurately describes the services I performed. I attest to the accuracy of the note.                 Clinical Impression:   Final diagnoses:  [R07.9] Chest pain  [E87.6] Hypokalemia       I personally performed the services described in this documentation. All medical record entries made by the scribe were at my direction and in my presence. I have reviewed the chart and agree that the record reflects my personal performance and is accurate and complete.            Jo Ann Winters MD  07/02/22 [id number]       Jo Ann Winters MD  07/02/22 1734

## 2022-07-03 PROBLEM — U07.1 COVID-19 VIRUS INFECTION: Status: RESOLVED | Noted: 2021-09-10 | Resolved: 2022-07-03

## 2022-07-03 PROBLEM — K92.2 UPPER GI BLEEDING: Status: ACTIVE | Noted: 2022-07-03

## 2022-07-03 PROBLEM — K92.2 UPPER GI BLEEDING: Status: RESOLVED | Noted: 2022-07-03 | Resolved: 2022-07-03

## 2022-07-03 PROBLEM — Z71.89 ADVANCED CARE PLANNING/COUNSELING DISCUSSION: Status: ACTIVE | Noted: 2022-07-03

## 2022-07-03 PROBLEM — R10.9 PAIN IN THE ABDOMEN: Status: RESOLVED | Noted: 2022-02-18 | Resolved: 2022-07-03

## 2022-07-03 PROBLEM — D64.9 ANEMIA: Status: ACTIVE | Noted: 2022-07-03

## 2022-07-03 LAB
ABO + RH BLD: NORMAL
ANION GAP SERPL CALC-SCNC: 9 MMOL/L (ref 8–16)
BASOPHILS # BLD AUTO: 0.02 K/UL (ref 0–0.2)
BASOPHILS NFR BLD: 0.2 % (ref 0–1.9)
BLD GP AB SCN CELLS X3 SERPL QL: NORMAL
BUN SERPL-MCNC: 10 MG/DL (ref 8–23)
CALCIUM SERPL-MCNC: 9 MG/DL (ref 8.7–10.5)
CHLORIDE SERPL-SCNC: 104 MMOL/L (ref 95–110)
CO2 SERPL-SCNC: 26 MMOL/L (ref 23–29)
CREAT SERPL-MCNC: 0.4 MG/DL (ref 0.5–1.4)
DIFFERENTIAL METHOD: ABNORMAL
EOSINOPHIL # BLD AUTO: 0.1 K/UL (ref 0–0.5)
EOSINOPHIL NFR BLD: 0.6 % (ref 0–8)
ERYTHROCYTE [DISTWIDTH] IN BLOOD BY AUTOMATED COUNT: 20.7 % (ref 11.5–14.5)
EST. GFR  (AFRICAN AMERICAN): >60 ML/MIN/1.73 M^2
EST. GFR  (NON AFRICAN AMERICAN): >60 ML/MIN/1.73 M^2
FERRITIN SERPL-MCNC: 77 NG/ML (ref 20–300)
FERRITIN SERPL-MCNC: 90 NG/ML (ref 20–300)
GLUCOSE SERPL-MCNC: 107 MG/DL (ref 70–110)
HCT VFR BLD AUTO: 29.2 % (ref 37–48.5)
HGB BLD-MCNC: 9.2 G/DL (ref 12–16)
IMM GRANULOCYTES # BLD AUTO: 0.04 K/UL (ref 0–0.04)
IMM GRANULOCYTES NFR BLD AUTO: 0.4 % (ref 0–0.5)
LACTATE SERPL-SCNC: 1.4 MMOL/L (ref 0.5–2.2)
LYMPHOCYTES # BLD AUTO: 2.4 K/UL (ref 1–4.8)
LYMPHOCYTES NFR BLD: 24.4 % (ref 18–48)
MAGNESIUM SERPL-MCNC: 2.2 MG/DL (ref 1.6–2.6)
MCH RBC QN AUTO: 21.4 PG (ref 27–31)
MCHC RBC AUTO-ENTMCNC: 31.5 G/DL (ref 32–36)
MCV RBC AUTO: 68 FL (ref 82–98)
MONOCYTES # BLD AUTO: 0.9 K/UL (ref 0.3–1)
MONOCYTES NFR BLD: 9 % (ref 4–15)
NEUTROPHILS # BLD AUTO: 6.4 K/UL (ref 1.8–7.7)
NEUTROPHILS NFR BLD: 65.4 % (ref 38–73)
NRBC BLD-RTO: 0 /100 WBC
PLATELET # BLD AUTO: 438 K/UL (ref 150–450)
PMV BLD AUTO: 8.9 FL (ref 9.2–12.9)
POTASSIUM SERPL-SCNC: 3.4 MMOL/L (ref 3.5–5.1)
POTASSIUM SERPL-SCNC: 4.7 MMOL/L (ref 3.5–5.1)
RBC # BLD AUTO: 4.3 M/UL (ref 4–5.4)
SODIUM SERPL-SCNC: 139 MMOL/L (ref 136–145)
VIT B12 SERPL-MCNC: 768 PG/ML (ref 210–950)
WBC # BLD AUTO: 9.83 K/UL (ref 3.9–12.7)

## 2022-07-03 PROCEDURE — 83735 ASSAY OF MAGNESIUM: CPT | Performed by: STUDENT IN AN ORGANIZED HEALTH CARE EDUCATION/TRAINING PROGRAM

## 2022-07-03 PROCEDURE — 86901 BLOOD TYPING SEROLOGIC RH(D): CPT | Performed by: STUDENT IN AN ORGANIZED HEALTH CARE EDUCATION/TRAINING PROGRAM

## 2022-07-03 PROCEDURE — 85025 COMPLETE CBC W/AUTO DIFF WBC: CPT | Performed by: INTERNAL MEDICINE

## 2022-07-03 PROCEDURE — 84132 ASSAY OF SERUM POTASSIUM: CPT | Performed by: INTERNAL MEDICINE

## 2022-07-03 PROCEDURE — 97161 PT EVAL LOW COMPLEX 20 MIN: CPT | Performed by: PHYSICAL THERAPIST

## 2022-07-03 PROCEDURE — 36415 COLL VENOUS BLD VENIPUNCTURE: CPT | Performed by: STUDENT IN AN ORGANIZED HEALTH CARE EDUCATION/TRAINING PROGRAM

## 2022-07-03 PROCEDURE — 25000003 PHARM REV CODE 250: Performed by: INTERNAL MEDICINE

## 2022-07-03 PROCEDURE — 83605 ASSAY OF LACTIC ACID: CPT | Performed by: STUDENT IN AN ORGANIZED HEALTH CARE EDUCATION/TRAINING PROGRAM

## 2022-07-03 PROCEDURE — 21400001 HC TELEMETRY ROOM

## 2022-07-03 PROCEDURE — 25000003 PHARM REV CODE 250: Performed by: STUDENT IN AN ORGANIZED HEALTH CARE EDUCATION/TRAINING PROGRAM

## 2022-07-03 PROCEDURE — 36415 COLL VENOUS BLD VENIPUNCTURE: CPT | Performed by: INTERNAL MEDICINE

## 2022-07-03 PROCEDURE — 97165 OT EVAL LOW COMPLEX 30 MIN: CPT

## 2022-07-03 PROCEDURE — 92610 EVALUATE SWALLOWING FUNCTION: CPT

## 2022-07-03 PROCEDURE — 80048 BASIC METABOLIC PNL TOTAL CA: CPT | Performed by: INTERNAL MEDICINE

## 2022-07-03 PROCEDURE — 63600175 PHARM REV CODE 636 W HCPCS: Performed by: INTERNAL MEDICINE

## 2022-07-03 PROCEDURE — 99222 1ST HOSP IP/OBS MODERATE 55: CPT | Mod: ,,, | Performed by: STUDENT IN AN ORGANIZED HEALTH CARE EDUCATION/TRAINING PROGRAM

## 2022-07-03 PROCEDURE — 82728 ASSAY OF FERRITIN: CPT | Performed by: INTERNAL MEDICINE

## 2022-07-03 PROCEDURE — 99222 PR INITIAL HOSPITAL CARE,LEVL II: ICD-10-PCS | Mod: ,,, | Performed by: STUDENT IN AN ORGANIZED HEALTH CARE EDUCATION/TRAINING PROGRAM

## 2022-07-03 RX ORDER — MAGNESIUM SULFATE HEPTAHYDRATE 40 MG/ML
2 INJECTION, SOLUTION INTRAVENOUS ONCE
Status: COMPLETED | OUTPATIENT
Start: 2022-07-03 | End: 2022-07-03

## 2022-07-03 RX ORDER — POTASSIUM CHLORIDE 20 MEQ/1
40 TABLET, EXTENDED RELEASE ORAL ONCE
Status: DISCONTINUED | OUTPATIENT
Start: 2022-07-03 | End: 2022-07-03

## 2022-07-03 RX ORDER — LANOLIN ALCOHOL/MO/W.PET/CERES
1 CREAM (GRAM) TOPICAL DAILY
Status: DISCONTINUED | OUTPATIENT
Start: 2022-07-03 | End: 2022-07-13 | Stop reason: HOSPADM

## 2022-07-03 RX ADMIN — FERROUS SULFATE TAB 325 MG (65 MG ELEMENTAL FE) 1 EACH: 325 (65 FE) TAB at 11:07

## 2022-07-03 RX ADMIN — POTASSIUM BICARBONATE 50 MEQ: 977.5 TABLET, EFFERVESCENT ORAL at 09:07

## 2022-07-03 RX ADMIN — PANTOPRAZOLE SODIUM 40 MG: 40 TABLET, DELAYED RELEASE ORAL at 09:07

## 2022-07-03 RX ADMIN — AMLODIPINE BESYLATE 10 MG: 5 TABLET ORAL at 09:07

## 2022-07-03 RX ADMIN — MAGNESIUM SULFATE HEPTAHYDRATE 2 G: 2 INJECTION, SOLUTION INTRAVENOUS at 06:07

## 2022-07-03 RX ADMIN — CETIRIZINE HYDROCHLORIDE 10 MG: 10 TABLET, FILM COATED ORAL at 09:07

## 2022-07-03 NOTE — H&P
"[facility] Medicine  History & Physical    Patient Name: Holly Kidd  MRN: [id number]  Patient Class: IP- Inpatient  Admission Date: 7/2/2022  Attending Physician: Massiel Clayton MD  Primary Care Provider: DENYS Sterling         Patient information was obtained from patient, relative(s) and ER records.     Subjective:     Principal Problem:Hypokalemia    Chief Complaint:   Chief Complaint   Patient presents with    Altered Mental Status     Daughter called EMS due to pt having AMS after A/C in her home not working today. Daughter states pt is normally very alert and verbally responsive. Hx of dementia and left sided deficits from previous stroke. Limited verbal response to patient at this time.         HPI: Ms. Kidd is a 71 year old female with PMH of CVA ( facial droop and left sided weakness) , HTN, Dementia ( at baseline agitated and oriented x1) is being admitted from the ER after presenting for general malize, weakness and fatigue when the AC went out as well as daughter reporting patient has been eating less and noted to have what is described as melena this AM x1. She described a "motor oil black" tarry stool by patient but denies diarrhea or vomiting. Reports that mother refuses to go to the doctor and only takes the merari aspirin and amlodipine. She is completely bedbound and total care with daughter providing all ADL's . Daughter has a large caregiver burden. There was a report of epigastric pain but patient denies this. Noted to have a potassium of 2.7 in ER and denies any vomiting or diarrhea. No chills, shortness of breath, nausea or other associated symptoms. She currently only reports she is "cold" and does not like people "sticking her" with needles.      Note*Patient's daughter denies altered mental status and notes that the patient is agitated and physically/verbally abusive to others at baseline. She recognizes only her daughter usually and calls her by a " nicknamrex.       Past Medical History:   Diagnosis Date    COVID-19 09/09/2021    Essential hypertension 6/12/2015    Obese     Obesity (BMI 35.0-39.9) 6/12/2015    Stroke 06/2015    Left side weakness    Stroke-like symptoms 09/17/2021    NEW LEFT FACIAL DROOP, LEFT SIDE WEAKNESS & SLURRED SPEECH    Wheelchair dependence     Wheelchair dependence        Past Surgical History:   Procedure Laterality Date    HYSTERECTOMY         Review of patient's allergies indicates:  No Known Allergies    No current facility-administered medications on file prior to encounter.     Current Outpatient Medications on File Prior to Encounter   Medication Sig    amLODIPine (NORVASC) 10 MG tablet Take 1 tablet (10 mg total) by mouth once daily. Hold if SBP <120    aspirin (ECOTRIN) 81 MG EC tablet Take 1 tablet (81 mg total) by mouth once daily.    atorvastatin (LIPITOR) 40 MG tablet Take 1 tablet (40 mg total) by mouth once daily.    cetirizine (ZYRTEC) 10 MG tablet Take 1 tablet (10 mg total) by mouth once daily.    clopidogreL (PLAVIX) 75 mg tablet Take 1 tablet (75 mg total) by mouth once daily. for 21 days    ergocalciferol (ERGOCALCIFEROL) 50,000 unit Cap Take 1 capsule (50,000 Units total) by mouth every 7 days.    ondansetron (ZOFRAN-ODT) 4 MG TbDL Take 1 tablet (4 mg total) by mouth every 4 (four) hours as needed (nausea/vomiting).    pantoprazole (PROTONIX) 40 MG tablet Take 1 tablet (40 mg total) by mouth once daily.    varicella-zoster gE-AS01B, PF, (SHINGRIX) 50 mcg/0.5 mL injection Inject into the muscle.     Family History       Problem Relation (Age of Onset)    Cancer Father    Stroke Mother          Tobacco Use    Smoking status: Never Smoker    Smokeless tobacco: Never Used   Substance and Sexual Activity    Alcohol use: No    Drug use: No    Sexual activity: Not on file     Review of Systems   Constitutional:  Positive for activity change (is bedbound and requires total assistance with ADL's at  "baseline), appetite change (poor appetite , po intake) and fatigue. Negative for chills, diaphoresis, fever and unexpected weight change.   HENT:  Positive for dental problem (has a dangling tooth in the back Left that daughter reports is loose. Daugher reports she "sawed " the tooth down). Negative for congestion, drooling, ear discharge, ear pain, facial swelling, hearing loss, mouth sores, nosebleeds, postnasal drip, rhinorrhea, sinus pressure, sinus pain, sneezing, sore throat, tinnitus, trouble swallowing and voice change.    Eyes: Negative.    Respiratory:  Negative for apnea, cough, choking, chest tightness, shortness of breath, wheezing and stridor.    Cardiovascular:  Negative for chest pain, palpitations and leg swelling.   Gastrointestinal:  Positive for abdominal pain. Negative for abdominal distention, anal bleeding, blood in stool, constipation, diarrhea, nausea, rectal pain and vomiting.   Endocrine: Positive for cold intolerance and heat intolerance. Negative for polydipsia, polyphagia and polyuria.   Genitourinary: Negative.    Musculoskeletal: Negative.    Skin: Negative.    Allergic/Immunologic: Negative.    Neurological:  Positive for facial asymmetry (chronic).   Hematological: Negative.    Psychiatric/Behavioral: Negative.     Objective:     Vital Signs (Most Recent):  Temp: 98.2 °F (36.8 °C) (07/02/22 2012)  Pulse: 98 (07/02/22 2012)  Resp: 18 (07/02/22 2012)  BP: 119/72 (07/02/22 2012)  SpO2: 100 % (07/02/22 2012) Vital Signs (24h Range):  Temp:  [97.9 °F (36.6 °C)-98.6 °F (37 °C)] 98.2 °F (36.8 °C)  Pulse:  [] 98  Resp:  [13-20] 18  SpO2:  [98 %-100 %] 100 %  BP: (106-119)/(62-72) 119/72     Weight: 86.6 kg (190 lb 14.7 oz)  Body mass index is 33.82 kg/m².    Physical Exam  Constitutional:       General: She is not in acute distress.     Appearance: She is normal weight. She is ill-appearing (chronically ill appearing , weak , frail).   HENT:      Head: Normocephalic and atraumatic.    " "  Right Ear: External ear normal.      Left Ear: External ear normal.      Nose: No congestion or rhinorrhea.      Mouth/Throat:      Mouth: Mucous membranes are dry.   Eyes:      Conjunctiva/sclera: Conjunctivae normal.      Pupils: Pupils are equal, round, and reactive to light.   Cardiovascular:      Rate and Rhythm: Normal rate and regular rhythm.      Heart sounds: Normal heart sounds. No murmur heard.     Comments: Patient says " I have no heart - someone took it" while auscultating her chest   Pulmonary:      Effort: No respiratory distress.      Breath sounds: No stridor. No wheezing, rhonchi or rales.   Chest:      Chest wall: No tenderness.   Abdominal:      General: There is no distension.      Palpations: There is no mass.      Tenderness: There is no abdominal tenderness. There is no right CVA tenderness or guarding.      Hernia: No hernia is present.   Neurological:      Mental Status: She is alert. Mental status is at baseline. She is disoriented.      Motor: Weakness present.   Psychiatric:      Comments: Confused, agitated per baseline according to daugther          CRANIAL NERVES     CN III, IV, VI   Pupils are equal, round, and reactive to light.     Significant Labs: All pertinent labs within the past 24 hours have been reviewed.  TSH:   Recent Labs   Lab 02/18/22  1654   TSH 1.646     Urine Studies:   Recent Labs   Lab 07/02/22  1629   COLORU Yellow   APPEARANCEUA Hazy*   PHUR 6.0   SPECGRAV 1.030   PROTEINUA 1+*   GLUCUA Negative   KETONESU Trace*   BILIRUBINUA Negative   OCCULTUA Negative   NITRITE Negative   UROBILINOGEN 4.0-6.0*   LEUKOCYTESUR Negative   RBCUA 5*   WBCUA 4   BACTERIA None   SQUAMEPITHEL 3   HYALINECASTS 2*       Significant Imaging: I have reviewed all pertinent imaging results/findings within the past 24 hours.    Assessment/Plan:     * Hypokalemia    Replace IV and PO with Magnesium   --no source of losses , poor PO intake   - urine electrolytes   - alcocer placed in ER due " to patient 'not being able to move'   - monitor and replace as needed             GI bleed due to NSAIDs    Patient's Hg 9.6 was 11 , four months ago   Report of melena x1 this AM   GI consult   protonix BID   Transfuse if <7 , trend HG   Was prescribed plavix as an outpt but isn't taking , only taking aspirin and amlopidine - stop asa     Facial droop  Chronic         COVID-19 virus infection  Almost a year ago - pt has reportedly been bedbound since then   -PT/OT   -Possible placement ? Doubt daughter and patient would agree - maybe qualify for some home services       Cerebrovascular disease  Chronic with L sided deficits       Wheelchair dependence  Baseline - daughter reports she slumps over in the wheelchair and she has to prop her up       Essential hypertension  Patient only takes amlodipine and aspirin , continue amlodipine and hold asa from home         VTE Risk Mitigation (From admission, onward)    None             Massiel Clayton MD  Department of Hospital Medicine   [facility] - [place] - Telemetry

## 2022-07-03 NOTE — PT/OT/SLP EVAL
Speech Language Pathology Evaluation  Bedside Swallow    Patient Name:  Holly Kidd   MRN:  [id number]  Admitting Diagnosis: Hypokalemia    Recommendations:                 General Recommendations:  Dysphagia therapy  Diet recommendations:  Puree, Nectar Thick   Aspiration Precautions: 1 bite/sip at a time and Alternating bites/sips   General Precautions: Standard,    Communication strategies:  none    History:     Past Medical History:   Diagnosis Date    COVID-19 09/09/2021    Essential hypertension 6/12/2015    Obese     Obesity (BMI 35.0-39.9) 6/12/2015    Stroke 06/2015    Left side weakness    Stroke-like symptoms 09/17/2021    NEW LEFT FACIAL DROOP, LEFT SIDE WEAKNESS & SLURRED SPEECH    Wheelchair dependence     Wheelchair dependence        Past Surgical History:   Procedure Laterality Date    HYSTERECTOMY         Social History: Patient lives with family and is bed bound at home.    Prior diet: Regular/thin    Occupation/hobbies/homemaking: Retired.    Subjective     Patient seen at bedside. Cooperative. ST observed strong odor from patient's mouth upon assessment. OT reported previously completing oral care prior to assessment. Dentition in poor condition upon oral motor exam.  Patient goals: to eat.     Pain/Comfort:  · Pain Rating Post-Intervention 1: 0/10  · Pain Rating 2: 0/10  · Pain Rating Post-Intervention 2: 0/10    Respiratory Status: Room air    Objective:     Oral Musculature Evaluation  · Oral Musculature: general weakness  · Dentition: gums in poor condition, scattered dentition, teeth in poor condition  · Mucosal Quality: adequate  · Mandibular Strength and Mobility: impaired, flaccid  · Oral Labial Strength and Mobility: impaired retraction, impaired pursing, impaired seal, impaired coordination  · Velar Elevation: impaired  · Buccal Strength and Mobility: impaired, flaccid  · Volitional Swallow: delay  · Voice Prior to PO Intake: breathy    Bedside Swallow Eval:   Consistencies  "Assessed:  · Thin liquids Coughing x 2 via cup and straw sips   · Nectar thick liquids WNL     Oral Phase:   · Excess chewing  · Prolonged mastication  · Impaired rotational chew  · Slow oral transit time    Pharyngeal Phase:   · coughing/choking  · decreased hyolaryngeal excursion to palpation  · throat clearing    Compensatory Strategies  · Chin tuck  · Multiple swallows  · Volitional cough/throat clear    Treatment: Puree/Nectar thick recommended at this time with ongoing dysphagia tx 3xweekly. Oral care recommended 3X daily due to oral odor and reduced oral hygiene observed. PT reporting patient's tooth "fell out" today prior to Speech evaluation. ST concerned oral infection possible.  Nursing aware.     Assessment:     Holly Kidd is a 71 y.o. female with an SLP diagnosis of Dysphagia.  She presents with oral holding of bolus for thin and nectar thick liquids. Overt s/s of aspiration of thin liquids c/b  Delayed coughing x 1 and throat clearing x 2. Patient tolerated nectar thick liquids WNL no overt s/s of aspiration at this time. Decreased oral motor strength, ROM, and coordination for all articulators, specifically reduced lingual lateralization with left deviation upon protrusion. Generalized weakness noted. Patient stated she did not have any teeth upon assessment. Scattered dentition noted. Patient presented with swallow initiation delay up to 12 seconds at this time with verbal cuing required for swallow. Oral holding noted. Tactile and Verbal cues successful for bolus clearance. Patient manipulated puree bolus WNL and demonstrated improved a/p transfer with swallow initation timing improved to 9 seconds. Bolus weight may be a factor in improved timing. ST recs: puree/nectar with follow up dysphagia therapy. Nursing notified. Recommending physician follow for possible oral/dental infection per observations of strong oral odor during assessment and PT reports of tooth "falling out".     Goals: "   Multidisciplinary Problems     SLP Goals     SLP GOALS:    1. Patient will continue ongoing assessment of swallow fx.  2. Patient will tolerate puree/nectar no overt s/s of aspiration.  Franci Fenton CCC-SLP                Plan:     · Patient to be seen:  3 x/week   · Plan of Care expires:  07/08/22  · Plan of Care reviewed with:      · SLP Follow-Up:  Yes       Discharge recommendations:      Barriers to Discharge:  None    Time Tracking:     SLP Treatment Date:   07/03/22  Speech Start Time:  1600  Speech Stop Time:  1630     Speech Total Time (min):  30 min    Billable Minutes: Eval 30     07/03/2022

## 2022-07-03 NOTE — PLAN OF CARE
Problem: Impaired Wound Healing  Goal: Optimal Wound Healing  Outcome: Ongoing, Progressing     Problem: Skin Injury Risk Increased  Goal: Skin Health and Integrity  Outcome: Ongoing, Progressing     Problem: Adjustment to Illness (Gastrointestinal Bleeding)  Goal: Optimal Coping with Acute Illness  Outcome: Ongoing, Progressing     Problem: Bleeding (Gastrointestinal Bleeding)  Goal: Hemostasis  Outcome: Ongoing, Progressing     Problem: Fall Injury Risk  Goal: Absence of Fall and Fall-Related Injury  Outcome: Ongoing, Progressing

## 2022-07-03 NOTE — ASSESSMENT & PLAN NOTE
Advance Care Planning     Date: 07/03/2022    Code Status  In light of the patients advanced and life limiting illness,I engaged the the patient, family and healthcare power of   in a conversation about the patient's preferences for care  at the very end of life. The patient wishes to have CPR and cardio version if needed with medications when her heart stops. However, patient does not want to be intubated or placed on mechanical ventilation when her breathing stops. I communicated to the patient, family and healthcare power of   that her wishes align with partial code status.   I spent a total of 18 minutes engaging the patient in this advance care planning discussion.        Code status: Partial code. Patient agrees to CPR/cardioversion but does not want to be intubated or mechanically ventilated.

## 2022-07-03 NOTE — PT/OT/SLP EVAL
"Occupational Therapy   Evaluation    Name: Holly Kidd  MRN: [id number]  Admitting Diagnosis:  Hypokalemia  Recent Surgery: * No surgery found *      Recommendations:     Discharge Recommendations: nursing facility, basic  Discharge Equipment Recommendations:     Barriers to discharge:   (requires total assist for bed mobility and self care)    Assessment:     Holly Kidd is a 71 y.o. female with a medical diagnosis of Hypokalemia. Performance deficits affecting function: weakness, impaired self care skills, impaired functional mobilty, decreased upper extremity function, decreased coordination, impaired cognition, decreased safety awareness, pain, decreased ROM, impaired fine motor, impaired skin.    The patient participated in bed level OT eval 2* [atient dependent for all bed mobility per PT eval. The patient prevents with decreased ROM ans MS in BUE. The patient required max assist for feeding and grooming in bed. The patient will benefit from a trial of OT to improve the patient's ability to participate in self care tasks.    Rehab Prognosis: Fair; patient would benefit from acute skilled OT services to address these deficits and reach maximum level of function.       Plan:     Patient to be seen 3 x/week to address the above listed problems via self-care/home management, therapeutic activities, therapeutic exercises  · Plan of Care Expires: 07/17/22  · Plan of Care Reviewed with: patient    Subjective     Chief Complaint: none  Patient/Family Comments/goals: wants to watch Zafar Cortes    Occupational Profile:  Living Environment: lives with her daughter and grandchildren in a trailer  Previous level of function: mostly bed bound but able to transfer to a W/C with 2 person assist. Patient is  dependent for bathing and dressing but able to feed herself. (per chart)  Roles and Routines: Per patient report, she stays in bed "unless someone takes pitty on me and puts me in the W/C". Daughter works and has a " " for the kids.  Equipment Used at Home:  wheelchair  Assistance upon Discharge: daughter    Pain/Comfort:  · Pain Rating 1: 0/10    Patients cultural, spiritual, Ep[employer]opalian conflicts given the current situation: no    Objective:     Communicated with: Leonardo aguayo prior to session.  Patient found HOB elevated with PureWick, peripheral IV, telemetry, pressure relief boots upon OT entry to room.    General Precautions: Standard, fall   Orthopedic Precautions:N/A   Braces: N/A  Respiratory Status: Room air    Occupational Performance:    Bed Mobility:    · Dependent with bed mobility per PT    Functional Mobility/Transfers:  · Functional Mobility: The patient was dependent for repositioning in bed with patient leaning against the right bed rail    Activities of Daily Living:  · Feeding:  maximal assistance The patient was dependent to place the cup in her right hand and brink cup with straw to her mouth. The pateint required max assist to hold the cup to drink juice x2 trials. The patient held juice in her mouth with a delayed swallow but no coughing noted.  · Grooming: stand by assistance to wipe face with a cloth in her right hand. The patient was handed a tooth brush with toothpaste and attempted to brush her teeth. The patient stated "where is my mouth" when unable to place brush in her moth. The patient made 2 attempts to brush her teeth and then OT tooth the toothbrush from the patient. OT noted loose lower teeth and attempts to brush teeth was stopped. The patient was able to rinse mouth x2 with water with max assist. The patient held the water in her mouth for an extended period before spitting into a basin.     Cognitive/Visual Perceptual:  Cognitive/Psychosocial Skills:     -       Oriented to: Person   -       Follows Commands/attention:Follows one-step commands  -       Communication: clear/fluent and minimal verbalization  -       Memory: Impaired STM and Poor immediate recall  -       Safety " awareness/insight to disability: impaired   -       Mood/Affect/Coping skills/emotional control: Appropriate to situation and somewhat flat  Visual/Perceptual:      -visual deficits?      Physical Exam:  Balance: -       right leaning in bed  Postural examination/scapula alignment:    -       Rounded shoulders  -       Forward head  Skin integrity: Visible skin intact  Edema:  None noted  Sensation:    -       Intact  Dominant hand: -       right  Upper Extremity Range of Motion:     -       Right Upper Extremity: WFL  -       Left Upper Extremity: limited shldr ROM ~70*, distally is WFL with increased tone   Upper Extremity Strength:    -       Right Upper Extremity: WFL  -       Left Upper Extremity: unable to move   Strength:    -       Right Upper Extremity: weak   Fine Motor Coordination:    -       Impaired   right hand    AMPAC 6 Click ADL:  AMPAC Total Score: 6    Treatment & Education:  OT eval and self care as noted above. No family was present to provide education.  Education:    Patient left HOB elevated with all lines intact, call button in reach and nurse, Leonardo notified    GOALS:   Multidisciplinary Problems     Occupational Therapy Goals        Problem: Occupational Therapy    Goal Priority Disciplines Outcome Interventions   Occupational Therapy Goal     OT, PT/OT Ongoing, Progressing    Description: Goals to be met by: 7/17/22    Patient will increase functional independence with ADLs by performing:    Feeding with Set-up Assistance, Supervision, and Assistive Devices as needed.  Grooming while EOB with Set-up Assistance and Stand-by Assistance.  Rolling to Bilateral with Moderate Assistance and use of bedrail as needed.   Increased functional strength to to participate in self care tasks  The patient will tolerated AAROM to BUE x10 reps                     History:     Past Medical History:   Diagnosis Date    COVID-19 09/09/2021    Essential hypertension 6/12/2015    Obese      Obesity (BMI 35.0-39.9) 6/12/2015    Stroke 06/2015    Left side weakness    Stroke-like symptoms 09/17/2021    NEW LEFT FACIAL DROOP, LEFT SIDE WEAKNESS & SLURRED SPEECH    Wheelchair dependence     Wheelchair dependence        Past Surgical History:   Procedure Laterality Date    HYSTERECTOMY         Time Tracking:     OT Date of Treatment: 07/03/22  OT Start Time: 0912  OT Stop Time: 0928  OT Total Time (min): 16 min    Billable Minutes:Evaluation 16    7/3/2022

## 2022-07-03 NOTE — H&P
"[facility] Medicine  History & Physical    Patient Name: Holly Kidd  MRN: [id number]  Patient Class: IP- Inpatient  Admission Date: 7/2/2022  Attending Physician: Massiel Clayton MD  Primary Care Provider: DENYS Sterling         Patient information was obtained from patient, relative(s) and ER records.     Subjective:     Principal Problem:Hypokalemia    Chief Complaint:   Chief Complaint   Patient presents with    Altered Mental Status     Daughter called EMS due to pt having AMS after A/C in her home not working today. Daughter states pt is normally very alert and verbally responsive. Hx of dementia and left sided deficits from previous stroke. Limited verbal response to patient at this time.         HPI: Ms. Kidd is a 71 year old female with PMH of CVA ( facial droop and left sided weakness) , HTN, Dementia ( at baseline agitated and oriented x1) is being admitted from the ER after presenting for general malize, weakness and fatigue when the AC went out as well as daughter reporting patient has been eating less and noted to have what is described as melena this AM x1. She described a "motor oil black" tarry stool by patient but denies diarrhea or vomiting. Reports that mother refuses to go to the doctor and only takes the merari aspirin and amlodipine. She is completely bedbound and total care with daughter providing all ADL's . Daughter has a large caregiver burden. There was a report of epigastric pain but patient denies this. Noted to have a potassium of 2.7 in ER and denies any vomiting or diarrhea. No chills, shortness of breath, nausea or other associated symptoms. She currently only reports she is "cold" and does not like people "sticking her" with needles.      Note*Patient's daughter denies altered mental status and notes that the patient is agitated and physically/verbally abusive to others at baseline. She recognizes only her daughter usually and calls her by a " nickname.       No new subjective & objective note has been filed under this hospital service since the last note was generated.    Assessment/Plan:     * Hypokalemia    Replace IV and PO with Magnesium   --no source of losses , poor PO intake   - urine electrolytes   - alcocer placed in ER due to patient 'not being able to move'   - monitor and replace as needed             GI bleed due to NSAIDs    Patient's Hg 9.6 was 11 , four months ago   Report of melena x1 this AM   GI consult   protonix BID   Transfuse if <7 , trend HG   Was prescribed plavix as an outpt but isn't taking , only taking aspirin and amlopidine - stop asa   T-sat was 7 - IV iron     Facial droop  Chronic         COVID-19 virus infection  Almost a year ago - pt has reportedly been bedbound since then   -PT/OT   -Possible placement ? Doubt daughter and patient would agree - maybe qualify for some home services       Cerebrovascular disease  Chronic with L sided deficits       Wheelchair dependence  Baseline - daughter reports she slumps over in the wheelchair and she has to prop her up       Essential hypertension  Patient only takes amlodipine and aspirin , continue amlodipine and hold asa from home         VTE Risk Mitigation (From admission, onward)    None             Massiel Clayton MD  Department of Hospital Medicine   [facility] - Telemetry

## 2022-07-03 NOTE — HOSPITAL COURSE
71 year old woman with history of CVA ( facial droop and left sided weakness), HTN, Dementia, and bedbound admitted on 07/02/2022 for hypokalemia and anemia, with suspicion for upper GI bleed. GI consulted and took her for EGD which did not reveal any source of bleeding.  Hb remained stable but on 7/10 she was diagnosed with DVT and PE and started on heparin drip. She was hemodynamically stable all through her admission, with no hypoxia or hypotension except for intermittent tachycardia. She was on room air lmost her entire stay. TTE showed EF 65% with no right heart strain. She was eventually transitioned to eliquis and her Hg was stable all through. GI had recommended colonoscopy if her Hg drops further but it was stable.  She had an encephalopathy due to Proteus UTI and completed 5 days of antibiotics.  She was eventually discharged to SNF on eliquis and advised to follow up with her PCP and GI outpatient to continue mgt of her anemia and other chronic medical conditions.

## 2022-07-03 NOTE — HPI
71 year old female with PMH of CVA ( facial droop and left sided weakness) , HTN, Dementia ( at baseline agitated and oriented x1) is being admitted from the ER after presenting for general malize, weakness and fatigue who GI is asked to see for melena.     The patient is altered and unable to provide much history.  Her daughter Lana provides history as she is her sole care taker.      The patient was noted to be more fatigued as of late. Her daughter comments that her stools were dark/tarry prior to admission.  Lana notes her stools were motor oil black yesterday morning.  This happened once a few weeks ago, but then resolved.  She did complain of some mild abdominal pain and thought she was constipated.        Hgb was 9.2 in the ED which is down from baseline.    No prior EGD

## 2022-07-03 NOTE — ASSESSMENT & PLAN NOTE
Body mass index is 33.82 kg/m². Morbid obesity complicates all aspects of disease management from diagnostic modalities to treatment. Weight loss encouraged and health benefits explained to patient.

## 2022-07-03 NOTE — PLAN OF CARE
[facility] [place]  [place]charge Assessment       Primary Care Provider: DENYS Sterling    Admission Diagnosis: Hypokalemia [E87.6]  Chest pain [R07.9]  GI bleed due to NSAIDs [K92.2, T39.395A]    Admission Date: 7/2/2022  Expected Discharge Date:     Discharge Barriers Identified: None    Payor: MEDICARE / Plan: MEDICARE PART A & B / Product Type: Government /     Extended Emergency Contact Information  Primary Emergency Contact: Lana Kidd   [facility]  Home Phone: [phone]  Mobile Phone: [phone]  Relation: Daughter    Discharge Plan A: Other (Daughter is considering NH placement)  Discharge Plan B: Other (TBD)      [facility] - [place], [place] - [address]  [address]  [place] [address]  Phone: [phone] Fax: [phone]    [id number] - [place], [place] - [address]  [address]  [place] [address]  Phone: [phone] Fax: [phone]      Initial Assessment (most recent)     Adult Discharge Assessment - 07/03/22 1401        Discharge Assessment    Assessment Type Discharge Planning Assessment     Confirmed/corrected address, phone number and insurance Yes     Confirmed Demographics Correct on Facesheet     Source of Information family;health record     Reason For Admission hypokalemia     Lives With child(marvin), adult     Facility Arrived From: Home     Do you expect to return to your current living situation? Yes     Do you have help at home or someone to help you manage your care at home? Yes     Who are your caregiver(s) and their phone number(s)? Lana-daughter: [phone]     Prior to hospitilization cognitive status: Alert/Oriented     Current cognitive status: Unable to Assess     Walking or Climbing Stairs Difficulty ambulation difficulty, dependent;stair climbing difficulty, dependent;transferring difficulty, dependent     Mobility Management ; Lana-daughter provides total assist     Dressing/Bathing Difficulty  bathing difficulty, dependent;dressing difficulty, dependent     Dressing/Bathing Management Total dependence with bathing and dressing     Do you have any problems with: Errands/Grocery;Needs other help     Specify other help Total assist required; dependent in all areas     Home Accessibility wheelchair accessible     Home Layout Able to live on 1st floor     Equipment Currently Used at Home wheelchair     Readmission within 30 days? No     Patient currently being followed by outpatient case management? No     Do you currently have service(s) that help you manage your care at home? No     Do you take prescription medications? Yes     Do you have prescription coverage? Yes     Coverage Medicare A, B;Medicaid     Do you have any problems affording any of your prescribed medications? No     Is the patient taking medications as prescribed? yes     Who is going to help you get home at discharge? Nataly     How do you get to doctors appointments? family or friend will provide     Are you on dialysis? No     Do you take coumadin? No     Discharge Plan A Other   Daughter is considering NH placement    Discharge Plan B Other   TBD    DME Needed Upon Discharge  other (see comments)   TBD    Discharge Plan discussed with: Adult children;Caregiver     Name(s) and Number(s) Alfreddaughter: [phone]     Discharge Barriers Identified None        Relationship/Environment    Name(s) of Who Lives With Patient Nataly             SW Role explained to patient; two patient identifiers recognized; SW contact information placed on Communication board. Discussed patient managing health care at home; determined who would be helping patient at home with recovery: Lana noe is in-home and does everything for the patient; patient requires total care and is dependent in all areas.    kusum Schwartz is considering NH placement at [facility].

## 2022-07-03 NOTE — PLAN OF CARE
SLP GOALS:  Patient will tolerate puree/nectar thick liquids   Patient will continue ongoing BSS.  Franci Fenton, CCC-SLP

## 2022-07-03 NOTE — SUBJECTIVE & OBJECTIVE
Interval history:    Review of Systems   Constitutional:  Positive for appetite change and fatigue. Negative for chills and diaphoresis.        Patient bedbound   HENT:  Negative for congestion and trouble swallowing.    Eyes:  Negative for photophobia and visual disturbance.   Respiratory:  Negative for cough and shortness of breath.    Cardiovascular:  Negative for chest pain, palpitations and leg swelling.   Gastrointestinal:  Positive for nausea. Negative for abdominal pain, constipation, diarrhea and vomiting.   Genitourinary:  Negative for difficulty urinating, dysuria and hematuria.   Musculoskeletal:  Negative for back pain and joint swelling.   Neurological:  Positive for weakness (at baseline). Negative for dizziness, light-headedness and headaches.   Psychiatric/Behavioral:  Positive for decreased concentration. Negative for confusion and hallucinations.      Objective:     Vital Signs (Most Recent):  Temp: 98.2 °F (36.8 °C) (07/02/22 2012)  Pulse: 98 (07/02/22 2012)  Resp: 18 (07/02/22 2012)  BP: 119/72 (07/02/22 2012)  SpO2: 100 % (07/02/22 2012) Vital Signs (24h Range):  Temp:  [97.9 °F (36.6 °C)-98.6 °F (37 °C)] 98.2 °F (36.8 °C)  Pulse:  [] 98  Resp:  [13-20] 18  SpO2:  [98 %-100 %] 100 %  BP: (106-119)/(62-72) 119/72     Weight: 86.6 kg (190 lb 14.7 oz)  Body mass index is 33.82 kg/m².    Physical Exam  Vitals and nursing note reviewed.   Constitutional:       General: She is not in acute distress.     Appearance: She is normal weight. She is ill-appearing (chronically ill appearing , weak , frail).      Comments: Elderly female, sitting up in bed. Appears in no acute distress.    HENT:      Head: Atraumatic.      Right Ear: External ear normal.      Left Ear: External ear normal.      Nose: Nose normal. No congestion.      Mouth/Throat:      Mouth: Mucous membranes are dry.   Eyes:      Extraocular Movements: Extraocular movements intact.      Pupils: Pupils are equal, round, and reactive to  light.   Cardiovascular:      Rate and Rhythm: Normal rate and regular rhythm.      Heart sounds: No murmur heard.    No gallop.   Pulmonary:      Effort: No respiratory distress.      Breath sounds: No wheezing or rales.   Abdominal:      General: There is no distension.      Tenderness: There is no abdominal tenderness. There is no guarding.   Musculoskeletal:      Cervical back: No tenderness.      Right lower leg: No edema.      Left lower leg: No edema.   Skin:     General: Skin is dry.   Neurological:      Mental Status: She is alert. Mental status is at baseline.      Motor: Weakness present.   Psychiatric:         Mood and Affect: Affect is flat.         Speech: Speech is delayed.         Behavior: Behavior is cooperative.      Comments: Patient is alert and oriented to self, time, , and place.         Significant Labs: All pertinent labs within the past 24 hours have been reviewed.    Significant Imaging: I have reviewed all pertinent imaging results/findings within the past 24 hours.

## 2022-07-03 NOTE — HPI
"Ms. Kidd is a 71 year old female with PMH of CVA ( facial droop and left sided weakness) , HTN, Dementia ( at baseline agitated and oriented x1) is being admitted from the ER after presenting for general malize, weakness and fatigue when the AC went out as well as daughter reporting patient has been eating less and noted to have what is described as melena this AM x1. She described a "motor oil black" tarry stool by patient but denies diarrhea or vomiting. Reports that mother refuses to go to the doctor and only takes the merari aspirin and amlodipine. She is completely bedbound and total care with daughter providing all ADL's . Daughter has a large caregiver burden. There was a report of epigastric pain but patient denies this. Noted to have a potassium of 2.7 in ER and denies any vomiting or diarrhea. No chills, shortness of breath, nausea or other associated symptoms. She currently only reports she is "cold" and does not like people "sticking her" with needles.      Note*Patient's daughter denies altered mental status and notes that the patient is agitated and physically/verbally abusive to others at baseline. She recognizes only her daughter usually and calls her by a nickname.   "

## 2022-07-03 NOTE — PT/OT/SLP PROGRESS
Physical Therapy  Evaluation/Discharge    Holly Kidd   MRN: [id number]   Admitting Diagnosis: Hypokalemia    Discharge recommendations: Nursing Care: jail vs. Home with family.      Barriers to discharge: decrease caregiver support - daughter works and No AC within home environment.  Daughter/Son utilizes W/C to ascend/descend 4 [place] home environment.     Equipment recommendations: none required at this time    Billable Minutes:  Total Time :  Evaluation Time 30 min    General Precautions: Standard, Fall  Orthopedic Precautions: none  Braces: N.A.   Respiratory Status: Room air    Subjective:  Chart review completed and Communicated with Nurse Patterson prior to session.  Prior to Current Hospitalization, pt baseline is Total Dependent for ADLs and Non-Ambulatory (Bed Bound).      Objective:   Functional Mobility:  Pt is currently total dependent for ADLs (bed mobility) and Max A for Sitting Balance EOB.  Pt unable to stand/ambulate.      Patient left supine with all lines intact, call button in reach and OT present.    Assessment:  Holly Kidd is a 71 y.o. female with a medical diagnosis of Hypokalemia and presents with decrease functional independence with ADLs and non-ambulatory.  Pt is currently functioning at baseline without any change-in-status.      Rehab potential is poor.    Activity tolerance: Poor    PLAN:    Plan of Care expires: 7/3/2022  Plan of Care reviewed with: Patient    Darrell Baltazarbert, PT  07/03/2022

## 2022-07-03 NOTE — ASSESSMENT & PLAN NOTE
Almost a year ago - pt has reportedly been bedbound since then   -PT/OT   -Possible placement ? Doubt daughter and patient would agree - maybe qualify for some home services

## 2022-07-03 NOTE — PROGRESS NOTES
"[facility] Medicine  Progress Note    Patient Name: Holly Kidd  MRN: [id number]  Patient Class: IP- Inpatient   Admission Date: 7/2/2022  Length of Stay: 1 days  Attending Physician: Ami Troy DO  Primary Care Provider: DENYS Sterling        Subjective:     Principal Problem:Hypokalemia        HPI:  Ms. Kidd is a 71 year old female with PMH of CVA ( facial droop and left sided weakness) , HTN, Dementia ( at baseline agitated and oriented x1) is being admitted from the ER after presenting for general malize, weakness and fatigue when the AC went out as well as daughter reporting patient has been eating less and noted to have what is described as melena this AM x1. She described a "motor oil black" tarry stool by patient but denies diarrhea or vomiting. Reports that mother refuses to go to the doctor and only takes the merari aspirin and amlodipine. She is completely bedbound and total care with daughter providing all ADL's . Daughter has a large caregiver burden. There was a report of epigastric pain but patient denies this. Noted to have a potassium of 2.7 in ER and denies any vomiting or diarrhea. No chills, shortness of breath, nausea or other associated symptoms. She currently only reports she is "cold" and does not like people "sticking her" with needles.      Note*Patient's daughter denies altered mental status and notes that the patient is agitated and physically/verbally abusive to others at baseline. She recognizes only her daughter usually and calls her by a nickname.       Overview/Hospital Course:  71 year old female with PMH of CVA ( facial droop and left sided weakness) , HTN, Dementia, and bedbound admitted on 07/02/2022 for hypokalemia and anemia, with suspicion for upper GI bleed.     Chest x-ray unremarkable.  Patient presented with complaints fatigue and weakness also with melena. Hgb on admit 9.6, down from baseline of 12. Potassium replaced. GI consulted- " recommends IV PPI BID and EGD if family is in agreement.  Continue to monitor       Interval history:    Review of Systems   Constitutional:  Positive for appetite change and fatigue. Negative for chills and diaphoresis.        Patient bedbound   HENT:  Negative for congestion and trouble swallowing.    Eyes:  Negative for photophobia and visual disturbance.   Respiratory:  Negative for cough and shortness of breath.    Cardiovascular:  Negative for chest pain, palpitations and leg swelling.   Gastrointestinal:  Positive for nausea. Negative for abdominal pain, constipation, diarrhea and vomiting.   Genitourinary:  Negative for difficulty urinating, dysuria and hematuria.   Musculoskeletal:  Negative for back pain and joint swelling.   Neurological:  Positive for weakness (at baseline). Negative for dizziness, light-headedness and headaches.   Psychiatric/Behavioral:  Positive for decreased concentration. Negative for confusion and hallucinations.      Objective:     Vital Signs (Most Recent):  Temp: 98.2 °F (36.8 °C) (07/02/22 2012)  Pulse: 98 (07/02/22 2012)  Resp: 18 (07/02/22 2012)  BP: 119/72 (07/02/22 2012)  SpO2: 100 % (07/02/22 2012) Vital Signs (24h Range):  Temp:  [97.9 °F (36.6 °C)-98.6 °F (37 °C)] 98.2 °F (36.8 °C)  Pulse:  [] 98  Resp:  [13-20] 18  SpO2:  [98 %-100 %] 100 %  BP: (106-119)/(62-72) 119/72     Weight: 86.6 kg (190 lb 14.7 oz)  Body mass index is 33.82 kg/m².    Physical Exam  Vitals and nursing note reviewed.   Constitutional:       General: She is not in acute distress.     Appearance: She is normal weight. She is ill-appearing (chronically ill appearing , weak , frail).      Comments: Elderly female, sitting up in bed. Appears in no acute distress.    HENT:      Head: Atraumatic.      Right Ear: External ear normal.      Left Ear: External ear normal.      Nose: Nose normal. No congestion.      Mouth/Throat:      Mouth: Mucous membranes are dry.   Eyes:      Extraocular Movements:  Extraocular movements intact.      Pupils: Pupils are equal, round, and reactive to light.   Cardiovascular:      Rate and Rhythm: Normal rate and regular rhythm.      Heart sounds: No murmur heard.    No gallop.   Pulmonary:      Effort: No respiratory distress.      Breath sounds: No wheezing or rales.   Abdominal:      General: There is no distension.      Tenderness: There is no abdominal tenderness. There is no guarding.   Musculoskeletal:      Cervical back: No tenderness.      Right lower leg: No edema.      Left lower leg: No edema.   Skin:     General: Skin is dry.   Neurological:      Mental Status: She is alert. Mental status is at baseline.      Motor: Weakness present.   Psychiatric:         Mood and Affect: Affect is flat.         Speech: Speech is delayed.         Behavior: Behavior is cooperative.      Comments: Patient is alert and oriented to self, time, , and place.         Significant Labs: All pertinent labs within the past 24 hours have been reviewed.    Significant Imaging: I have reviewed all pertinent imaging results/findings within the past 24 hours.      Assessment/Plan:      * Hypokalemia  -K 2.7 on admission, Mg WNL  -Electrolytes replaced  -K 3.4 today  -no source of losses, poor PO intake   -Will replace as needed      GI bleed due to NSAIDs  Patient's Hg 9.6 on admit. Hg near 12 at baseline    Report of melena x1   GI consult- recommends PPI BID and EGD if family in agreement.  protonix BID   Transfuse if Hgb <7  Type and screen   Hold home asa  Irone profile with T-sat of 7  Continue oral iron supplement     Anemia  Patient's Hg 9.6 on admit. Hg near 12 at baseline    Report of melena x1   Concern for UGIB given history for NSAIDs and melena   GI consulted  Plan as above      Essential hypertension  Continue home Norvasc    Cerebrovascular disease  Chronic with L sided deficits   Holding home ASA at this time      Obesity (BMI 35.0-39.9)  Body mass index is 33.82 kg/m². Morbid  obesity complicates all aspects of disease management from diagnostic modalities to treatment. Weight loss encouraged and health benefits explained to patient.         Facial droop  Chronic from previous CVA  Speech is understandable         Wheelchair dependence  Baseline - daughter reports she slumps over in the wheelchair and she has to prop her up       Advanced care planning/counseling discussion  Advance Care Planning     Date: 07/03/2022    Code Status  In light of the patients advanced and life limiting illness,I engaged the the patient, family and healthcare power of   in a conversation about the patient's preferences for care  at the very end of life. The patient wishes to have CPR and cardio version if needed with medications when her heart stops. However, patient does not want to be intubated or placed on mechanical ventilation when her breathing stops. I communicated to the patient, family and healthcare power of   that her wishes align with partial code status.   I spent a total of 18 minutes engaging the patient in this advance care planning discussion.       Code status: Partial code. Patient agrees to CPR/cardioversion but does not want to be intubated or mechanically ventilated.         VTE Risk Mitigation (From admission, onward)         Ordered     Place sequential compression device  Until discontinued         07/02/22 2111                Discharge Planning   ANISH:      Code Status: Partial Code   Is the patient medically ready for discharge?:     Reason for patient still in hospital (select all that apply): Treatment, Consult recommendations and Pending disposition                     Ami Troy DO  Department of Hospital Medicine   [facility] - Telemetry

## 2022-07-03 NOTE — SUBJECTIVE & OBJECTIVE
Past Medical History:   Diagnosis Date    COVID-19 09/09/2021    Essential hypertension 6/12/2015    Obese     Obesity (BMI 35.0-39.9) 6/12/2015    Stroke 06/2015    Left side weakness    Stroke-like symptoms 09/17/2021    NEW LEFT FACIAL DROOP, LEFT SIDE WEAKNESS & SLURRED SPEECH    Wheelchair dependence     Wheelchair dependence        Past Surgical History:   Procedure Laterality Date    HYSTERECTOMY         Review of patient's allergies indicates:  No Known Allergies  Family History       Problem Relation (Age of Onset)    Cancer Father    Stroke Mother          Tobacco Use    Smoking status: Never Smoker    Smokeless tobacco: Never Used   Substance and Sexual Activity    Alcohol use: No    Drug use: No    Sexual activity: Not on file     Review of Systems   Unable to perform ROS: Dementia   Objective:     Vital Signs (Most Recent):  Temp: 98.7 °F (37.1 °C) (07/03/22 0742)  Pulse: 98 (07/03/22 0742)  Resp: 18 (07/03/22 0742)  BP: 115/67 (07/03/22 0742)  SpO2: 97 % (07/03/22 0742) Vital Signs (24h Range):  Temp:  [97.9 °F (36.6 °C)-98.7 °F (37.1 °C)] 98.7 °F (37.1 °C)  Pulse:  [] 98  Resp:  [13-20] 18  SpO2:  [97 %-100 %] 97 %  BP: (106-119)/(60-72) 115/67     Weight: 86.6 kg (190 lb 14.7 oz) (07/02/22 2012)  Body mass index is 33.82 kg/m².      Intake/Output Summary (Last 24 hours) at 7/3/2022 0846  Last data filed at 7/3/2022 0520  Gross per 24 hour   Intake 325.35 ml   Output 150 ml   Net 175.35 ml       Lines/Drains/Airways       Peripheral Intravenous Line  Duration                  Peripheral IV - Single Lumen 07/02/22 1616 20 G Left Forearm <1 day         Peripheral IV - Single Lumen 07/02/22 1813 20 G Right Antecubital <1 day                    Physical Exam  Constitutional:       Appearance: She is ill-appearing.   HENT:      Head: Normocephalic.      Nose: Nose normal.      Mouth/Throat:      Mouth: Mucous membranes are moist.   Eyes:      Extraocular Movements: Extraocular movements intact.       Conjunctiva/sclera: Conjunctivae normal.   Cardiovascular:      Rate and Rhythm: Normal rate and regular rhythm.      Heart sounds: Normal heart sounds.   Pulmonary:      Effort: Pulmonary effort is normal.      Breath sounds: Normal breath sounds.   Abdominal:      General: Abdomen is flat. Bowel sounds are normal.      Palpations: Abdomen is soft.   Musculoskeletal:         General: Normal range of motion.      Cervical back: Normal range of motion.   Skin:     General: Skin is warm.   Neurological:      Mental Status: Mental status is at baseline.       Significant Labs:  All pertinent lab results from the last 24 hours have been reviewed.    Significant Imaging:  Imaging results within the past 24 hours have been reviewed.

## 2022-07-03 NOTE — PLAN OF CARE
Problem: Occupational Therapy  Goal: Occupational Therapy Goal  Description: Goals to be met by: 7/17/22    Patient will increase functional independence with ADLs by performing:    Feeding with Set-up Assistance, Supervision, and Assistive Devices as needed.  Grooming while EOB with Set-up Assistance and Stand-by Assistance.  Rolling to Bilateral with Moderate Assistance and use of bedrail as needed.   Increased functional strength to to participate in self care tasks  The patient will tolerated AAROM to BUE x10 reps    Outcome: Ongoing, Progressing     The patient will benefit from OT to address functional deficits.

## 2022-07-03 NOTE — ASSESSMENT & PLAN NOTE
In brief, the patient is a 70yo woman with dementia who presents with suspected UGI bleeding.    The patient has had dark stools per daughter and a down trend in her Hgb.  She has clopidogrel in her med list, but it is not clear that she is taking this.  Her daughter reports ASA use, but no other anti-coagulation.      I had a lengthy discussion regarding an EGD with the patient's daughter.  She would like to discuss this with her siblings and [place] back with me.  I think this is reasonable given her mothers clinical stability at this time.    I would recommend she is treated with IV PPI twice daily and we can consider EGD on Tuesday pending the family's decision.

## 2022-07-03 NOTE — PHARMACY MED REC
"Admission Medication History     The home medication history was taken by Roslyn Cruz.    You may go to "Admission" then "Reconcile Home Medications" tabs to review and/or act upon these items.      The home medication list has been updated by the Pharmacy department.    Please read ALL comments highlighted in yellow.    Please address this information as you see fit.     Feel free to contact us if you have any questions or require assistance.      The medications listed below were removed from the home medication list. Please reorder if appropriate:  Patient reports no longer taking the following medication(s):   Cetirizine   Ergocalciferol   Zofran   Pantoprazole    Medications listed below were obtained from: Patient/family, Medications brought from home and [employer]- [employer]     The medication reconciliation was completed by the patient's bedside.    Roslyn Cruz  [phone]                        .          "

## 2022-07-03 NOTE — ASSESSMENT & PLAN NOTE
-K 2.7 on admission, Mg WNL  -Electrolytes replaced  -K 3.4 today  -no source of losses, poor PO intake   -Will replace as needed

## 2022-07-03 NOTE — ASSESSMENT & PLAN NOTE
Patient's Hg 9.6 on admit. Hg near 12 at baseline    Report of melena x1   GI consult- recommends PPI BID and EGD if family in agreement.  protonix BID   Transfuse if Hgb <7  Type and screen   Hold home asa  Irone profile with T-sat of 7  Continue oral iron supplement

## 2022-07-03 NOTE — ASSESSMENT & PLAN NOTE
Patient's Hg 9.6 on admit. Hg near 12 at baseline    Report of melena x1   Concern for UGIB given history for NSAIDs and melena   GI consulted  Plan as above

## 2022-07-03 NOTE — CONSULTS
[facility]  [facility]  Consult Note    Patient Name: Holly Kidd  MRN: [id number]  Admission Date: 7/2/2022  Hospital Length of Stay: 1 days  Code Status: Prior   Attending Provider: Ami Troy DO   Consulting Provider: Yony Beaulieu MD  Primary Care Physician: DENYS Sterling  Principal Problem:Hypokalemia    Inpatient consult to Gastroenterology  Consult performed by: Yony Beaulieu MD  Consult ordered by: Massiel Clayton MD  Reason for consult: UGI bleeding        Subjective:     HPI:  71 year old female with PMH of CVA ( facial droop and left sided weakness) , HTN, Dementia ( at baseline agitated and oriented x1) is being admitted from the ER after presenting for general malize, weakness and fatigue who GI is asked to see for melena.     The patient is altered and unable to provide much history.  Her daughter Lana provides history as she is her sole care taker.      The patient was noted to be more fatigued as of late. Her daughter comments that her stools were dark/tarry prior to admission.  Lana notes her stools were motor oil black yesterday morning.  This happened once a few weeks ago, but then resolved.  She did complain of some mild abdominal pain and thought she was constipated.        Hgb was 9.2 in the ED which is down from baseline.    No prior EGD      Past Medical History:   Diagnosis Date    COVID-19 09/09/2021    Essential hypertension 6/12/2015    Obese     Obesity (BMI 35.0-39.9) 6/12/2015    Stroke 06/2015    Left side weakness    Stroke-like symptoms 09/17/2021    NEW LEFT FACIAL DROOP, LEFT SIDE WEAKNESS & SLURRED SPEECH    Wheelchair dependence     Wheelchair dependence        Past Surgical History:   Procedure Laterality Date    HYSTERECTOMY         Review of patient's allergies indicates:  No Known Allergies  Family History       Problem Relation (Age of Onset)    Cancer Father    Stroke Mother          Tobacco Use    Smoking  status: Never Smoker    Smokeless tobacco: Never Used   Substance and Sexual Activity    Alcohol use: No    Drug use: No    Sexual activity: Not on file     Review of Systems   Unable to perform ROS: Dementia   Objective:     Vital Signs (Most Recent):  Temp: 98.7 °F (37.1 °C) (07/03/22 0742)  Pulse: 98 (07/03/22 0742)  Resp: 18 (07/03/22 0742)  BP: 115/67 (07/03/22 0742)  SpO2: 97 % (07/03/22 0742) Vital Signs (24h Range):  Temp:  [97.9 °F (36.6 °C)-98.7 °F (37.1 °C)] 98.7 °F (37.1 °C)  Pulse:  [] 98  Resp:  [13-20] 18  SpO2:  [97 %-100 %] 97 %  BP: (106-119)/(60-72) 115/67     Weight: 86.6 kg (190 lb 14.7 oz) (07/02/22 2012)  Body mass index is 33.82 kg/m².      Intake/Output Summary (Last 24 hours) at 7/3/2022 0846  Last data filed at 7/3/2022 0520  Gross per 24 hour   Intake 325.35 ml   Output 150 ml   Net 175.35 ml       Lines/Drains/Airways       Peripheral Intravenous Line  Duration                  Peripheral IV - Single Lumen 07/02/22 1616 20 G Left Forearm <1 day         Peripheral IV - Single Lumen 07/02/22 1813 20 G Right Antecubital <1 day                    Physical Exam  Constitutional:       Appearance: She is ill-appearing.   HENT:      Head: Normocephalic.      Nose: Nose normal.      Mouth/Throat:      Mouth: Mucous membranes are moist.   Eyes:      Extraocular Movements: Extraocular movements intact.      Conjunctiva/sclera: Conjunctivae normal.   Cardiovascular:      Rate and Rhythm: Normal rate and regular rhythm.      Heart sounds: Normal heart sounds.   Pulmonary:      Effort: Pulmonary effort is normal.      Breath sounds: Normal breath sounds.   Abdominal:      General: Abdomen is flat. Bowel sounds are normal.      Palpations: Abdomen is soft.   Musculoskeletal:         General: Normal range of motion.      Cervical back: Normal range of motion.   Skin:     General: Skin is warm.   Neurological:      Mental Status: Mental status is at baseline.       Significant Labs:  All  pertinent lab results from the last 24 hours have been reviewed.    Significant Imaging:  Imaging results within the past 24 hours have been reviewed.    Assessment/Plan:     Upper GI bleeding  In brief, the patient is a 72yo woman with dementia who presents with suspected UGI bleeding.    The patient has had dark stools per daughter and a down trend in her Hgb.  She has clopidogrel in her med list, but it is not clear that she is taking this.  Her daughter reports ASA use, but no other anti-coagulation.      I had a lengthy discussion regarding an EGD with the patient's daughter.  She would like to discuss this with her siblings and [place] back with me.  I think this is reasonable given her mothers clinical stability at this time.    I would recommend she is treated with IV PPI twice daily and we can consider EGD on Tuesday pending the family's decision.        Thank you for your consult. I will follow-up with patient. Please contact us if you have any additional questions.    Yony Beaulieu MD  Gastroenterology  [facility]

## 2022-07-04 PROBLEM — R53.81 DEBILITY: Status: ACTIVE | Noted: 2022-07-04

## 2022-07-04 LAB
ANION GAP SERPL CALC-SCNC: 8 MMOL/L (ref 8–16)
BASOPHILS # BLD AUTO: 0.03 K/UL (ref 0–0.2)
BASOPHILS NFR BLD: 0.4 % (ref 0–1.9)
BUN SERPL-MCNC: 8 MG/DL (ref 8–23)
CALCIUM SERPL-MCNC: 8.6 MG/DL (ref 8.7–10.5)
CHLORIDE SERPL-SCNC: 106 MMOL/L (ref 95–110)
CO2 SERPL-SCNC: 25 MMOL/L (ref 23–29)
CREAT SERPL-MCNC: 0.4 MG/DL (ref 0.5–1.4)
DIFFERENTIAL METHOD: ABNORMAL
EOSINOPHIL # BLD AUTO: 0 K/UL (ref 0–0.5)
EOSINOPHIL NFR BLD: 0.4 % (ref 0–8)
ERYTHROCYTE [DISTWIDTH] IN BLOOD BY AUTOMATED COUNT: 20.8 % (ref 11.5–14.5)
EST. GFR  (AFRICAN AMERICAN): >60 ML/MIN/1.73 M^2
EST. GFR  (NON AFRICAN AMERICAN): >60 ML/MIN/1.73 M^2
GLUCOSE SERPL-MCNC: 101 MG/DL (ref 70–110)
HCT VFR BLD AUTO: 28.2 % (ref 37–48.5)
HGB BLD-MCNC: 9 G/DL (ref 12–16)
IMM GRANULOCYTES # BLD AUTO: 0.07 K/UL (ref 0–0.04)
IMM GRANULOCYTES NFR BLD AUTO: 0.8 % (ref 0–0.5)
LYMPHOCYTES # BLD AUTO: 1.9 K/UL (ref 1–4.8)
LYMPHOCYTES NFR BLD: 23.4 % (ref 18–48)
MAGNESIUM SERPL-MCNC: 2.2 MG/DL (ref 1.6–2.6)
MCH RBC QN AUTO: 21.7 PG (ref 27–31)
MCHC RBC AUTO-ENTMCNC: 31.9 G/DL (ref 32–36)
MCV RBC AUTO: 68 FL (ref 82–98)
MONOCYTES # BLD AUTO: 0.9 K/UL (ref 0.3–1)
MONOCYTES NFR BLD: 10.8 % (ref 4–15)
NEUTROPHILS # BLD AUTO: 5.3 K/UL (ref 1.8–7.7)
NEUTROPHILS NFR BLD: 64.2 % (ref 38–73)
NRBC BLD-RTO: 0 /100 WBC
PLATELET # BLD AUTO: 407 K/UL (ref 150–450)
PMV BLD AUTO: 8.5 FL (ref 9.2–12.9)
POTASSIUM SERPL-SCNC: 3.9 MMOL/L (ref 3.5–5.1)
RBC # BLD AUTO: 4.14 M/UL (ref 4–5.4)
SODIUM SERPL-SCNC: 139 MMOL/L (ref 136–145)
WBC # BLD AUTO: 8.26 K/UL (ref 3.9–12.7)

## 2022-07-04 PROCEDURE — 85025 COMPLETE CBC W/AUTO DIFF WBC: CPT | Performed by: STUDENT IN AN ORGANIZED HEALTH CARE EDUCATION/TRAINING PROGRAM

## 2022-07-04 PROCEDURE — 25000003 PHARM REV CODE 250: Performed by: INTERNAL MEDICINE

## 2022-07-04 PROCEDURE — 83735 ASSAY OF MAGNESIUM: CPT | Performed by: STUDENT IN AN ORGANIZED HEALTH CARE EDUCATION/TRAINING PROGRAM

## 2022-07-04 PROCEDURE — 36415 COLL VENOUS BLD VENIPUNCTURE: CPT | Performed by: STUDENT IN AN ORGANIZED HEALTH CARE EDUCATION/TRAINING PROGRAM

## 2022-07-04 PROCEDURE — 21400001 HC TELEMETRY ROOM

## 2022-07-04 PROCEDURE — 80048 BASIC METABOLIC PNL TOTAL CA: CPT | Performed by: STUDENT IN AN ORGANIZED HEALTH CARE EDUCATION/TRAINING PROGRAM

## 2022-07-04 PROCEDURE — 25000003 PHARM REV CODE 250: Performed by: STUDENT IN AN ORGANIZED HEALTH CARE EDUCATION/TRAINING PROGRAM

## 2022-07-04 RX ADMIN — PANTOPRAZOLE SODIUM 40 MG: 40 TABLET, DELAYED RELEASE ORAL at 09:07

## 2022-07-04 RX ADMIN — FERROUS SULFATE TAB 325 MG (65 MG ELEMENTAL FE) 1 EACH: 325 (65 FE) TAB at 09:07

## 2022-07-04 RX ADMIN — CETIRIZINE HYDROCHLORIDE 10 MG: 10 TABLET, FILM COATED ORAL at 09:07

## 2022-07-04 NOTE — PLAN OF CARE
GI Plan of Care    EGD discussed with daughter Lana and she would like us to proceed tomorrow.  Will plan for EGD on 7/5.  Patient remains HD stable and Hgb relatively unchanged.    Yony Beauileu MD

## 2022-07-04 NOTE — ASSESSMENT & PLAN NOTE
-K 2.7 on admission, Mg WNL  -Electrolytes replaced  -K 3.9 today  -no source of losses, poor PO intake   -Will replace as needed  -Resolved

## 2022-07-04 NOTE — PROGRESS NOTES
"[facility] Medicine  Progress Note    Patient Name: Holly Kidd  MRN: [id number]  Patient Class: IP- Inpatient   Admission Date: 7/2/2022  Length of Stay: 2 days  Attending Physician: Ami Troy DO  Primary Care Provider: DENYS Sterling        Subjective:     Principal Problem:Hypokalemia        HPI:  Ms. Kidd is a 71 year old female with PMH of CVA ( facial droop and left sided weakness) , HTN, Dementia ( at baseline agitated and oriented x1) is being admitted from the ER after presenting for general malize, weakness and fatigue when the AC went out as well as daughter reporting patient has been eating less and noted to have what is described as melena this AM x1. She described a "motor oil black" tarry stool by patient but denies diarrhea or vomiting. Reports that mother refuses to go to the doctor and only takes the merari aspirin and amlodipine. She is completely bedbound and total care with daughter providing all ADL's . Daughter has a large caregiver burden. There was a report of epigastric pain but patient denies this. Noted to have a potassium of 2.7 in ER and denies any vomiting or diarrhea. No chills, shortness of breath, nausea or other associated symptoms. She currently only reports she is "cold" and does not like people "sticking her" with needles.      Note*Patient's daughter denies altered mental status and notes that the patient is agitated and physically/verbally abusive to others at baseline. She recognizes only her daughter usually and calls her by a nickname.       Overview/Hospital Course:  71 year old female with PMH of CVA ( facial droop and left sided weakness) , HTN, Dementia, and bedbound admitted on 07/02/2022 for hypokalemia and anemia, with suspicion for upper GI bleed.     Chest x-ray unremarkable.  Patient presented with complaints fatigue and weakness also with melena. Hgb on admit 9.6, down from baseline of 12. Potassium replaced. GI consulted- " recommends IV PPI BID and EGD on 07/05. Family is in agreement. Hemoglobin stable.  Continue to monitor       Interval History:  No acute overnight events.  Patient remained afebrile.  Denies any hematemesis or blood in her stools.  Denies any weaknesses. Tolerating diet.    Review of Systems   Constitutional:  Positive for appetite change and fatigue. Negative for chills and diaphoresis.        Patient bedbound   HENT:  Negative for congestion and trouble swallowing.    Eyes:  Negative for photophobia and visual disturbance.   Respiratory:  Negative for cough and shortness of breath.    Cardiovascular:  Negative for chest pain, palpitations and leg swelling.   Gastrointestinal:  Negative for abdominal pain, constipation, diarrhea, nausea and vomiting.   Genitourinary:  Negative for difficulty urinating, dysuria and hematuria.   Musculoskeletal:  Negative for back pain and joint swelling.   Neurological:  Positive for weakness (at baseline). Negative for dizziness, light-headedness and headaches.   Psychiatric/Behavioral:  Positive for decreased concentration. Negative for confusion and hallucinations.      Objective:     Vital Signs (Most Recent):  Temp: 97.1 °F (36.2 °C) (07/04/22 1106)  Pulse: 98 (07/04/22 1106)  Resp: 18 (07/04/22 1106)  BP: 119/68 (07/04/22 1106)  SpO2: 95 % (07/04/22 1106) Vital Signs (24h Range):  Temp:  [96.9 °F (36.1 °C)-99.2 °F (37.3 °C)] 97.1 °F (36.2 °C)  Pulse:  [] 98  Resp:  [18-19] 18  SpO2:  [94 %-97 %] 95 %  BP: (113-146)/(63-78) 119/68     Weight: 86.6 kg (190 lb 14.7 oz)  Body mass index is 33.82 kg/m².    Intake/Output Summary (Last 24 hours) at 7/4/2022 1156  Last data filed at 7/4/2022 1100  Gross per 24 hour   Intake 100 ml   Output 1450 ml   Net -1350 ml      Physical Exam  Vitals and nursing note reviewed.   Constitutional:       General: She is not in acute distress.     Appearance: She is normal weight. She is ill-appearing (chronically ill appearing , weak , frail).       Comments: Elderly female, sitting up in bed. Appears in no acute distress.    HENT:      Head: Atraumatic.      Right Ear: External ear normal.      Left Ear: External ear normal.      Nose: Nose normal. No congestion.      Mouth/Throat:      Mouth: Mucous membranes are moist.   Eyes:      Extraocular Movements: Extraocular movements intact.      Pupils: Pupils are equal, round, and reactive to light.   Cardiovascular:      Rate and Rhythm: Normal rate and regular rhythm.      Heart sounds: No murmur heard.    No gallop.   Pulmonary:      Effort: No respiratory distress.      Breath sounds: No wheezing or rales.   Abdominal:      General: There is no distension.      Tenderness: There is no abdominal tenderness. There is no guarding.   Musculoskeletal:      Cervical back: No tenderness.      Right lower leg: No edema.      Left lower leg: No edema.   Skin:     General: Skin is dry.   Neurological:      Mental Status: She is alert. Mental status is at baseline.      Motor: Weakness present.   Psychiatric:         Mood and Affect: Affect is flat.         Speech: Speech is delayed.         Behavior: Behavior is cooperative.      Comments: Patient is alert and oriented to self, time, , and place.        Significant Labs: All pertinent labs within the past 24 hours have been reviewed.    Significant Imaging: I have reviewed all pertinent imaging results/findings within the past 24 hours.      Assessment/Plan:      * Hypokalemia  -K 2.7 on admission, Mg WNL  -Electrolytes replaced  -K 3.9 today  -no source of losses, poor PO intake   -Will replace as needed  -Resolved      GI bleed due to NSAIDs  Patient's Hg 9.6 on admit. Hg near 12 at baseline    Report of melena x1   GI consult- recommends PPI BID and EGD on , family in agreement  protonix BID   Transfuse if Hgb <7  Type and screen   Hold home asa  Irone profile with T-sat of 7  Continue oral iron supplement     Anemia  Patient's Hg 9.6 on admit. Hg near  12 at baseline    Report of melena x1   Concern for UGIB given history for NSAIDs and melena   GI consulted  Plan as above      Essential hypertension  Continue home Norvasc    Cerebrovascular disease  Chronic with L sided deficits   Holding home ASA at this time      Obesity (BMI 35.0-39.9)  Body mass index is 33.82 kg/m². Morbid obesity complicates all aspects of disease management from diagnostic modalities to treatment. Weight loss encouraged and health benefits explained to patient.         Facial droop  Chronic from previous CVA  Speech is understandable         Wheelchair dependence  Baseline - daughter reports she slumps over in the wheelchair and she has to prop her up       Debility  -PT/OT consulted  -Daughter states she can no longer care for her mom and would like to proceed with assisted care at NH. Prefer [place]  -CM/SW consulted to assist with placement      Advanced care planning/counseling discussion  Advance Care Planning     Date: 07/03/2022    Code Status  In light of the patients advanced and life limiting illness,I engaged the the patient, family and healthcare power of   in a conversation about the patient's preferences for care  at the very end of life. The patient wishes to have CPR and cardio version if needed with medications when her heart stops. However, patient does not want to be intubated or placed on mechanical ventilation when her breathing stops. I communicated to the patient, family and healthcare power of   that her wishes align with partial code status.   I spent a total of 18 minutes engaging the patient in this advance care planning discussion.       Code status: Partial code. Patient agrees to CPR/cardioversion but does not want to be intubated or mechanically ventilated.         VTE Risk Mitigation (From admission, onward)         Ordered     Place sequential compression device  Until discontinued         07/02/22 2111                Discharge Planning    ANISH:      Code Status: Partial Code   Is the patient medically ready for discharge?:     Reason for patient still in hospital (select all that apply): Patient trending condition, Treatment, Consult recommendations, PT / OT recommendations and Pending disposition  Discharge Plan A: Other (Daughter is considering NH placement)                  Ami Troy DO  Department of Hospital Medicine   [facility]

## 2022-07-04 NOTE — PLAN OF CARE
Problem: Adult Inpatient Plan of Care  Goal: Plan of Care Review  Outcome: Ongoing, Progressing     Problem: Impaired Wound Healing  Goal: Optimal Wound Healing  Outcome: Ongoing, Progressing     Problem: Skin Injury Risk Increased  Goal: Skin Health and Integrity  Outcome: Ongoing, Progressing     Problem: Adjustment to Illness (Gastrointestinal Bleeding)  Goal: Optimal Coping with Acute Illness  Outcome: Ongoing, Progressing     Problem: Bleeding (Gastrointestinal Bleeding)  Goal: Hemostasis  Outcome: Ongoing, Progressing     Problem: Fall Injury Risk  Goal: Absence of Fall and Fall-Related Injury  Outcome: Ongoing, Progressing

## 2022-07-04 NOTE — PLAN OF CARE
Recommendations  1) Continue pureed/nectar thick diet per ST   2) Add Boost Plus BID for additional kcal/protein - thickened per ST recs   3) Encourage intake and assist with meals as needed    Goals: Pt to meet greater than or equal to 75% EEN by RD follow up  Nutrition Goal Status: new  Communication of RD Recs: other (comment) (POC)    Assessment and Plan  Nutrition Problem  Swallowing difficulty    Related to (etiology):   Poor dentition, dysphagia    Signs and Symptoms (as evidenced by):   ST recommending pureed diet with thickened liquids  Inability to swallow pill per RN    Interventions(treatment strategy):  Collaboration with other providers  Commercial beverage  Thickened liquids  Texture modified diet (Pureed)    Nutrition Diagnosis Status:   New

## 2022-07-04 NOTE — ASSESSMENT & PLAN NOTE
Patient's Hg 9.6 on admit. Hg near 12 at baseline    Report of melena x1   GI consult- recommends PPI BID and EGD on 07/04, family in agreement  protonix BID   Transfuse if Hgb <7  Type and screen   Hold home asa  Irone profile with T-sat of 7  Continue oral iron supplement

## 2022-07-04 NOTE — CONSULTS
[facility] [place] - Telemetry  Adult Nutrition  Consult Note    SUMMARY     Recommendations  1) Continue pureed/nectar thick diet per ST   2) Add Boost Plus BID for additional kcal/protein - thickened per ST recs   3) Encourage intake and assist with meals as needed    Goals: Pt to meet greater than or equal to 75% EEN by RD follow up  Nutrition Goal Status: new  Communication of RD Recs: other (comment) (POC)    Assessment and Plan  Nutrition Problem  Swallowing difficulty    Related to (etiology):   Poor dentition, dysphagia    Signs and Symptoms (as evidenced by):   ST recommending pureed diet with thickened liquids  Inability to swallow pill per RN    Interventions(treatment strategy):  Collaboration with other providers  Commercial beverage  Thickened liquids  Texture modified diet (Pureed)    Nutrition Diagnosis Status:   New      Malnutrition Assessment    GEETA NFPE Due to remote assessment    Reason for Assessment  Reason For Assessment: consult (nutrition recs)  Relevant Medical History: CVA ( facial droop and left sided weakness) , HTN, Dementia ( at baseline agitated and oriented x1)  Interdisciplinary Rounds: did not attend  General Information Comments: Remote assessment for coverage. Pt admitted with increased weakness/fatigue due to AC out at home, decreased PO and melena per pt's daughter who is her caregiver. Pt has been bedbound since getting COVID about a year ago and dependent on daughter for all ADLs. LBM 7/3, 25-50% PO intake at meals/ NPO at midnight for EGD tomorrow. ST recommending pureed diet/nectar thick liquids, noted with very poor dentitian and oral hygeine and unable to swallow K pill per RN. Wt appears to fluctuate per history. -225 lbs x 1 year, current wt slightly under UBW.  Nutrition Discharge Planning: pending clinical course    Nutrition Risk Screen    Nutrition Risk Screen: dysphagia or difficulty swallowing    Nutrition/Diet History  Spiritual, Cultural Beliefs, Jain  "Practices, Values that Affect Care: yes  Factors Affecting Nutritional Intake: chewing difficulties/inability to chew food, difficulty/impaired swallowing, decreased appetite    Anthropometrics  Temp: 97.1 °F (36.2 °C)  Height Method: Stated  Height: 5' 3" (160 cm)  Height (inches): 63 in  Weight Method: Bed Scale  Weight: 86.6 kg (190 lb 14.7 oz)  Weight (lb): 190.92 lb  Ideal Body Weight (IBW), Female: 115 lb  % Ideal Body Weight, Female (lb): 166.02 %  BMI (Calculated): 33.8       Lab/Procedures/Meds  Pertinent Labs Reviewed: reviewed  Pertinent Labs Comments: H/H 9/28.2, MCV 68, Ca 8.6, Cr 0.4  Pertinent Medications Reviewed: reviewed  Pertinent Medications Comments: amlodipine, ferrous sulfate, pantoprazole    Estimated/Assessed Needs  Weight Used For Calorie Calculations: 86.2 kg (190 lb)  Energy Calorie Requirements (kcal): 1350 kcal/day based on MSJ x no AF for BMI > 30  Energy Need Method: [place]  Protein Requirements:  g/day based on 1-1.2 g/kg  Weight Used For Protein Calculations: 86.2 kg (190 lb)  Fluid Requirements (mL): 1mL/kcal or per MD  Estimated Fluid Requirement Method: RDA Method  RDA Method (mL): 1350       Nutrition Prescription Ordered  Current Diet Order: Pureed, nectar thick liquids  Nutrition Order Comments: NPO at midnight for EGD    Evaluation of Received Nutrient/Fluid Intake  Energy Calories Required: not meeting needs  Protein Required: not meeting needs  Fluid Required: not meeting needs  Comments: LBM 7/3  Tolerance: tolerating  % Intake of Estimated Energy Needs: 25 - 50 %  % Meal Intake: 25 - 50 %    Nutrition Risk  Level of Risk/Frequency of Follow-up:  (1-2x weekly)     Monitor and Evaluation  Food and Nutrient Intake: energy intake, food and beverage intake  Food and Nutrient Adminstration: diet order  Knowledge/Beliefs/Attitudes: food and nutrition knowledge/skill  Physical Activity and Function: nutrition-related ADLs and IADLs  Anthropometric Measurements: " body mass index, weight, weight change  Biochemical Data, Medical Tests and Procedures: electrolyte and renal panel, lipid profile, gastrointestinal profile, glucose/endocrine profile, inflammatory profile  Nutrition-Focused Physical Findings: overall appearance, extremities, muscles and bones, skin     Nutrition Follow-Up  RD Follow-up?: Yes

## 2022-07-04 NOTE — SUBJECTIVE & OBJECTIVE
Interval History:  No acute overnight events.  Patient remained afebrile.  Denies any hematemesis or blood in her stools.  Denies any weaknesses. Tolerating diet.    Review of Systems   Constitutional:  Positive for appetite change and fatigue. Negative for chills and diaphoresis.        Patient bedbound   HENT:  Negative for congestion and trouble swallowing.    Eyes:  Negative for photophobia and visual disturbance.   Respiratory:  Negative for cough and shortness of breath.    Cardiovascular:  Negative for chest pain, palpitations and leg swelling.   Gastrointestinal:  Negative for abdominal pain, constipation, diarrhea, nausea and vomiting.   Genitourinary:  Negative for difficulty urinating, dysuria and hematuria.   Musculoskeletal:  Negative for back pain and joint swelling.   Neurological:  Positive for weakness (at baseline). Negative for dizziness, light-headedness and headaches.   Psychiatric/Behavioral:  Positive for decreased concentration. Negative for confusion and hallucinations.      Objective:     Vital Signs (Most Recent):  Temp: 97.1 °F (36.2 °C) (07/04/22 1106)  Pulse: 98 (07/04/22 1106)  Resp: 18 (07/04/22 1106)  BP: 119/68 (07/04/22 1106)  SpO2: 95 % (07/04/22 1106) Vital Signs (24h Range):  Temp:  [96.9 °F (36.1 °C)-99.2 °F (37.3 °C)] 97.1 °F (36.2 °C)  Pulse:  [] 98  Resp:  [18-19] 18  SpO2:  [94 %-97 %] 95 %  BP: (113-146)/(63-78) 119/68     Weight: 86.6 kg (190 lb 14.7 oz)  Body mass index is 33.82 kg/m².    Intake/Output Summary (Last 24 hours) at 7/4/2022 1156  Last data filed at 7/4/2022 1100  Gross per 24 hour   Intake 100 ml   Output 1450 ml   Net -1350 ml      Physical Exam  Vitals and nursing note reviewed.   Constitutional:       General: She is not in acute distress.     Appearance: She is normal weight. She is ill-appearing (chronically ill appearing , weak , frail).      Comments: Elderly female, sitting up in bed. Appears in no acute distress.    HENT:      Head:  Atraumatic.      Right Ear: External ear normal.      Left Ear: External ear normal.      Nose: Nose normal. No congestion.      Mouth/Throat:      Mouth: Mucous membranes are moist.   Eyes:      Extraocular Movements: Extraocular movements intact.      Pupils: Pupils are equal, round, and reactive to light.   Cardiovascular:      Rate and Rhythm: Normal rate and regular rhythm.      Heart sounds: No murmur heard.    No gallop.   Pulmonary:      Effort: No respiratory distress.      Breath sounds: No wheezing or rales.   Abdominal:      General: There is no distension.      Tenderness: There is no abdominal tenderness. There is no guarding.   Musculoskeletal:      Cervical back: No tenderness.      Right lower leg: No edema.      Left lower leg: No edema.   Skin:     General: Skin is dry.   Neurological:      Mental Status: She is alert. Mental status is at baseline.      Motor: Weakness present.   Psychiatric:         Mood and Affect: Affect is flat.         Speech: Speech is delayed.         Behavior: Behavior is cooperative.      Comments: Patient is alert and oriented to self, time, , and place.        Significant Labs: All pertinent labs within the past 24 hours have been reviewed.    Significant Imaging: I have reviewed all pertinent imaging results/findings within the past 24 hours.

## 2022-07-05 ENCOUNTER — ANESTHESIA EVENT (OUTPATIENT)
Dept: ENDOSCOPY | Facility: HOSPITAL | Age: 72
DRG: 377 | End: 2022-07-05
Payer: MEDICARE

## 2022-07-05 ENCOUNTER — ANESTHESIA (OUTPATIENT)
Dept: ENDOSCOPY | Facility: HOSPITAL | Age: 72
DRG: 377 | End: 2022-07-05
Payer: MEDICARE

## 2022-07-05 PROBLEM — D50.9 IRON DEFICIENCY ANEMIA: Status: ACTIVE | Noted: 2022-07-03

## 2022-07-05 LAB
ANION GAP SERPL CALC-SCNC: 9 MMOL/L (ref 8–16)
BASOPHILS # BLD AUTO: 0.03 K/UL (ref 0–0.2)
BASOPHILS NFR BLD: 0.4 % (ref 0–1.9)
BUN SERPL-MCNC: 6 MG/DL (ref 8–23)
CALCIUM SERPL-MCNC: 8.9 MG/DL (ref 8.7–10.5)
CHLORIDE SERPL-SCNC: 106 MMOL/L (ref 95–110)
CO2 SERPL-SCNC: 24 MMOL/L (ref 23–29)
CREAT SERPL-MCNC: 0.4 MG/DL (ref 0.5–1.4)
DIFFERENTIAL METHOD: ABNORMAL
EOSINOPHIL # BLD AUTO: 0.1 K/UL (ref 0–0.5)
EOSINOPHIL NFR BLD: 1.5 % (ref 0–8)
ERYTHROCYTE [DISTWIDTH] IN BLOOD BY AUTOMATED COUNT: 20.8 % (ref 11.5–14.5)
EST. GFR  (AFRICAN AMERICAN): >60 ML/MIN/1.73 M^2
EST. GFR  (NON AFRICAN AMERICAN): >60 ML/MIN/1.73 M^2
GLUCOSE SERPL-MCNC: 81 MG/DL (ref 70–110)
HCT VFR BLD AUTO: 27.1 % (ref 37–48.5)
HGB BLD-MCNC: 8.7 G/DL (ref 12–16)
IMM GRANULOCYTES # BLD AUTO: 0.03 K/UL (ref 0–0.04)
IMM GRANULOCYTES NFR BLD AUTO: 0.4 % (ref 0–0.5)
LYMPHOCYTES # BLD AUTO: 2.7 K/UL (ref 1–4.8)
LYMPHOCYTES NFR BLD: 33 % (ref 18–48)
MAGNESIUM SERPL-MCNC: 2.1 MG/DL (ref 1.6–2.6)
MCH RBC QN AUTO: 22 PG (ref 27–31)
MCHC RBC AUTO-ENTMCNC: 32.1 G/DL (ref 32–36)
MCV RBC AUTO: 69 FL (ref 82–98)
MONOCYTES # BLD AUTO: 0.9 K/UL (ref 0.3–1)
MONOCYTES NFR BLD: 11.1 % (ref 4–15)
NEUTROPHILS # BLD AUTO: 4.4 K/UL (ref 1.8–7.7)
NEUTROPHILS NFR BLD: 53.6 % (ref 38–73)
NRBC BLD-RTO: 0 /100 WBC
PLATELET # BLD AUTO: 416 K/UL (ref 150–450)
PMV BLD AUTO: 8.7 FL (ref 9.2–12.9)
POTASSIUM SERPL-SCNC: 3.3 MMOL/L (ref 3.5–5.1)
RBC # BLD AUTO: 3.95 M/UL (ref 4–5.4)
SODIUM SERPL-SCNC: 139 MMOL/L (ref 136–145)
WBC # BLD AUTO: 8.12 K/UL (ref 3.9–12.7)

## 2022-07-05 PROCEDURE — D9220A PRA ANESTHESIA: Mod: CRNA,,, | Performed by: STUDENT IN AN ORGANIZED HEALTH CARE EDUCATION/TRAINING PROGRAM

## 2022-07-05 PROCEDURE — 37000009 HC ANESTHESIA EA ADD 15 MINS: Performed by: INTERNAL MEDICINE

## 2022-07-05 PROCEDURE — D9220A PRA ANESTHESIA: ICD-10-PCS | Mod: CRNA,,, | Performed by: STUDENT IN AN ORGANIZED HEALTH CARE EDUCATION/TRAINING PROGRAM

## 2022-07-05 PROCEDURE — 85025 COMPLETE CBC W/AUTO DIFF WBC: CPT | Performed by: STUDENT IN AN ORGANIZED HEALTH CARE EDUCATION/TRAINING PROGRAM

## 2022-07-05 PROCEDURE — 86580 TB INTRADERMAL TEST: CPT | Performed by: HOSPITALIST

## 2022-07-05 PROCEDURE — 80048 BASIC METABOLIC PNL TOTAL CA: CPT | Performed by: STUDENT IN AN ORGANIZED HEALTH CARE EDUCATION/TRAINING PROGRAM

## 2022-07-05 PROCEDURE — 43235 PR EGD, FLEX, DIAGNOSTIC: ICD-10-PCS | Mod: ,,, | Performed by: INTERNAL MEDICINE

## 2022-07-05 PROCEDURE — 63600175 PHARM REV CODE 636 W HCPCS: Performed by: STUDENT IN AN ORGANIZED HEALTH CARE EDUCATION/TRAINING PROGRAM

## 2022-07-05 PROCEDURE — 43235 EGD DIAGNOSTIC BRUSH WASH: CPT | Mod: ,,, | Performed by: INTERNAL MEDICINE

## 2022-07-05 PROCEDURE — 30200315 PPD INTRADERMAL TEST REV CODE 302: Performed by: HOSPITALIST

## 2022-07-05 PROCEDURE — 99221 PR INITIAL HOSPITAL CARE,LEVL I: ICD-10-PCS | Mod: ,,, | Performed by: NURSE PRACTITIONER

## 2022-07-05 PROCEDURE — D9220A PRA ANESTHESIA: ICD-10-PCS | Mod: ANES,,, | Performed by: ANESTHESIOLOGY

## 2022-07-05 PROCEDURE — 83735 ASSAY OF MAGNESIUM: CPT | Performed by: STUDENT IN AN ORGANIZED HEALTH CARE EDUCATION/TRAINING PROGRAM

## 2022-07-05 PROCEDURE — D9220A PRA ANESTHESIA: Mod: ANES,,, | Performed by: ANESTHESIOLOGY

## 2022-07-05 PROCEDURE — 37000008 HC ANESTHESIA 1ST 15 MINUTES: Performed by: INTERNAL MEDICINE

## 2022-07-05 PROCEDURE — 25000003 PHARM REV CODE 250: Performed by: STUDENT IN AN ORGANIZED HEALTH CARE EDUCATION/TRAINING PROGRAM

## 2022-07-05 PROCEDURE — 36415 COLL VENOUS BLD VENIPUNCTURE: CPT | Performed by: STUDENT IN AN ORGANIZED HEALTH CARE EDUCATION/TRAINING PROGRAM

## 2022-07-05 PROCEDURE — 99221 1ST HOSP IP/OBS SF/LOW 40: CPT | Mod: ,,, | Performed by: NURSE PRACTITIONER

## 2022-07-05 PROCEDURE — 21400001 HC TELEMETRY ROOM

## 2022-07-05 PROCEDURE — 43235 EGD DIAGNOSTIC BRUSH WASH: CPT | Performed by: INTERNAL MEDICINE

## 2022-07-05 PROCEDURE — 25000003 PHARM REV CODE 250: Performed by: INTERNAL MEDICINE

## 2022-07-05 RX ORDER — PROPOFOL 10 MG/ML
INJECTION, EMULSION INTRAVENOUS
Status: DISPENSED
Start: 2022-07-05 | End: 2022-07-06

## 2022-07-05 RX ORDER — LIDOCAINE HYDROCHLORIDE 20 MG/ML
INJECTION INTRAVENOUS
Status: DISCONTINUED | OUTPATIENT
Start: 2022-07-05 | End: 2022-07-05

## 2022-07-05 RX ORDER — PROPOFOL 10 MG/ML
VIAL (ML) INTRAVENOUS
Status: DISCONTINUED | OUTPATIENT
Start: 2022-07-05 | End: 2022-07-05

## 2022-07-05 RX ORDER — SODIUM CHLORIDE 9 MG/ML
INJECTION, SOLUTION INTRAVENOUS CONTINUOUS
Status: DISCONTINUED | OUTPATIENT
Start: 2022-07-05 | End: 2022-07-10

## 2022-07-05 RX ORDER — LIDOCAINE HYDROCHLORIDE 20 MG/ML
INJECTION, SOLUTION EPIDURAL; INFILTRATION; INTRACAUDAL; PERINEURAL
Status: DISPENSED
Start: 2022-07-05 | End: 2022-07-06

## 2022-07-05 RX ORDER — POTASSIUM CHLORIDE 750 MG/1
30 TABLET, EXTENDED RELEASE ORAL ONCE
Status: DISCONTINUED | OUTPATIENT
Start: 2022-07-05 | End: 2022-07-06

## 2022-07-05 RX ADMIN — PROPOFOL 10 MG: 10 INJECTION, EMULSION INTRAVENOUS at 02:07

## 2022-07-05 RX ADMIN — SODIUM CHLORIDE: 0.9 INJECTION, SOLUTION INTRAVENOUS at 02:07

## 2022-07-05 RX ADMIN — LIDOCAINE HYDROCHLORIDE 50 MG: 20 INJECTION, SOLUTION INTRAVENOUS at 02:07

## 2022-07-05 RX ADMIN — PROPOFOL 50 MG: 10 INJECTION, EMULSION INTRAVENOUS at 02:07

## 2022-07-05 RX ADMIN — TUBERCULIN PURIFIED PROTEIN DERIVATIVE 5 UNITS: 5 INJECTION, SOLUTION INTRADERMAL at 04:07

## 2022-07-05 RX ADMIN — PANTOPRAZOLE SODIUM 40 MG: 40 TABLET, DELAYED RELEASE ORAL at 09:07

## 2022-07-05 NOTE — PLAN OF CARE
Problem: Adjustment to Illness (Gastrointestinal Bleeding)  Goal: Optimal Coping with Acute Illness  Intervention: Optimize Psychosocial Response  Flowsheets (Taken 7/5/2022 1709)  Supportive Measures:   active listening utilized   positive reinforcement provided   relaxation techniques promoted     Problem: Coping Ineffective  Goal: Effective Coping  Intervention: Support and Enhance Coping Strategies  Flowsheets (Taken 7/5/2022 1709)  Supportive Measures:   active listening utilized   positive reinforcement provided   relaxation techniques promoted  Environmental Support:   calm environment promoted   rest periods encouraged

## 2022-07-05 NOTE — PLAN OF CARE
SW spoke to pt's daughter, Lana.Pt's daughter stated she could no longer take care of pt and  would like  nursing home placement. Pt's daughter first preference is [facility]. SW inquired if she had any other preferences. Daughter agreed for SW to send referrals to [facility].     07/05/22 0905   Post-Acute Status   Post-Acute Authorization Placement  (NH referral sent.)   Coverage MEDICARE - MEDICARE PART A & B   Patient choice form signed by patient/caregiver List from System Post-Acute Care   Discharge Delays (!) Post-Acute Set-up   Discharge Plan   Discharge Plan A New Nursing Home placement - [facility] care facility   Discharge Plan B New Nursing Home placement - [facility] care facility

## 2022-07-05 NOTE — SUBJECTIVE & OBJECTIVE
Interval History: No acute events overnight.  Denies pain and has had no further evidence of bleeding.  States she is very hungry and weak.  All questions answered and patient had no further complaints.    Review of Systems   Constitutional:  Positive for activity change and fatigue. Negative for chills and diaphoresis.        Patient bedbound   HENT:  Negative for congestion and trouble swallowing.    Eyes:  Negative for photophobia and visual disturbance.   Respiratory:  Negative for cough and shortness of breath.    Cardiovascular:  Negative for chest pain, palpitations and leg swelling.   Gastrointestinal:  Negative for abdominal pain, constipation, diarrhea, nausea and vomiting.   Genitourinary:  Negative for difficulty urinating, dysuria and hematuria.   Musculoskeletal:  Negative for back pain and joint swelling.   Neurological:  Positive for weakness (at baseline). Negative for dizziness, light-headedness and headaches.   Psychiatric/Behavioral:  Positive for dysphoric mood. Negative for confusion, decreased concentration and hallucinations.      Objective:     Vital Signs (Most Recent):  Temp: 97.9 °F (36.6 °C) (07/05/22 1335)  Pulse: 96 (07/05/22 1335)  Resp: 18 (07/05/22 1335)  BP: 122/69 (07/05/22 1335)  SpO2: 98 % (07/05/22 1335) Vital Signs (24h Range):  Temp:  [96.8 °F (36 °C)-99.3 °F (37.4 °C)] 97.9 °F (36.6 °C)  Pulse:  [] 96  Resp:  [18-20] 18  SpO2:  [95 %-98 %] 98 %  BP: (100-122)/(60-80) 122/69     Weight: 86.6 kg (190 lb 14.7 oz)  Body mass index is 33.82 kg/m².    Intake/Output Summary (Last 24 hours) at 7/5/2022 1426  Last data filed at 7/5/2022 0641  Gross per 24 hour   Intake --   Output 500 ml   Net -500 ml        Physical Exam  Vitals and nursing note reviewed.   Constitutional:       General: She is not in acute distress.     Appearance: She is normal weight. She is not ill-appearing (chronically weak, frail), toxic-appearing or diaphoretic.      Comments: Elderly female, sitting  up in bed. Appears in no acute distress.    HENT:      Head: Atraumatic.      Right Ear: External ear normal.      Left Ear: External ear normal.      Nose: Nose normal. No congestion.      Mouth/Throat:      Mouth: Mucous membranes are moist.      Pharynx: Oropharynx is clear.   Eyes:      Extraocular Movements: Extraocular movements intact.      Pupils: Pupils are equal, round, and reactive to light.   Cardiovascular:      Rate and Rhythm: Regular rhythm. Tachycardia present.      Heart sounds: No murmur heard.    No gallop.   Pulmonary:      Effort: No respiratory distress.      Breath sounds: No wheezing or rales.   Abdominal:      General: There is no distension.      Tenderness: There is no abdominal tenderness. There is no guarding.   Musculoskeletal:      Cervical back: Normal range of motion. No rigidity or tenderness.      Right lower leg: No edema.      Left lower leg: No edema.   Skin:     General: Skin is dry.   Neurological:      Mental Status: She is alert. Mental status is at baseline.      Motor: Weakness present.      Comments: Diffusely weak   Psychiatric:         Mood and Affect: Affect is labile. Affect is not flat.         Speech: Speech is not delayed.         Behavior: Behavior is cooperative.      Comments: Patient is alert and oriented to self, time, , and place.        Significant Labs: All pertinent labs within the past 24 hours have been reviewed.    Significant Imaging: I have reviewed all pertinent imaging results/findings within the past 24 hours.

## 2022-07-05 NOTE — NURSING
Report received from off going nurse, RAJENDRA Rankin. Patient AA resting with eyes closed, rise and fall of chest noted. No signs of distress noted. Call light in reach. Bed low and locked. Will continue plan of care.

## 2022-07-05 NOTE — PT/OT/SLP PROGRESS
Speech Language Pathology      Holly Kidd  MRN: [id number]    Patient not seen today secondary to  EGD. Will follow. Marcie Chase, PATRICIA-SLP

## 2022-07-05 NOTE — PROGRESS NOTES
"[facility] Medicine  Progress Note    Patient Name: Holly Kidd  MRN: [id number]  Patient Class: IP- Inpatient   Admission Date: 7/2/2022  Length of Stay: 3 days  Attending Physician: Colton Maharaj MD  Primary Care Provider: DENYS Sterling        Subjective:     Principal Problem:GI bleed due to NSAIDs        HPI:  Ms. Kidd is a 71 year old female with PMH of CVA ( facial droop and left sided weakness) , HTN, Dementia ( at baseline agitated and oriented x1) is being admitted from the ER after presenting for general malize, weakness and fatigue when the AC went out as well as daughter reporting patient has been eating less and noted to have what is described as melena this AM x1. She described a "motor oil black" tarry stool by patient but denies diarrhea or vomiting. Reports that mother refuses to go to the doctor and only takes the merari aspirin and amlodipine. She is completely bedbound and total care with daughter providing all ADL's . Daughter has a large caregiver burden. There was a report of epigastric pain but patient denies this. Noted to have a potassium of 2.7 in ER and denies any vomiting or diarrhea. No chills, shortness of breath, nausea or other associated symptoms. She currently only reports she is "cold" and does not like people "sticking her" with needles.      Note*Patient's daughter denies altered mental status and notes that the patient is agitated and physically/verbally abusive to others at baseline. She recognizes only her daughter usually and calls her by a nickname.       Overview/Hospital Course:  71 year old female with PMH of CVA ( facial droop and left sided weakness) , HTN, Dementia, and bedbound admitted on 07/02/2022 for hypokalemia and anemia, with suspicion for upper GI bleed.     Chest x-ray unremarkable.  Patient presented with complaints fatigue and weakness also with melena. Hgb on admit 9.6, down from baseline of 12. Potassium replaced. GI " consulted- recommends IV PPI BID and EGD on 07/05. Family is in agreement. Hemoglobin stable.  Continue to monitor       Interval History: No acute events overnight.  Denies pain and has had no further evidence of bleeding.  States she is very hungry and weak.  All questions answered and patient had no further complaints.    Review of Systems   Constitutional:  Positive for activity change and fatigue. Negative for chills and diaphoresis.        Patient bedbound   HENT:  Negative for congestion and trouble swallowing.    Eyes:  Negative for photophobia and visual disturbance.   Respiratory:  Negative for cough and shortness of breath.    Cardiovascular:  Negative for chest pain, palpitations and leg swelling.   Gastrointestinal:  Negative for abdominal pain, constipation, diarrhea, nausea and vomiting.   Genitourinary:  Negative for difficulty urinating, dysuria and hematuria.   Musculoskeletal:  Negative for back pain and joint swelling.   Neurological:  Positive for weakness (at baseline). Negative for dizziness, light-headedness and headaches.   Psychiatric/Behavioral:  Positive for dysphoric mood. Negative for confusion, decreased concentration and hallucinations.      Objective:     Vital Signs (Most Recent):  Temp: 97.9 °F (36.6 °C) (07/05/22 1335)  Pulse: 96 (07/05/22 1335)  Resp: 18 (07/05/22 1335)  BP: 122/69 (07/05/22 1335)  SpO2: 98 % (07/05/22 1335) Vital Signs (24h Range):  Temp:  [96.8 °F (36 °C)-99.3 °F (37.4 °C)] 97.9 °F (36.6 °C)  Pulse:  [] 96  Resp:  [18-20] 18  SpO2:  [95 %-98 %] 98 %  BP: (100-122)/(60-80) 122/69     Weight: 86.6 kg (190 lb 14.7 oz)  Body mass index is 33.82 kg/m².    Intake/Output Summary (Last 24 hours) at 7/5/2022 9072  Last data filed at 7/5/2022 0641  Gross per 24 hour   Intake --   Output 500 ml   Net -500 ml        Physical Exam  Vitals and nursing note reviewed.   Constitutional:       General: She is not in acute distress.     Appearance: She is normal weight. She  is not ill-appearing (chronically weak, frail), toxic-appearing or diaphoretic.      Comments: Elderly female, sitting up in bed. Appears in no acute distress.    HENT:      Head: Atraumatic.      Right Ear: External ear normal.      Left Ear: External ear normal.      Nose: Nose normal. No congestion.      Mouth/Throat:      Mouth: Mucous membranes are moist.      Pharynx: Oropharynx is clear.   Eyes:      Extraocular Movements: Extraocular movements intact.      Pupils: Pupils are equal, round, and reactive to light.   Cardiovascular:      Rate and Rhythm: Regular rhythm. Tachycardia present.      Heart sounds: No murmur heard.    No gallop.   Pulmonary:      Effort: No respiratory distress.      Breath sounds: No wheezing or rales.   Abdominal:      General: There is no distension.      Tenderness: There is no abdominal tenderness. There is no guarding.   Musculoskeletal:      Cervical back: Normal range of motion. No rigidity or tenderness.      Right lower leg: No edema.      Left lower leg: No edema.   Skin:     General: Skin is dry.   Neurological:      Mental Status: She is alert. Mental status is at baseline.      Motor: Weakness present.      Comments: Diffusely weak   Psychiatric:         Mood and Affect: Affect is labile. Affect is not flat.         Speech: Speech is not delayed.         Behavior: Behavior is cooperative.      Comments: Patient is alert and oriented to self, time, , and place.        Significant Labs: All pertinent labs within the past 24 hours have been reviewed.    Significant Imaging: I have reviewed all pertinent imaging results/findings within the past 24 hours.      Assessment/Plan:      * GI bleed due to NSAIDs  -Admitted to inpatient status  -Baseline Hb near 12  -On admit Hb 9.6 and reported melena  -GI consulted and patient going for EGD today.  -Holding home aspirin  -Continue protonix bid for now.  -Monitor H/H and transfuse for Hb less than 7.  She is iron deficient -  Tsat 7%  -Continue oral iron supplement     Iron deficiency anemia  -Likely due to GI bleeding  -Treatment as above.    Hypokalemia  -Replace potassium today.  -Check BMP and Mag in AM.    Cerebrovascular disease  -History noted  -Has chronic L sided deficits and facial droop and is wheelchair dependent at home  -Daughter no longer able to care for her at home  -Pursuing NH placement    Debility  -Due to stroke and chronic  -PT/OT consulted  -Daughter states she can no longer care for her mom and would like to proceed with skilled nursing care at NH. Prefer [facility]  -CM/SW consulted to assist with placement    Wheelchair dependence  -Baseline - daughter reports she slumps over in the wheelchair and she has to prop her up     Advanced care planning/counseling discussion  Advance Care Planning  Date: 07/03/2022 Code Status  In light of the patients advanced and life limiting illness,I engaged the the patient, family and healthcare power of   in a conversation about the patient's preferences for care  at the very end of life. The patient wishes to have CPR and cardio version if needed with medications when her heart stops. However, patient does not want to be intubated or placed on mechanical ventilation when her breathing stops. I communicated to the patient, family and healthcare power of   that her wishes align with partial code status.    Code status: Partial code. Patient agrees to CPR/cardioversion but does not want to be intubated or mechanically ventilated.   -Partial code status with CPR/cardioversion but without possibility of securing airway is considered illogical by many physicians.  Will consult palliative care to continue goals of care discussion and hopefully pursue more straight forward code status - either full code or DNR.    Obesity (BMI 30.0-34.9)  Body mass index is 33.82 kg/m². Morbid obesity complicates all aspects of disease management from diagnostic modalities to treatment.  Weight loss encouraged and health benefits explained to patient.     Essential hypertension  -Well controlled  -Continue norvasc      VTE Risk Mitigation (From admission, onward)         Ordered     Place sequential compression device  Until discontinued         07/02/22 2111                Discharge Planning   ANISH:      Code Status: Partial Code   Is the patient medically ready for discharge?:     Reason for patient still in hospital (select all that apply): Treatment  Discharge Plan A: New Nursing Home placement - [facility] care facility   Discharge Delays: (!) Post-Acute Set-up              Colton Maharaj MD  Department of Hospital Medicine   [facility] - [facility]

## 2022-07-05 NOTE — CONSULTS
SW spoke with daughter. Sent referral to [facility] and other nursing homes on the [facility] per daughters request.

## 2022-07-05 NOTE — TRANSFER OF CARE
"Anesthesia Transfer of Care Note    Patient: Holly Kidd    Procedure(s) Performed: Procedure(s) (LRB):  EGD (ESOPHAGOGASTRODUODENOSCOPY) (N/A)    Patient location: GI    Anesthesia Type: MAC    Transport from OR: Transported from OR on room air with adequate spontaneous ventilation    Post pain: adequate analgesia    Post assessment: no apparent anesthetic complications    Post vital signs: stable    Level of consciousness: responds to stimulation    Nausea/Vomiting: no nausea/vomiting    Complications: none    Transfer of care protocol was followed      Last vitals:   Visit Vitals  /63 (BP Location: Right arm, Patient Position: Lying)   Pulse 98   Temp 36.6 °C (97.8 °F) (Oral)   Resp 16   Ht 5' 3" (1.6 m)   Wt 86.6 kg (190 lb 14.7 oz)   SpO2 (!) 93%   Breastfeeding No   BMI 33.82 kg/m²     "

## 2022-07-05 NOTE — ASSESSMENT & PLAN NOTE
-Due to stroke and chronic  -PT/OT consulted  -Daughter states she can no longer care for her mom and would like to proceed with [facility] care at NH. Prefer [facility]  -CM/SW consulted to assist with placement

## 2022-07-05 NOTE — NURSING
Patient returned to unit, no distress noted. Asking for food. Report received from nurse Jack. Will continue plan of care

## 2022-07-05 NOTE — ASSESSMENT & PLAN NOTE
-Admitted to inpatient status  -Baseline Hb near 12  -On admit Hb 9.6 and reported melena  -GI consulted and patient going for EGD today.  -Holding home aspirin  -Continue protonix bid for now.  -Monitor H/H and transfuse for Hb less than 7.  She is iron deficient - Tsat 7%  -Continue oral iron supplement

## 2022-07-05 NOTE — PLAN OF CARE
Procedure and recovery completed without complication. Pt in NAD, vitals recovered to baseline. MD discussed findings with pt and brother at bedside. Criteria for transfer back to floor met at this time. Report given to RAJENDRA Coats. Pt transported to floor via stretcher with transport tech

## 2022-07-05 NOTE — ANESTHESIA PREPROCEDURE EVALUATION
07/05/2022  Holly Kidd is a 71 y.o., female.  To undergo Procedure(s) (LRB):  EGD (ESOPHAGOGASTRODUODENOSCOPY) (N/A)     Denies CP/SOB/GERD/MI/URI symptoms.  CVA with residual left sided weakness.  METS < 4  NPO > 8    Past Medical History:  Past Medical History:   Diagnosis Date    COVID-19 09/09/2021    Essential hypertension 6/12/2015    Obese     Obesity (BMI 35.0-39.9) 6/12/2015    Stroke 06/2015    Left side weakness    Stroke-like symptoms 09/17/2021    NEW LEFT FACIAL DROOP, LEFT SIDE WEAKNESS & SLURRED SPEECH    Wheelchair dependence     Wheelchair dependence        Past Surgical History:  Past Surgical History:   Procedure Laterality Date    HYSTERECTOMY         Social History:  Social History     Socioeconomic History    Marital status: Single   Tobacco Use    Smoking status: Never Smoker    Smokeless tobacco: Never Used   Substance and Sexual Activity    Alcohol use: No    Drug use: No       Medications:  No current facility-administered medications on file prior to encounter.     Current Outpatient Medications on File Prior to Encounter   Medication Sig Dispense Refill    amLODIPine (NORVASC) 10 MG tablet Take 1 tablet (10 mg total) by mouth once daily. Hold if SBP <120 30 tablet 11    aspirin (ECOTRIN) 81 MG EC tablet Take 1 tablet (81 mg total) by mouth once daily. 30 tablet 1    atorvastatin (LIPITOR) 40 MG tablet Take 1 tablet (40 mg total) by mouth once daily. 30 tablet 11    cetirizine (ZYRTEC) 10 MG tablet Take 1 tablet (10 mg total) by mouth once daily. 30 tablet 0    clopidogreL (PLAVIX) 75 mg tablet Take 1 tablet (75 mg total) by mouth once daily. for 21 days 21 tablet 0    ergocalciferol (ERGOCALCIFEROL) 50,000 unit Cap Take 1 capsule (50,000 Units total) by mouth every 7 days. 12 capsule 0    ondansetron (ZOFRAN-ODT) 4 MG TbDL Take 1 tablet (4 mg total) by  mouth every 4 (four) hours as needed (nausea/vomiting). 20 tablet 1    pantoprazole (PROTONIX) 40 MG tablet Take 1 tablet (40 mg total) by mouth once daily. 30 tablet 3    varicella-zoster gE-AS01B, PF, (SHINGRIX) 50 mcg/0.5 mL injection Inject into the muscle. 1 each 0       Allergies:  Review of patient's allergies indicates:  No Known Allergies    Active Problems:  Patient Active Problem List   Diagnosis    Essential hypertension    Hypokalemia    Obesity (BMI 35.0-39.9)    Acute ischemic stroke    Wheelchair dependence    Cerebrovascular disease    History of stroke    Syncope    Vitamin D deficiency    Facial droop    Chest pain    Leukocytosis    GI bleed due to NSAIDs    Anemia    Advanced care planning/counseling discussion    Debility       Diagnostic Studies:    CBC:  Recent Labs   Lab 07/05/22  0430   WBC 8.12   RBC 3.95*   HGB 8.7*   HCT 27.1*      MCV 69*   MCH 22.0*   MCHC 32.1      CMP:  Recent Labs   Lab 07/05/22  0430   CALCIUM 8.9      K 3.3*   CO2 24      BUN 6*   CREATININE 0.4*     EKG (7/2/22):  Sinus tachycardia   Left axis deviation   Cannot rule out Inferior infarct (cited on or before 25-NOV-2021)     Nuclear Stress (10/1/21):    Normal myocardial perfusion scan. There is no evidence of myocardial ischemia or infarction.    The gated perfusion images showed an ejection fraction of 84% post stress.    The EKG portion of this study is negative for ischemia.    The patient reported no chest pain during the stress test.    There were no arrhythmias during stress.    TTE (10/1/21):  · The left ventricle is normal in size with concentric hypertrophy and normal systolic function.  · The estimated ejection fraction is 60%.  · Normal left ventricular diastolic function.  · Normal right ventricular size with normal right ventricular systolic function.  · Normal central venous pressure (3 mmHg).    24 Hour Vitals:  Temp:  [36 °C (96.8 °F)-37.4 °C (99.3 °F)]  37.4 °C (99.3 °F)  Pulse:  [] 103  Resp:  [18-20] 20  SpO2:  [95 %-97 %] 95 %  BP: (101-121)/(60-80) 121/80   See Nursing Charting For Additional Vitals      Pre-op Assessment    I have reviewed the Patient Summary Reports.     I have reviewed the Nursing Notes.       Review of Systems  Anesthesia Hx:  No problems with previous Anesthesia   Denies Personal Hx of Anesthesia complications.   Social:  Non-Smoker, No Alcohol Use    Hematology/Oncology:         -- Anemia:   Cardiovascular:   Exercise tolerance: poor Hypertension ECG has been reviewed.    Pulmonary:  Pulmonary Normal    Hepatic/GI:  Hepatic/GI Normal    Neurological:   CVA, residual symptoms Residual L side weakness   Endocrine:  Endocrine Normal        Physical Exam  General: Well nourished    Airway:  Mallampati: III   Mouth Opening: Normal  TM Distance: Normal      Chest/Lungs:  Clear to auscultation    Heart:  Rate: Normal  Rhythm: Regular Rhythm        Anesthesia Plan  Type of Anesthesia, risks & benefits discussed:    Anesthesia Type: Gen ETT, MAC  Intra-op Monitoring Plan: Standard ASA Monitors  Post Op Pain Control Plan: multimodal analgesia  Induction:  IV  Informed Consent: Informed consent signed with the Patient representative and all parties understand the risks and agree with anesthesia plan.  All questions answered.   ASA Score: 3    Ready For Surgery From Anesthesia Perspective.     .

## 2022-07-05 NOTE — PROVATION PATIENT INSTRUCTIONS
Discharge Summary/Instructions after an Endoscopic Procedure  Patient Name: Holly Kidd  Patient MRN: [id number]  Patient YOB: 1950  Tuesday, July 5, 2022  Wilma Munguia MD  Dear patient,  As a result of recent federal legislation (The Federal Cures Act), you may   receive lab or pathology results from your procedure in your [facility]   account before your physician is able to contact you. Your physician or   their representative will relay the results to you with their   recommendations at their soonest availability.  Thank you,  RESTRICTIONS:  During your procedure today, you received medications for sedation.  These   medications may affect your judgment, balance and coordination.  Therefore,   for 24 hours, you have the following restrictions:   - DO NOT drive a car, operate machinery, make legal/financial decisions,   sign important papers or drink alcohol.    ACTIVITY:  Today: no heavy lifting, straining or running due to procedural   sedation/anesthesia.  The following day: return to full activity including work.  DIET:  Eat and drink normally unless instructed otherwise.     TREATMENT FOR COMMON SIDE EFFECTS:  - Mild abdominal pain, nausea, belching, bloating or excessive gas:  rest,   eat lightly and use a heating pad.  - Sore Throat: treat with throat lozenges and/or gargle with warm salt   water.  - Because air was used during the procedure, expelling large amounts of air   from your rectum or belching is normal.  - If a bowel prep was taken, you may not have a bowel movement for 1-3 days.    This is normal.  SYMPTOMS TO WATCH FOR AND REPORT TO YOUR PHYSICIAN:  1. Abdominal pain or bloating, other than gas cramps.  2. Chest pain.  3. Back pain.  4. Signs of infection such as: chills or fever occurring within 24 hours   after the procedure.  5. Rectal bleeding, which would show as bright red, maroon, or black stools.   (A tablespoon of blood from the rectum is not serious, especially if    hemorrhoids are present.)  6. Vomiting.  7. Weakness or dizziness.  GO DIRECTLY TO THE NEAREST EMERGENCY ROOM IF YOU HAVE ANY OF THE FOLLOWING:      Difficulty breathing              Chills and/or fever over 101 F   Persistent vomiting and/or vomiting blood   Severe abdominal pain   Severe chest pain   Black, tarry stools   Bleeding- more than one tablespoon   Any other symptom or condition that you feel may need urgent attention  Your doctor recommends these additional instructions:  If any biopsies were taken, your doctors clinic will contact you in 1 to 2   weeks with any results.  - Return patient to hospital [facility] for ongoing care.   - Advance diet as tolerated.   - Continue present medications.  For questions, problems or results please call your physician - Wilma Munguia MD at Work:  [phone].  [facility] can be reached at [phone]     IF A COMPLICATION OR EMERGENCY SITUATION ARISES AND YOU ARE UNABLE TO REACH   YOUR PHYSICIAN - GO DIRECTLY TO THE EMERGENCY ROOM.  Wilma Munguia MD  7/5/2022 3:01:45 PM  This report has been verified and signed electronically.  Dear patient,  As a result of recent federal legislation (The Federal Cures Act), you may   receive lab or pathology results from your procedure in your [facility]   account before your physician is able to contact you. Your physician or   their representative will relay the results to you with their   recommendations at their soonest availability.  Thank you,  PROVATION

## 2022-07-05 NOTE — PLAN OF CARE
[facility] - Telemetry  Discharge Reassessment    Primary Care Provider: [facility]    Expected Discharge Date: Pending    SW discussed discharge planning with daughter. Daughter unable to care for pt and would like residential placement. Daughter prefers [facility]. SW inquired if referrals could be sent to other [facility] asa plan B. Daughter agreed and referral were sent via [facility]. Referral to [facility] in review.    Reassessment (most recent)       Discharge Reassessment - 07/05/22 1258          Discharge Reassessment    Assessment Type Discharge Planning Reassessment (P)      Did the patient's condition or plan change since previous assessment? No (P)      Name(s) and Number(s) Lana [phone] (P)      Discharge Plan A New Nursing Home placement - residential care facility (P)      Discharge Plan B New Nursing Home placement - residential care facility (P)      DME Needed Upon Discharge  walker, standard;wheelchair (P)         Post-Acute Status    Post-Acute Authorization Placement (P)    half-way Nursing Home placement.    Post-Acute Placement Status Pending post-acute provider review/more information requested (P)      Coverage MEDICARE - MEDICARE PART A & B (P)      Patient choice form signed by patient/caregiver List from System Post-Acute Care (P)      Discharge Delays Post-Acute Set-up (P)

## 2022-07-05 NOTE — ASSESSMENT & PLAN NOTE
-History noted  -Has chronic L sided deficits and facial droop and is wheelchair dependent at home  -Daughter no longer able to care for her at home  -Pursuing NH placement

## 2022-07-05 NOTE — ASSESSMENT & PLAN NOTE
Advance Care Planning  Date: 07/03/2022 Code Status  In light of the patients advanced and life limiting illness,I engaged the the patient, family and healthcare power of   in a conversation about the patient's preferences for care  at the very end of life. The patient wishes to have CPR and cardio version if needed with medications when her heart stops. However, patient does not want to be intubated or placed on mechanical ventilation when her breathing stops. I communicated to the patient, family and healthcare power of   that her wishes align with partial code status.     Code status: Partial code. Patient agrees to CPR/cardioversion but does not want to be intubated or mechanically ventilated.   -Partial code status with CPR/cardioversion but without possibility of securing airway is considered illogical by many physicians.  Will consult palliative care to continue goals of care discussion and hopefully pursue more straight forward code status - either full code or DNR.

## 2022-07-05 NOTE — PT/OT/SLP PROGRESS
Occupational Therapy      Patient Name:  Holly Kidd   MRN:  [id number]    Patient not seen today secondary to Other (Comment) (off the floor for EGD). Will follow-up tomorrow.    7/5/2022

## 2022-07-06 LAB
ANION GAP SERPL CALC-SCNC: 12 MMOL/L (ref 8–16)
BACTERIA BLD CULT: NORMAL
BACTERIA BLD CULT: NORMAL
BASOPHILS # BLD AUTO: 0.03 K/UL (ref 0–0.2)
BASOPHILS NFR BLD: 0.3 % (ref 0–1.9)
BUN SERPL-MCNC: 5 MG/DL (ref 8–23)
CALCIUM SERPL-MCNC: 9.2 MG/DL (ref 8.7–10.5)
CHLORIDE SERPL-SCNC: 104 MMOL/L (ref 95–110)
CO2 SERPL-SCNC: 23 MMOL/L (ref 23–29)
CREAT SERPL-MCNC: 0.4 MG/DL (ref 0.5–1.4)
DIFFERENTIAL METHOD: ABNORMAL
EOSINOPHIL # BLD AUTO: 0.1 K/UL (ref 0–0.5)
EOSINOPHIL NFR BLD: 0.9 % (ref 0–8)
ERYTHROCYTE [DISTWIDTH] IN BLOOD BY AUTOMATED COUNT: 21.5 % (ref 11.5–14.5)
EST. GFR  (AFRICAN AMERICAN): >60 ML/MIN/1.73 M^2
EST. GFR  (NON AFRICAN AMERICAN): >60 ML/MIN/1.73 M^2
GLUCOSE SERPL-MCNC: 86 MG/DL (ref 70–110)
HCT VFR BLD AUTO: 35 % (ref 37–48.5)
HGB BLD-MCNC: 11 G/DL (ref 12–16)
IMM GRANULOCYTES # BLD AUTO: 0.03 K/UL (ref 0–0.04)
IMM GRANULOCYTES NFR BLD AUTO: 0.3 % (ref 0–0.5)
LYMPHOCYTES # BLD AUTO: 3 K/UL (ref 1–4.8)
LYMPHOCYTES NFR BLD: 35.2 % (ref 18–48)
MAGNESIUM SERPL-MCNC: 1.9 MG/DL (ref 1.6–2.6)
MCH RBC QN AUTO: 21.7 PG (ref 27–31)
MCHC RBC AUTO-ENTMCNC: 31.4 G/DL (ref 32–36)
MCV RBC AUTO: 69 FL (ref 82–98)
MONOCYTES # BLD AUTO: 0.8 K/UL (ref 0.3–1)
MONOCYTES NFR BLD: 9.7 % (ref 4–15)
NEUTROPHILS # BLD AUTO: 4.6 K/UL (ref 1.8–7.7)
NEUTROPHILS NFR BLD: 53.6 % (ref 38–73)
NRBC BLD-RTO: 0 /100 WBC
PLATELET # BLD AUTO: 437 K/UL (ref 150–450)
PMV BLD AUTO: 8.7 FL (ref 9.2–12.9)
POTASSIUM SERPL-SCNC: 3.3 MMOL/L (ref 3.5–5.1)
RBC # BLD AUTO: 5.06 M/UL (ref 4–5.4)
SODIUM SERPL-SCNC: 139 MMOL/L (ref 136–145)
WBC # BLD AUTO: 8.64 K/UL (ref 3.9–12.7)

## 2022-07-06 PROCEDURE — 99223 PR INITIAL HOSPITAL CARE,LEVL III: ICD-10-PCS | Mod: ,,, | Performed by: NURSE PRACTITIONER

## 2022-07-06 PROCEDURE — 99223 1ST HOSP IP/OBS HIGH 75: CPT | Mod: ,,, | Performed by: NURSE PRACTITIONER

## 2022-07-06 PROCEDURE — 85025 COMPLETE CBC W/AUTO DIFF WBC: CPT | Performed by: STUDENT IN AN ORGANIZED HEALTH CARE EDUCATION/TRAINING PROGRAM

## 2022-07-06 PROCEDURE — 80048 BASIC METABOLIC PNL TOTAL CA: CPT | Performed by: STUDENT IN AN ORGANIZED HEALTH CARE EDUCATION/TRAINING PROGRAM

## 2022-07-06 PROCEDURE — 92610 EVALUATE SWALLOWING FUNCTION: CPT

## 2022-07-06 PROCEDURE — 21400001 HC TELEMETRY ROOM

## 2022-07-06 PROCEDURE — 36415 COLL VENOUS BLD VENIPUNCTURE: CPT | Performed by: STUDENT IN AN ORGANIZED HEALTH CARE EDUCATION/TRAINING PROGRAM

## 2022-07-06 PROCEDURE — 25000003 PHARM REV CODE 250: Performed by: STUDENT IN AN ORGANIZED HEALTH CARE EDUCATION/TRAINING PROGRAM

## 2022-07-06 PROCEDURE — 25000003 PHARM REV CODE 250: Performed by: HOSPITALIST

## 2022-07-06 PROCEDURE — 83735 ASSAY OF MAGNESIUM: CPT | Performed by: STUDENT IN AN ORGANIZED HEALTH CARE EDUCATION/TRAINING PROGRAM

## 2022-07-06 PROCEDURE — 97535 SELF CARE MNGMENT TRAINING: CPT

## 2022-07-06 PROCEDURE — 25000003 PHARM REV CODE 250: Performed by: INTERNAL MEDICINE

## 2022-07-06 RX ORDER — ACETAMINOPHEN 325 MG/1
650 TABLET ORAL EVERY 6 HOURS PRN
Status: DISCONTINUED | OUTPATIENT
Start: 2022-07-06 | End: 2022-07-13 | Stop reason: HOSPADM

## 2022-07-06 RX ORDER — TALC
6 POWDER (GRAM) TOPICAL NIGHTLY PRN
Status: DISCONTINUED | OUTPATIENT
Start: 2022-07-06 | End: 2022-07-13 | Stop reason: HOSPADM

## 2022-07-06 RX ORDER — LOPERAMIDE HYDROCHLORIDE 2 MG/1
2 CAPSULE ORAL 4 TIMES DAILY PRN
Status: DISCONTINUED | OUTPATIENT
Start: 2022-07-06 | End: 2022-07-13 | Stop reason: HOSPADM

## 2022-07-06 RX ORDER — ONDANSETRON 2 MG/ML
4 INJECTION INTRAMUSCULAR; INTRAVENOUS EVERY 4 HOURS PRN
Status: DISCONTINUED | OUTPATIENT
Start: 2022-07-06 | End: 2022-07-13 | Stop reason: HOSPADM

## 2022-07-06 RX ORDER — POTASSIUM CHLORIDE 20 MEQ/1
40 TABLET, EXTENDED RELEASE ORAL ONCE
Status: COMPLETED | OUTPATIENT
Start: 2022-07-06 | End: 2022-07-06

## 2022-07-06 RX ADMIN — AMLODIPINE BESYLATE 10 MG: 5 TABLET ORAL at 08:07

## 2022-07-06 RX ADMIN — PANTOPRAZOLE SODIUM 40 MG: 40 TABLET, DELAYED RELEASE ORAL at 09:07

## 2022-07-06 RX ADMIN — CETIRIZINE HYDROCHLORIDE 10 MG: 10 TABLET, FILM COATED ORAL at 08:07

## 2022-07-06 RX ADMIN — POTASSIUM CHLORIDE 40 MEQ: 1500 TABLET, EXTENDED RELEASE ORAL at 09:07

## 2022-07-06 RX ADMIN — PANTOPRAZOLE SODIUM 40 MG: 40 TABLET, DELAYED RELEASE ORAL at 08:07

## 2022-07-06 RX ADMIN — FERROUS SULFATE TAB 325 MG (65 MG ELEMENTAL FE) 1 EACH: 325 (65 FE) TAB at 08:07

## 2022-07-06 NOTE — PT/OT/SLP PROGRESS
"Speech Language Pathology Treatment    Patient Name:  Holly Kidd   MRN:  [id number]  Admitting Diagnosis: GI bleed due to NSAIDs    Recommendations:                 General Recommendations:  Dysphagia therapy  Diet recommendations:  Mechanical soft, Liquid Diet Level: Thin   Aspiration Precautions: 1 bite/sip at a time, Feed only when awake/alert, HOB to 90 degrees and Small bites/sips   General Precautions: Standard,    Communication strategies:  provide increased time to answer    Subjective   Pt see with brother present. Brother reporting Pt tolerated predominantly soft foods without issue prior to admit. Pt reporting she hates the "baby food" they are bringing her in reference to puree.   Patient goals: upgrade diet    Pain/Comfort:  · Pain Rating 1: 0/10    Respiratory Status: Room air    Objective:     Has the patient been evaluated by SLP for swallowing?   Yes  Keep patient NPO? No     Pt seen upright in bed self feeding lunch tray of puree as well as thin liquid trials. She was slow to self feed despite being awake and alert and required frequent redirection to task of eating secondary to poor sustained attention. Increased oral prep of puree without significant oral residue post swallow which represents marked improvement of swallow as compared to initial eval. Pt self presented crackers X2 increased oral prep noted, however A-P transport was adequate, no significant oral residue post swallow. Pt was resistant to trials of thin liquid stating she does not want to drink because it will make her urinate. With encouragement she accepted straw sips X3, no overt s/s of aspiration were noted. Pt and brother were educated regarding recommendation for upgrade to [facility] soft with thin liquids.     Assessment:     Holly Kidd is a 71 y.o. female with dx of GI bleed due to NSAIDs she presents with mild oral dysphagia improving with medical status.     Goals:   Multidisciplinary Problems     SLP Goals        " Problem: SLP    Goal Priority Disciplines Outcome   SLP Goal    Low SLP Ongoing, Progressing   Description: Pt will tolerate mech soft with thin liquids X2 consecutive sessions (1 of 2 met 7/6)                   Plan:     · Patient to be seen:  2 x/week   · Plan of Care expires:  07/08/22  · Plan of Care reviewed with:     and MD via secure chat  · SLP Follow-Up:  Yes       Discharge recommendations:    TBD  Barriers to Discharge:  None    Time Tracking:     SLP Treatment Date:   07/06/22  Speech Start Time:  1243  Speech Stop Time:  1306     Speech Total Time (min):  23 min    Billable Minutes: Treatment Swallowing Dysfunction 15 and Self Care/Home Management Training 8    07/06/2022

## 2022-07-06 NOTE — ASSESSMENT & PLAN NOTE
-Due to stroke and chronic  -PT/OT consulted  -Daughter states she can no longer care for her mom and would like to proceed with intermediate care at NH. Prefer [facility]  -CM/SW consulted to assist with placement

## 2022-07-06 NOTE — SUBJECTIVE & OBJECTIVE
Interval History: patient alert and oriented. She c/o nausea and asked for crackers    Past Medical History:   Diagnosis Date    COVID-19 09/09/2021    Essential hypertension 6/12/2015    Obese     Obesity (BMI 35.0-39.9) 6/12/2015    Stroke 06/2015    Left side weakness    Stroke-like symptoms 09/17/2021    NEW LEFT FACIAL DROOP, LEFT SIDE WEAKNESS & SLURRED SPEECH    Wheelchair dependence     Wheelchair dependence        Past Surgical History:   Procedure Laterality Date    ESOPHAGOGASTRODUODENOSCOPY N/A 7/5/2022    Procedure: EGD (ESOPHAGOGASTRODUODENOSCOPY);  Surgeon: Wilma Munguia MD;  Location[facility];  Service: Endoscopy;  Laterality: N/A;    HYSTERECTOMY         Review of patient's allergies indicates:  No Known Allergies    Medications:  Continuous Infusions:   sodium chloride 0.9%       Scheduled Meds:   amLODIPine  10 mg Oral Daily    cetirizine  10 mg Oral Daily    ferrous sulfate  1 tablet Oral Daily    pantoprazole  40 mg Oral BID     PRN Meds:acetaminophen, loperamide, melatonin, ondansetron, sodium chloride 0.9%    Family History       Problem Relation (Age of Onset)    Cancer Father    Stroke Mother          Tobacco Use    Smoking status: Never Smoker    Smokeless tobacco: Never Used   Substance and Sexual Activity    Alcohol use: No    Drug use: No    Sexual activity: Not on file       Review of Systems   Constitutional:  Positive for activity change.   Respiratory:  Negative for shortness of breath.    Cardiovascular:  Negative for chest pain.   Gastrointestinal:  Positive for nausea. Negative for abdominal pain and vomiting.   Musculoskeletal:  Positive for myalgias.   Neurological:  Positive for weakness.   Objective:     Vital Signs (Most Recent):  Temp: 98.2 °F (36.8 °C) (07/06/22 1243)  Pulse: 94 (07/06/22 1243)  Resp: 18 (07/06/22 1243)  BP: 110/65 (07/06/22 1243)  SpO2: 98 % (07/06/22 1243) Vital Signs (24h Range):  Temp:  [96.8 °F (36 °C)-98.2 °F (36.8 °C)] 98.2 °F (36.8 °C)  Pulse:   [] 94  Resp:  [16-19] 18  SpO2:  [93 %-99 %] 98 %  BP: (105-142)/(63-88) 110/65     Weight: 86.6 kg (190 lb 14.7 oz)  Body mass index is 33.82 kg/m².    Physical Exam  Vitals and nursing note reviewed.   Constitutional:       General: She is not in acute distress.     Appearance: She is obese. She is ill-appearing. She is not toxic-appearing or diaphoretic.   HENT:      Right Ear: External ear normal.      Left Ear: External ear normal.      Mouth/Throat:      Mouth: Mucous membranes are dry.      Comments: Poor dentation  Neurological:      Mental Status: She is alert.      Motor: Weakness present.      Comments: Patient oriented to self, able to tell me she is in hospital, knew year and her address and that she lives with her daughter     Psychiatric:         Mood and Affect: Affect is flat.         Behavior: Behavior is withdrawn.       Review of Symptoms      Symptom Assessment (ESAS 0-10 Scale)  Pain:  0  Dyspnea:  0  Anxiety:  0  Nausea:  0  Depression:  0  Anorexia:  0  Fatigue:  0  Insomnia:  0  Restlessness:  0  Agitation:  0       Advance Care Planning   Advance Care Planning       Significant Labs: All pertinent labs within the past 24 hours have been reviewed.  CBC:   Recent Labs   Lab 07/06/22  0510   WBC 8.64   HGB 11.0*   HCT 35.0*   MCV 69*        BMP:  Recent Labs   Lab 07/06/22  0510   GLU 86      K 3.3*      CO2 23   BUN 5*   CREATININE 0.4*   CALCIUM 9.2   MG 1.9     LFT:  Lab Results   Component Value Date    AST 17 07/02/2022    ALKPHOS 105 07/02/2022    BILITOT 0.4 07/02/2022     Albumin:   Albumin   Date Value Ref Range Status   07/02/2022 3.7 3.5 - 5.2 g/dL Final     Protein:   Total Protein   Date Value Ref Range Status   07/02/2022 7.9 6.0 - 8.4 g/dL Final     Lactic acid:   Lab Results   Component Value Date    LACTATE 1.4 07/03/2022    LACTATE 3.8 (HH) 07/02/2022       Significant Imaging: last imaging 7/2

## 2022-07-06 NOTE — PLAN OF CARE
Problem: Adult Inpatient Plan of Care  Goal: Plan of Care Review  Outcome: Ongoing, Progressing     Problem: Impaired Wound Healing  Goal: Optimal Wound Healing  Outcome: Ongoing, Progressing     Problem: Skin Injury Risk Increased  Goal: Skin Health and Integrity  Outcome: Ongoing, Progressing     Problem: Adjustment to Illness (Gastrointestinal Bleeding)  Goal: Optimal Coping with Acute Illness  Outcome: Ongoing, Progressing     Problem: Fall Injury Risk  Goal: Absence of Fall and Fall-Related Injury  Outcome: Ongoing, Progressing     Problem: Coping Ineffective  Goal: Effective Coping  Outcome: Ongoing, Progressing

## 2022-07-06 NOTE — PLAN OF CARE
KATARZYNA spoke with patient's daughter, Lana about discharge planning. KATARZYNA informed Lana that [facility] declined. KATARZYNA informed Lana that [facility] is interested. Lana inquired if a referral could be sent to [facility]. KATARZYNA informed Lana that a referral was sent there and KATARZYNA will follow up on referral. KATARZYNA informed that a referral can also be sent to [facility]. Lana agreed. Lana stated that she can no longer care for patient.     3:34 pm     KATARZYNA spoke with Haily at [facility]. KATARZYNA informed Haily that patient is not vaccinated. Haily stated that they don't have any private rooms.

## 2022-07-06 NOTE — PROGRESS NOTES
"[facility] Medicine  Progress Note    Patient Name: Holly Kidd  MRN: [id number]  Patient Class: IP- Inpatient   Admission Date: 7/2/2022  Length of Stay: 4 days  Attending Physician: Colton Maharaj MD  Primary Care Provider: DENYS Sterling        Subjective:     Principal Problem:GI bleed due to NSAIDs        HPI:  Ms. Kidd is a 71 year old female with PMH of CVA ( facial droop and left sided weakness) , HTN, Dementia ( at baseline agitated and oriented x1) is being admitted from the ER after presenting for general malize, weakness and fatigue when the AC went out as well as daughter reporting patient has been eating less and noted to have what is described as melena this AM x1. She described a "motor oil black" tarry stool by patient but denies diarrhea or vomiting. Reports that mother refuses to go to the doctor and only takes the merari aspirin and amlodipine. She is completely bedbound and total care with daughter providing all ADL's . Daughter has a large caregiver burden. There was a report of epigastric pain but patient denies this. Noted to have a potassium of 2.7 in ER and denies any vomiting or diarrhea. No chills, shortness of breath, nausea or other associated symptoms. She currently only reports she is "cold" and does not like people "sticking her" with needles.      Note*Patient's daughter denies altered mental status and notes that the patient is agitated and physically/verbally abusive to others at baseline. She recognizes only her daughter usually and calls her by a nickname.       Overview/Hospital Course:  71 year old female with PMH of CVA ( facial droop and left sided weakness) , HTN, Dementia, and bedbound admitted on 07/02/2022 for hypokalemia and anemia, with suspicion for upper GI bleed.     Chest x-ray unremarkable.  Patient presented with complaints fatigue and weakness also with melena. Hgb on admit 9.6, down from baseline of 12. Potassium replaced. GI " consulted- recommends IV PPI BID and EGD on 07/05. Family is in agreement. Hemoglobin stable.  Continue to monitor       Interval History: No acute events overnight.  Agreeable to take potassium supplement after we spoke today.  Still feels very weak, but denies pain and has had no further evidence of bleeding.  Youngest brother at bedside.  All questions answered and patient had no further complaints.    Review of Systems   Constitutional:  Positive for activity change and fatigue. Negative for chills and diaphoresis.        Patient bedbound   HENT:  Negative for congestion and trouble swallowing.    Eyes:  Negative for photophobia and visual disturbance.   Respiratory:  Negative for cough and shortness of breath.    Cardiovascular:  Negative for chest pain, palpitations and leg swelling.   Gastrointestinal:  Negative for abdominal pain, constipation, diarrhea, nausea and vomiting.   Genitourinary:  Negative for difficulty urinating, dysuria and hematuria.   Musculoskeletal:  Negative for back pain and joint swelling.   Neurological:  Positive for weakness (at baseline). Negative for dizziness, light-headedness and headaches.   Psychiatric/Behavioral:  Positive for dysphoric mood. Negative for confusion, decreased concentration and hallucinations.      Objective:     Vital Signs (Most Recent):  Temp: 98.3 °F (36.8 °C) (07/06/22 1638)  Pulse: 98 (07/06/22 1638)  Resp: 18 (07/06/22 1638)  BP: 108/67 (07/06/22 1638)  SpO2: 98 % (07/06/22 1638) Vital Signs (24h Range):  Temp:  [96.8 °F (36 °C)-98.3 °F (36.8 °C)] 98.3 °F (36.8 °C)  Pulse:  [] 98  Resp:  [18-19] 18  SpO2:  [98 %-99 %] 98 %  BP: (108-142)/(65-88) 108/67     Weight: 86.6 kg (190 lb 14.7 oz)  Body mass index is 33.82 kg/m².    Intake/Output Summary (Last 24 hours) at 7/6/2022 1702  Last data filed at 7/6/2022 1311  Gross per 24 hour   Intake --   Output 400 ml   Net -400 ml        Physical Exam  Vitals and nursing note reviewed.   Constitutional:        General: She is not in acute distress.     Appearance: She is normal weight. She is not ill-appearing (chronically weak, frail), toxic-appearing or diaphoretic.      Comments: Elderly female, sitting up in bed. Appears in no acute distress.    HENT:      Head: Atraumatic.      Right Ear: External ear normal.      Left Ear: External ear normal.      Nose: Nose normal. No congestion.      Mouth/Throat:      Mouth: Mucous membranes are moist.      Pharynx: Oropharynx is clear.   Eyes:      Extraocular Movements: Extraocular movements intact.      Pupils: Pupils are equal, round, and reactive to light.   Cardiovascular:      Rate and Rhythm: Regular rhythm. Tachycardia present.      Heart sounds: No murmur heard.    No gallop.   Pulmonary:      Effort: No respiratory distress.      Breath sounds: No wheezing or rales.   Abdominal:      General: There is no distension.      Tenderness: There is no abdominal tenderness. There is no guarding.   Musculoskeletal:      Cervical back: Normal range of motion. No rigidity or tenderness.      Right lower leg: No edema.      Left lower leg: No edema.   Skin:     General: Skin is dry.   Neurological:      Mental Status: She is alert. Mental status is at baseline.      Motor: Weakness present.      Comments: Diffusely weak   Psychiatric:         Mood and Affect: Affect is labile. Affect is not flat.         Speech: Speech is not delayed.         Behavior: Behavior is cooperative.      Comments: Patient is alert and oriented to self, time, , and place.        Significant Labs: All pertinent labs within the past 24 hours have been reviewed.    Significant Imaging: I have reviewed all pertinent imaging results/findings within the past 24 hours.      Assessment/Plan:      * GI bleed due to NSAIDs  -Admitted to inpatient status  -Baseline Hb near 12  -On admit Hb 9.6 and reported melena  -GI consulted and patient going taken for EGD  which showed no source of bleeding.   Specifically: a bleb found in the esophagus, tortuous esophagus, 6 cm hiatal hernia, normal stomach, normal examined duodenum.  -Given risk of aspiration with prep - will hold off on colonoscopy unless active bleeding or dropping H/H.  -Holding home aspirin  -Continue protonix bid for now.  -Monitor H/H - stable - now up to 11.0.  Transfuse for Hb less than 7.  She is iron deficient - Tsat 7%  -Continue oral iron supplement   -Medically stable for discharge to NH pending placement.    Iron deficiency anemia  -Likely due to GI bleeding  -Treatment as above.    Hypokalemia  -Replace potassium today.  -Check BMP and Mag in AM.    Cerebrovascular disease  -History noted  -Has chronic L sided deficits and facial droop and is wheelchair dependent at home  -Daughter no longer able to care for her at home  -Pursuing NH placement    Debility  -Due to stroke and chronic  -PT/OT consulted  -Daughter states she can no longer care for her mom and would like to proceed with residential care at NH. Prefer [place]  -CM/SW consulted to assist with placement    Wheelchair dependence  -Baseline - daughter reports she slumps over in the wheelchair and she has to prop her up     Advanced care planning/counseling discussion  Advance Care Planning  Date: 07/03/2022 Code Status  In light of the patients advanced and life limiting illness,I engaged the the patient, family and healthcare power of   in a conversation about the patient's preferences for care  at the very end of life. The patient wishes to have CPR and cardio version if needed with medications when her heart stops. However, patient does not want to be intubated or placed on mechanical ventilation when her breathing stops. I communicated to the patient, family and healthcare power of   that her wishes align with partial code status.    Code status: Partial code. Patient agrees to CPR/cardioversion but does not want to be intubated or mechanically ventilated.    -Partial code status with CPR/cardioversion but without possibility of securing airway is considered illogical by many physicians.  Will consult palliative care to continue goals of care discussion and hopefully pursue more straight forward code status - either full code or DNR.    Obesity (BMI 30.0-34.9)  Body mass index is 33.82 kg/m². Morbid obesity complicates all aspects of disease management from diagnostic modalities to treatment. Weight loss encouraged and health benefits explained to patient.     Essential hypertension  -Well controlled  -Continue norvasc      VTE Risk Mitigation (From admission, onward)         Ordered     Place sequential compression device  Until discontinued         07/02/22 2111                Discharge Planning   ANISH: 7/6/2022     Code Status: Partial Code   Is the patient medically ready for discharge?:     Reason for patient still in hospital (select all that apply): Pending disposition  Discharge Plan A: New Nursing Home placement - group home care facility   Discharge Delays: (!) Post-Acute Set-up              Colton Maharaj MD  Department of Hospital Medicine   [facility] - Telemetry

## 2022-07-06 NOTE — PLAN OF CARE
Problem: Adult Inpatient Plan of Care  Goal: Plan of Care Review  Outcome: Ongoing, Progressing     Problem: Impaired Wound Healing  Goal: Optimal Wound Healing  Outcome: Ongoing, Progressing     Problem: Skin Injury Risk Increased  Goal: Skin Health and Integrity  Outcome: Ongoing, Progressing     Problem: Adjustment to Illness (Gastrointestinal Bleeding)  Goal: Optimal Coping with Acute Illness  Outcome: Ongoing, Progressing     Problem: Bleeding (Gastrointestinal Bleeding)  Goal: Hemostasis  Outcome: Ongoing, Progressing     Problem: Fall Injury Risk  Goal: Absence of Fall and Fall-Related Injury  Outcome: Ongoing, Progressing     Problem: Coping Ineffective  Goal: Effective Coping  Outcome: Ongoing, Progressing

## 2022-07-06 NOTE — ANESTHESIA POSTPROCEDURE EVALUATION
Anesthesia Post Evaluation    Patient: Holly Kidd    Procedure(s) Performed: Procedure(s) (LRB):  EGD (ESOPHAGOGASTRODUODENOSCOPY) (N/A)    Final Anesthesia Type: general      Patient location during evaluation: GI PACU  Patient participation: Yes- Able to Participate  Level of consciousness: awake and alert and oriented  Post-procedure vital signs: reviewed and stable  Pain management: adequate  Airway patency: patent    PONV status at discharge: No PONV  Anesthetic complications: no      Cardiovascular status: hemodynamically stable and blood pressure returned to baseline  Respiratory status: spontaneous ventilation, room air and unassisted  Hydration status: euvolemic  Follow-up not needed.          Vitals Value Taken Time   /73 07/06/22 0927   Temp 36.4 °C (97.6 °F) 07/06/22 0951   Pulse 66 07/06/22 0931   Resp 18 07/06/22 0753   SpO2 98 % 07/06/22 0931   Vitals shown include unvalidated device data.      No case tracking events are documented in the log.      Pain/Benito Score: Benito Score: 10 (7/5/2022  3:18 PM)

## 2022-07-06 NOTE — PLAN OF CARE
KATARZYNA spoke with Ramila at [facility] in regard to referral. Ramila informed KATARZYNA that referral was still being reviewed by nurse.        07/06/22 1121   Post-Acute Status   Post-Acute Authorization Placement  (care home Nursing Home)   Post-Acute Placement Status Pending post-acute provider review/more information requested   Coverage Medicare AB   Patient choice form signed by patient/caregiver List from System Post-Acute Care   Discharge Delays (!) Post-Acute Set-up   Discharge Plan   Discharge Plan A New Nursing Home placement - [facility] care facility   Discharge Plan B New Nursing Home placement - [facility] care facility

## 2022-07-06 NOTE — NURSING
Patient awake, alert, oriented resting comfortably. No signs of distress observed. bed low and locked. Call light in reach. Report given to oncoming nurse. 12hour chart check complete. Will continue plan of care.

## 2022-07-06 NOTE — NURSING
[facility] MEWS TRIGGER FOLLOW UP       MEWS Monitoring, Score is: 3  Indication for review: Temp 96.8    Bedside Nurse, Milady contacted, no concerns verbalized at this time, instructed to call [phone] for further concerns or assistance.Re-assessed temp.

## 2022-07-06 NOTE — SUBJECTIVE & OBJECTIVE
Interval History: No acute events overnight.  Agreeable to take potassium supplement after we spoke today.  Still feels very weak, but denies pain and has had no further evidence of bleeding.  Youngest brother at bedside.  All questions answered and patient had no further complaints.    Review of Systems   Constitutional:  Positive for activity change and fatigue. Negative for chills and diaphoresis.        Patient bedbound   HENT:  Negative for congestion and trouble swallowing.    Eyes:  Negative for photophobia and visual disturbance.   Respiratory:  Negative for cough and shortness of breath.    Cardiovascular:  Negative for chest pain, palpitations and leg swelling.   Gastrointestinal:  Negative for abdominal pain, constipation, diarrhea, nausea and vomiting.   Genitourinary:  Negative for difficulty urinating, dysuria and hematuria.   Musculoskeletal:  Negative for back pain and joint swelling.   Neurological:  Positive for weakness (at baseline). Negative for dizziness, light-headedness and headaches.   Psychiatric/Behavioral:  Positive for dysphoric mood. Negative for confusion, decreased concentration and hallucinations.      Objective:     Vital Signs (Most Recent):  Temp: 98.3 °F (36.8 °C) (07/06/22 1638)  Pulse: 98 (07/06/22 1638)  Resp: 18 (07/06/22 1638)  BP: 108/67 (07/06/22 1638)  SpO2: 98 % (07/06/22 1638) Vital Signs (24h Range):  Temp:  [96.8 °F (36 °C)-98.3 °F (36.8 °C)] 98.3 °F (36.8 °C)  Pulse:  [] 98  Resp:  [18-19] 18  SpO2:  [98 %-99 %] 98 %  BP: (108-142)/(65-88) 108/67     Weight: 86.6 kg (190 lb 14.7 oz)  Body mass index is 33.82 kg/m².    Intake/Output Summary (Last 24 hours) at 7/6/2022 1702  Last data filed at 7/6/2022 1311  Gross per 24 hour   Intake --   Output 400 ml   Net -400 ml        Physical Exam  Vitals and nursing note reviewed.   Constitutional:       General: She is not in acute distress.     Appearance: She is normal weight. She is not ill-appearing (chronically  weak, frail), toxic-appearing or diaphoretic.      Comments: Elderly female, sitting up in bed. Appears in no acute distress.    HENT:      Head: Atraumatic.      Right Ear: External ear normal.      Left Ear: External ear normal.      Nose: Nose normal. No congestion.      Mouth/Throat:      Mouth: Mucous membranes are moist.      Pharynx: Oropharynx is clear.   Eyes:      Extraocular Movements: Extraocular movements intact.      Pupils: Pupils are equal, round, and reactive to light.   Cardiovascular:      Rate and Rhythm: Regular rhythm. Tachycardia present.      Heart sounds: No murmur heard.    No gallop.   Pulmonary:      Effort: No respiratory distress.      Breath sounds: No wheezing or rales.   Abdominal:      General: There is no distension.      Tenderness: There is no abdominal tenderness. There is no guarding.   Musculoskeletal:      Cervical back: Normal range of motion. No rigidity or tenderness.      Right lower leg: No edema.      Left lower leg: No edema.   Skin:     General: Skin is dry.   Neurological:      Mental Status: She is alert. Mental status is at baseline.      Motor: Weakness present.      Comments: Diffusely weak   Psychiatric:         Mood and Affect: Affect is labile. Affect is not flat.         Speech: Speech is not delayed.         Behavior: Behavior is cooperative.      Comments: Patient is alert and oriented to self, time, , and place.        Significant Labs: All pertinent labs within the past 24 hours have been reviewed.    Significant Imaging: I have reviewed all pertinent imaging results/findings within the past 24 hours.

## 2022-07-06 NOTE — ASSESSMENT & PLAN NOTE
-Admitted to inpatient status  -Baseline Hb near 12  -On admit Hb 9.6 and reported melena  -GI consulted and patient going taken for EGD 7/5 which showed no source of bleeding.  Specifically: a bleb found in the esophagus, tortuous esophagus, 6 cm hiatal hernia, normal stomach, normal examined duodenum.  -Given risk of aspiration with prep - will hold off on colonoscopy unless active bleeding or dropping H/H.  -Holding home aspirin  -Continue protonix bid for now.  -Monitor H/H - stable - now up to 11.0.  Transfuse for Hb less than 7.  She is iron deficient - Tsat 7%  -Continue oral iron supplement   -Medically stable for discharge to NH pending placement.

## 2022-07-06 NOTE — NURSING
Report received from night nurse RAJENDRA Rankin. Visualized patient and assessed patient's overall condition and appearance. No acute distress noted. Will continue to monitor

## 2022-07-06 NOTE — PROGRESS NOTES
[facility] Progress Note        CC: dark stools    HPI 71 y.o. female with PMH of CVA ( facial droop and left sided weakness) , HTN, Dementia ( at baseline agitated and oriented x1) is being admitted from the ER after presenting for general malize, weakness and fatigue who GI is asked to see for melena.      The patient is altered and unable to provide much history.  Her daughter Lana provides history as she is her sole care taker.       The patient was noted to be more fatigued as of late. Her daughter comments that her stools were dark/tarry prior to admission.  Lana notes her stools were motor oil black yesterday morning.  This happened once a few weeks ago, but then resolved.  She did complain of some mild abdominal pain and thought she was constipated.         Hgb was 9.2 in the ED which is down from baseline.     No prior EGD    Interval History:  Patient seen and examined today in bed. Patient has EGD on yesterday with no findings of bleeding. Patient denies any more dark or melena stool. Tolerating diet well. No acute signs of distress noted. No acute events reported over night. VSS, afebrile.    Past Medical History  Past Medical History:   Diagnosis Date    COVID-19 09/09/2021    Essential hypertension 6/12/2015    Obese     Obesity (BMI 35.0-39.9) 6/12/2015    Stroke 06/2015    Left side weakness    Stroke-like symptoms 09/17/2021    NEW LEFT FACIAL DROOP, LEFT SIDE WEAKNESS & SLURRED SPEECH    Wheelchair dependence     Wheelchair dependence        Review of Systems   Unable to perform ROS: Dementia                Physical Examination    Temp:  [96.8 °F (36 °C)-98.2 °F (36.8 °C)]   Pulse:  []   Resp:  [16-19]   BP: (105-142)/(63-88)   SpO2:  [93 %-99 %]     Physical Exam   Constitutional:       Appearance: She is ill-appearing.   HENT:      Head: Normocephalic.      Nose: Nose normal.      Mouth/Throat:      Mouth: Mucous membranes are moist.   Eyes:      Extraocular  Movements: Extraocular movements intact.      Conjunctiva/sclera: Conjunctivae normal.   Cardiovascular:      Rate and Rhythm: Normal rate and regular rhythm.      Heart sounds: Normal heart sounds.   Pulmonary:      Effort: Pulmonary effort is normal.      Breath sounds: Normal breath sounds.   Abdominal:      General: Abdomen is flat. Bowel sounds are normal.      Palpations: Abdomen is soft.   Musculoskeletal:         General: Normal range of motion.      Cervical back: Normal range of motion.   Skin:     General: Skin is warm.   Neurological:      Mental Status: Mental status is at baseline.         Significant Labs:  All pertinent lab results from the last 24 hours have been reviewed.     Significant Imaging:  Imaging results within the past 24 hours have been reviewed.    Labs:  Lab Results   Component Value Date    WBC 8.64 07/06/2022    HGB 11.0 (L) 07/06/2022    HCT 35.0 (L) 07/06/2022    MCV 69 (L) 07/06/2022     07/06/2022         CMP  Sodium   Date Value Ref Range Status   07/06/2022 139 136 - 145 mmol/L Final     Potassium   Date Value Ref Range Status   07/06/2022 3.3 (L) 3.5 - 5.1 mmol/L Final     Chloride   Date Value Ref Range Status   07/06/2022 104 95 - 110 mmol/L Final     CO2   Date Value Ref Range Status   07/06/2022 23 23 - 29 mmol/L Final     Glucose   Date Value Ref Range Status   07/06/2022 86 70 - 110 mg/dL Final     BUN   Date Value Ref Range Status   07/06/2022 5 (L) 8 - 23 mg/dL Final     Creatinine   Date Value Ref Range Status   07/06/2022 0.4 (L) 0.5 - 1.4 mg/dL Final     Calcium   Date Value Ref Range Status   07/06/2022 9.2 8.7 - 10.5 mg/dL Final     Total Protein   Date Value Ref Range Status   07/02/2022 7.9 6.0 - 8.4 g/dL Final     Albumin   Date Value Ref Range Status   07/02/2022 3.7 3.5 - 5.2 g/dL Final     Total Bilirubin   Date Value Ref Range Status   07/02/2022 0.4 0.1 - 1.0 mg/dL Final     Comment:     For infants and newborns, interpretation of results should be  based  on gestational age, weight and in agreement with clinical  observations.    Premature Infant recommended reference ranges:  Up to 24 hours.............<8.0 mg/dL  Up to 48 hours............<12.0 mg/dL  3-5 days..................<15.0 mg/dL  6-29 days.................<15.0 mg/dL       Alkaline Phosphatase   Date Value Ref Range Status   2022 105 55 - 135 U/L Final     AST   Date Value Ref Range Status   2022 17 10 - 40 U/L Final     ALT   Date Value Ref Range Status   2022 12 10 - 44 U/L Final     Anion Gap   Date Value Ref Range Status   2022 12 8 - 16 mmol/L Final     eGFR if    Date Value Ref Range Status   2022 >60 >60 mL/min/1.73 m^2 Final     eGFR if non    Date Value Ref Range Status   2022 >60 >60 mL/min/1.73 m^2 Final     Comment:     Calculation used to obtain the estimated glomerular filtration  rate (eGFR) is the CKD-EPI equation.          Imagin/5 EGD- small bleb with localized distribution found in middle third of esophagus. Lower third of esophagus tortuous. Hiatal hernia present. Duodenum was normal. Normal stomach.     I have personally reviewed and interpreted these images.        Assessment/ Plan:    Upper GI bleeding  In brief, the patient is a 70yo woman with dementia who presents with suspected UGI bleeding.     The patient has had dark stools per daughter and a down trend in her Hgb.  She has clopidogrel in her med list, but it is not clear that she is taking this.  Her daughter reports ASA use, but no other anti-coagulation.       I had a lengthy discussion regarding an EGD with the patient's daughter.  She would like to discuss this with her siblings and [place] back with me.  I think this is reasonable given her mothers clinical stability at this time.  I would recommend she is treated with IV PPI twice daily and we can consider EGD on Tuesday pending the family's decision.  -EGD done on yesterday no active  bleeding or stigmata of bleeding found. Tolerating soft diet well. Hgb stable and improved.  -continue current medications, avoid NSAID use, continue PPI daily.    -We will sign off at this time    Thank you so much for allowing us to participate in the care of Holly Kidd. Please contact us if you have any additional questions.    Payton Carroll NP  Gastroenterology  [facility] - Med Surg

## 2022-07-07 LAB
AMMONIA PLAS-SCNC: 31 UMOL/L (ref 10–50)
ANION GAP SERPL CALC-SCNC: 12 MMOL/L (ref 8–16)
ANISOCYTOSIS BLD QL SMEAR: SLIGHT
BACTERIA #/AREA URNS HPF: ABNORMAL /HPF
BASOPHILS # BLD AUTO: 0.02 K/UL (ref 0–0.2)
BASOPHILS NFR BLD: 0.2 % (ref 0–1.9)
BILIRUB UR QL STRIP: NEGATIVE
BUN SERPL-MCNC: 6 MG/DL (ref 8–23)
CALCIUM SERPL-MCNC: 9.4 MG/DL (ref 8.7–10.5)
CAOX CRY URNS QL MICRO: ABNORMAL
CHLORIDE SERPL-SCNC: 104 MMOL/L (ref 95–110)
CLARITY UR: ABNORMAL
CO2 SERPL-SCNC: 22 MMOL/L (ref 23–29)
COLOR UR: ABNORMAL
CREAT SERPL-MCNC: 0.4 MG/DL (ref 0.5–1.4)
DIFFERENTIAL METHOD: ABNORMAL
EOSINOPHIL # BLD AUTO: 0.1 K/UL (ref 0–0.5)
EOSINOPHIL NFR BLD: 1.1 % (ref 0–8)
ERYTHROCYTE [DISTWIDTH] IN BLOOD BY AUTOMATED COUNT: 22.1 % (ref 11.5–14.5)
EST. GFR  (AFRICAN AMERICAN): >60 ML/MIN/1.73 M^2
EST. GFR  (NON AFRICAN AMERICAN): >60 ML/MIN/1.73 M^2
GLUCOSE SERPL-MCNC: 101 MG/DL (ref 70–110)
GLUCOSE UR QL STRIP: NEGATIVE
HCT VFR BLD AUTO: 34 % (ref 37–48.5)
HGB BLD-MCNC: 10.8 G/DL (ref 12–16)
HGB UR QL STRIP: ABNORMAL
HYALINE CASTS #/AREA URNS LPF: 0 /LPF
HYPOCHROMIA BLD QL SMEAR: ABNORMAL
IMM GRANULOCYTES # BLD AUTO: 0.04 K/UL (ref 0–0.04)
IMM GRANULOCYTES NFR BLD AUTO: 0.5 % (ref 0–0.5)
KETONES UR QL STRIP: ABNORMAL
LEUKOCYTE ESTERASE UR QL STRIP: ABNORMAL
LYMPHOCYTES # BLD AUTO: 2.8 K/UL (ref 1–4.8)
LYMPHOCYTES NFR BLD: 33.1 % (ref 18–48)
MCH RBC QN AUTO: 21.8 PG (ref 27–31)
MCHC RBC AUTO-ENTMCNC: 31.8 G/DL (ref 32–36)
MCV RBC AUTO: 69 FL (ref 82–98)
MICROSCOPIC COMMENT: ABNORMAL
MONOCYTES # BLD AUTO: 0.8 K/UL (ref 0.3–1)
MONOCYTES NFR BLD: 9.6 % (ref 4–15)
NEUTROPHILS # BLD AUTO: 4.7 K/UL (ref 1.8–7.7)
NEUTROPHILS NFR BLD: 55.5 % (ref 38–73)
NITRITE UR QL STRIP: NEGATIVE
NRBC BLD-RTO: 0 /100 WBC
OVALOCYTES BLD QL SMEAR: ABNORMAL
PH UR STRIP: 7 [PH] (ref 5–8)
PLATELET # BLD AUTO: 422 K/UL (ref 150–450)
PLATELET BLD QL SMEAR: ABNORMAL
PMV BLD AUTO: 9.2 FL (ref 9.2–12.9)
POIKILOCYTOSIS BLD QL SMEAR: SLIGHT
POLYCHROMASIA BLD QL SMEAR: ABNORMAL
POTASSIUM SERPL-SCNC: 3.7 MMOL/L (ref 3.5–5.1)
PROT UR QL STRIP: ABNORMAL
RBC # BLD AUTO: 4.95 M/UL (ref 4–5.4)
RBC #/AREA URNS HPF: 2 /HPF (ref 0–4)
SODIUM SERPL-SCNC: 138 MMOL/L (ref 136–145)
SP GR UR STRIP: 1.01 (ref 1–1.03)
SQUAMOUS #/AREA URNS HPF: 4 /HPF
TARGETS BLD QL SMEAR: ABNORMAL
TSH SERPL DL<=0.005 MIU/L-ACNC: 1.19 UIU/ML (ref 0.4–4)
URN SPEC COLLECT METH UR: ABNORMAL
UROBILINOGEN UR STRIP-ACNC: ABNORMAL EU/DL
WBC # BLD AUTO: 8.53 K/UL (ref 3.9–12.7)
WBC #/AREA URNS HPF: 99 /HPF (ref 0–5)
WBC CLUMPS URNS QL MICRO: ABNORMAL

## 2022-07-07 PROCEDURE — 81000 URINALYSIS NONAUTO W/SCOPE: CPT | Performed by: HOSPITALIST

## 2022-07-07 PROCEDURE — 85025 COMPLETE CBC W/AUTO DIFF WBC: CPT | Performed by: HOSPITALIST

## 2022-07-07 PROCEDURE — 86592 SYPHILIS TEST NON-TREP QUAL: CPT | Performed by: HOSPITALIST

## 2022-07-07 PROCEDURE — 21400001 HC TELEMETRY ROOM

## 2022-07-07 PROCEDURE — 84443 ASSAY THYROID STIM HORMONE: CPT | Performed by: HOSPITALIST

## 2022-07-07 PROCEDURE — 25000003 PHARM REV CODE 250: Performed by: STUDENT IN AN ORGANIZED HEALTH CARE EDUCATION/TRAINING PROGRAM

## 2022-07-07 PROCEDURE — 87086 URINE CULTURE/COLONY COUNT: CPT | Performed by: HOSPITALIST

## 2022-07-07 PROCEDURE — 87186 SC STD MICRODIL/AGAR DIL: CPT | Performed by: HOSPITALIST

## 2022-07-07 PROCEDURE — 97535 SELF CARE MNGMENT TRAINING: CPT

## 2022-07-07 PROCEDURE — 87088 URINE BACTERIA CULTURE: CPT | Performed by: HOSPITALIST

## 2022-07-07 PROCEDURE — 82746 ASSAY OF FOLIC ACID SERUM: CPT | Performed by: HOSPITALIST

## 2022-07-07 PROCEDURE — 36415 COLL VENOUS BLD VENIPUNCTURE: CPT | Performed by: HOSPITALIST

## 2022-07-07 PROCEDURE — 25000003 PHARM REV CODE 250: Performed by: INTERNAL MEDICINE

## 2022-07-07 PROCEDURE — 82140 ASSAY OF AMMONIA: CPT | Performed by: HOSPITALIST

## 2022-07-07 PROCEDURE — 87077 CULTURE AEROBIC IDENTIFY: CPT | Mod: 59 | Performed by: HOSPITALIST

## 2022-07-07 PROCEDURE — 80048 BASIC METABOLIC PNL TOTAL CA: CPT | Performed by: HOSPITALIST

## 2022-07-07 RX ADMIN — PANTOPRAZOLE SODIUM 40 MG: 40 TABLET, DELAYED RELEASE ORAL at 09:07

## 2022-07-07 RX ADMIN — CETIRIZINE HYDROCHLORIDE 10 MG: 10 TABLET, FILM COATED ORAL at 09:07

## 2022-07-07 RX ADMIN — FERROUS SULFATE TAB 325 MG (65 MG ELEMENTAL FE) 1 EACH: 325 (65 FE) TAB at 09:07

## 2022-07-07 RX ADMIN — AMLODIPINE BESYLATE 10 MG: 5 TABLET ORAL at 09:07

## 2022-07-07 NOTE — PLAN OF CARE
Problem: Adult Inpatient Plan of Care  Goal: Plan of Care Review  Outcome: Ongoing, Progressing     Problem: Impaired Wound Healing  Goal: Optimal Wound Healing  Outcome: Ongoing, Progressing     Problem: Skin Injury Risk Increased  Goal: Skin Health and Integrity  Outcome: Ongoing, Progressing     Problem: Coping Ineffective  Goal: Effective Coping  Outcome: Ongoing, Progressing

## 2022-07-07 NOTE — SUBJECTIVE & OBJECTIVE
Interval History:  Noted to be hemodynamically stable and afebrile.  Noted to be slightly lethargic today in not very communicative.  According to SLP and nursing staff, this is an acute change.  Labs reviewed.  Patient has not received any sedating medications.  From my virtual examination, patient does not appear to have any focal neurological deficits.  Patient will be transferred back to in-person medicine team today.    Review of Systems   Constitutional:  Positive for activity change and fatigue. Negative for fever.   Respiratory:  Negative for shortness of breath.    Cardiovascular:  Negative for chest pain.   Gastrointestinal:  Negative for abdominal pain.   Neurological:  Positive for weakness. Negative for dizziness and headaches.   All other systems reviewed and are negative.  Objective:     Vital Signs (Most Recent):  Temp: 98.4 °F (36.9 °C) (07/07/22 1535)  Pulse: 83 (07/07/22 1535)  Resp: 17 (07/07/22 1535)  BP: 102/64 (07/07/22 1535)  SpO2: 98 % (07/07/22 1535) Vital Signs (24h Range):  Temp:  [97.8 °F (36.6 °C)-98.6 °F (37 °C)] 98.4 °F (36.9 °C)  Pulse:  [] 83  Resp:  [17-18] 17  SpO2:  [95 %-98 %] 98 %  BP: (102-119)/(58-76) 102/64     Weight: 86.6 kg (190 lb 14.7 oz)  Body mass index is 33.82 kg/m².    Intake/Output Summary (Last 24 hours) at 7/7/2022 1606  Last data filed at 7/7/2022 1100  Gross per 24 hour   Intake 240 ml   Output 404 ml   Net -164 ml      Physical Exam  Vitals and nursing note reviewed.   Constitutional:       Appearance: Normal appearance.      Comments: Noted to be drowsy, not very communicative   HENT:      Head: Normocephalic and atraumatic.   Eyes:      Extraocular Movements: Extraocular movements intact.      Pupils: Pupils are equal, round, and reactive to light.   Cardiovascular:      Rate and Rhythm: Normal rate and regular rhythm.   Pulmonary:      Effort: Pulmonary effort is normal. No respiratory distress.   Abdominal:      General: There is no distension.    Musculoskeletal:         General: Normal range of motion.      Cervical back: Normal range of motion.   Neurological:      General: No focal deficit present.      Comments: Unable to answer simple questions or follow simple commands   Psychiatric:         Mood and Affect: Mood normal.         Behavior: Behavior normal.       Significant Labs: All pertinent labs within the past 24 hours have been reviewed.  CBC:   Recent Labs   Lab 07/06/22  0510 07/07/22  0513   WBC 8.64 8.53   HGB 11.0* 10.8*   HCT 35.0* 34.0*    422     CMP:   Recent Labs   Lab 07/06/22  0510 07/07/22  0513    138   K 3.3* 3.7    104   CO2 23 22*   GLU 86 101   BUN 5* 6*   CREATININE 0.4* 0.4*   CALCIUM 9.2 9.4   ANIONGAP 12 12   EGFRNONAA >60 >60       Significant Imaging: I have reviewed all pertinent imaging results/findings within the past 24 hours.

## 2022-07-07 NOTE — CONSULTS
[facility] Hospital Medicine  Telemedicine Consult Note       Patient has been accepted for transfer to [facility] and will be followed through telemedicine services beginning at 7 AM.      Olivia Abreu MD  [facility] Medicine Staff

## 2022-07-07 NOTE — CONSULTS
[place] - Telemetry  Palliative Medicine  Consult Note    Patient Name: Holly Kidd  MRN: [id number]  Admission Date: 7/2/2022  Hospital Length of Stay: 5 days  Code Status: Partial Code   Attending Provider: Olivia Abreu MD  Consulting Provider: Franny Casillas NP  Primary Care Physician: DENYS Sterling  Principal Problem:GI bleed due to NSAIDs    Patient information was obtained from patient, past medical records, ER records and primary team.      Inpatient consult to Palliative Care  Consult performed by: Franny Casillas NP  Consult ordered by: Colton Maharaj MD  Reason for consult: [facility]        Assessment/Plan:   Palliative encounter:    -Palliative consult received  -Chart reviewed in detail. Patient with PMX of CVA and dementia unsure of how advanced due to patient very appropriate with her answers to questions and was A & O x 3 when I spoke with her. Her answers are slow, and she does seem withdrawn but she is also asking to go home.  It appears that the daughter has asked for placement due to c/g burden. I did not tell the patient she was not going home.  -OT/PT and ST to see and will know more after their eval of whether we will be looking at SNF or care home.  This will help to guide the discussion with the daughter such as the consideration of hospice     Cerebrovascular disease  -History noted  -Has chronic L sided deficits and facial droop and is wheelchair dependent at home  -Daughter no longer able to care for her at home  -Pursuing NH placement  -qualifies for hospice      Debility  -Due to stroke; WC bound  -PT/OT consulted  -will see if SNF is being considered        Thank you for your consult. I will follow-up with patient. Please contact us if you have any additional questions.    Subjective:   Interval History: patient alert and oriented. She c/o nausea and asked for crackers    Past Medical History:   Diagnosis Date    COVID-19 09/09/2021    Essential hypertension 6/12/2015     Obese     Obesity (BMI 35.0-39.9) 6/12/2015    Stroke 06/2015    Left side weakness    Stroke-like symptoms 09/17/2021    NEW LEFT FACIAL DROOP, LEFT SIDE WEAKNESS & SLURRED SPEECH    Wheelchair dependence     Wheelchair dependence        Past Surgical History:   Procedure Laterality Date    ESOPHAGOGASTRODUODENOSCOPY N/A 7/5/2022    Procedure: EGD (ESOPHAGOGASTRODUODENOSCOPY);  Surgeon: Wilma Munguia MD;  Location[facility];  Service: Endoscopy;  Laterality: N/A;    HYSTERECTOMY         Review of patient's allergies indicates:  No Known Allergies    Medications:  Continuous Infusions:   sodium chloride 0.9%       Scheduled Meds:   amLODIPine  10 mg Oral Daily    cetirizine  10 mg Oral Daily    ferrous sulfate  1 tablet Oral Daily    pantoprazole  40 mg Oral BID     PRN Meds:acetaminophen, loperamide, melatonin, ondansetron, sodium chloride 0.9%    Family History       Problem Relation (Age of Onset)    Cancer Father    Stroke Mother          Tobacco Use    Smoking status: Never Smoker    Smokeless tobacco: Never Used   Substance and Sexual Activity    Alcohol use: No    Drug use: No    Sexual activity: Not on file       Review of Systems   Constitutional:  Positive for activity change.   Respiratory:  Negative for shortness of breath.    Cardiovascular:  Negative for chest pain.   Gastrointestinal:  Positive for nausea. Negative for abdominal pain and vomiting.   Musculoskeletal:  Positive for myalgias.   Neurological:  Positive for weakness.   Objective:     Vital Signs (Most Recent):  Temp: 98.2 °F (36.8 °C) (07/06/22 1243)  Pulse: 94 (07/06/22 1243)  Resp: 18 (07/06/22 1243)  BP: 110/65 (07/06/22 1243)  SpO2: 98 % (07/06/22 1243) Vital Signs (24h Range):  Temp:  [96.8 °F (36 °C)-98.2 °F (36.8 °C)] 98.2 °F (36.8 °C)  Pulse:  [] 94  Resp:  [16-19] 18  SpO2:  [93 %-99 %] 98 %  BP: (105-142)/(63-88) 110/65     Weight: 86.6 kg (190 lb 14.7 oz)  Body mass index is 33.82  kg/m².    Physical Exam  Vitals and nursing note reviewed.   Constitutional:       General: She is not in acute distress.     Appearance: She is obese. She is ill-appearing. She is not toxic-appearing or diaphoretic.   HENT:      Right Ear: External ear normal.      Left Ear: External ear normal.      Mouth/Throat:      Mouth: Mucous membranes are dry.      Comments: Poor dentation  Neurological:      Mental Status: She is alert.      Motor: Weakness present.      Comments: Patient oriented to self, able to tell me she is in hospital, knew year and her address and that she lives with her daughter     Psychiatric:         Mood and Affect: Affect is flat.         Behavior: Behavior is withdrawn.       Review of Symptoms      Symptom Assessment (ESAS 0-10 Scale)  Pain:  0  Dyspnea:  0  Anxiety:  0  Nausea:  0  Depression:  0  Anorexia:  0  Fatigue:  0  Insomnia:  0  Restlessness:  0  Agitation:  0       Advance Care Planning   Advance Care Planning       Significant Labs: All pertinent labs within the past 24 hours have been reviewed.  CBC:   Recent Labs   Lab 07/06/22  0510   WBC 8.64   HGB 11.0*   HCT 35.0*   MCV 69*        BMP:  Recent Labs   Lab 07/06/22  0510   GLU 86      K 3.3*      CO2 23   BUN 5*   CREATININE 0.4*   CALCIUM 9.2   MG 1.9     LFT:  Lab Results   Component Value Date    AST 17 07/02/2022    ALKPHOS 105 07/02/2022    BILITOT 0.4 07/02/2022     Albumin:   Albumin   Date Value Ref Range Status   07/02/2022 3.7 3.5 - 5.2 g/dL Final     Protein:   Total Protein   Date Value Ref Range Status   07/02/2022 7.9 6.0 - 8.4 g/dL Final     Lactic acid:   Lab Results   Component Value Date    LACTATE 1.4 07/03/2022    LACTATE 3.8 (HH) 07/02/2022       Significant Imaging: last imaging 7/2      > 50% of 40 min visit spent in chart review, face to face discussion of goals of care,  symptom assessment, coordination of care and emotional support.    Franny Casillas NP  Palliative  Medicine  [facility]

## 2022-07-07 NOTE — ASSESSMENT & PLAN NOTE
- patient noted to be slightly more lethargic, less conversant on 07/07.  Etiology unclear.    - Labs and imaging reviewed.   - Patient has not had any sedating medication.  - No focal deficits per virtual examination.   -  Will transfer back to in-person hospital patient in and management.   - Maintain delirium precautions.Maintain regular sleep cycle. Early ambulation. Minimal interruptions overnight. Re-orient patient frequently. Maintain adequate bowel regimen, hydration and electrolyte replenishments. Hearing Aids and eye glasses as needed.

## 2022-07-07 NOTE — NURSING
Bedside Report given to night nurse RAJENDRA Rankin. Walking rounds completed. Visualized and assessed patient NAD noted. Safety precautions maintained and call light within reach.     Chart check completed.

## 2022-07-07 NOTE — ASSESSMENT & PLAN NOTE
-Baseline - daughter reports she slumps over in the wheelchair and she has to prop her up   -fall precautions  -DC to SNF pending

## 2022-07-07 NOTE — PLAN OF CARE
Recommendations  1) Continue current diet - texture recommendations per SLP   2) Add Boost Plus BID for additional kcal/protein - thickened per ST recs as warranted  3) Encourage intake and assist with meals as needed    Goals: Pt to meet greater than or equal to 75% EEN by RD follow up  Nutrition Goal Status: progressing towards goal  Communication of RD Recs: other (comment) (POC)

## 2022-07-07 NOTE — PT/OT/SLP PROGRESS
Speech Language Pathology      Holly Kidd  MRN: [id number]    Patient not seen today secondary to pt with dcr alertness, and dcr acceptance of po intake. Per pt's sister, pt with notable dcr verbal output and dcr alertness compared to yesterday, when pt was fully alert and talkative. Dr. Abreu attempted to conduct a virtual check-in while ST was in the room, however, pt was unable to participate. MD notified Dr. Maharaj to conduct full physical assessment 2/2 change. ST will attempt to follow-up 7/8.    Angelica Mandujano, PATRICIA-SLP

## 2022-07-07 NOTE — ASSESSMENT & PLAN NOTE
-Admitted to inpatient status  -Baseline Hb near 12  -On admit Hb 9.6 and reported melena  -GI consulted and patient going taken for EGD 7/5 which showed no source of bleeding.  Specifically: a bleb found in the esophagus, tortuous esophagus, 6 cm hiatal hernia, normal stomach, normal examined duodenum.  -Given risk of aspiration with prep - will hold off on colonoscopy unless active bleeding or dropping H/H.  -Holding home aspirin  -Continue protonix bid for now.  -Monitor H/H - stable .  Transfuse for Hb less than 7.  She is iron deficient - Tsat 7%  -Continue oral iron supplement   -Medically stable for discharge to NH pending placement.

## 2022-07-07 NOTE — PROGRESS NOTES
"      [facility] Medicine  Telemedicine Progress Note    Patient Name: Holly Kidd  MRN: [id number]  Patient Class: IP- Inpatient   Admission Date: 7/2/2022  Length of Stay: 5 days  Attending Physician: Colton Maharaj MD  Primary Care Provider: DENYS Sterling          Subjective:     Principal Problem:GI bleed due to NSAIDs        Ms. Kidd is a 71 year old female with PMH of CVA ( facial droop and left sided weakness) , HTN, Dementia ( at baseline agitated and oriented x1) is being admitted from the ER after presenting for general malize, weakness and fatigue when the AC went out as well as daughter reporting patient has been eating less and noted to have what is described as melena this AM x1. She described a "motor oil black" tarry stool by patient but denies diarrhea or vomiting. Reports that mother refuses to go to the doctor and only takes the merari aspirin and amlodipine. She is completely bedbound and total care with daughter providing all ADL's . Daughter has a large caregiver burden. There was a report of epigastric pain but patient denies this. Noted to have a potassium of 2.7 in ER and denies any vomiting or diarrhea. No chills, shortness of breath, nausea or other associated symptoms. She currently only reports she is "cold" and does not like people "sticking her" with needles.      Note*Patient's daughter denies altered mental status and notes that the patient is agitated and physically/verbally abusive to others at baseline. She recognizes only her daughter usually and calls her by a nickname.       Overview/Hospital Course:  71 year old female with PMH of CVA ( facial droop and left sided weakness) , HTN, Dementia, and bedbound admitted on 07/02/2022 for hypokalemia and anemia, with suspicion for upper GI bleed. Chest x-ray unremarkable.  Patient presented with complaints fatigue and weakness also with melena. Hgb on admit 9.6, down from baseline of 12. Potassium " replaced. GI consulted- recommends IV PPI BID and EGD on 07/05. Family is in agreement. Hemoglobin stable.  Continue to monitor.  Patient transferred to virtual medicine on 07/07.  However, patient noted to be slightly more lethargic, less communicative.  This is an acute change in her mentation.  Patient to be transferred back to in-person Hospital Medicine team.      Interval History:  Noted to be hemodynamically stable and afebrile.  Noted to be slightly lethargic today in not very communicative.  According to SLP and nursing staff, this is an acute change.  Labs reviewed.  Patient has not received any sedating medications.  From my virtual examination, patient does not appear to have any focal neurological deficits.  Patient will be transferred back to in-person medicine team today.    Review of Systems   Constitutional:  Positive for activity change and fatigue. Negative for fever.   Respiratory:  Negative for shortness of breath.    Cardiovascular:  Negative for chest pain.   Gastrointestinal:  Negative for abdominal pain.   Neurological:  Positive for weakness. Negative for dizziness and headaches.   All other systems reviewed and are negative.  Objective:     Vital Signs (Most Recent):  Temp: 98.4 °F (36.9 °C) (07/07/22 1535)  Pulse: 83 (07/07/22 1535)  Resp: 17 (07/07/22 1535)  BP: 102/64 (07/07/22 1535)  SpO2: 98 % (07/07/22 1535) Vital Signs (24h Range):  Temp:  [97.8 °F (36.6 °C)-98.6 °F (37 °C)] 98.4 °F (36.9 °C)  Pulse:  [] 83  Resp:  [17-18] 17  SpO2:  [95 %-98 %] 98 %  BP: (102-119)/(58-76) 102/64     Weight: 86.6 kg (190 lb 14.7 oz)  Body mass index is 33.82 kg/m².    Intake/Output Summary (Last 24 hours) at 7/7/2022 1606  Last data filed at 7/7/2022 1100  Gross per 24 hour   Intake 240 ml   Output 404 ml   Net -164 ml      Physical Exam  Vitals and nursing note reviewed.   Constitutional:       Appearance: Normal appearance.      Comments: Noted to be drowsy, not very communicative   HENT:       Head: Normocephalic and atraumatic.   Eyes:      Extraocular Movements: Extraocular movements intact.      Pupils: Pupils are equal, round, and reactive to light.   Cardiovascular:      Rate and Rhythm: Normal rate and regular rhythm.   Pulmonary:      Effort: Pulmonary effort is normal. No respiratory distress.   Abdominal:      General: There is no distension.   Musculoskeletal:         General: Normal range of motion.      Cervical back: Normal range of motion.   Neurological:      General: No focal deficit present.      Comments: Unable to answer simple questions or follow simple commands   Psychiatric:         Mood and Affect: Mood normal.         Behavior: Behavior normal.       Significant Labs: All pertinent labs within the past 24 hours have been reviewed.  CBC:   Recent Labs   Lab 07/06/22  0510 07/07/22  0513   WBC 8.64 8.53   HGB 11.0* 10.8*   HCT 35.0* 34.0*    422     CMP:   Recent Labs   Lab 07/06/22 0510 07/07/22  0513    138   K 3.3* 3.7    104   CO2 23 22*   GLU 86 101   BUN 5* 6*   CREATININE 0.4* 0.4*   CALCIUM 9.2 9.4   ANIONGAP 12 12   EGFRNONAA >60 >60       Significant Imaging: I have reviewed all pertinent imaging results/findings within the past 24 hours.      Assessment/Plan:      * GI bleed due to NSAIDs  -Admitted to inpatient status  -Baseline Hb near 12  -On admit Hb 9.6 and reported melena  -GI consulted and patient going taken for EGD 7/5 which showed no source of bleeding.  Specifically: a bleb found in the esophagus, tortuous esophagus, 6 cm hiatal hernia, normal stomach, normal examined duodenum.  -Given risk of aspiration with prep - will hold off on colonoscopy unless active bleeding or dropping H/H.  -Holding home aspirin  -Continue protonix bid for now.  -Monitor H/H - stable .  Transfuse for Hb less than 7.  She is iron deficient - Tsat 7%  -Continue oral iron supplement   -Medically stable for discharge to NH pending placement.    Debility  -Due to  stroke and chronic  -PT/OT consulted  -Daughter states she can no longer care for her mom and would like to proceed with [facility] care at NH. Prefer [place]  -CM/SW consulted to assist with placement    Advanced care planning/counseling discussion  Advance Care Planning  Date: 07/03/2022 Code Status  In light of the patients advanced and life limiting illness,I engaged the the patient, family and healthcare power of   in a conversation about the patient's preferences for care  at the very end of life. The patient wishes to have CPR and cardio version if needed with medications when her heart stops. However, patient does not want to be intubated or placed on mechanical ventilation when her breathing stops. I communicated to the patient, family and healthcare power of   that her wishes align with partial code status.    Code status: Partial code. Patient agrees to CPR/cardioversion but does not want to be intubated or mechanically ventilated.   -Partial code status with CPR/cardioversion but without possibility of securing airway is considered illogical by many physicians.  Will consult palliative care to continue goals of care discussion and hopefully pursue more straight forward code status - either full code or DNR.    Iron deficiency anemia  -Likely due to GI bleeding  -Treatment as above.    Acute encephalopathy  - patient noted to be slightly more lethargic, less conversant on 07/07.  Etiology unclear.    - Labs and imaging reviewed.   - Patient has not had any sedating medication.  - No focal deficits per virtual examination.   -  Will transfer back to in-person hospital patient in and management.   - Maintain delirium precautions.Maintain regular sleep cycle. Early ambulation. Minimal interruptions overnight. Re-orient patient frequently. Maintain adequate bowel regimen, hydration and electrolyte replenishments. Hearing Aids and eye glasses as needed.            Cerebrovascular  disease  -History noted  -Has chronic L sided deficits and facial droop and is wheelchair dependent at home  -Daughter no longer able to care for her at home  -Pursuing NH placement    Wheelchair dependence  -Baseline - daughter reports she slumps over in the wheelchair and she has to prop her up   -fall precautions  -DC to SNF pending     Obesity (BMI 30.0-34.9)  Body mass index is 33.82 kg/m². Morbid obesity complicates all aspects of disease management from diagnostic modalities to treatment. Weight loss encouraged and health benefits explained to patient.     Hypokalemia  -Replace potassium .  -Check BMP and Mag in AM.    Essential hypertension  -Well controlled  -Continue norvasc  -monitor vitals q4h  -SBP goal of <160 in hospital      VTE Risk Mitigation (From admission, onward)         Ordered     Place sequential compression device  Until discontinued         07/02/22 2111                      I have assessed these finding virtually using telemed platform and with assistance of bedside nurse                 The attending portion of this evaluation, treatment, and documentation was performed per Olivia Abreu MD via Telemedicine AudioVisual using the secure [facility] software platform with 2 way audio/video. The provider was located off-site and the patient is located in the hospital. The aforementioned video software was utilized to document the relevant history and physical exam    Olivia Abreu MD  Department of Hospital Medicine   [facility] - Telemetry

## 2022-07-07 NOTE — PLAN OF CARE
Problem: Occupational Therapy  Goal: Occupational Therapy Goal  Description: Goals to be met by: 7/17/22    Patient will increase functional independence with ADLs by performing:    Feeding with Set-up Assistance, Supervision, and Assistive Devices as needed.  Grooming while in bed with Set-up Assistance and Stand-by Assistance.  Rolling to Bilateral with Moderate Assistance and use of bedrail as needed.   Increased functional strength to to participate in self care tasks  The patient will tolerated AAROM to BUE x10 reps    Outcome: Ongoing, Not Progressing   The patient required verbal and tactile cues to open eyes. The patient was unable able to follow commands to participate in grooming tasks. OT will continue as able.   Additional Anesthesia Volume In Cc: 6

## 2022-07-07 NOTE — PT/OT/SLP PROGRESS
Occupational Therapy   Treatment    Name: Holly Kidd  MRN: [id number]  Admitting Diagnosis:  GI bleed due to NSAIDs  2 Days Post-Op    Recommendations:     Discharge Recommendations: nursing facility, basic  Discharge Equipment Recommendations:  none  Barriers to discharge:  None    Assessment:     Holly Kidd is a 71 y.o. female with a medical diagnosis of GI bleed due to NSAIDs.   Performance deficits affecting function are weakness, impaired self care skills, impaired functional mobilty, impaired balance, impaired cognition, decreased upper extremity function, decreased lower extremity function, decreased safety awareness, pain, decreased ROM, impaired fine motor, impaired skin, impaired cardiopulmonary response to activity.   The patient required verbal and tactile cues to open eyes. The patient was unable able to follow commands to participate in grooming tasks. OT will continue as able.    Rehab Prognosis:  Fair; patient would benefit from acute skilled OT services to address these deficits and reach maximum level of function.       Plan:     Patient to be seen 2 x/week, 3 x/week to address the above listed problems via self-care/home management, therapeutic activities, therapeutic exercises  · Plan of Care Expires: 07/17/22  · Plan of Care Reviewed with: patient    Subjective     Pain/Comfort:  · Pain Rating 1: 0/10    Objective:       Patient found HOB elevated with PureWick, peripheral IV, pressure relief boots, telemetry upon OT entry to room.    General Precautions: Standard, fall   Orthopedic Precautions:N/A   Braces: N/A  Respiratory Status: Room air     Occupational Performance:     Bed Mobility:    · The patient was dependent with repositioning in bed. The patient was leaning to the left side while in supine with HOB elevated.     Functional Mobility/Transfers:  · Functional Mobility: N/T    Activities of Daily Living:  · Grooming: dependence to wipe face and hands. The patient did not attempt  to wipe face when cloth was placed in her right hand.      [facility] 6 Click ADL: 6    Treatment & Education:  · The patient required max verbal and tactile cues to open her eyes with bed placed in chair position.  · Attempted to engage the patient in simple grooming tasks but patient was unable to remain alert.    Patient left HOB elevated with all lines intact and call button in reachEducation:      GOALS:   Multidisciplinary Problems     Occupational Therapy Goals        Problem: Occupational Therapy    Goal Priority Disciplines Outcome Interventions   Occupational Therapy Goal     OT, PT/OT Ongoing, Not Progressing    Description: Goals to be met by: 7/17/22    Patient will increase functional independence with ADLs by performing:    Feeding with Set-up Assistance, Supervision, and Assistive Devices as needed.  Grooming while in bed with Set-up Assistance and Stand-by Assistance.  Rolling to Bilateral with Moderate Assistance and use of bedrail as needed.   Increased functional strength to to participate in self care tasks  The patient will tolerated AAROM to BUE x10 reps                     Time Tracking:     OT Date of Treatment: 07/07/22  OT Start Time: 1156  OT Stop Time: 1209  OT Total Time (min): 13 min    Billable Minutes:Self Care/Home Management 13    OT/IRENE: OT          7/7/2022

## 2022-07-07 NOTE — PROGRESS NOTES
[facility] - Telemetry  Adult Nutrition  Consult Note    SUMMARY     Recommendations  1) Continue current diet - texture recommendations per SLP   2) Add Boost Plus BID for additional kcal/protein - thickened per ST recs as warranted  3) Encourage intake and assist with meals as needed    Goals: Pt to meet greater than or equal to 75% EEN by RD follow up  Nutrition Goal Status: progressing towards goal  Communication of RD Recs: other (comment) (POC)    Assessment and Plan  Nutrition Problem  Swallowing difficulty    Related to (etiology):   Poor dentition, dysphagia    Signs and Symptoms (as evidenced by):   ST recommending IDDSI 5: Mechanical Soft Diet    Interventions(treatment strategy):  Collaboration with other providers  Commercial beverage  Texture modified diet    Nutrition Diagnosis Status:   Improving      Malnutrition Assessment  NFPE not performed per pt with increased lethargy of acute nature today.  Will continue to follow and perform as warranted.        Reason for Assessment  Reason For Assessment: RD follow-up  Relevant Medical History: CVA ( facial droop and left sided weakness) , HTN, Dementia ( at baseline agitated and oriented x1)  Interdisciplinary Rounds: did not attend  General Information Comments:     7/7 - Pt not seen today per acute decrease in alertness and increased lethargy. Pt in room very lethargic and did not respond to my introduction - had tray at bedside untouched at time. PO intake approximately 50% - appears lunch was 75% per assistance from daughter today.     7/4 - Remote assessment for coverage. Pt admitted with increased weakness/fatigue due to AC out at home, decreased PO and melena per pt's daughter who is her caregiver. Pt has been bedbound since getting COVID about a year ago and dependent on daughter for all ADLs. LBM 7/3, 25-50% PO intake at meals/ NPO at midnight for EGD tomorrow. ST recommending pureed diet/nectar thick liquids, noted with very poor dentitian and  "oral hygeine and unable to swallow K pill per RN. Wt appears to fluctuate per history. -225 lbs x 1 year, current wt slightly under UBW.  Nutrition Discharge Planning: pending clinical course    Nutrition Risk Screen    Nutrition Risk Screen: dysphagia or difficulty swallowing    Nutrition/Diet History  Spiritual, Cultural Beliefs, [employer] Practices, Values that Affect Care: no  Factors Affecting Nutritional Intake: chewing difficulties/inability to chew food, difficulty/impaired swallowing, decreased appetite, impaired cognitive status/motor control    Anthropometrics  Temp: 98.4 °F (36.9 °C)  Height Method: Stated  Height: 5' 3" (160 cm)  Height (inches): 63 in  Weight Method: Bed Scale  Weight: 86.6 kg (190 lb 14.7 oz)  Weight (lb): 190.92 lb  Ideal Body Weight (IBW), Female: 115 lb  % Ideal Body Weight, Female (lb): 166.02 %  BMI (Calculated): 33.8  BMI Grade: 30 - 34.9- obesity - grade I       Lab/Procedures/Meds  Pertinent Labs Reviewed: reviewed  CBC:  Recent Labs   Lab 07/07/22 0513   WBC 8.53   HGB 10.8*   HCT 34.0*        CMP:  Recent Labs   Lab 07/07/22 0513   CALCIUM 9.4      K 3.7   CO2 22*      BUN 6*   CREATININE 0.4*     Pertinent Medications Reviewed: reviewed  Scheduled Meds:   amLODIPine  10 mg Oral Daily    cetirizine  10 mg Oral Daily    ferrous sulfate  1 tablet Oral Daily    pantoprazole  40 mg Oral BID     Continuous Infusions:   sodium chloride 0.9%       PRN Meds:.acetaminophen, loperamide, melatonin, ondansetron, sodium chloride 0.9%      Estimated/Assessed Needs  Weight Used For Calorie Calculations: 86.2 kg (190 lb)  Energy Calorie Requirements (kcal): 1350 kcal/day based on MSJ x no AF for BMI > 30  Energy Need Method: Valery Cabral  Protein Requirements:  g/day based on 1-1.2 g/kg  Weight Used For Protein Calculations: 86.2 kg (190 lb)  Fluid Requirements (mL): 1mL/kcal or per MD  Estimated Fluid Requirement Method: RDA Method  RDA Method " (mL): 1350  CHO Requirement: 169g    Nutrition Prescription Ordered  Current Diet Order: IDDSI 5: Regular  Nutrition Order Comments: Decreased Alertness today  Oral Nutrition Supplement: Boost (+)    Evaluation of Received Nutrient/Fluid Intake  Energy Calories Required: not meeting needs  Protein Required: not meeting needs  Fluid Required: not meeting needs  Comments: LBM 7/5  Tolerance: tolerating  % Intake of Estimated Energy Needs: 25 - 50 %  % Meal Intake: 25 - 50 %    Intake/Output - Last 3 Shifts       07/05 0700 07/06 0659 07/06 0700 07/07 0659 07/07 0700 07/08 0659    P.O.   240    IV Piggyback 200      Total Intake(mL/kg) 200 (2.3)  240 (2.8)    Urine (mL/kg/hr) 200 (0.1) 200 (0.1) 400 (0.5)    Emesis/NG output  4     Stool       Total Output 200 204 400    Net 0 -204 -160                 Nutrition Risk  Level of Risk/Frequency of Follow-up:  (1-2x weekly)     Monitor and Evaluation  Food and Nutrient Intake: energy intake, food and beverage intake  Food and Nutrient Adminstration: diet order  Knowledge/Beliefs/Attitudes: food and nutrition knowledge/skill  Physical Activity and Function: nutrition-related ADLs and IADLs  Anthropometric Measurements: body mass index, weight, weight change  Biochemical Data, Medical Tests and Procedures: electrolyte and renal panel, lipid profile, gastrointestinal profile, glucose/endocrine profile, inflammatory profile  Nutrition-Focused Physical Findings: overall appearance, extremities, muscles and bones, skin     Nutrition Follow-Up  RD Follow-up?: Yes

## 2022-07-08 PROBLEM — N30.00 ACUTE CYSTITIS: Status: ACTIVE | Noted: 2022-07-08

## 2022-07-08 LAB
BASOPHILS # BLD AUTO: 0.03 K/UL (ref 0–0.2)
BASOPHILS NFR BLD: 0.4 % (ref 0–1.9)
DIFFERENTIAL METHOD: ABNORMAL
EOSINOPHIL # BLD AUTO: 0.1 K/UL (ref 0–0.5)
EOSINOPHIL NFR BLD: 1.1 % (ref 0–8)
ERYTHROCYTE [DISTWIDTH] IN BLOOD BY AUTOMATED COUNT: 21.7 % (ref 11.5–14.5)
FOLATE SERPL-MCNC: 10.6 NG/ML (ref 4–24)
HCT VFR BLD AUTO: 33 % (ref 37–48.5)
HGB BLD-MCNC: 10.4 G/DL (ref 12–16)
IMM GRANULOCYTES # BLD AUTO: 0.03 K/UL (ref 0–0.04)
IMM GRANULOCYTES NFR BLD AUTO: 0.4 % (ref 0–0.5)
LYMPHOCYTES # BLD AUTO: 3.1 K/UL (ref 1–4.8)
LYMPHOCYTES NFR BLD: 36.4 % (ref 18–48)
MCH RBC QN AUTO: 21.5 PG (ref 27–31)
MCHC RBC AUTO-ENTMCNC: 31.5 G/DL (ref 32–36)
MCV RBC AUTO: 68 FL (ref 82–98)
MONOCYTES # BLD AUTO: 0.9 K/UL (ref 0.3–1)
MONOCYTES NFR BLD: 10 % (ref 4–15)
NEUTROPHILS # BLD AUTO: 4.4 K/UL (ref 1.8–7.7)
NEUTROPHILS NFR BLD: 51.7 % (ref 38–73)
NRBC BLD-RTO: 0 /100 WBC
PLATELET # BLD AUTO: 492 K/UL (ref 150–450)
PMV BLD AUTO: 8.9 FL (ref 9.2–12.9)
RBC # BLD AUTO: 4.83 M/UL (ref 4–5.4)
WBC # BLD AUTO: 8.52 K/UL (ref 3.9–12.7)

## 2022-07-08 PROCEDURE — 92526 ORAL FUNCTION THERAPY: CPT

## 2022-07-08 PROCEDURE — 25000003 PHARM REV CODE 250: Performed by: HOSPITALIST

## 2022-07-08 PROCEDURE — 21400001 HC TELEMETRY ROOM

## 2022-07-08 PROCEDURE — 97535 SELF CARE MNGMENT TRAINING: CPT

## 2022-07-08 PROCEDURE — 63600175 PHARM REV CODE 636 W HCPCS: Performed by: HOSPITALIST

## 2022-07-08 PROCEDURE — 97110 THERAPEUTIC EXERCISES: CPT

## 2022-07-08 PROCEDURE — 85025 COMPLETE CBC W/AUTO DIFF WBC: CPT | Performed by: HOSPITALIST

## 2022-07-08 PROCEDURE — 36415 COLL VENOUS BLD VENIPUNCTURE: CPT | Performed by: HOSPITALIST

## 2022-07-08 RX ORDER — PANTOPRAZOLE SODIUM 40 MG/1
40 TABLET, DELAYED RELEASE ORAL DAILY
Status: DISCONTINUED | OUTPATIENT
Start: 2022-07-08 | End: 2022-07-13 | Stop reason: HOSPADM

## 2022-07-08 RX ADMIN — PIPERACILLIN SODIUM,TAZOBACTAM SODIUM 4.5 G: 4; .5 INJECTION, POWDER, FOR SOLUTION INTRAVENOUS at 03:07

## 2022-07-08 RX ADMIN — PIPERACILLIN SODIUM,TAZOBACTAM SODIUM 4.5 G: 4; .5 INJECTION, POWDER, FOR SOLUTION INTRAVENOUS at 08:07

## 2022-07-08 NOTE — NURSING
Bedside Report given to night nurse JER Gaffney.  Walking rounds completed. Visualized and assessed patient NAD noted. Safety precautions maintained and call light within reach.     Chart check completed.

## 2022-07-08 NOTE — PLAN OF CARE
Problem: Occupational Therapy  Goal: Occupational Therapy Goal  Description: Goals to be met by: 7/17/22    Patient will increase functional independence with ADLs by performing:    Feeding with Set-up Assistance, Supervision, and Assistive Devices as needed.  Grooming while in bed with Set-up Assistance and Stand-by Assistance.  Rolling to Bilateral with Moderate Assistance and use of bedrail as needed.   Increased functional strength to to participate in self care tasks  The patient will tolerate P/ROM BUE, progressing to engage in AAROM to BUE x10 reps    Outcome: Ongoing.

## 2022-07-08 NOTE — SUBJECTIVE & OBJECTIVE
Interval History:  No acute events overnight.  Patient eating lunch at time of my visit.  Was in good spirits.  Not eating much - doesn't seem to like the food.  Denies pain.  A bit lethargic.  All questions answered and patient had no further complaints.    Review of Systems   Constitutional:  Positive for activity change and fatigue. Negative for fever.   Respiratory:  Negative for shortness of breath.    Cardiovascular:  Negative for chest pain.   Gastrointestinal:  Negative for abdominal pain.   Neurological:  Positive for weakness. Negative for dizziness and headaches.   All other systems reviewed and are negative.    Objective:     Vital Signs (Most Recent):  Temp: 98.1 °F (36.7 °C) (07/08/22 1521)  Pulse: 93 (07/08/22 1521)  Resp: 18 (07/08/22 1521)  BP: 99/62 (07/08/22 1521)  SpO2: 98 % (07/08/22 1521) Vital Signs (24h Range):  Temp:  [97.2 °F (36.2 °C)-98.5 °F (36.9 °C)] 98.1 °F (36.7 °C)  Pulse:  [] 93  Resp:  [17-19] 18  SpO2:  [98 %-99 %] 98 %  BP: ()/(62-70) 99/62     Weight: 86.6 kg (190 lb 14.7 oz)  Body mass index is 33.82 kg/m².    Intake/Output Summary (Last 24 hours) at 7/8/2022 1846  Last data filed at 7/8/2022 1101  Gross per 24 hour   Intake 315 ml   Output --   Net 315 ml        Physical Exam  Vitals and nursing note reviewed.   Constitutional:       General: She is not in acute distress.     Appearance: Normal appearance. She is normal weight. She is not ill-appearing (chronically weak, frail), toxic-appearing or diaphoretic.   HENT:      Head: Normocephalic and atraumatic.      Right Ear: External ear normal.      Left Ear: External ear normal.      Nose: Nose normal. No congestion.      Mouth/Throat:      Mouth: Mucous membranes are moist.      Pharynx: Oropharynx is clear.   Eyes:      Extraocular Movements: Extraocular movements intact.      Pupils: Pupils are equal, round, and reactive to light.   Cardiovascular:      Rate and Rhythm: Normal rate and regular rhythm.       Heart sounds: No murmur heard.    No gallop.   Pulmonary:      Effort: Pulmonary effort is normal. No respiratory distress.      Breath sounds: No wheezing or rales.   Abdominal:      General: There is no distension.      Tenderness: There is no abdominal tenderness. There is no guarding.   Musculoskeletal:         General: Normal range of motion.      Cervical back: Normal range of motion. No rigidity or tenderness.      Right lower leg: No edema.      Left lower leg: No edema.   Skin:     General: Skin is dry.   Neurological:      General: No focal deficit present.      Mental Status: She is alert.      Motor: Weakness present.      Comments: Alert and attentive.  Appears same as 2 days ago.  Moves all extremities.  Answers questions sporadically with fluent speech.  Knew my name instantly from 2 days ago - just one visit previously.     Psychiatric:         Mood and Affect: Mood normal. Affect is not labile or flat.         Speech: Speech is not delayed.         Behavior: Behavior normal. Behavior is cooperative.       Significant Labs: All pertinent labs within the past 24 hours have been reviewed.    Significant Imaging: I have reviewed all pertinent imaging results/findings within the past 24 hours.

## 2022-07-08 NOTE — ASSESSMENT & PLAN NOTE
-Patient felt to be lethargic and less conversant 7/7  -TSH, Ammonia normal but found to have UTI.  -Noted prior ESBL E.coli UTI - started zosyn 7/8  -Maintain delirium precautions.  -Clinically much improved today.

## 2022-07-08 NOTE — PROGRESS NOTES
"[facility] Medicine  Progress Note    Patient Name: Holly Kidd  MRN: [id number]  Patient Class: IP- Inpatient   Admission Date: 7/2/2022  Length of Stay: 6 days  Attending Physician: Colton Maharaj MD  Primary Care Provider: DENYS Sterling        Subjective:     Principal Problem:GI bleed due to NSAIDs        HPI:  Ms. Kidd is a 71 year old female with PMH of CVA ( facial droop and left sided weakness) , HTN, Dementia ( at baseline agitated and oriented x1) is being admitted from the ER after presenting for general malize, weakness and fatigue when the AC went out as well as daughter reporting patient has been eating less and noted to have what is described as melena this AM x1. She described a "motor oil black" tarry stool by patient but denies diarrhea or vomiting. Reports that mother refuses to go to the doctor and only takes the merari aspirin and amlodipine. She is completely bedbound and total care with daughter providing all ADL's . Daughter has a large caregiver burden. There was a report of epigastric pain but patient denies this. Noted to have a potassium of 2.7 in ER and denies any vomiting or diarrhea. No chills, shortness of breath, nausea or other associated symptoms. She currently only reports she is "cold" and does not like people "sticking her" with needles.      Note*Patient's daughter denies altered mental status and notes that the patient is agitated and physically/verbally abusive to others at baseline. She recognizes only her daughter usually and calls her by a nickname.       Overview/Hospital Course:  71 year old female with PMH of CVA ( facial droop and left sided weakness) , HTN, Dementia, and bedbound admitted on 07/02/2022 for hypokalemia and anemia, with suspicion for upper GI bleed. Chest x-ray unremarkable.  Patient presented with complaints fatigue and weakness also with melena. Hgb on admit 9.6, down from baseline of 12. Potassium replaced. GI " consulted- recommends IV PPI BID and EGD on 07/05. Family is in agreement. Hemoglobin stable.  Continue to monitor.  Patient transferred to [facility] on 07/07.  However, patient noted to be slightly more lethargic, less communicative.  This is an acute change in her mentation.  Patient to be transferred back to in-person Hospital Medicine team.      Interval History:  No acute events overnight.  Patient eating lunch at time of my visit.  Was in good spirits.  Not eating much - doesn't seem to like the food.  Denies pain.  A bit lethargic.  All questions answered and patient had no further complaints.    Review of Systems   Constitutional:  Positive for activity change and fatigue. Negative for fever.   Respiratory:  Negative for shortness of breath.    Cardiovascular:  Negative for chest pain.   Gastrointestinal:  Negative for abdominal pain.   Neurological:  Positive for weakness. Negative for dizziness and headaches.   All other systems reviewed and are negative.    Objective:     Vital Signs (Most Recent):  Temp: 98.1 °F (36.7 °C) (07/08/22 1521)  Pulse: 93 (07/08/22 1521)  Resp: 18 (07/08/22 1521)  BP: 99/62 (07/08/22 1521)  SpO2: 98 % (07/08/22 1521) Vital Signs (24h Range):  Temp:  [97.2 °F (36.2 °C)-98.5 °F (36.9 °C)] 98.1 °F (36.7 °C)  Pulse:  [] 93  Resp:  [17-19] 18  SpO2:  [98 %-99 %] 98 %  BP: ()/(62-70) 99/62     Weight: 86.6 kg (190 lb 14.7 oz)  Body mass index is 33.82 kg/m².    Intake/Output Summary (Last 24 hours) at 7/8/2022 1846  Last data filed at 7/8/2022 1101  Gross per 24 hour   Intake 315 ml   Output --   Net 315 ml        Physical Exam  Vitals and nursing note reviewed.   Constitutional:       General: She is not in acute distress.     Appearance: Normal appearance. She is normal weight. She is not ill-appearing (chronically weak, frail), toxic-appearing or diaphoretic.   HENT:      Head: Normocephalic and atraumatic.      Right Ear: External ear normal.      Left Ear:  External ear normal.      Nose: Nose normal. No congestion.      Mouth/Throat:      Mouth: Mucous membranes are moist.      Pharynx: Oropharynx is clear.   Eyes:      Extraocular Movements: Extraocular movements intact.      Pupils: Pupils are equal, round, and reactive to light.   Cardiovascular:      Rate and Rhythm: Normal rate and regular rhythm.      Heart sounds: No murmur heard.    No gallop.   Pulmonary:      Effort: Pulmonary effort is normal. No respiratory distress.      Breath sounds: No wheezing or rales.   Abdominal:      General: There is no distension.      Tenderness: There is no abdominal tenderness. There is no guarding.   Musculoskeletal:         General: Normal range of motion.      Cervical back: Normal range of motion. No rigidity or tenderness.      Right lower leg: No edema.      Left lower leg: No edema.   Skin:     General: Skin is dry.   Neurological:      General: No focal deficit present.      Mental Status: She is alert.      Motor: Weakness present.      Comments: Alert and attentive.  Appears same as 2 days ago.  Moves all extremities.  Answers questions sporadically with fluent speech.  Knew my name instantly from 2 days ago - just one visit previously.     Psychiatric:         Mood and Affect: Mood normal. Affect is not labile or flat.         Speech: Speech is not delayed.         Behavior: Behavior normal. Behavior is cooperative.       Significant Labs: All pertinent labs within the past 24 hours have been reviewed.    Significant Imaging: I have reviewed all pertinent imaging results/findings within the past 24 hours.      Assessment/Plan:      * GI bleed due to NSAIDs  -Admitted to inpatient status  -Baseline Hb near 12  -On admit Hb 9.6 and reported melena  -GI consulted and patient going taken for EGD 7/5 which showed no source of bleeding.  Specifically: a bleb found in the esophagus, tortuous esophagus, 6 cm hiatal hernia, normal stomach, normal examined duodenum.  -Given  risk of aspiration with prep - will hold off on colonoscopy unless active bleeding or dropping H/H.  -Holding home aspirin  -Monitor H/H - stable .  Transfuse for Hb less than 7.  She is iron deficient - Tsat 7%  -Change to daily PPI  -Continue oral iron supplement   -Medically stable for discharge to NH pending placement.    Iron deficiency anemia  -Likely due to GI bleeding  -Treatment as above.    Hypokalemia  -Check BMP and Mag in AM.    Acute encephalopathy  -Patient felt to be lethargic and less conversant 7/7  -TSH, Ammonia normal but found to have UTI.  -Noted prior ESBL E.coli UTI - started zosyn 7/8  -Maintain delirium precautions.  -Clinically much improved today.    Acute cystitis  -Urine culture growing GNR > 100,000 cfu/ml  -Noted prior ESBL E.coli UTI  -Follow culture  -Start zosyn.    Cerebrovascular disease  -History noted  -Has chronic L sided deficits and facial droop and is wheelchair dependent at home  -Daughter no longer able to care for her at home  -Pursuing NH placement    Debility  -Due to stroke and chronic  -PT/OT consulted  -Daughter states she can no longer care for her mom and would like to proceed with [facility] care at NH. Prefer [facility]  -CM/SW consulted to assist with placement.  Plan discharge to SNF w transition to NH after.    Wheelchair dependence  -Baseline - daughter reports she slumps over in the wheelchair and she has to prop her up   -fall precautions  -DC to SNF pending     Advanced care planning/counseling discussion  Advance Care Planning  Date: 07/03/2022 Code Status  In light of the patients advanced and life limiting illness,I engaged the the patient, family and healthcare power of   in a conversation about the patient's preferences for care  at the very end of life. The patient wishes to have CPR and cardio version if needed with medications when her heart stops. However, patient does not want to be intubated or placed on mechanical ventilation when her  breathing stops. I communicated to the patient, family and healthcare power of   that her wishes align with partial code status.    Code status: Partial code. Patient agrees to CPR/cardioversion but does not want to be intubated or mechanically ventilated.   -Partial code status with CPR/cardioversion but without possibility of securing airway is considered illogical by many physicians.  Will consult palliative care to continue goals of care discussion and hopefully pursue more straight forward code status - either full code or DNR.    Obesity (BMI 30.0-34.9)  Body mass index is 33.82 kg/m². Morbid obesity complicates all aspects of disease management from diagnostic modalities to treatment. Weight loss encouraged and health benefits explained to patient.     Essential hypertension  -Well controlled  -Continue norvasc    VTE Risk Mitigation (From admission, onward)         Ordered     Place sequential compression device  Until discontinued         07/02/22 2111                Discharge Planning   ANISH: 7/6/2022     Code Status: Partial Code   Is the patient medically ready for discharge?:     Reason for patient still in hospital (select all that apply): Treatment and Pending disposition  Discharge Plan A: New Nursing Home placement - [facility] care facility   Discharge Delays: (!) Post-Acute Set-up              Colton Maharaj MD  Department of Hospital Medicine   [facility] - Telemetry

## 2022-07-08 NOTE — ASSESSMENT & PLAN NOTE
-Due to stroke and chronic  -PT/OT consulted  -Daughter states she can no longer care for her mom and would like to proceed with longterm care at NH. Prefer [facility]  -CM/SW consulted to assist with placement.  Plan discharge to SNF w transition to NH after.

## 2022-07-08 NOTE — PLAN OF CARE
[facility] - Telemetry  Discharge Reassessment    Primary Care Provider: DENYS Sterling    Expected Discharge Date: Pending    KATARZYNA discussed discharge planning with daughter, Lana. Lana informed SW that she is touring [facility] today.     Lynne Central Intake for [facility] contacted KATARZYNA to discuss discharge planning. Lynne confirmed that family is visiting facility today. Lynne informed KATARZYNA that they can't take patient Medicaid pending and inquired if the patient can come SNF. KATARZYNA informed Lynne that she will discuss it with the doctor. Lynne stated that with patient coming SNF it will buy them time until she is approved for medicaid.       Reassessment (most recent)     Discharge Reassessment - 07/08/22 0850        Discharge Reassessment    Assessment Type Discharge Planning Reassessment     Did the patient's condition or plan change since previous assessment? No     Discharge Plan discussed with: Adult children     Name(s) and Number(s) Lana Kidd (Daughter)   [phone] (Mobile)     Communicated ANISH with patient/caregiver Yes     Discharge Plan A New Nursing Home placement - senior living care facility     Discharge Plan B New Nursing Home placement - senior living care facility     DME Needed Upon Discharge  walker, rolling;wheelchair     Discharge Barriers Identified None     Why the patient remains in the hospital Requires continued medical care        Post-Acute Status    Post-Acute Authorization Placement   longterm Placement    Post-Acute Placement Status Pending post-acute provider review/more information requested     Coverage Medicare AB     Discharge Delays Post-Acute Set-up

## 2022-07-08 NOTE — ASSESSMENT & PLAN NOTE
-Urine culture growing GNR > 100,000 cfu/ml  -Noted prior ESBL E.coli UTI  -Follow culture  -Start zosyn.

## 2022-07-08 NOTE — ASSESSMENT & PLAN NOTE
-Admitted to inpatient status  -Baseline Hb near 12  -On admit Hb 9.6 and reported melena  -GI consulted and patient going taken for EGD 7/5 which showed no source of bleeding.  Specifically: a bleb found in the esophagus, tortuous esophagus, 6 cm hiatal hernia, normal stomach, normal examined duodenum.  -Given risk of aspiration with prep - will hold off on colonoscopy unless active bleeding or dropping H/H.  -Holding home aspirin  -Monitor H/H - stable .  Transfuse for Hb less than 7.  She is iron deficient - Tsat 7%  -Change to daily PPI  -Continue oral iron supplement   -Medically stable for discharge to NH pending placement.

## 2022-07-08 NOTE — PLAN OF CARE
Ciarra from [facility] business office contacted KATARZYNA to inform that patient is medically accepted to the facility. KATARZYNA informed Ciarra that patient is going SNF to skilled nursing. Ciarra stated that she is veryfying her benefits.  Ciarra instructed KATARZYNA to send her the PASRR and 142.     KATARZYNA contacted Rylee at [facility] in central intake and informed her that Dr. Maharaj agreed to send patient SNF to skilled nursing.

## 2022-07-08 NOTE — PT/OT/SLP PROGRESS
Occupational Therapy   Treatment    Name: Holly Kidd  MRN: [id number]  Admitting Diagnosis:  GI bleed due to NSAIDs  3 Days Post-Op    Recommendations:     Discharge Recommendations: nursing facility, basic  Discharge Equipment Recommendations:   (TBD at next level of care)  Barriers to discharge:   None    Assessment:     Holly Kidd is a 71 y.o. female with a medical diagnosis of GI bleed due to NSAIDs. The Patient presents with evidence of fleeting discomfort (groan, facial grimacing, limb withdrawal) with B shoulder AROM all planes, and B elbow extension. Reposition, external BUE and cervical support provided. PROM underway as needed to skin/joint integrity protection measures.  Patient response (+) this date: absence negative responses described above, increased func communication and environmental scanning, increased ADL engagement (w/ hand over hand at outset) noted. Performance deficits affecting function are weakness, decreased upper extremity function, decreased lower extremity function, decreased safety awareness, pain, decreased coordination, impaired functional mobilty, impaired self care skills, impaired endurance, impaired coordination, impaired joint extensibility.     Rehab Prognosis:  Fair; patient would benefit from acute skilled OT services to address these deficits and reach maximum level of function.       Plan:     Patient to be seen 2 x/week, 3 x/week to address the above listed problems via self-care/home management, therapeutic activities, therapeutic exercises  · Plan of Care Expires: 07/17/22  · Plan of Care Reviewed with: patient    Subjective     Pain/Comfort:  Pain Rating 1: 0/10    Objective:     Communicated with: RN prior to session.  Patient found HOB elevated 25 degrees with pressure relief boots, telemetry, PureWick, peripheral IV upon OT entry to room.    General Precautions: Standard, fall, aspiration   Orthopedic Precautions:N/A   Braces: N/A  Respiratory Status: Room  "air     Occupational Performance:     Bed Mobility:  Total assist all.      Functional Mobility/Transfers: OOB NT this date, deemed unsafe with staff of 1.     Activities of Daily Living:  · Grooming: maximal assistance hnand over hand beneficial at outset for facial cleansing and oral hygiene.   · Upper Body Dressing: total assistance        AMPAC 6 Click ADL: 7    Treatment & Education:  ADLs as indicated above.   environmental modifications to decrease (-) cog effects of social isolation. Patient responses (+) "I want to watch Gun Smoke" upon inquiry, increased evidence of volition and (+) responses to BC only for hygiene, Patient visually tracted Therapist at sessions end to doorway and waved -said "Good by."   PROM BUE all joints, all planes and cervical low amp (lateral rotation and lateral flx/ext.       Patient left HOB elevated mid/high Hong's, tolerated Rx well, resting well, with all lines intact, call button in hand, bed alarm on, RN notified and heels floatingEducation:  .    GOALS:   Multidisciplinary Problems     Occupational Therapy Goals        Problem: Occupational Therapy    Goal Priority Disciplines Outcome Interventions   Occupational Therapy Goal     OT, PT/OT Ongoing, Progressing    Description: Goals to be met by: 7/17/22    Patient will increase functional independence with ADLs by performing:    Feeding with Set-up Assistance, Supervision, and Assistive Devices as needed.  Grooming while in bed with Set-up Assistance and Stand-by Assistance.  Rolling to Bilateral with Moderate Assistance and use of bedrail as needed.   Increased functional strength to to participate in self care tasks  The patient will tolerated AAROM to BUE x10 reps                     Time Tracking:     OT Date of Treatment: 07/08/22  OT Start Time: 1127  OT Stop Time: 1151  OT Total Time (min): 24 min    Billable Minutes:Self Care/Home Management 14  Therapeutic Exercise 10    7/8/2022    "

## 2022-07-08 NOTE — PLAN OF CARE
Problem: Skin Injury Risk Increased  Goal: Skin Health and Integrity  Outcome: Ongoing, Progressing   Turn Q2H. Heel boots to remain in place.

## 2022-07-09 LAB
ANION GAP SERPL CALC-SCNC: 9 MMOL/L (ref 8–16)
ANION GAP SERPL CALC-SCNC: 9 MMOL/L (ref 8–16)
BASOPHILS # BLD AUTO: 0.02 K/UL (ref 0–0.2)
BASOPHILS NFR BLD: 0.2 % (ref 0–1.9)
BUN SERPL-MCNC: 12 MG/DL (ref 8–23)
BUN SERPL-MCNC: 12 MG/DL (ref 8–23)
CALCIUM SERPL-MCNC: 9.2 MG/DL (ref 8.7–10.5)
CALCIUM SERPL-MCNC: 9.2 MG/DL (ref 8.7–10.5)
CHLORIDE SERPL-SCNC: 104 MMOL/L (ref 95–110)
CHLORIDE SERPL-SCNC: 104 MMOL/L (ref 95–110)
CO2 SERPL-SCNC: 25 MMOL/L (ref 23–29)
CO2 SERPL-SCNC: 25 MMOL/L (ref 23–29)
CREAT SERPL-MCNC: 0.5 MG/DL (ref 0.5–1.4)
CREAT SERPL-MCNC: 0.5 MG/DL (ref 0.5–1.4)
DIFFERENTIAL METHOD: ABNORMAL
EOSINOPHIL # BLD AUTO: 0.1 K/UL (ref 0–0.5)
EOSINOPHIL NFR BLD: 0.9 % (ref 0–8)
ERYTHROCYTE [DISTWIDTH] IN BLOOD BY AUTOMATED COUNT: 21.4 % (ref 11.5–14.5)
EST. GFR  (AFRICAN AMERICAN): >60 ML/MIN/1.73 M^2
EST. GFR  (AFRICAN AMERICAN): >60 ML/MIN/1.73 M^2
EST. GFR  (NON AFRICAN AMERICAN): >60 ML/MIN/1.73 M^2
EST. GFR  (NON AFRICAN AMERICAN): >60 ML/MIN/1.73 M^2
GLUCOSE SERPL-MCNC: 115 MG/DL (ref 70–110)
GLUCOSE SERPL-MCNC: 115 MG/DL (ref 70–110)
HCT VFR BLD AUTO: 30.7 % (ref 37–48.5)
HGB BLD-MCNC: 9.7 G/DL (ref 12–16)
IMM GRANULOCYTES # BLD AUTO: 0.04 K/UL (ref 0–0.04)
IMM GRANULOCYTES NFR BLD AUTO: 0.4 % (ref 0–0.5)
LYMPHOCYTES # BLD AUTO: 2.8 K/UL (ref 1–4.8)
LYMPHOCYTES NFR BLD: 30.1 % (ref 18–48)
MAGNESIUM SERPL-MCNC: 2.1 MG/DL (ref 1.6–2.6)
MCH RBC QN AUTO: 22 PG (ref 27–31)
MCHC RBC AUTO-ENTMCNC: 31.6 G/DL (ref 32–36)
MCV RBC AUTO: 70 FL (ref 82–98)
MONOCYTES # BLD AUTO: 0.8 K/UL (ref 0.3–1)
MONOCYTES NFR BLD: 8.9 % (ref 4–15)
NEUTROPHILS # BLD AUTO: 5.5 K/UL (ref 1.8–7.7)
NEUTROPHILS NFR BLD: 59.5 % (ref 38–73)
NRBC BLD-RTO: 0 /100 WBC
PLATELET # BLD AUTO: 432 K/UL (ref 150–450)
PMV BLD AUTO: 8.7 FL (ref 9.2–12.9)
POTASSIUM SERPL-SCNC: 3.1 MMOL/L (ref 3.5–5.1)
POTASSIUM SERPL-SCNC: 3.1 MMOL/L (ref 3.5–5.1)
RBC # BLD AUTO: 4.4 M/UL (ref 4–5.4)
RPR SER QL: NORMAL
SODIUM SERPL-SCNC: 138 MMOL/L (ref 136–145)
SODIUM SERPL-SCNC: 138 MMOL/L (ref 136–145)
WBC # BLD AUTO: 9.29 K/UL (ref 3.9–12.7)

## 2022-07-09 PROCEDURE — 25000003 PHARM REV CODE 250: Performed by: STUDENT IN AN ORGANIZED HEALTH CARE EDUCATION/TRAINING PROGRAM

## 2022-07-09 PROCEDURE — 21400001 HC TELEMETRY ROOM

## 2022-07-09 PROCEDURE — 63600175 PHARM REV CODE 636 W HCPCS: Performed by: HOSPITALIST

## 2022-07-09 PROCEDURE — 85025 COMPLETE CBC W/AUTO DIFF WBC: CPT | Performed by: HOSPITALIST

## 2022-07-09 PROCEDURE — 25000003 PHARM REV CODE 250: Performed by: INTERNAL MEDICINE

## 2022-07-09 PROCEDURE — 80048 BASIC METABOLIC PNL TOTAL CA: CPT | Performed by: HOSPITALIST

## 2022-07-09 PROCEDURE — 25000003 PHARM REV CODE 250: Performed by: HOSPITALIST

## 2022-07-09 PROCEDURE — 36415 COLL VENOUS BLD VENIPUNCTURE: CPT | Performed by: HOSPITALIST

## 2022-07-09 PROCEDURE — 83735 ASSAY OF MAGNESIUM: CPT | Performed by: HOSPITALIST

## 2022-07-09 RX ORDER — POTASSIUM CHLORIDE 20 MEQ/1
40 TABLET, EXTENDED RELEASE ORAL ONCE
Status: COMPLETED | OUTPATIENT
Start: 2022-07-09 | End: 2022-07-09

## 2022-07-09 RX ADMIN — PIPERACILLIN SODIUM,TAZOBACTAM SODIUM 4.5 G: 4; .5 INJECTION, POWDER, FOR SOLUTION INTRAVENOUS at 08:07

## 2022-07-09 RX ADMIN — PIPERACILLIN SODIUM,TAZOBACTAM SODIUM 4.5 G: 4; .5 INJECTION, POWDER, FOR SOLUTION INTRAVENOUS at 04:07

## 2022-07-09 RX ADMIN — CETIRIZINE HYDROCHLORIDE 10 MG: 10 TABLET, FILM COATED ORAL at 08:07

## 2022-07-09 RX ADMIN — PANTOPRAZOLE SODIUM 40 MG: 40 TABLET, DELAYED RELEASE ORAL at 08:07

## 2022-07-09 RX ADMIN — PIPERACILLIN SODIUM,TAZOBACTAM SODIUM 4.5 G: 4; .5 INJECTION, POWDER, FOR SOLUTION INTRAVENOUS at 12:07

## 2022-07-09 RX ADMIN — POTASSIUM CHLORIDE 40 MEQ: 1500 TABLET, EXTENDED RELEASE ORAL at 08:07

## 2022-07-09 RX ADMIN — FERROUS SULFATE TAB 325 MG (65 MG ELEMENTAL FE) 1 EACH: 325 (65 FE) TAB at 08:07

## 2022-07-09 RX ADMIN — AMLODIPINE BESYLATE 10 MG: 5 TABLET ORAL at 08:07

## 2022-07-09 NOTE — ASSESSMENT & PLAN NOTE
-Patient felt to be lethargic and less conversant 7/7  -TSH, folate and Ammonia normal but found to have UTI.  -Noted prior ESBL E.coli UTI - started zosyn 7/8  -Maintain delirium precautions.  -Clinically improving.

## 2022-07-09 NOTE — ASSESSMENT & PLAN NOTE
-History noted  -Has chronic L sided deficits and facial droop and is wheelchair dependent at home  -Daughter no longer able to care for her at home  -Pursuing SNF --> NH placement

## 2022-07-09 NOTE — SUBJECTIVE & OBJECTIVE
Interval History:  No acute events overnight.  Alert and responsive, but only whispers responses to questions.  Denies pain and sob.  Denies hunger.  Appears frail and weak.  Asks no questions when prompted.      Review of Systems   Constitutional:  Positive for activity change and fatigue. Negative for fever.   Respiratory:  Negative for shortness of breath.    Cardiovascular:  Negative for chest pain.   Gastrointestinal:  Negative for abdominal pain.   Neurological:  Positive for weakness. Negative for dizziness and headaches.   All other systems reviewed and are negative.    Objective:     Vital Signs (Most Recent):  Temp: 99.1 °F (37.3 °C) (07/09/22 1159)  Pulse: 98 (07/09/22 1159)  Resp: 15 (07/09/22 1159)  BP: 113/77 (07/09/22 1159)  SpO2: 97 % (07/09/22 1159) Vital Signs (24h Range):  Temp:  [98 °F (36.7 °C)-99.1 °F (37.3 °C)] 99.1 °F (37.3 °C)  Pulse:  [83-98] 98  Resp:  [15-18] 15  SpO2:  [95 %-99 %] 97 %  BP: ()/(62-77) 113/77     Weight: 86.6 kg (190 lb 14.7 oz)  Body mass index is 33.82 kg/m².    Intake/Output Summary (Last 24 hours) at 7/9/2022 1500  Last data filed at 7/9/2022 1320  Gross per 24 hour   Intake 951 ml   Output 500 ml   Net 451 ml        Physical Exam  Vitals and nursing note reviewed.   Constitutional:       General: She is not in acute distress.     Appearance: Normal appearance. She is normal weight. She is not ill-appearing (chronically weak, frail), toxic-appearing or diaphoretic.   HENT:      Head: Normocephalic and atraumatic.      Right Ear: External ear normal.      Left Ear: External ear normal.      Nose: Nose normal. No congestion.      Mouth/Throat:      Mouth: Mucous membranes are moist.      Pharynx: Oropharynx is clear.   Eyes:      Extraocular Movements: Extraocular movements intact.      Pupils: Pupils are equal, round, and reactive to light.   Cardiovascular:      Rate and Rhythm: Normal rate and regular rhythm.      Heart sounds: No murmur heard.    No gallop.    Pulmonary:      Effort: Pulmonary effort is normal. No respiratory distress.      Breath sounds: No wheezing or rales.   Abdominal:      General: There is no distension.      Tenderness: There is no abdominal tenderness. There is no guarding.   Musculoskeletal:         General: Normal range of motion.      Cervical back: Normal range of motion. No rigidity or tenderness.      Right lower leg: No edema.      Left lower leg: No edema.   Skin:     General: Skin is dry.   Neurological:      General: No focal deficit present.      Mental Status: She is alert.      Motor: Weakness present.      Comments: Alert and attentive.  Appears frail and weak.  Answers questions with a whisper only.     Psychiatric:         Mood and Affect: Mood normal. Affect is not labile or flat.         Speech: Speech is not delayed.         Behavior: Behavior normal. Behavior is cooperative.       Significant Labs: All pertinent labs within the past 24 hours have been reviewed.    Significant Imaging: I have reviewed all pertinent imaging results/findings within the past 24 hours.

## 2022-07-09 NOTE — ASSESSMENT & PLAN NOTE
-Admitted to inpatient status  -Baseline Hb near 12  -On admit Hb 9.6 and reported melena  -GI consulted and patient going taken for EGD 7/5 which showed no source of bleeding.  Specifically: a bleb found in the esophagus, tortuous esophagus, 6 cm hiatal hernia, normal stomach, normal examined duodenum.  -Given risk of aspiration with prep - will hold off on colonoscopy unless active bleeding or dropping H/H.  -Holding home aspirin  -Monitor H/H - stable .  Transfuse for Hb less than 7.  She is iron deficient - Tsat 7%  -Hb 10.4-->9.7 today  -Continue to daily PPI and oral iron supplement   -Medically stable for discharge to NH pending placement.

## 2022-07-09 NOTE — PLAN OF CARE
Problem: Adult Inpatient Plan of Care  Goal: Plan of Care Review  Outcome: Ongoing, Progressing     Problem: Skin Injury Risk Increased  Goal: Skin Health and Integrity  Outcome: Ongoing, Progressing     Problem: Fall Injury Risk  Goal: Absence of Fall and Fall-Related Injury  Outcome: Ongoing, Progressing

## 2022-07-09 NOTE — ASSESSMENT & PLAN NOTE
-Urine culture growing GNR > 100,000 cfu/ml not yet speciated and a low count of pan sensitive Providencia rettgeri  -Noted prior ESBL E.coli UTI  -Follow culture  -Continue zosyn.

## 2022-07-09 NOTE — PROGRESS NOTES
"[facility] Medicine  Progress Note    Patient Name: Holly Kidd  MRN: [id number]  Patient Class: IP- Inpatient   Admission Date: 7/2/2022  Length of Stay: 7 days  Attending Physician: Colton Maharaj MD  Primary Care Provider: DENYS Sterling        Subjective:     Principal Problem:GI bleed due to NSAIDs        HPI:  Ms. Kidd is a 71 year old female with PMH of CVA ( facial droop and left sided weakness) , HTN, Dementia ( at baseline agitated and oriented x1) is being admitted from the ER after presenting for general malize, weakness and fatigue when the AC went out as well as daughter reporting patient has been eating less and noted to have what is described as melena this AM x1. She described a "motor oil black" tarry stool by patient but denies diarrhea or vomiting. Reports that mother refuses to go to the doctor and only takes the merari aspirin and amlodipine. She is completely bedbound and total care with daughter providing all ADL's . Daughter has a large caregiver burden. There was a report of epigastric pain but patient denies this. Noted to have a potassium of 2.7 in ER and denies any vomiting or diarrhea. No chills, shortness of breath, nausea or other associated symptoms. She currently only reports she is "cold" and does not like people "sticking her" with needles.      Note*Patient's daughter denies altered mental status and notes that the patient is agitated and physically/verbally abusive to others at baseline. She recognizes only her daughter usually and calls her by a nickname.       Overview/Hospital Course:  71 year old female with PMH of CVA ( facial droop and left sided weakness) , HTN, Dementia, and bedbound admitted on 07/02/2022 for hypokalemia and anemia, with suspicion for upper GI bleed. Chest x-ray unremarkable.  Patient presented with complaints fatigue and weakness also with melena. Hgb on admit 9.6, down from baseline of 12. Potassium replaced. GI " consulted- recommends IV PPI BID and EGD on 07/05. Family is in agreement. Hemoglobin stable.  Continue to monitor.  Patient transferred to [facility] on 07/07.  However, patient noted to be slightly more lethargic, less communicative.  This is an acute change in her mentation.  Patient to be transferred back to in-person Hospital Medicine team.      Interval History:  No acute events overnight.  Alert and responsive, but only whispers responses to questions.  Denies pain and sob.  Denies hunger.  Appears frail and weak.  Asks no questions when prompted.      Review of Systems   Constitutional:  Positive for activity change and fatigue. Negative for fever.   Respiratory:  Negative for shortness of breath.    Cardiovascular:  Negative for chest pain.   Gastrointestinal:  Negative for abdominal pain.   Neurological:  Positive for weakness. Negative for dizziness and headaches.   All other systems reviewed and are negative.    Objective:     Vital Signs (Most Recent):  Temp: 99.1 °F (37.3 °C) (07/09/22 1159)  Pulse: 98 (07/09/22 1159)  Resp: 15 (07/09/22 1159)  BP: 113/77 (07/09/22 1159)  SpO2: 97 % (07/09/22 1159) Vital Signs (24h Range):  Temp:  [98 °F (36.7 °C)-99.1 °F (37.3 °C)] 99.1 °F (37.3 °C)  Pulse:  [83-98] 98  Resp:  [15-18] 15  SpO2:  [95 %-99 %] 97 %  BP: ()/(62-77) 113/77     Weight: 86.6 kg (190 lb 14.7 oz)  Body mass index is 33.82 kg/m².    Intake/Output Summary (Last 24 hours) at 7/9/2022 1500  Last data filed at 7/9/2022 1320  Gross per 24 hour   Intake 951 ml   Output 500 ml   Net 451 ml        Physical Exam  Vitals and nursing note reviewed.   Constitutional:       General: She is not in acute distress.     Appearance: Normal appearance. She is normal weight. She is not ill-appearing (chronically weak, frail), toxic-appearing or diaphoretic.   HENT:      Head: Normocephalic and atraumatic.      Right Ear: External ear normal.      Left Ear: External ear normal.      Nose: Nose normal.  No congestion.      Mouth/Throat:      Mouth: Mucous membranes are moist.      Pharynx: Oropharynx is clear.   Eyes:      Extraocular Movements: Extraocular movements intact.      Pupils: Pupils are equal, round, and reactive to light.   Cardiovascular:      Rate and Rhythm: Normal rate and regular rhythm.      Heart sounds: No murmur heard.    No gallop.   Pulmonary:      Effort: Pulmonary effort is normal. No respiratory distress.      Breath sounds: No wheezing or rales.   Abdominal:      General: There is no distension.      Tenderness: There is no abdominal tenderness. There is no guarding.   Musculoskeletal:         General: Normal range of motion.      Cervical back: Normal range of motion. No rigidity or tenderness.      Right lower leg: No edema.      Left lower leg: No edema.   Skin:     General: Skin is dry.   Neurological:      General: No focal deficit present.      Mental Status: She is alert.      Motor: Weakness present.      Comments: Alert and attentive.  Appears frail and weak.  Answers questions with a whisper only.     Psychiatric:         Mood and Affect: Mood normal. Affect is not labile or flat.         Speech: Speech is not delayed.         Behavior: Behavior normal. Behavior is cooperative.       Significant Labs: All pertinent labs within the past 24 hours have been reviewed.    Significant Imaging: I have reviewed all pertinent imaging results/findings within the past 24 hours.      Assessment/Plan:      * GI bleed due to NSAIDs  -Admitted to inpatient status  -Baseline Hb near 12  -On admit Hb 9.6 and reported melena  -GI consulted and patient going taken for EGD 7/5 which showed no source of bleeding.  Specifically: a bleb found in the esophagus, tortuous esophagus, 6 cm hiatal hernia, normal stomach, normal examined duodenum.  -Given risk of aspiration with prep - will hold off on colonoscopy unless active bleeding or dropping H/H.  -Holding home aspirin  -Monitor H/H - stable .   Transfuse for Hb less than 7.  She is iron deficient - Tsat 7%  -Hb 10.4-->9.7 today  -Continue to daily PPI and oral iron supplement   -Medically stable for discharge to NH pending placement.    Iron deficiency anemia  -Likely due to GI bleeding  -Treatment as above.    Hypokalemia  -Replace potassium today.  -Check BMP and Mag in AM.    Acute encephalopathy  -Patient felt to be lethargic and less conversant 7/7  -TSH, folate and Ammonia normal but found to have UTI.  -Noted prior ESBL E.coli UTI - started zosyn 7/8  -Maintain delirium precautions.  -Clinically improving.    Acute cystitis  -Urine culture growing GNR > 100,000 cfu/ml not yet speciated and a low count of pan sensitive Providencia rettgeri  -Noted prior ESBL E.coli UTI  -Follow culture  -Continue zosyn.    Cerebrovascular disease  -History noted  -Has chronic L sided deficits and facial droop and is wheelchair dependent at home  -Daughter no longer able to care for her at home  -Pursuing SNF --> NH placement    Debility  -Due to stroke and chronic  -PT/OT consulted  -Daughter states she can no longer care for her mom and would like to proceed with long-term care at NH. Prefer [place]  -CM/SW consulted to assist with placement.  Plan discharge to SNF w transition to NH after.    Wheelchair dependence  -Baseline - daughter reports she slumps over in the wheelchair and she has to prop her up   -fall precautions  -DC to SNF pending     Advanced care planning/counseling discussion  Advance Care Planning  Date: 07/03/2022 Code Status  In light of the patients advanced and life limiting illness,I engaged the the patient, family and healthcare power of   in a conversation about the patient's preferences for care  at the very end of life. The patient wishes to have CPR and cardio version if needed with medications when her heart stops. However, patient does not want to be intubated or placed on mechanical ventilation when her breathing stops. I  communicated to the patient, family and healthcare power of   that her wishes align with partial code status.    Code status: Partial code. Patient agrees to CPR/cardioversion but does not want to be intubated or mechanically ventilated.   -Partial code status with CPR/cardioversion but without possibility of securing airway is considered illogical by many physicians.  Will consult palliative care to continue goals of care discussion and hopefully pursue more straight forward code status - either full code or DNR.    Obesity (BMI 30.0-34.9)  Body mass index is 33.82 kg/m². Morbid obesity complicates all aspects of disease management from diagnostic modalities to treatment. Weight loss encouraged and health benefits explained to patient.     Essential hypertension  -Well controlled  -Continue norvasc      VTE Risk Mitigation (From admission, onward)         Ordered     Place sequential compression device  Until discontinued         07/02/22 2111                Discharge Planning   ANISH: 7/6/2022     Code Status: Partial Code   Is the patient medically ready for discharge?:     Reason for patient still in hospital (select all that apply): Treatment and Pending disposition  Discharge Plan A: New Nursing Home placement - [facility] care facility   Discharge Delays: (!) Post-Acute Set-up              Colton Maharaj MD  Department of Hospital Medicine   [facility] - Telemetry

## 2022-07-09 NOTE — NURSING
Bedside Report given to RAJENDRA Pritchard. Walking rounds completed. Visualized and assessed patient NAD noted. Safety precautions maintained and call light within reach.     Chart check completed.

## 2022-07-09 NOTE — NURSING
Report received from day nurse April. Visualized patient and assessed patient's overall condition and appearance. No acute distress noted. Will continue to monitor

## 2022-07-09 NOTE — PLAN OF CARE
Plan of care reviewed with pt, any questions or concerns addressed. Further instructions required.  Vital signs stable. Medicated per MAR. Pt free of falls/injuries, bed in lowest position with bed locked, side rails up x2, call light and belongings within reach. Will continue to monitor throughout shift.

## 2022-07-10 LAB
ANION GAP SERPL CALC-SCNC: 11 MMOL/L (ref 8–16)
ANION GAP SERPL CALC-SCNC: 11 MMOL/L (ref 8–16)
APTT BLDCRRT: 25.1 SEC (ref 21–32)
BACTERIA UR CULT: ABNORMAL
BACTERIA UR CULT: ABNORMAL
BASOPHILS # BLD AUTO: 0.02 K/UL (ref 0–0.2)
BASOPHILS NFR BLD: 0.2 % (ref 0–1.9)
BUN SERPL-MCNC: 11 MG/DL (ref 8–23)
BUN SERPL-MCNC: 11 MG/DL (ref 8–23)
CALCIUM SERPL-MCNC: 9.2 MG/DL (ref 8.7–10.5)
CALCIUM SERPL-MCNC: 9.2 MG/DL (ref 8.7–10.5)
CHLORIDE SERPL-SCNC: 104 MMOL/L (ref 95–110)
CHLORIDE SERPL-SCNC: 104 MMOL/L (ref 95–110)
CO2 SERPL-SCNC: 25 MMOL/L (ref 23–29)
CO2 SERPL-SCNC: 25 MMOL/L (ref 23–29)
CREAT SERPL-MCNC: 0.5 MG/DL (ref 0.5–1.4)
CREAT SERPL-MCNC: 0.5 MG/DL (ref 0.5–1.4)
D DIMER PPP IA.FEU-MCNC: 1.72 MG/L FEU
DIFFERENTIAL METHOD: ABNORMAL
EOSINOPHIL # BLD AUTO: 0.1 K/UL (ref 0–0.5)
EOSINOPHIL NFR BLD: 0.5 % (ref 0–8)
ERYTHROCYTE [DISTWIDTH] IN BLOOD BY AUTOMATED COUNT: 21.6 % (ref 11.5–14.5)
EST. GFR  (AFRICAN AMERICAN): >60 ML/MIN/1.73 M^2
EST. GFR  (AFRICAN AMERICAN): >60 ML/MIN/1.73 M^2
EST. GFR  (NON AFRICAN AMERICAN): >60 ML/MIN/1.73 M^2
EST. GFR  (NON AFRICAN AMERICAN): >60 ML/MIN/1.73 M^2
GLUCOSE SERPL-MCNC: 113 MG/DL (ref 70–110)
GLUCOSE SERPL-MCNC: 113 MG/DL (ref 70–110)
HCT VFR BLD AUTO: 30.2 % (ref 37–48.5)
HGB BLD-MCNC: 9.7 G/DL (ref 12–16)
IMM GRANULOCYTES # BLD AUTO: 0.06 K/UL (ref 0–0.04)
IMM GRANULOCYTES NFR BLD AUTO: 0.5 % (ref 0–0.5)
INR PPP: 1 (ref 0.8–1.2)
LACTATE SERPL-SCNC: 2.4 MMOL/L (ref 0.5–2.2)
LACTATE SERPL-SCNC: 2.8 MMOL/L (ref 0.5–2.2)
LYMPHOCYTES # BLD AUTO: 3 K/UL (ref 1–4.8)
LYMPHOCYTES NFR BLD: 26.3 % (ref 18–48)
MAGNESIUM SERPL-MCNC: 2.1 MG/DL (ref 1.6–2.6)
MCH RBC QN AUTO: 21.9 PG (ref 27–31)
MCHC RBC AUTO-ENTMCNC: 32.1 G/DL (ref 32–36)
MCV RBC AUTO: 68 FL (ref 82–98)
MONOCYTES # BLD AUTO: 0.8 K/UL (ref 0.3–1)
MONOCYTES NFR BLD: 7.4 % (ref 4–15)
NEUTROPHILS # BLD AUTO: 7.3 K/UL (ref 1.8–7.7)
NEUTROPHILS NFR BLD: 65.1 % (ref 38–73)
NRBC BLD-RTO: 0 /100 WBC
PLATELET # BLD AUTO: 435 K/UL (ref 150–450)
PMV BLD AUTO: 8.5 FL (ref 9.2–12.9)
POTASSIUM SERPL-SCNC: 3.3 MMOL/L (ref 3.5–5.1)
POTASSIUM SERPL-SCNC: 3.3 MMOL/L (ref 3.5–5.1)
PROCALCITONIN SERPL IA-MCNC: 0.03 NG/ML
PROTHROMBIN TIME: 11 SEC (ref 9–12.5)
RBC # BLD AUTO: 4.43 M/UL (ref 4–5.4)
SODIUM SERPL-SCNC: 140 MMOL/L (ref 136–145)
SODIUM SERPL-SCNC: 140 MMOL/L (ref 136–145)
TROPONIN I SERPL DL<=0.01 NG/ML-MCNC: 0.01 NG/ML (ref 0–0.03)
WBC # BLD AUTO: 11.21 K/UL (ref 3.9–12.7)

## 2022-07-10 PROCEDURE — 93010 ELECTROCARDIOGRAM REPORT: CPT | Mod: ,,, | Performed by: INTERNAL MEDICINE

## 2022-07-10 PROCEDURE — C1751 CATH, INF, PER/CENT/MIDLINE: HCPCS

## 2022-07-10 PROCEDURE — 84145 PROCALCITONIN (PCT): CPT | Performed by: HOSPITALIST

## 2022-07-10 PROCEDURE — 85379 FIBRIN DEGRADATION QUANT: CPT | Performed by: HOSPITALIST

## 2022-07-10 PROCEDURE — 80048 BASIC METABOLIC PNL TOTAL CA: CPT | Performed by: HOSPITALIST

## 2022-07-10 PROCEDURE — 87040 BLOOD CULTURE FOR BACTERIA: CPT | Performed by: HOSPITALIST

## 2022-07-10 PROCEDURE — 93005 ELECTROCARDIOGRAM TRACING: CPT

## 2022-07-10 PROCEDURE — 21400001 HC TELEMETRY ROOM

## 2022-07-10 PROCEDURE — 25000003 PHARM REV CODE 250: Performed by: INTERNAL MEDICINE

## 2022-07-10 PROCEDURE — 85610 PROTHROMBIN TIME: CPT | Performed by: HOSPITALIST

## 2022-07-10 PROCEDURE — 83735 ASSAY OF MAGNESIUM: CPT | Performed by: HOSPITALIST

## 2022-07-10 PROCEDURE — 25000003 PHARM REV CODE 250: Performed by: STUDENT IN AN ORGANIZED HEALTH CARE EDUCATION/TRAINING PROGRAM

## 2022-07-10 PROCEDURE — 83605 ASSAY OF LACTIC ACID: CPT | Mod: 91 | Performed by: HOSPITALIST

## 2022-07-10 PROCEDURE — 93010 EKG 12-LEAD: ICD-10-PCS | Mod: ,,, | Performed by: INTERNAL MEDICINE

## 2022-07-10 PROCEDURE — 25500020 PHARM REV CODE 255: Performed by: HOSPITALIST

## 2022-07-10 PROCEDURE — 94761 N-INVAS EAR/PLS OXIMETRY MLT: CPT

## 2022-07-10 PROCEDURE — 25000003 PHARM REV CODE 250: Performed by: HOSPITALIST

## 2022-07-10 PROCEDURE — 85025 COMPLETE CBC W/AUTO DIFF WBC: CPT | Performed by: HOSPITALIST

## 2022-07-10 PROCEDURE — 85730 THROMBOPLASTIN TIME PARTIAL: CPT | Performed by: HOSPITALIST

## 2022-07-10 PROCEDURE — 36410 VNPNXR 3YR/> PHY/QHP DX/THER: CPT

## 2022-07-10 PROCEDURE — 84484 ASSAY OF TROPONIN QUANT: CPT | Performed by: HOSPITALIST

## 2022-07-10 PROCEDURE — 36415 COLL VENOUS BLD VENIPUNCTURE: CPT | Performed by: HOSPITALIST

## 2022-07-10 PROCEDURE — 63600175 PHARM REV CODE 636 W HCPCS: Performed by: HOSPITALIST

## 2022-07-10 RX ORDER — SODIUM CHLORIDE 9 MG/ML
INJECTION, SOLUTION INTRAVENOUS CONTINUOUS
Status: DISCONTINUED | OUTPATIENT
Start: 2022-07-10 | End: 2022-07-13 | Stop reason: HOSPADM

## 2022-07-10 RX ORDER — ENOXAPARIN SODIUM 100 MG/ML
1 INJECTION SUBCUTANEOUS EVERY 12 HOURS
Status: DISCONTINUED | OUTPATIENT
Start: 2022-07-10 | End: 2022-07-10

## 2022-07-10 RX ORDER — POTASSIUM CHLORIDE 20 MEQ/1
40 TABLET, EXTENDED RELEASE ORAL ONCE
Status: COMPLETED | OUTPATIENT
Start: 2022-07-10 | End: 2022-07-10

## 2022-07-10 RX ORDER — HEPARIN SODIUM,PORCINE/D5W 25000/250
0-40 INTRAVENOUS SOLUTION INTRAVENOUS CONTINUOUS
Status: DISCONTINUED | OUTPATIENT
Start: 2022-07-10 | End: 2022-07-13

## 2022-07-10 RX ADMIN — FERROUS SULFATE TAB 325 MG (65 MG ELEMENTAL FE) 1 EACH: 325 (65 FE) TAB at 09:07

## 2022-07-10 RX ADMIN — CEFTRIAXONE 1 G: 1 INJECTION, SOLUTION INTRAVENOUS at 11:07

## 2022-07-10 RX ADMIN — ACETAMINOPHEN 650 MG: 325 TABLET ORAL at 11:07

## 2022-07-10 RX ADMIN — PIPERACILLIN SODIUM,TAZOBACTAM SODIUM 4.5 G: 4; .5 INJECTION, POWDER, FOR SOLUTION INTRAVENOUS at 12:07

## 2022-07-10 RX ADMIN — HEPARIN SODIUM 18 UNITS/KG/HR: 10000 INJECTION, SOLUTION INTRAVENOUS at 08:07

## 2022-07-10 RX ADMIN — PANTOPRAZOLE SODIUM 40 MG: 40 TABLET, DELAYED RELEASE ORAL at 09:07

## 2022-07-10 RX ADMIN — SODIUM CHLORIDE: 0.9 INJECTION, SOLUTION INTRAVENOUS at 04:07

## 2022-07-10 RX ADMIN — PIPERACILLIN SODIUM,TAZOBACTAM SODIUM 4.5 G: 4; .5 INJECTION, POWDER, FOR SOLUTION INTRAVENOUS at 09:07

## 2022-07-10 RX ADMIN — CETIRIZINE HYDROCHLORIDE 10 MG: 10 TABLET, FILM COATED ORAL at 09:07

## 2022-07-10 RX ADMIN — IOHEXOL 75 ML: 350 INJECTION, SOLUTION INTRAVENOUS at 04:07

## 2022-07-10 RX ADMIN — POTASSIUM CHLORIDE 40 MEQ: 1500 TABLET, EXTENDED RELEASE ORAL at 11:07

## 2022-07-10 NOTE — ASSESSMENT & PLAN NOTE
-Due to stroke and chronic  -PT/OT consulted  -Daughter states she can no longer care for her mom and would like to proceed with prison care at NH. Prefer [facility]  -CM/SW consulted to assist with placement.  Plan discharge to SNF w transition to NH after.

## 2022-07-10 NOTE — PROGRESS NOTES
MEWS Monitoring: Chart check completed, abnormal VS noted, bedside RN, Ashley contacted, MD aware/ following, instructed to call [phone] for further concerns or assistance..    Per RN, ekg, cxr done,blood cultures done, starting fluids and pt getting CTA now

## 2022-07-10 NOTE — CARE UPDATE
Noted fever and tachycardia of abrupt onset.  Oxygenating OK and in no distress.  EKG shows sinus tachycardia.  PCT and cxr unrevealing, but slightly elevated lactic acid.  Blood cultures ordered and repeat lactic acid  At 4PM.  Has not been very active and had no heparin/lovenox due to GI bleed so at risk for DVT/PE.  Ordered CTA chest which showed pulmonary embolism.  Adding heparin drip.  Check troponin now and echo tomorrow.  Continue telemetry monitoring.

## 2022-07-10 NOTE — SUBJECTIVE & OBJECTIVE
"Interval History:  No acute events overnight.  Alert and responsive, but lethargic and depressed in appearance.  Denies any complaints.  Says she is fine.  Denies depression and pain.  When I asked to call her family she shook her head "No" and said, "They're all crazy too."  Appears frail and weak.  Noted fever after my visit.  Asks no questions when prompted.      Review of Systems   Constitutional:  Positive for activity change, fatigue and fever.   Respiratory:  Negative for shortness of breath.    Cardiovascular:  Negative for chest pain.   Gastrointestinal:  Negative for abdominal pain.   Neurological:  Positive for weakness. Negative for dizziness and headaches.   Psychiatric/Behavioral:          Appears depressed - but denies   All other systems reviewed and are negative.    Objective:     Vital Signs (Most Recent):  Temp: (!) 100.5 °F (38.1 °C) (07/10/22 1119)  Pulse: 98 (07/10/22 1111)  Resp: 18 (07/10/22 1111)  BP: 102/63 (07/10/22 1111)  SpO2: 97 % (07/10/22 1111) Vital Signs (24h Range):  Temp:  [98.1 °F (36.7 °C)-100.5 °F (38.1 °C)] 100.5 °F (38.1 °C)  Pulse:  [] 98  Resp:  [16-20] 18  SpO2:  [96 %-98 %] 97 %  BP: (102-127)/(63-86) 102/63     Weight: 86.6 kg (190 lb 14.7 oz)  Body mass index is 33.82 kg/m².    Intake/Output Summary (Last 24 hours) at 7/10/2022 1350  Last data filed at 7/10/2022 1151  Gross per 24 hour   Intake 480 ml   Output 275 ml   Net 205 ml        Physical Exam  Vitals and nursing note reviewed.   Constitutional:       General: She is not in acute distress.     Appearance: Normal appearance. She is normal weight. She is not ill-appearing (chronically weak, frail), toxic-appearing or diaphoretic.   HENT:      Head: Normocephalic and atraumatic.      Right Ear: External ear normal.      Left Ear: External ear normal.      Nose: Nose normal. No congestion.      Mouth/Throat:      Mouth: Mucous membranes are moist.      Pharynx: Oropharynx is clear.   Eyes:      Extraocular " Movements: Extraocular movements intact.      Pupils: Pupils are equal, round, and reactive to light.   Cardiovascular:      Rate and Rhythm: Normal rate and regular rhythm.      Heart sounds: No murmur heard.    No gallop.   Pulmonary:      Effort: Pulmonary effort is normal. No respiratory distress.      Breath sounds: No wheezing or rales.   Abdominal:      General: There is no distension.      Tenderness: There is no abdominal tenderness. There is no guarding.   Musculoskeletal:         General: Normal range of motion.      Cervical back: Normal range of motion. No rigidity or tenderness.      Right lower leg: No edema.      Left lower leg: No edema.   Skin:     General: Skin is dry.   Neurological:      General: No focal deficit present.      Mental Status: She is alert.      Motor: Weakness present.      Comments: Alert and attentive.  Appears frail and weak.  Voice is stronger today.  Still lethargic.   Psychiatric:         Mood and Affect: Affect is not labile or flat.         Speech: Speech is not delayed.         Behavior: Behavior is cooperative.      Comments: Appears depressed - but denies this       Significant Labs: All pertinent labs within the past 24 hours have been reviewed.    Significant Imaging: I have reviewed all pertinent imaging results/findings within the past 24 hours.

## 2022-07-10 NOTE — PROGRESS NOTES
[facility]  Rapid Response Nurse Intervention/ Task    Date of Visit: 07/10/2022  Time of Visit: 5:24 PM     INTERVENTIONS/ TASK Completed:     Iv start

## 2022-07-10 NOTE — ASSESSMENT & PLAN NOTE
-Noted prior ESBL E.coli UTI so started on zosyn  -Urine culture growing Proteus mirabilis GNR > 100,000 cfu/ml sensitive to ceftriaxone and a low count of pan sensitive Providencia rettgeri  -Change zosyn to ceftriaxone.  -Noted fever at 11:19 today - check blood cultures, lactic acid, procalcitonin and cxr.

## 2022-07-10 NOTE — PROGRESS NOTES
"[facility] Medicine  Progress Note    Patient Name: Holly Kidd  MRN: [id number]  Patient Class: IP- Inpatient   Admission Date: 7/2/2022  Length of Stay: 8 days  Attending Physician: Colton Maharaj MD  Primary Care Provider: DENYS Sterling        Subjective:     Principal Problem:GI bleed due to NSAIDs        HPI:  Ms. Kidd is a 71 year old female with PMH of CVA ( facial droop and left sided weakness) , HTN, Dementia ( at baseline agitated and oriented x1) is being admitted from the ER after presenting for general malize, weakness and fatigue when the AC went out as well as daughter reporting patient has been eating less and noted to have what is described as melena this AM x1. She described a "motor oil black" tarry stool by patient but denies diarrhea or vomiting. Reports that mother refuses to go to the doctor and only takes the merari aspirin and amlodipine. She is completely bedbound and total care with daughter providing all ADL's . Daughter has a large caregiver burden. There was a report of epigastric pain but patient denies this. Noted to have a potassium of 2.7 in ER and denies any vomiting or diarrhea. No chills, shortness of breath, nausea or other associated symptoms. She currently only reports she is "cold" and does not like people "sticking her" with needles.      Note*Patient's daughter denies altered mental status and notes that the patient is agitated and physically/verbally abusive to others at baseline. She recognizes only her daughter usually and calls her by a nickname.       Overview/Hospital Course:  71 year old female with PMH of CVA ( facial droop and left sided weakness) , HTN, Dementia, and bedbound admitted on 07/02/2022 for hypokalemia and anemia, with suspicion for upper GI bleed. Chest x-ray unremarkable.  Patient presented with complaints fatigue and weakness also with melena. Hgb on admit 9.6, down from baseline of 12. Potassium replaced. GI " "consulted- recommends IV PPI BID and EGD on 07/05. Family is in agreement. Hemoglobin stable.  Continue to monitor.  Patient transferred to [facility] on 07/07.  However, patient noted to be slightly more lethargic, less communicative.  This is an acute change in her mentation.  Patient to be transferred back to in-person Hospital Medicine team.      Interval History:  No acute events overnight.  Alert and responsive, but lethargic and depressed in appearance.  Denies any complaints.  Says she is fine.  Denies depression and pain.  When I asked to call her family she shook her head "No" and said, "They're all crazy too."  Appears frail and weak.  Noted fever after my visit.  Asks no questions when prompted.      Review of Systems   Constitutional:  Positive for activity change, fatigue and fever.   Respiratory:  Negative for shortness of breath.    Cardiovascular:  Negative for chest pain.   Gastrointestinal:  Negative for abdominal pain.   Neurological:  Positive for weakness. Negative for dizziness and headaches.   Psychiatric/Behavioral:          Appears depressed - but denies   All other systems reviewed and are negative.    Objective:     Vital Signs (Most Recent):  Temp: (!) 100.5 °F (38.1 °C) (07/10/22 1119)  Pulse: 98 (07/10/22 1111)  Resp: 18 (07/10/22 1111)  BP: 102/63 (07/10/22 1111)  SpO2: 97 % (07/10/22 1111) Vital Signs (24h Range):  Temp:  [98.1 °F (36.7 °C)-100.5 °F (38.1 °C)] 100.5 °F (38.1 °C)  Pulse:  [] 98  Resp:  [16-20] 18  SpO2:  [96 %-98 %] 97 %  BP: (102-127)/(63-86) 102/63     Weight: 86.6 kg (190 lb 14.7 oz)  Body mass index is 33.82 kg/m².    Intake/Output Summary (Last 24 hours) at 7/10/2022 1350  Last data filed at 7/10/2022 1151  Gross per 24 hour   Intake 480 ml   Output 275 ml   Net 205 ml        Physical Exam  Vitals and nursing note reviewed.   Constitutional:       General: She is not in acute distress.     Appearance: Normal appearance. She is normal weight. She is " not ill-appearing (chronically weak, frail), toxic-appearing or diaphoretic.   HENT:      Head: Normocephalic and atraumatic.      Right Ear: External ear normal.      Left Ear: External ear normal.      Nose: Nose normal. No congestion.      Mouth/Throat:      Mouth: Mucous membranes are moist.      Pharynx: Oropharynx is clear.   Eyes:      Extraocular Movements: Extraocular movements intact.      Pupils: Pupils are equal, round, and reactive to light.   Cardiovascular:      Rate and Rhythm: Normal rate and regular rhythm.      Heart sounds: No murmur heard.    No gallop.   Pulmonary:      Effort: Pulmonary effort is normal. No respiratory distress.      Breath sounds: No wheezing or rales.   Abdominal:      General: There is no distension.      Tenderness: There is no abdominal tenderness. There is no guarding.   Musculoskeletal:         General: Normal range of motion.      Cervical back: Normal range of motion. No rigidity or tenderness.      Right lower leg: No edema.      Left lower leg: No edema.   Skin:     General: Skin is dry.   Neurological:      General: No focal deficit present.      Mental Status: She is alert.      Motor: Weakness present.      Comments: Alert and attentive.  Appears frail and weak.  Voice is stronger today.  Still lethargic.   Psychiatric:         Mood and Affect: Affect is not labile or flat.         Speech: Speech is not delayed.         Behavior: Behavior is cooperative.      Comments: Appears depressed - but denies this       Significant Labs: All pertinent labs within the past 24 hours have been reviewed.    Significant Imaging: I have reviewed all pertinent imaging results/findings within the past 24 hours.      Assessment/Plan:      * GI bleed due to NSAIDs  -Admitted to inpatient status  -Baseline Hb near 12  -On admit Hb 9.6 and reported melena  -GI consulted and patient going taken for EGD 7/5 which showed no source of bleeding.  Specifically: a bleb found in the  esophagus, tortuous esophagus, 6 cm hiatal hernia, normal stomach, normal examined duodenum.  -Given risk of aspiration with prep - will hold off on colonoscopy unless active bleeding or dropping H/H.  -Holding home aspirin  -Monitor H/H - stable .  Transfuse for Hb less than 7.  She is iron deficient - Tsat 7%  -Hb 10.4-->9.7 today  -Continue to daily PPI and oral iron supplement   -Noted isolated fever - addressed below  -Pending d/c to SNF    Iron deficiency anemia  -Likely due to GI bleeding  -Treatment as above.    Hypokalemia  -Replace potassium today.  -Check BMP and Mag in AM.    Acute encephalopathy  -Patient felt to be lethargic and less conversant 7/7  -TSH, folate and Ammonia normal but found to have UTI.  -Noted prior ESBL E.coli UTI - started zosyn 7/8  -Maintain delirium precautions.  -Clinically improving - but lethargic and appears depressed.  Denies depression.    Acute cystitis  -Noted prior ESBL E.coli UTI so started on zosyn  -Urine culture growing Proteus mirabilis GNR > 100,000 cfu/ml sensitive to ceftriaxone and a low count of pan sensitive Providencia rettgeri  -Change zosyn to ceftriaxone.  -Noted fever at 11:19 today - check blood cultures, lactic acid, procalcitonin and cxr.    Cerebrovascular disease  -History noted  -Has chronic L sided deficits and facial droop and is wheelchair dependent at home  -Daughter no longer able to care for her at home  -Pursuing SNF --> NH placement    Debility  -Due to stroke and chronic  -PT/OT consulted  -Daughter states she can no longer care for her mom and would like to proceed with nursing home care at NH. Prefer [facility]  -CM/SW consulted to assist with placement.  Plan discharge to SNF w transition to NH after.    Wheelchair dependence  -Baseline - daughter reports she slumps over in the wheelchair and she has to prop her up   -fall precautions  -DC to SNF pending     Advanced care planning/counseling discussion  Advance Care Planning  Date:  07/03/2022 Code Status  In light of the patients advanced and life limiting illness,I engaged the the patient, family and healthcare power of   in a conversation about the patient's preferences for care  at the very end of life. The patient wishes to have CPR and cardio version if needed with medications when her heart stops. However, patient does not want to be intubated or placed on mechanical ventilation when her breathing stops. I communicated to the patient, family and healthcare power of   that her wishes align with partial code status.    Code status: Partial code. Patient agrees to CPR/cardioversion but does not want to be intubated or mechanically ventilated.   -Partial code status with CPR/cardioversion but without possibility of securing airway is considered illogical by many physicians.  Will consult palliative care to continue goals of care discussion and hopefully pursue more straight forward code status - either full code or DNR.    Obesity (BMI 30.0-34.9)  Body mass index is 33.82 kg/m². Morbid obesity complicates all aspects of disease management from diagnostic modalities to treatment. Weight loss encouraged and health benefits explained to patient.     Essential hypertension  -Well controlled  -Continue norvasc      VTE Risk Mitigation (From admission, onward)         Ordered     Place sequential compression device  Until discontinued         07/02/22 2111                Discharge Planning   ANISH: 7/6/2022     Code Status: Partial Code   Is the patient medically ready for discharge?:     Reason for patient still in hospital (select all that apply): Treatment and Pending disposition  Discharge Plan A: New Nursing Home placement - [facility] care facility   Discharge Delays: (!) Post-Acute Set-up              Colton Maharaj MD  Department of Hospital Medicine   [facility]

## 2022-07-10 NOTE — NURSING
Report received from Macho . Pt awake and alert . Purwik intact . Not very talkative.. Bed down SR up x 2 . Call bell in reach.

## 2022-07-10 NOTE — ASSESSMENT & PLAN NOTE
-Patient felt to be lethargic and less conversant 7/7  -TSH, folate and Ammonia normal but found to have UTI.  -Noted prior ESBL E.coli UTI - started zosyn 7/8  -Maintain delirium precautions.  -Clinically improving - but lethargic and appears depressed.  Denies depression.

## 2022-07-11 PROBLEM — I26.99 PULMONARY EMBOLISM ON RIGHT: Status: ACTIVE | Noted: 2022-07-11

## 2022-07-11 PROBLEM — I82.411 ACUTE DEEP VEIN THROMBOSIS (DVT) OF FEMORAL VEIN OF RIGHT LOWER EXTREMITY: Status: ACTIVE | Noted: 2022-07-11

## 2022-07-11 LAB
ANION GAP SERPL CALC-SCNC: 9 MMOL/L (ref 8–16)
APTT BLDCRRT: 39.3 SEC (ref 21–32)
APTT BLDCRRT: 42 SEC (ref 21–32)
APTT BLDCRRT: 89.4 SEC (ref 21–32)
ASCENDING AORTA: 3.64 CM
AV INDEX (PROSTH): 0.96
AV MEAN GRADIENT: 10 MMHG
AV PEAK GRADIENT: 17 MMHG
AV VALVE AREA: 3.89 CM2
AV VELOCITY RATIO: 0.84
BASOPHILS # BLD AUTO: 0.03 K/UL (ref 0–0.2)
BASOPHILS NFR BLD: 0.3 % (ref 0–1.9)
BSA FOR ECHO PROCEDURE: 1.96 M2
BUN SERPL-MCNC: 9 MG/DL (ref 8–23)
CALCIUM SERPL-MCNC: 8.9 MG/DL (ref 8.7–10.5)
CHLORIDE SERPL-SCNC: 107 MMOL/L (ref 95–110)
CO2 SERPL-SCNC: 22 MMOL/L (ref 23–29)
CREAT SERPL-MCNC: 0.4 MG/DL (ref 0.5–1.4)
CV ECHO LV RWT: 0.58 CM
DIFFERENTIAL METHOD: ABNORMAL
DOP CALC AO PEAK VEL: 2.04 M/S
DOP CALC AO VTI: 32.97 CM
DOP CALC LVOT AREA: 4 CM2
DOP CALC LVOT DIAMETER: 2.27 CM
DOP CALC LVOT PEAK VEL: 1.72 M/S
DOP CALC LVOT STROKE VOLUME: 128.27 CM3
DOP CALCLVOT PEAK VEL VTI: 31.71 CM
ECHO LV POSTERIOR WALL: 1.04 CM (ref 0.6–1.1)
EJECTION FRACTION: 65 %
EOSINOPHIL # BLD AUTO: 0.1 K/UL (ref 0–0.5)
EOSINOPHIL NFR BLD: 0.8 % (ref 0–8)
ERYTHROCYTE [DISTWIDTH] IN BLOOD BY AUTOMATED COUNT: 22 % (ref 11.5–14.5)
EST. GFR  (AFRICAN AMERICAN): >60 ML/MIN/1.73 M^2
EST. GFR  (NON AFRICAN AMERICAN): >60 ML/MIN/1.73 M^2
FRACTIONAL SHORTENING: 31 % (ref 28–44)
GLUCOSE SERPL-MCNC: 144 MG/DL (ref 70–110)
HCT VFR BLD AUTO: 29.3 % (ref 37–48.5)
HGB BLD-MCNC: 9 G/DL (ref 12–16)
IMM GRANULOCYTES # BLD AUTO: 0.06 K/UL (ref 0–0.04)
IMM GRANULOCYTES NFR BLD AUTO: 0.6 % (ref 0–0.5)
INTERVENTRICULAR SEPTUM: 1.08 CM (ref 0.6–1.1)
IVRT: 96.89 MSEC
LA MAJOR: 4.49 CM
LA MINOR: 5.3 CM
LA WIDTH: 3.73 CM
LEFT ATRIUM SIZE: 3.61 CM
LEFT ATRIUM VOLUME INDEX: 29.3 ML/M2
LEFT ATRIUM VOLUME: 55.64 CM3
LEFT INTERNAL DIMENSION IN SYSTOLE: 2.45 CM (ref 2.1–4)
LEFT VENTRICLE DIASTOLIC VOLUME INDEX: 28.12 ML/M2
LEFT VENTRICLE DIASTOLIC VOLUME: 53.43 ML
LEFT VENTRICLE MASS INDEX: 61 G/M2
LEFT VENTRICLE SYSTOLIC VOLUME INDEX: 11.1 ML/M2
LEFT VENTRICLE SYSTOLIC VOLUME: 21.17 ML
LEFT VENTRICULAR INTERNAL DIMENSION IN DIASTOLE: 3.57 CM (ref 3.5–6)
LEFT VENTRICULAR MASS: 116.02 G
LYMPHOCYTES # BLD AUTO: 2.5 K/UL (ref 1–4.8)
LYMPHOCYTES NFR BLD: 26.8 % (ref 18–48)
MAGNESIUM SERPL-MCNC: 1.9 MG/DL (ref 1.6–2.6)
MCH RBC QN AUTO: 22.3 PG (ref 27–31)
MCHC RBC AUTO-ENTMCNC: 30.7 G/DL (ref 32–36)
MCV RBC AUTO: 73 FL (ref 82–98)
MONOCYTES # BLD AUTO: 0.7 K/UL (ref 0.3–1)
MONOCYTES NFR BLD: 7.1 % (ref 4–15)
NEUTROPHILS # BLD AUTO: 6 K/UL (ref 1.8–7.7)
NEUTROPHILS NFR BLD: 64.4 % (ref 38–73)
NRBC BLD-RTO: 0 /100 WBC
PISA TR MAX VEL: 1.46 M/S
PLATELET # BLD AUTO: 362 K/UL (ref 150–450)
PMV BLD AUTO: 8.5 FL (ref 9.2–12.9)
POTASSIUM SERPL-SCNC: 3.6 MMOL/L (ref 3.5–5.1)
PV PEAK VELOCITY: 1.35 CM/S
RA MAJOR: 4.37 CM
RA PRESSURE: 3 MMHG
RA WIDTH: 2.81 CM
RBC # BLD AUTO: 4.03 M/UL (ref 4–5.4)
RIGHT VENTRICULAR END-DIASTOLIC DIMENSION: 3.49 CM
RV TISSUE DOPPLER FREE WALL SYSTOLIC VELOCITY 1 (APICAL 4 CHAMBER VIEW): 17.16 CM/S
SINUS: 3.77 CM
SODIUM SERPL-SCNC: 138 MMOL/L (ref 136–145)
STJ: 2.52 CM
TDI LATERAL: 0.1 M/S
TDI SEPTAL: 0.05 M/S
TDI: 0.08 M/S
TR MAX PG: 9 MMHG
TRICUSPID ANNULAR PLANE SYSTOLIC EXCURSION: 1.36 CM
TV REST PULMONARY ARTERY PRESSURE: 12 MMHG
WBC # BLD AUTO: 9.25 K/UL (ref 3.9–12.7)

## 2022-07-11 PROCEDURE — 83735 ASSAY OF MAGNESIUM: CPT | Performed by: HOSPITALIST

## 2022-07-11 PROCEDURE — 80048 BASIC METABOLIC PNL TOTAL CA: CPT | Performed by: HOSPITALIST

## 2022-07-11 PROCEDURE — 85730 THROMBOPLASTIN TIME PARTIAL: CPT | Mod: 91 | Performed by: HOSPITALIST

## 2022-07-11 PROCEDURE — 25000003 PHARM REV CODE 250: Performed by: STUDENT IN AN ORGANIZED HEALTH CARE EDUCATION/TRAINING PROGRAM

## 2022-07-11 PROCEDURE — 63600175 PHARM REV CODE 636 W HCPCS: Performed by: HOSPITALIST

## 2022-07-11 PROCEDURE — 36415 COLL VENOUS BLD VENIPUNCTURE: CPT | Performed by: HOSPITALIST

## 2022-07-11 PROCEDURE — 25000003 PHARM REV CODE 250: Performed by: INTERNAL MEDICINE

## 2022-07-11 PROCEDURE — 21400001 HC TELEMETRY ROOM

## 2022-07-11 PROCEDURE — 25000003 PHARM REV CODE 250: Performed by: HOSPITALIST

## 2022-07-11 PROCEDURE — 85025 COMPLETE CBC W/AUTO DIFF WBC: CPT | Performed by: HOSPITALIST

## 2022-07-11 RX ADMIN — AMLODIPINE BESYLATE 10 MG: 5 TABLET ORAL at 09:07

## 2022-07-11 RX ADMIN — FERROUS SULFATE TAB 325 MG (65 MG ELEMENTAL FE) 1 EACH: 325 (65 FE) TAB at 09:07

## 2022-07-11 RX ADMIN — PANTOPRAZOLE SODIUM 40 MG: 40 TABLET, DELAYED RELEASE ORAL at 09:07

## 2022-07-11 RX ADMIN — CETIRIZINE HYDROCHLORIDE 10 MG: 10 TABLET, FILM COATED ORAL at 09:07

## 2022-07-11 RX ADMIN — HEPARIN SODIUM 15 UNITS/KG/HR: 10000 INJECTION, SOLUTION INTRAVENOUS at 04:07

## 2022-07-11 RX ADMIN — CEFTRIAXONE 1 G: 1 INJECTION, SOLUTION INTRAVENOUS at 08:07

## 2022-07-11 RX ADMIN — SODIUM CHLORIDE: 0.9 INJECTION, SOLUTION INTRAVENOUS at 11:07

## 2022-07-11 NOTE — ASSESSMENT & PLAN NOTE
-Noted prior ESBL E.coli UTI so started on zosyn  -Urine culture growing Proteus mirabilis GNR > 100,000 cfu/ml sensitive to ceftriaxone and a low count of pan sensitive Providencia rettgeri  -Noted fever at 11:19 7/10 - PCT normal.  Lactic acid a bit elevated at 2.4.  checked blood cultures which are negative so far.  Suspect fever due to DVT and PE.  -Changed zosyn to ceftriaxone on 7/10.  Has completed 5 days treatment.  Will stop antibiotics and monitor.

## 2022-07-11 NOTE — SUBJECTIVE & OBJECTIVE
Interval History:  No acute events overnight.  Found to have DVT and pulmonary embolism yesterday.  Breathing comfortably on room air.  Denies leg and chest pain.  More alert and conversant today.  Denies any blood in stool.  No further fevers since yesterday afternoon.  Asks no questions when prompted.      Review of Systems   Constitutional:  Positive for activity change, fatigue and fever.   Respiratory:  Negative for shortness of breath.    Cardiovascular:  Negative for chest pain.   Gastrointestinal:  Negative for abdominal pain.   Neurological:  Positive for weakness. Negative for dizziness and headaches.   All other systems reviewed and are negative.    Objective:     Vital Signs (Most Recent):  Temp: 97.7 °F (36.5 °C) (07/11/22 1641)  Pulse: 108 (07/11/22 1641)  Resp: 18 (07/11/22 1641)  BP: (!) 110/50 (07/11/22 1641)  SpO2: 96 % (07/11/22 1641) Vital Signs (24h Range):  Temp:  [97.6 °F (36.4 °C)-98.8 °F (37.1 °C)] 97.7 °F (36.5 °C)  Pulse:  [] 108  Resp:  [17-19] 18  SpO2:  [95 %-100 %] 96 %  BP: (109-148)/(50-78) 110/50     Weight: 86.6 kg (190 lb 14.7 oz)  Body mass index is 33.82 kg/m².    Intake/Output Summary (Last 24 hours) at 7/11/2022 1848  Last data filed at 7/11/2022 0846  Gross per 24 hour   Intake 120 ml   Output 900 ml   Net -780 ml        Physical Exam  Vitals and nursing note reviewed.   Constitutional:       General: She is not in acute distress.     Appearance: Normal appearance. She is normal weight. She is not ill-appearing (chronically weak, frail), toxic-appearing or diaphoretic.   HENT:      Head: Normocephalic and atraumatic.      Right Ear: External ear normal.      Left Ear: External ear normal.      Nose: Nose normal. No congestion.      Mouth/Throat:      Mouth: Mucous membranes are moist.      Pharynx: Oropharynx is clear.   Eyes:      Extraocular Movements: Extraocular movements intact.      Pupils: Pupils are equal, round, and reactive to light.   Cardiovascular:       Rate and Rhythm: Regular rhythm. Tachycardia present.      Heart sounds: No murmur heard.    No gallop.   Pulmonary:      Effort: Pulmonary effort is normal. No respiratory distress.      Breath sounds: No wheezing or rales.   Abdominal:      General: There is no distension.      Tenderness: There is no abdominal tenderness. There is no guarding.   Musculoskeletal:         General: Normal range of motion.      Cervical back: Normal range of motion. No rigidity or tenderness.      Right lower leg: No edema.      Left lower leg: No edema.   Skin:     General: Skin is dry.   Neurological:      General: No focal deficit present.      Mental Status: She is alert.      Motor: Weakness present.      Comments: Alert and attentive.  Appears frail and weak.  Voice is stronger today.  Lethargic at times, but not during my visit today.   Psychiatric:         Mood and Affect: Affect is not labile or flat.         Speech: Speech is not delayed.         Behavior: Behavior normal. Behavior is cooperative.       Significant Labs: All pertinent labs within the past 24 hours have been reviewed.    Significant Imaging: I have reviewed all pertinent imaging results/findings within the past 24 hours.

## 2022-07-11 NOTE — ASSESSMENT & PLAN NOTE
-Admitted to inpatient status  -Baseline Hb near 12  -On admit Hb 9.6 and reported melena  -GI consulted and patient going taken for EGD 7/5 which showed no source of bleeding.  Specifically: a bleb found in the esophagus, tortuous esophagus, 6 cm hiatal hernia, normal stomach, normal examined duodenum.  -Given risk of aspiration with prep - will hold off on colonoscopy unless active bleeding or dropping H/H.  -Holding home aspirin  -Monitor H/H - stable .  Transfuse for Hb less than 7.  She is iron deficient - Tsat 7%  -Hb 10.4-->9.7-->9.0 today  -Started heparin drip 7/10 due to dvt/PE and Hb trending down.  Will ask GI to eval again tomorrow if Hb drops further.  If Hb stable or improved would transition from heparin drip to eliquis.  -Continue to daily PPI and oral iron supplement   -Plan d/c to SNF.

## 2022-07-11 NOTE — ASSESSMENT & PLAN NOTE
-Due to stroke and chronic  -PT/OT consulted  -Daughter states she can no longer care for her mom and would like to proceed with halfway care at NH. Prefer [facility]  -CM/SW consulted to assist with placement.  Plan discharge to SNF w transition to NH after.

## 2022-07-11 NOTE — ASSESSMENT & PLAN NOTE
----- Message from Peggy Whitten [facility]: [phone]                                     -Well controlled  -Continue norvasc

## 2022-07-11 NOTE — NURSING
Report received from Ashley. PT awake and alert . Receiving IV fluids NS at 100cc/hr. Denies any pain or discomfort at this time. Boots on Both feet . Pt informed she has to get stuck by Lab . Bed down SR up x 2 HOB . Call bell in reach.

## 2022-07-11 NOTE — ASSESSMENT & PLAN NOTE
-Fever and tachycardia on 7/10 prompted investigation which identified a DVT and PE  -Doppler US showed Nonocclusive thrombus within the distal right femoral vein.  No thrombus seen elsewhere  -Started heparin drip 7/11.  Monitor closely given GI bleed and anemia.

## 2022-07-11 NOTE — PLAN OF CARE
KATARZYNA spoke with Ciarra ([facility]) confirming patient was accepted to SNF placement. KATARZYNA will follow up with plan of care.     KATARZYNA received call from Laura ([facility]) re: patient completing paperwork. KATARZYNA provided Laura with patient's daughter and son contact # to have paperwork completed.

## 2022-07-12 LAB
ANION GAP SERPL CALC-SCNC: 10 MMOL/L (ref 8–16)
APTT BLDCRRT: 50.3 SEC (ref 21–32)
BASOPHILS # BLD AUTO: 0.03 K/UL (ref 0–0.2)
BASOPHILS NFR BLD: 0.3 % (ref 0–1.9)
BUN SERPL-MCNC: 6 MG/DL (ref 8–23)
CALCIUM SERPL-MCNC: 9.3 MG/DL (ref 8.7–10.5)
CHLORIDE SERPL-SCNC: 107 MMOL/L (ref 95–110)
CO2 SERPL-SCNC: 23 MMOL/L (ref 23–29)
CREAT SERPL-MCNC: 0.4 MG/DL (ref 0.5–1.4)
DIFFERENTIAL METHOD: ABNORMAL
EOSINOPHIL # BLD AUTO: 0.1 K/UL (ref 0–0.5)
EOSINOPHIL NFR BLD: 0.7 % (ref 0–8)
ERYTHROCYTE [DISTWIDTH] IN BLOOD BY AUTOMATED COUNT: 21.7 % (ref 11.5–14.5)
EST. GFR  (AFRICAN AMERICAN): >60 ML/MIN/1.73 M^2
EST. GFR  (NON AFRICAN AMERICAN): >60 ML/MIN/1.73 M^2
GLUCOSE SERPL-MCNC: 75 MG/DL (ref 70–110)
HCT VFR BLD AUTO: 28.1 % (ref 37–48.5)
HGB BLD-MCNC: 9 G/DL (ref 12–16)
IMM GRANULOCYTES # BLD AUTO: 0.07 K/UL (ref 0–0.04)
IMM GRANULOCYTES NFR BLD AUTO: 0.6 % (ref 0–0.5)
LYMPHOCYTES # BLD AUTO: 3.1 K/UL (ref 1–4.8)
LYMPHOCYTES NFR BLD: 28.3 % (ref 18–48)
MAGNESIUM SERPL-MCNC: 1.9 MG/DL (ref 1.6–2.6)
MCH RBC QN AUTO: 22 PG (ref 27–31)
MCHC RBC AUTO-ENTMCNC: 32 G/DL (ref 32–36)
MCV RBC AUTO: 69 FL (ref 82–98)
MONOCYTES # BLD AUTO: 0.7 K/UL (ref 0.3–1)
MONOCYTES NFR BLD: 6.3 % (ref 4–15)
NEUTROPHILS # BLD AUTO: 7.1 K/UL (ref 1.8–7.7)
NEUTROPHILS NFR BLD: 63.8 % (ref 38–73)
NRBC BLD-RTO: 0 /100 WBC
PLATELET # BLD AUTO: 410 K/UL (ref 150–450)
PMV BLD AUTO: 9.2 FL (ref 9.2–12.9)
POTASSIUM SERPL-SCNC: 3.3 MMOL/L (ref 3.5–5.1)
RBC # BLD AUTO: 4.09 M/UL (ref 4–5.4)
SODIUM SERPL-SCNC: 140 MMOL/L (ref 136–145)
WBC # BLD AUTO: 11.04 K/UL (ref 3.9–12.7)

## 2022-07-12 PROCEDURE — 25000003 PHARM REV CODE 250: Performed by: STUDENT IN AN ORGANIZED HEALTH CARE EDUCATION/TRAINING PROGRAM

## 2022-07-12 PROCEDURE — 36415 COLL VENOUS BLD VENIPUNCTURE: CPT | Performed by: HOSPITALIST

## 2022-07-12 PROCEDURE — 63600175 PHARM REV CODE 636 W HCPCS: Performed by: HOSPITALIST

## 2022-07-12 PROCEDURE — 85025 COMPLETE CBC W/AUTO DIFF WBC: CPT | Performed by: HOSPITALIST

## 2022-07-12 PROCEDURE — 21400001 HC TELEMETRY ROOM

## 2022-07-12 PROCEDURE — 25000003 PHARM REV CODE 250: Performed by: HOSPITALIST

## 2022-07-12 PROCEDURE — 85730 THROMBOPLASTIN TIME PARTIAL: CPT | Performed by: HOSPITALIST

## 2022-07-12 PROCEDURE — 80048 BASIC METABOLIC PNL TOTAL CA: CPT | Performed by: HOSPITALIST

## 2022-07-12 PROCEDURE — 83735 ASSAY OF MAGNESIUM: CPT | Performed by: HOSPITALIST

## 2022-07-12 PROCEDURE — 25000003 PHARM REV CODE 250: Performed by: INTERNAL MEDICINE

## 2022-07-12 RX ADMIN — CETIRIZINE HYDROCHLORIDE 10 MG: 10 TABLET, FILM COATED ORAL at 09:07

## 2022-07-12 RX ADMIN — AMLODIPINE BESYLATE 10 MG: 5 TABLET ORAL at 09:07

## 2022-07-12 RX ADMIN — SODIUM CHLORIDE: 0.9 INJECTION, SOLUTION INTRAVENOUS at 08:07

## 2022-07-12 RX ADMIN — SODIUM CHLORIDE: 0.9 INJECTION, SOLUTION INTRAVENOUS at 10:07

## 2022-07-12 RX ADMIN — HEPARIN SODIUM 15 UNITS/KG/HR: 10000 INJECTION, SOLUTION INTRAVENOUS at 06:07

## 2022-07-12 RX ADMIN — FERROUS SULFATE TAB 325 MG (65 MG ELEMENTAL FE) 1 EACH: 325 (65 FE) TAB at 09:07

## 2022-07-12 RX ADMIN — PANTOPRAZOLE SODIUM 40 MG: 40 TABLET, DELAYED RELEASE ORAL at 09:07

## 2022-07-12 NOTE — PLAN OF CARE
Problem: Adult Inpatient Plan of Care  Goal: Plan of Care Review  Outcome: Ongoing, Progressing     Problem: Impaired Wound Healing  Goal: Optimal Wound Healing  Outcome: Ongoing, Progressing     Problem: Adjustment to Illness (Gastrointestinal Bleeding)  Goal: Optimal Coping with Acute Illness  Outcome: Ongoing, Progressing     Problem: Fall Injury Risk  Goal: Absence of Fall and Fall-Related Injury  Outcome: Ongoing, Progressing     Problem: Infection  Goal: Absence of Infection Signs and Symptoms  Outcome: Ongoing, Progressing

## 2022-07-12 NOTE — NURSING
Report received from night nurse RAJENDRA Davenport. Visualized patient and assessed patient's overall condition and appearance. No acute distress noted. Will continue to monitor

## 2022-07-12 NOTE — ASSESSMENT & PLAN NOTE
-Baseline - daughter reports she slumps over in the wheelchair and she has to prop her up   -fall precautions  -DC to SNF tomorrow

## 2022-07-12 NOTE — ASSESSMENT & PLAN NOTE
Advance Care Planning Per  Marker: Date: 07/03/2022 Code Status  In light of the patients advanced and life limiting illness,I engaged the the patient, family and healthcare power of   in a conversation about the patient's preferences for care  at the very end of life. The patient wishes to have CPR and cardio version if needed with medications when her heart stops. However, patient does not want to be intubated or placed on mechanical ventilation when her breathing stops. I communicated to the patient, family and healthcare power of   that her wishes align with partial code status.     Code status: Partial code. Patient agrees to CPR/cardioversion but does not want to be intubated or mechanically ventilated.   -Partial code status with CPR/cardioversion but without possibility of securing airway is considered illogical by many physicians.  Will consult palliative care to continue goals of care discussion and hopefully pursue more straight forward code status - either full code or DNR.

## 2022-07-12 NOTE — SUBJECTIVE & OBJECTIVE
Interval History: no acute events overnight    Review of Systems  Objective:     Vital Signs (Most Recent):  Temp: 99.1 °F (37.3 °C) (07/12/22 1132)  Pulse: 89 (07/12/22 1132)  Resp: 18 (07/12/22 1132)  BP: 114/67 (07/12/22 1132)  SpO2: 97 % (07/12/22 1132) Vital Signs (24h Range):  Temp:  [97.7 °F (36.5 °C)-99.1 °F (37.3 °C)] 99.1 °F (37.3 °C)  Pulse:  [] 89  Resp:  [16-19] 18  SpO2:  [96 %-99 %] 97 %  BP: (110-124)/(50-73) 114/67     Weight: 86.6 kg (190 lb 14.7 oz)  Body mass index is 33.82 kg/m².    Intake/Output Summary (Last 24 hours) at 7/12/2022 1326  Last data filed at 7/12/2022 1300  Gross per 24 hour   Intake 480 ml   Output 400 ml   Net 80 ml      Physical Exam  Constitutional:       Comments: Elderly AA female laying in bed comfortably appears chronically ill   HENT:      Head: Normocephalic and atraumatic.   Eyes:      Pupils: Pupils are equal, round, and reactive to light.   Cardiovascular:      Rate and Rhythm: Normal rate and regular rhythm.      Pulses: Normal pulses.      Heart sounds: Normal heart sounds. No murmur heard.    No friction rub. No gallop.   Pulmonary:      Effort: Pulmonary effort is normal. No respiratory distress.      Breath sounds: Normal breath sounds. No stridor. No wheezing, rhonchi or rales.   Chest:      Chest wall: No tenderness.   Abdominal:      General: Abdomen is flat. Bowel sounds are normal. There is no distension.      Palpations: Abdomen is soft. There is no mass.      Tenderness: There is no abdominal tenderness.      Hernia: No hernia is present.   Skin:     Capillary Refill: Capillary refill takes less than 2 seconds.   Neurological:      General: No focal deficit present.      Mental Status: Mental status is at baseline.       Significant Labs: All pertinent labs within the past 24 hours have been reviewed.    Significant Imaging: I have reviewed all pertinent imaging results/findings within the past 24 hours.

## 2022-07-12 NOTE — PROGRESS NOTES
"[facility]  Progress Note    Patient Name: Holly Kidd  MRN: [id number]  Patient Class: IP- Inpatient   Admission Date: 7/2/2022  Length of Stay: 10 days  Attending Physician: Barry Ross MD  Primary Care Provider: DENYS Sterling        Subjective:     Principal Problem:GI bleed due to NSAIDs        HPI:  Ms. Kidd is a 71 year old female with PMH of CVA ( facial droop and left sided weakness) , HTN, Dementia ( at baseline agitated and oriented x1) is being admitted from the ER after presenting for general malize, weakness and fatigue when the AC went out as well as daughter reporting patient has been eating less and noted to have what is described as melena this AM x1. She described a "motor oil black" tarry stool by patient but denies diarrhea or vomiting. Reports that mother refuses to go to the doctor and only takes the merari aspirin and amlodipine. She is completely bedbound and total care with daughter providing all ADL's . Daughter has a large caregiver burden. There was a report of epigastric pain but patient denies this. Noted to have a potassium of 2.7 in ER and denies any vomiting or diarrhea. No chills, shortness of breath, nausea or other associated symptoms. She currently only reports she is "cold" and does not like people "sticking her" with needles.      Note*Patient's daughter denies altered mental status and notes that the patient is agitated and physically/verbally abusive to others at baseline. She recognizes only her daughter usually and calls her by a nickname.       Overview/Hospital Course:  71 year old woman with history of CVA ( facial droop and left sided weakness), HTN, Dementia, and bedbound admitted on 07/02/2022 for hypokalemia and anemia, with suspicion for upper GI bleed. GI consulted and took her for EGD which did not reveal any source of bleeding.  Hb remained stable but on 7/10 she was diagnosed with DVT and PE and started on " heparin drip.  Hb has trended down today and if any further tomorrow plan is to reconsult GI to consider colonoscopy.  She had an encephalopathy due to Proteus UTI and completed 5 days of antibiotics.  Once stable and able to start eliquis she will discharge to SNF.      Interval History: no acute events overnight    Review of Systems  Objective:     Vital Signs (Most Recent):  Temp: 99.1 °F (37.3 °C) (07/12/22 1132)  Pulse: 89 (07/12/22 1132)  Resp: 18 (07/12/22 1132)  BP: 114/67 (07/12/22 1132)  SpO2: 97 % (07/12/22 1132) Vital Signs (24h Range):  Temp:  [97.7 °F (36.5 °C)-99.1 °F (37.3 °C)] 99.1 °F (37.3 °C)  Pulse:  [] 89  Resp:  [16-19] 18  SpO2:  [96 %-99 %] 97 %  BP: (110-124)/(50-73) 114/67     Weight: 86.6 kg (190 lb 14.7 oz)  Body mass index is 33.82 kg/m².    Intake/Output Summary (Last 24 hours) at 7/12/2022 1326  Last data filed at 7/12/2022 1300  Gross per 24 hour   Intake 480 ml   Output 400 ml   Net 80 ml      Physical Exam  Constitutional:       Comments: Elderly AA female laying in bed comfortably appears chronically ill   HENT:      Head: Normocephalic and atraumatic.   Eyes:      Pupils: Pupils are equal, round, and reactive to light.   Cardiovascular:      Rate and Rhythm: Normal rate and regular rhythm.      Pulses: Normal pulses.      Heart sounds: Normal heart sounds. No murmur heard.    No friction rub. No gallop.   Pulmonary:      Effort: Pulmonary effort is normal. No respiratory distress.      Breath sounds: Normal breath sounds. No stridor. No wheezing, rhonchi or rales.   Chest:      Chest wall: No tenderness.   Abdominal:      General: Abdomen is flat. Bowel sounds are normal. There is no distension.      Palpations: Abdomen is soft. There is no mass.      Tenderness: There is no abdominal tenderness.      Hernia: No hernia is present.   Skin:     Capillary Refill: Capillary refill takes less than 2 seconds.   Neurological:      General: No focal deficit present.      Mental  Status: Mental status is at baseline.       Significant Labs: All pertinent labs within the past 24 hours have been reviewed.    Significant Imaging: I have reviewed all pertinent imaging results/findings within the past 24 hours.      Assessment/Plan:      * GI bleed due to NSAIDs  -Admitted to inpatient status  -Baseline Hb near 12  -On admit Hb 9.6 and reported melena  -GI consulted and patient going taken for EGD 7/5 which showed no source of bleeding.  Specifically: a bleb found in the esophagus, tortuous esophagus, 6 cm hiatal hernia, normal stomach, normal examined duodenum.  -Given risk of aspiration with prep - will hold off on colonoscopy unless active bleeding or dropping H/H.  -Holding home aspirin  -Monitor H/H - stable .  Transfuse for Hb less than 7.  She is iron deficient - Tsat 7%  -Hb 10.4-->9.7-->9.0 today  -Started heparin drip 7/10 due to dvt/PE and Hb trending down.  Will ask GI to eval again tomorrow if Hb drops further.  If Hb stable or improved would transition from heparin drip to eliquis.  -Continue to daily PPI and oral iron supplement   -Plan d/c to SNF.    Pulmonary embolism on right  -Fever and tachycardia on 7/10 prompted investigation which discovered DVT and PE  -CTA chest showed Pulmonary thromboembolism involving a few segmental branches to the right lower lobe  -Echo showed EF 65%, normal diastolic function and mildly dilated ascending aorta  -Troponin negative  -Mildly tachycardic (occasionally into 120s but generally upper 90s -119), but breathing comfortably on room air and no chest pain.  -PESI score of 70 indicating class II, low risk with 1.7-3.5% 30 day mortality  -Treating with heparin drip until stable H/H then convert to eliquis.    Acute deep vein thrombosis (DVT) of femoral vein of right lower extremity  -Fever and tachycardia on 7/10 prompted investigation which identified a DVT and PE  -Doppler US showed Nonocclusive thrombus within the distal right femoral vein.   No thrombus seen elsewhere  -Started heparin drip 7/11.  Monitor closely given GI bleed and anemia.    Acute cystitis  -Noted prior ESBL E.coli UTI so started on zosyn  -Urine culture growing Proteus mirabilis GNR > 100,000 cfu/ml sensitive to ceftriaxone and a low count of pan sensitive Providencia rettgeri  -Noted fever at 11:19 7/10 - PCT normal.  Lactic acid a bit elevated at 2.4.  checked blood cultures which are negative so far.  Suspect fever due to DVT and PE.  -Changed zosyn to ceftriaxone on 7/10.  Has completed 5 days treatment.  Will stop antibiotics and monitor.    Debility  -Due to stroke and chronic  -PT/OT consulted  -Daughter states she can no longer care for her mom and would like to proceed with assisted care at NH. Prefer [facility]  -CM/SW consulted to assist with placement.  Plan discharge to SNF w transition to NH after.    Advanced care planning/counseling discussion  Advance Care Planning Per  Marker: Date: 07/03/2022 Code Status  In light of the patients advanced and life limiting illness,I engaged the the patient, family and healthcare power of   in a conversation about the patient's preferences for care  at the very end of life. The patient wishes to have CPR and cardio version if needed with medications when her heart stops. However, patient does not want to be intubated or placed on mechanical ventilation when her breathing stops. I communicated to the patient, family and healthcare power of   that her wishes align with partial code status.    Code status: Partial code. Patient agrees to CPR/cardioversion but does not want to be intubated or mechanically ventilated.   -Partial code status with CPR/cardioversion but without possibility of securing airway is considered illogical by many physicians.  Will consult palliative care to continue goals of care discussion and hopefully pursue more straight forward code status - either full code or DNR.    Iron deficiency  anemia  -Likely due to GI bleeding  -Treatment as above.    Acute encephalopathy  -Patient felt to be lethargic and less conversant 7/7  -TSH, folate and Ammonia normal but found to have UTI.  -Noted prior ESBL E.coli UTI - started zosyn 7/8, and complete treatment with ceftriaxone, plan to discharge tomorrow  -Maintain delirium precautions.  -Clinically improving - but lethargic/depressed at times.        Cerebrovascular disease  -History noted  -Has chronic L sided deficits and facial droop and is wheelchair dependent at home  -Daughter no longer able to care for her at home  -Pursuing SNF --> NH placement    Wheelchair dependence  -Baseline - daughter reports she slumps over in the wheelchair and she has to prop her up   -fall precautions  -DC to SNF tomorrow    Obesity (BMI 30.0-34.9)  Body mass index is 33.82 kg/m². Morbid obesity complicates all aspects of disease management from diagnostic modalities to treatment. Weight loss encouraged and health benefits explained to patient.     Hypokalemia  Replete adequately    Essential hypertension  -Well controlled  -Continue norvasc      VTE Risk Mitigation (From admission, onward)         Ordered     heparin 25,000 units in dextrose 5% (100 units/ml) IV bolus from bag - ADDITIONAL PRN BOLUS - 60 units/kg  As needed (PRN)        Question:  Heparin Infusion Adjustment (DO NOT MODIFY ANSWER)  Answer:  \\[email or web address]\[email or web address]\Images\Pharmacy\HeparinInfusions\heparin HIGH INTENSITY nomogram for [facility] [id number]    07/10/22 1700     heparin 25,000 units in dextrose 5% 250 mL (100 units/mL) infusion HIGH INTENSITY nomogram - [facility]  Continuous        Question Answer Comment   Heparin Infusion Adjustment (DO NOT MODIFY ANSWER) \\[facility]\MeetMe\Images\Pharmacy\HeparinInfusions\heparin HIGH INTENSITY nomogram for [facility] [id number]    Begin at (in units/kg/hr) 18        07/10/22 1700     heparin 25,000 units in dextrose 5% (100 units/ml) IV bolus from bag - ADDITIONAL PRN BOLUS - 30  units/kg  As needed (PRN)        Question:  Heparin Infusion Adjustment (DO NOT MODIFY ANSWER)  Answer:  \\[email or web address]\[email or web address]\Images\Pharmacy\HeparinInfusions\heparin HIGH INTENSITY nomogram for [facility] [id number]    07/10/22 1700     Place sequential compression device  Until discontinued         07/02/22 2111                Discharge Planning   ANISH: 7/6/2022     Code Status: Partial Code   Is the patient medically ready for discharge?:     Reason for patient still in hospital (select all that apply): Patient trending condition and Treatment  Discharge Plan A: Skilled Nursing Facility   Discharge Delays: None known at this time              Barry Ross MD  Department of Hospital Medicine   [facility]

## 2022-07-12 NOTE — ASSESSMENT & PLAN NOTE
-Fever and tachycardia on 7/10 prompted investigation which discovered DVT and PE  -CTA chest showed Pulmonary thromboembolism involving a few segmental branches to the right lower lobe  -Echo showed EF 65%, normal diastolic function and mildly dilated ascending aorta  -Troponin negative  -Mildly tachycardic (occasionally into 120s but generally upper 90s -119), but breathing comfortably on room air and no chest pain.  -PESI score of 70 indicating class II, low risk with 1.7-3.5% 30 day mortality  -Treating with heparin drip until stable H/H then convert to eliquis.

## 2022-07-12 NOTE — ASSESSMENT & PLAN NOTE
-Patient felt to be lethargic and less conversant 7/7  -TSH, folate and Ammonia normal but found to have UTI.  -Noted prior ESBL E.coli UTI - started zosyn 7/8, and complete treatment with ceftriaxone, plan to discharge tomorrow  -Maintain delirium precautions.  -Clinically improving - but lethargic/depressed at times.

## 2022-07-12 NOTE — PLAN OF CARE
[facility] - Telemetry  Discharge Reassessment    Primary Care Provider: DENYS Sterling    Expected Discharge Date: 7/6/2022    Reassessment (most recent)       Discharge Reassessment - 07/12/22 1015          Discharge Reassessment    Assessment Type Discharge Planning Reassessment     Did the patient's condition or plan change since previous assessment? No     Communicated ANISH with patient/caregiver Date not available/Unable to determine     Discharge Plan A Skilled Nursing Facility     Discharge Plan B Skilled Nursing Facility     DME Needed Upon Discharge  other (see comments)   TBD    Discharge Barriers Identified None     Why the patient remains in the hospital Requires continued medical care        Post-Acute Status    Post-Acute Authorization Placement     Post-Acute Placement Status Set-up Complete/Auth obtained     Coverage Medicare A/B     Discharge Delays None known at this time                     Patient's daughter completed paperwork at [facility].     142 and [facility] manually faxed to [facility]. Fax #  [phone].

## 2022-07-12 NOTE — PROGRESS NOTES
"[facility] Medicine  Progress Note    Patient Name: Holly Kidd  MRN: [id number]  Patient Class: IP- Inpatient   Admission Date: 7/2/2022  Length of Stay: 9 days  Attending Physician: Colton Maharaj MD  Primary Care Provider: DENYS Sterling        Subjective:     Principal Problem:GI bleed due to NSAIDs        HPI:  Ms. Kidd is a 71 year old female with PMH of CVA ( facial droop and left sided weakness) , HTN, Dementia ( at baseline agitated and oriented x1) is being admitted from the ER after presenting for general malize, weakness and fatigue when the AC went out as well as daughter reporting patient has been eating less and noted to have what is described as melena this AM x1. She described a "motor oil black" tarry stool by patient but denies diarrhea or vomiting. Reports that mother refuses to go to the doctor and only takes the merari aspirin and amlodipine. She is completely bedbound and total care with daughter providing all ADL's . Daughter has a large caregiver burden. There was a report of epigastric pain but patient denies this. Noted to have a potassium of 2.7 in ER and denies any vomiting or diarrhea. No chills, shortness of breath, nausea or other associated symptoms. She currently only reports she is "cold" and does not like people "sticking her" with needles.      Note*Patient's daughter denies altered mental status and notes that the patient is agitated and physically/verbally abusive to others at baseline. She recognizes only her daughter usually and calls her by a nickname.       Overview/Hospital Course:  71 year old female with PMH of CVA ( facial droop and left sided weakness) , HTN, Dementia, and bedbound admitted on 07/02/2022 for hypokalemia and anemia, with suspicion for upper GI bleed. Chest x-ray unremarkable.  Patient presented with complaints fatigue and weakness also with melena. Hgb on admit 9.6, down from baseline of 12. Potassium replaced. GI " consulted- recommends IV PPI BID and EGD on 07/05. Family is in agreement. Hemoglobin stable.  Continue to monitor.  Patient transferred to [facility] on 07/07.  However, patient noted to be slightly more lethargic, less communicative.  This is an acute change in her mentation.  Patient to be transferred back to in-person Hospital Medicine team.      Interval History:  No acute events overnight.  Found to have DVT and pulmonary embolism yesterday.  Breathing comfortably on room air.  Denies leg and chest pain.  More alert and conversant today.  Denies any blood in stool.  No further fevers since yesterday afternoon.  Asks no questions when prompted.      Review of Systems   Constitutional:  Positive for activity change, fatigue and fever.   Respiratory:  Negative for shortness of breath.    Cardiovascular:  Negative for chest pain.   Gastrointestinal:  Negative for abdominal pain.   Neurological:  Positive for weakness. Negative for dizziness and headaches.   All other systems reviewed and are negative.    Objective:     Vital Signs (Most Recent):  Temp: 97.7 °F (36.5 °C) (07/11/22 1641)  Pulse: 108 (07/11/22 1641)  Resp: 18 (07/11/22 1641)  BP: (!) 110/50 (07/11/22 1641)  SpO2: 96 % (07/11/22 1641) Vital Signs (24h Range):  Temp:  [97.6 °F (36.4 °C)-98.8 °F (37.1 °C)] 97.7 °F (36.5 °C)  Pulse:  [] 108  Resp:  [17-19] 18  SpO2:  [95 %-100 %] 96 %  BP: (109-148)/(50-78) 110/50     Weight: 86.6 kg (190 lb 14.7 oz)  Body mass index is 33.82 kg/m².    Intake/Output Summary (Last 24 hours) at 7/11/2022 1848  Last data filed at 7/11/2022 0846  Gross per 24 hour   Intake 120 ml   Output 900 ml   Net -780 ml        Physical Exam  Vitals and nursing note reviewed.   Constitutional:       General: She is not in acute distress.     Appearance: Normal appearance. She is normal weight. She is not ill-appearing (chronically weak, frail), toxic-appearing or diaphoretic.   HENT:      Head: Normocephalic and atraumatic.       Right Ear: External ear normal.      Left Ear: External ear normal.      Nose: Nose normal. No congestion.      Mouth/Throat:      Mouth: Mucous membranes are moist.      Pharynx: Oropharynx is clear.   Eyes:      Extraocular Movements: Extraocular movements intact.      Pupils: Pupils are equal, round, and reactive to light.   Cardiovascular:      Rate and Rhythm: Regular rhythm. Tachycardia present.      Heart sounds: No murmur heard.    No gallop.   Pulmonary:      Effort: Pulmonary effort is normal. No respiratory distress.      Breath sounds: No wheezing or rales.   Abdominal:      General: There is no distension.      Tenderness: There is no abdominal tenderness. There is no guarding.   Musculoskeletal:         General: Normal range of motion.      Cervical back: Normal range of motion. No rigidity or tenderness.      Right lower leg: No edema.      Left lower leg: No edema.   Skin:     General: Skin is dry.   Neurological:      General: No focal deficit present.      Mental Status: She is alert.      Motor: Weakness present.      Comments: Alert and attentive.  Appears frail and weak.  Voice is stronger today.  Lethargic at times, but not during my visit today.   Psychiatric:         Mood and Affect: Affect is not labile or flat.         Speech: Speech is not delayed.         Behavior: Behavior normal. Behavior is cooperative.       Significant Labs: All pertinent labs within the past 24 hours have been reviewed.    Significant Imaging: I have reviewed all pertinent imaging results/findings within the past 24 hours.      Assessment/Plan:      * GI bleed due to NSAIDs  -Admitted to inpatient status  -Baseline Hb near 12  -On admit Hb 9.6 and reported melena  -GI consulted and patient going taken for EGD 7/5 which showed no source of bleeding.  Specifically: a bleb found in the esophagus, tortuous esophagus, 6 cm hiatal hernia, normal stomach, normal examined duodenum.  -Given risk of aspiration with prep  - will hold off on colonoscopy unless active bleeding or dropping H/H.  -Holding home aspirin  -Monitor H/H - stable .  Transfuse for Hb less than 7.  She is iron deficient - Tsat 7%  -Hb 10.4-->9.7-->9.0 today  -Started heparin drip 7/10 due to dvt/PE and Hb trending down.  Will ask GI to eval again tomorrow if Hb drops further.  If Hb stable or improved would transition from heparin drip to eliquis.  -Continue to daily PPI and oral iron supplement   -Plan d/c to SNF.    Iron deficiency anemia  -Likely due to GI bleeding  -Treatment as above.    Pulmonary embolism on right  -Fever and tachycardia on 7/10 prompted investigation which discovered DVT and PE  -CTA chest showed Pulmonary thromboembolism involving a few segmental branches to the right lower lobe  -Echo showed EF 65%, normal diastolic function and mildly dilated ascending aorta  -Troponin negative  -Mildly tachycardic (occasionally into 120s but generally upper 90s -119), but breathing comfortably on room air and no chest pain.  -PESI score of 70 indicating class II, low risk with 1.7-3.5% 30 day mortality  -Treating with heparin drip until stable H/H then convert to eliquis.    Acute deep vein thrombosis (DVT) of femoral vein of right lower extremity  -Fever and tachycardia on 7/10 prompted investigation which identified a DVT and PE  -Doppler US showed Nonocclusive thrombus within the distal right femoral vein.  No thrombus seen elsewhere  -Started heparin drip 7/11.  Monitor closely given GI bleed and anemia.    Acute encephalopathy  -Patient felt to be lethargic and less conversant 7/7  -TSH, folate and Ammonia normal but found to have UTI.  -Noted prior ESBL E.coli UTI - started zosyn 7/8  -Maintain delirium precautions.  -Clinically improving - but lethargic/depressed at times.  Denies depression and today was much more alert.    Acute cystitis  -Noted prior ESBL E.coli UTI so started on zosyn  -Urine culture growing Proteus mirabilis GNR > 100,000  cfu/ml sensitive to ceftriaxone and a low count of pan sensitive Providencia rettgeri  -Noted fever at 11:19 7/10 - PCT normal.  Lactic acid a bit elevated at 2.4.  checked blood cultures which are negative so far.  Suspect fever due to DVT and PE.  -Changed zosyn to ceftriaxone on 7/10.  Has completed 5 days treatment.  Will stop antibiotics and monitor.    Hypokalemia  -K normal today.  -Check BMP and Mag in AM.    Debility  -Due to stroke and chronic  -PT/OT consulted  -Daughter states she can no longer care for her mom and would like to proceed with intermediate care at NH. Prefer [place]  -CM/SW consulted to assist with placement.  Plan discharge to SNF w transition to NH after.    Cerebrovascular disease  -History noted  -Has chronic L sided deficits and facial droop and is wheelchair dependent at home  -Daughter no longer able to care for her at home  -Pursuing SNF --> NH placement    Wheelchair dependence  -Baseline - daughter reports she slumps over in the wheelchair and she has to prop her up   -fall precautions  -DC to SNF pending     Advanced care planning/counseling discussion  Advance Care Planning  Date: 07/03/2022 Code Status  In light of the patients advanced and life limiting illness,I engaged the the patient, family and healthcare power of   in a conversation about the patient's preferences for care  at the very end of life. The patient wishes to have CPR and cardio version if needed with medications when her heart stops. However, patient does not want to be intubated or placed on mechanical ventilation when her breathing stops. I communicated to the patient, family and healthcare power of   that her wishes align with partial code status.    Code status: Partial code. Patient agrees to CPR/cardioversion but does not want to be intubated or mechanically ventilated.   -Partial code status with CPR/cardioversion but without possibility of securing airway is considered illogical by  many physicians.  Will consult palliative care to continue goals of care discussion and hopefully pursue more straight forward code status - either full code or DNR.    Obesity (BMI 30.0-34.9)  Body mass index is 33.82 kg/m². Morbid obesity complicates all aspects of disease management from diagnostic modalities to treatment. Weight loss encouraged and health benefits explained to patient.     Essential hypertension  -Well controlled  -Continue norvasc      VTE Risk Mitigation (From admission, onward)         Ordered     heparin 25,000 units in dextrose 5% (100 units/ml) IV bolus from bag - ADDITIONAL PRN BOLUS - 60 units/kg  As needed (PRN)        Question:  Heparin Infusion Adjustment (DO NOT MODIFY ANSWER)  Answer:  \\[email or web address]\epic\Images\Pharmacy\HeparinInfusions\heparin HIGH INTENSITY nomogram for [facility] [id number].pdf    07/10/22 1700     heparin 25,000 units in dextrose 5% 250 mL (100 units/mL) infusion HIGH INTENSITY nomogram - [facility]  Continuous        Question Answer Comment   Heparin Infusion Adjustment (DO NOT MODIFY ANSWER) \\[email or web address]\epic\Images\Pharmacy\HeparinInfusions\heparin HIGH INTENSITY nomogram for [facility] [id number].pdf    Begin at (in units/kg/hr) 18        07/10/22 1700     heparin 25,000 units in dextrose 5% (100 units/ml) IV bolus from bag - ADDITIONAL PRN BOLUS - 30 units/kg  As needed (PRN)        Question:  Heparin Infusion Adjustment (DO NOT MODIFY ANSWER)  Answer:  \\[email or web address]\epic\Images\Pharmacy\HeparinInfusions\heparin HIGH INTENSITY nomogram for [facility] [id number].pdf    07/10/22 1700     Place sequential compression device  Until discontinued         07/02/22 2111                Discharge Planning   ANISH: 7/6/2022     Code Status: Partial Code   Is the patient medically ready for discharge?:     Reason for patient still in hospital (select all that apply): Treatment  Discharge Plan A: New Nursing Home placement - shelter care facility   Discharge Delays: (!) Post-Acute Set-up              Colton PRIEST  MD Nicko  Department of [facility] Medicine   [facility] [place] - Telemetry

## 2022-07-12 NOTE — ASSESSMENT & PLAN NOTE
-Due to stroke and chronic  -PT/OT consulted  -Daughter states she can no longer care for her mom and would like to proceed with [facility] care at NH. Prefer [facility]  -CM/SW consulted to assist with placement.  Plan discharge to SNF w transition to NH after.

## 2022-07-12 NOTE — PLAN OF CARE
KATARZYNA received call from Laura ([facility]). Laura informed SW that patient's daughter completed paperwork for patient, and patient is able to come to [facility] when medically ready. KATARZYNA will follow up with MD for medial clearance.

## 2022-07-13 VITALS
OXYGEN SATURATION: 97 % | HEIGHT: 63 IN | RESPIRATION RATE: 20 BRPM | DIASTOLIC BLOOD PRESSURE: 55 MMHG | SYSTOLIC BLOOD PRESSURE: 105 MMHG | WEIGHT: 190.94 LBS | TEMPERATURE: 99 F | HEART RATE: 86 BPM | BODY MASS INDEX: 33.83 KG/M2

## 2022-07-13 LAB
ANION GAP SERPL CALC-SCNC: 11 MMOL/L (ref 8–16)
ANISOCYTOSIS BLD QL SMEAR: SLIGHT
ANISOCYTOSIS BLD QL SMEAR: SLIGHT
APTT BLDCRRT: 45.6 SEC (ref 21–32)
BASOPHILS # BLD AUTO: 0.01 K/UL (ref 0–0.2)
BASOPHILS # BLD AUTO: 0.01 K/UL (ref 0–0.2)
BASOPHILS NFR BLD: 0.1 % (ref 0–1.9)
BASOPHILS NFR BLD: 0.1 % (ref 0–1.9)
BUN SERPL-MCNC: 7 MG/DL (ref 8–23)
CALCIUM SERPL-MCNC: 8.9 MG/DL (ref 8.7–10.5)
CHLORIDE SERPL-SCNC: 108 MMOL/L (ref 95–110)
CO2 SERPL-SCNC: 21 MMOL/L (ref 23–29)
CREAT SERPL-MCNC: 0.4 MG/DL (ref 0.5–1.4)
DIFFERENTIAL METHOD: ABNORMAL
DIFFERENTIAL METHOD: ABNORMAL
EOSINOPHIL # BLD AUTO: 0.1 K/UL (ref 0–0.5)
EOSINOPHIL # BLD AUTO: 0.1 K/UL (ref 0–0.5)
EOSINOPHIL NFR BLD: 0.8 % (ref 0–8)
EOSINOPHIL NFR BLD: 0.8 % (ref 0–8)
ERYTHROCYTE [DISTWIDTH] IN BLOOD BY AUTOMATED COUNT: 22.1 % (ref 11.5–14.5)
ERYTHROCYTE [DISTWIDTH] IN BLOOD BY AUTOMATED COUNT: 22.1 % (ref 11.5–14.5)
EST. GFR  (AFRICAN AMERICAN): >60 ML/MIN/1.73 M^2
EST. GFR  (NON AFRICAN AMERICAN): >60 ML/MIN/1.73 M^2
GLUCOSE SERPL-MCNC: 97 MG/DL (ref 70–110)
HCT VFR BLD AUTO: 26 % (ref 37–48.5)
HCT VFR BLD AUTO: 26 % (ref 37–48.5)
HGB BLD-MCNC: 8.2 G/DL (ref 12–16)
HGB BLD-MCNC: 8.2 G/DL (ref 12–16)
HYPOCHROMIA BLD QL SMEAR: ABNORMAL
HYPOCHROMIA BLD QL SMEAR: ABNORMAL
IMM GRANULOCYTES # BLD AUTO: 0.05 K/UL (ref 0–0.04)
IMM GRANULOCYTES # BLD AUTO: 0.05 K/UL (ref 0–0.04)
IMM GRANULOCYTES NFR BLD AUTO: 0.6 % (ref 0–0.5)
IMM GRANULOCYTES NFR BLD AUTO: 0.6 % (ref 0–0.5)
LYMPHOCYTES # BLD AUTO: 2.9 K/UL (ref 1–4.8)
LYMPHOCYTES # BLD AUTO: 2.9 K/UL (ref 1–4.8)
LYMPHOCYTES NFR BLD: 32 % (ref 18–48)
LYMPHOCYTES NFR BLD: 32 % (ref 18–48)
MAGNESIUM SERPL-MCNC: 1.8 MG/DL (ref 1.6–2.6)
MCH RBC QN AUTO: 21.8 PG (ref 27–31)
MCH RBC QN AUTO: 21.8 PG (ref 27–31)
MCHC RBC AUTO-ENTMCNC: 31.5 G/DL (ref 32–36)
MCHC RBC AUTO-ENTMCNC: 31.5 G/DL (ref 32–36)
MCV RBC AUTO: 69 FL (ref 82–98)
MCV RBC AUTO: 69 FL (ref 82–98)
MONOCYTES # BLD AUTO: 0.7 K/UL (ref 0.3–1)
MONOCYTES # BLD AUTO: 0.7 K/UL (ref 0.3–1)
MONOCYTES NFR BLD: 8.2 % (ref 4–15)
MONOCYTES NFR BLD: 8.2 % (ref 4–15)
NEUTROPHILS # BLD AUTO: 5.3 K/UL (ref 1.8–7.7)
NEUTROPHILS # BLD AUTO: 5.3 K/UL (ref 1.8–7.7)
NEUTROPHILS NFR BLD: 58.3 % (ref 38–73)
NEUTROPHILS NFR BLD: 58.3 % (ref 38–73)
NRBC BLD-RTO: 0 /100 WBC
NRBC BLD-RTO: 0 /100 WBC
OVALOCYTES BLD QL SMEAR: ABNORMAL
OVALOCYTES BLD QL SMEAR: ABNORMAL
PLATELET # BLD AUTO: 410 K/UL (ref 150–450)
PLATELET # BLD AUTO: 410 K/UL (ref 150–450)
PMV BLD AUTO: 9 FL (ref 9.2–12.9)
PMV BLD AUTO: 9 FL (ref 9.2–12.9)
POIKILOCYTOSIS BLD QL SMEAR: SLIGHT
POIKILOCYTOSIS BLD QL SMEAR: SLIGHT
POLYCHROMASIA BLD QL SMEAR: ABNORMAL
POLYCHROMASIA BLD QL SMEAR: ABNORMAL
POTASSIUM SERPL-SCNC: 3.1 MMOL/L (ref 3.5–5.1)
RBC # BLD AUTO: 3.77 M/UL (ref 4–5.4)
RBC # BLD AUTO: 3.77 M/UL (ref 4–5.4)
SARS-COV-2 RDRP RESP QL NAA+PROBE: NEGATIVE
SODIUM SERPL-SCNC: 140 MMOL/L (ref 136–145)
WBC # BLD AUTO: 9.03 K/UL (ref 3.9–12.7)
WBC # BLD AUTO: 9.03 K/UL (ref 3.9–12.7)

## 2022-07-13 PROCEDURE — 86580 TB INTRADERMAL TEST: CPT | Performed by: STUDENT IN AN ORGANIZED HEALTH CARE EDUCATION/TRAINING PROGRAM

## 2022-07-13 PROCEDURE — 25000003 PHARM REV CODE 250: Performed by: STUDENT IN AN ORGANIZED HEALTH CARE EDUCATION/TRAINING PROGRAM

## 2022-07-13 PROCEDURE — 30200315 PPD INTRADERMAL TEST REV CODE 302: Performed by: STUDENT IN AN ORGANIZED HEALTH CARE EDUCATION/TRAINING PROGRAM

## 2022-07-13 PROCEDURE — 83735 ASSAY OF MAGNESIUM: CPT | Performed by: HOSPITALIST

## 2022-07-13 PROCEDURE — 85025 COMPLETE CBC W/AUTO DIFF WBC: CPT | Performed by: HOSPITALIST

## 2022-07-13 PROCEDURE — 97535 SELF CARE MNGMENT TRAINING: CPT

## 2022-07-13 PROCEDURE — 36415 COLL VENOUS BLD VENIPUNCTURE: CPT | Performed by: STUDENT IN AN ORGANIZED HEALTH CARE EDUCATION/TRAINING PROGRAM

## 2022-07-13 PROCEDURE — 25000003 PHARM REV CODE 250: Performed by: INTERNAL MEDICINE

## 2022-07-13 PROCEDURE — 25000003 PHARM REV CODE 250: Performed by: HOSPITALIST

## 2022-07-13 PROCEDURE — 85730 THROMBOPLASTIN TIME PARTIAL: CPT | Performed by: STUDENT IN AN ORGANIZED HEALTH CARE EDUCATION/TRAINING PROGRAM

## 2022-07-13 PROCEDURE — 80048 BASIC METABOLIC PNL TOTAL CA: CPT | Performed by: HOSPITALIST

## 2022-07-13 PROCEDURE — U0002 COVID-19 LAB TEST NON-CDC: HCPCS | Performed by: STUDENT IN AN ORGANIZED HEALTH CARE EDUCATION/TRAINING PROGRAM

## 2022-07-13 RX ORDER — FERROUS SULFATE 325(65) MG
325 TABLET, DELAYED RELEASE (ENTERIC COATED) ORAL DAILY
Qty: 30 TABLET | Refills: 0 | Status: SHIPPED | OUTPATIENT
Start: 2022-07-13 | End: 2023-12-12

## 2022-07-13 RX ORDER — PANTOPRAZOLE SODIUM 40 MG/1
40 TABLET, DELAYED RELEASE ORAL DAILY
Qty: 30 TABLET | Refills: 11 | Status: SHIPPED | OUTPATIENT
Start: 2022-07-13 | End: 2023-12-12

## 2022-07-13 RX ADMIN — SODIUM CHLORIDE: 0.9 INJECTION, SOLUTION INTRAVENOUS at 05:07

## 2022-07-13 RX ADMIN — FERROUS SULFATE TAB 325 MG (65 MG ELEMENTAL FE) 1 EACH: 325 (65 FE) TAB at 09:07

## 2022-07-13 RX ADMIN — PANTOPRAZOLE SODIUM 40 MG: 40 TABLET, DELAYED RELEASE ORAL at 09:07

## 2022-07-13 RX ADMIN — APIXABAN 10 MG: 5 TABLET, FILM COATED ORAL at 11:07

## 2022-07-13 RX ADMIN — TUBERCULIN PURIFIED PROTEIN DERIVATIVE 5 UNITS: 5 INJECTION, SOLUTION INTRADERMAL at 01:07

## 2022-07-13 RX ADMIN — CETIRIZINE HYDROCHLORIDE 10 MG: 10 TABLET, FILM COATED ORAL at 09:07

## 2022-07-13 RX ADMIN — AMLODIPINE BESYLATE 10 MG: 5 TABLET ORAL at 09:07

## 2022-07-13 NOTE — PLAN OF CARE
07/13/22 1305   Medicare Message   Important Message from Medicare regarding Discharge Appeal Rights Explained to patient/caregiver   Date IMM was signed 07/13/22   Time IMM was signed 1301      spoke with Mik Kidd, Pt's son, and explained IMM appeals process. Mr. Kidd verbalized his understanding. A certified copy of the IMM will be mailed to Pt's address. [phone]      JEFFREY Sood KATARZYNA  [facility]  [id number]

## 2022-07-13 NOTE — NURSING
Bedside Report given to night nurse RAJENDRA Hartman. Walking rounds completed. Visualized and assessed patient NAD noted. Safety precautions maintained and call light within reach.     Chart check completed.

## 2022-07-13 NOTE — PLAN OF CARE
Problem: Adult Inpatient Plan of Care  Goal: Plan of Care Review  Outcome: Met  Goal: Patient-Specific Goal (Individualized)  Outcome: Met  Goal: Absence of Hospital-Acquired Illness or Injury  Outcome: Met  Goal: Optimal Comfort and Wellbeing  Outcome: Met  Goal: Readiness for Transition of Care  Outcome: Met     Problem: Impaired Wound Healing  Goal: Optimal Wound Healing  Outcome: Met     Problem: Skin Injury Risk Increased  Goal: Skin Health and Integrity  Outcome: Met     Problem: Adjustment to Illness (Gastrointestinal Bleeding)  Goal: Optimal Coping with Acute Illness  Outcome: Met     Problem: Bleeding (Gastrointestinal Bleeding)  Goal: Hemostasis  Outcome: Met     Problem: Fall Injury Risk  Goal: Absence of Fall and Fall-Related Injury  Outcome: Met     Problem: Coping Ineffective  Goal: Effective Coping  Outcome: Met     Problem: Infection  Goal: Absence of Infection Signs and Symptoms  Outcome: Met

## 2022-07-13 NOTE — ASSESSMENT & PLAN NOTE
-History noted  -Has chronic L sided deficits and facial droop and is wheelchair dependent at home  -Daughter no longer able to care for her at home  -DC to SNF

## 2022-07-13 NOTE — NURSING
Report received from night nurse Miguel/RAJENDRA Adams. Visualized patient and assessed patient's overall condition and appearance. No acute distress noted. Will continue to monitor

## 2022-07-13 NOTE — NURSING
In preparation for discharge, d/c'd pt's saline lock, applied pressure to site, secured gauze and coban, no redness or swelling noted. Instructions given.Reviewed all new meds, continued medications, follow up appointments and signs and symptoms to report to PCP or seek emergency medical care. Pt verbalized understanding to all instructions and education. Pt denies any complaints or concerns at this time. Pt was transported off the unit to ambulance for discharge to [facility].

## 2022-07-13 NOTE — PLAN OF CARE
[facility] [place] - Telemetry  Discharge Final Note    Primary Care Provider: DENYS Sterling    Expected Discharge Date: 7/13/2022    Final Discharge Note (most recent)     Final Note - 07/13/22 1541        Final Note    Assessment Type Final Discharge Note     Anticipated Discharge Disposition Skilled Nursing Facility     What phone number can be called within the next 1-3 days to see how you are doing after discharge? [phone]     Hospital Resources/Appts/Education Provided Provided patient/caregiver with written discharge plan information        Post-Acute Status    Post-Acute Authorization Placement     Post-Acute Placement Status Set-up Complete/Auth obtained     Coverage Medicare     Patient choice form signed by patient/caregiver List with quality metrics by geographic area provided     Discharge Delays Ambulance Transport/Facility Transport                 Important Message from Medicare  Important Message from Medicare regarding Discharge Appeal Rights: Explained to patient/caregiver     Date IMM was signed: 07/13/22  Time IMM was signed: 1301     [place] [address] via stretcher transport at 4:15 PM. SW notified Pt's nurse of the call report number [phone], Pt going to the 8th floor, rm [phone].    Pt has no other post acute needs. Pt is cleared for discharge from a case management standpoint.     JEFFREY Sood, GARCÍA  [facility]  [id number]

## 2022-07-13 NOTE — ASSESSMENT & PLAN NOTE
-Baseline - daughter reports she slumps over in the wheelchair and she has to prop her up   -fall precautions  -DC to SNF

## 2022-07-13 NOTE — PLAN OF CARE
Problem: Occupational Therapy  Goal: Occupational Therapy Goal  Description: Goals to be met by: 7/17/22    Patient will increase functional independence with ADLs by performing:    Feeding with Set-up Assistance, Supervision, and Assistive Devices as needed.  Grooming while in bed with Set-up Assistance and Stand-by Assistance.  Rolling to Bilateral with Moderate Assistance and use of bedrail as needed.   Increased functional strength to to participate in self care tasks  The patient will tolerated AAROM to BUE x10 reps    Outcome: Ongoing, Progressing   The patient tolerated PROM to BUE x10 reps with c/o pain. The patient is dependent with grooming tasks.

## 2022-07-13 NOTE — ASSESSMENT & PLAN NOTE
-Patient felt to be lethargic and less conversant 7/7  -TSH, folate and Ammonia normal but found to have UTI.  -Noted prior ESBL E.coli UTI - started zosyn 7/8, and complete treatment with ceftriaxone  -Maintain delirium precautions.  -Clinically improving - but lethargic/depressed at times.

## 2022-07-13 NOTE — PT/OT/SLP PROGRESS
Occupational Therapy   Treatment    Name: Holly Kidd  MRN: [id number]  Admitting Diagnosis:  GI bleed due to NSAIDs  8 Days Post-Op    Recommendations:     Discharge Recommendations: nursing facility, basic  Discharge Equipment Recommendations:   (DME per NH)  Barriers to discharge:  None    Assessment:     Holly Kidd is a 71 y.o. female with a medical diagnosis of GI bleed due to NSAIDs.   Performance deficits affecting function are weakness, impaired self care skills, impaired functional mobilty, decreased upper extremity function, decreased lower extremity function, decreased safety awareness, pain, decreased ROM, edema, impaired fine motor, impaired joint extensibility, impaired cardiopulmonary response to activity, abnormal tone.     Rehab Prognosis:  Fair; patient would benefit from acute skilled OT services to address these deficits and reach maximum level of function.       Plan:     Patient to be seen 2 x/week, 3 x/week to address the above listed problems via self-care/home management, therapeutic activities, therapeutic exercises  · Plan of Care Expires: 07/17/22  · Plan of Care Reviewed with: patient    Subjective     Pain/Comfort:  · Pain Rating 1: 0/10 (at rest)  · Location - Side 1: Left  · Location 1: arm (with PROM)  · Pain Addressed 1: Cessation of Activity    Objective:     Communicated with: nurseMilady prior to session.  Patient found HOB elevated with pressure relief boots, telemetry, PureWick, peripheral IV upon OT entry to room.    General Precautions: Standard, fall, aspiration   Orthopedic Precautions:N/A   Braces: N/A  Respiratory Status: Room air     Occupational Performance:     Bed Mobility:    · N/T     Functional Mobility/Transfers:  · Functional Mobility: N/T    Activities of Daily Living:  · Grooming: The patient attempted to bring wash cloth to the patient's mouth but was unable. The patient was dependent to bring cloth to her lips and was unable to wipe. The paient  allowed OT to dependently wipe her face.      [facility] 6 Click ADL:      Treatment & Education:  · The patient tolerated PROM to BUE x10 reps in all plains, within available ROM. The LUE was limited by c/o pain and increased tone with ROM. The patient postures with the LUE abducted to her side with rolled up blanket.   · Attempted to perform ROM to the head/neck. The patient was able to rotate head from right to mid-line but refused to allow further ROM.  · Participated in self care as noted above    Patient left HOB elevated with all lines intact and call button in reachEducation:      GOALS:   Multidisciplinary Problems     Occupational Therapy Goals        Problem: Occupational Therapy    Goal Priority Disciplines Outcome Interventions   Occupational Therapy Goal     OT, PT/OT Ongoing, Progressing    Description: Goals to be met by: 7/17/22    Patient will increase functional independence with ADLs by performing:    Feeding with Set-up Assistance, Supervision, and Assistive Devices as needed.  Grooming while in bed with Set-up Assistance and Stand-by Assistance.  Rolling to Bilateral with Moderate Assistance and use of bedrail as needed.   Increased functional strength to to participate in self care tasks  The patient will tolerated AAROM to BUE x10 reps                     Time Tracking:     OT Date of Treatment: 07/13/22  OT Start Time: 1212  OT Stop Time: 1223  OT Total Time (min): 11 min    Billable Minutes:Self Care/Home Management 11    OT/IRENE: OT          7/13/2022

## 2022-07-13 NOTE — NURSING
Report given to Fauzia Davila LPN at [facility]. Updated vitals given . Awaiting arrival of transportation.

## 2022-07-13 NOTE — PLAN OF CARE
[facility]     Department of Hospital Medicine     [address] [place] [address]     [phone] after hours  [phone] fax       NURSING HOME ORDERS    07/13/2022    Admit to Nursing Home:    Skilled Bed                                                  Diagnoses:  Active Hospital Problems    Diagnosis  POA    *GI bleed due to NSAIDs [K92.2, T39.395A]  Yes    Acute deep vein thrombosis (DVT) of femoral vein of right lower extremity [I82.411]  No    Pulmonary embolism on right [I26.99]  No    Acute cystitis [N30.00]  No    Debility [R53.81]  Yes    Iron deficiency anemia [D50.9]  Yes    Advanced care planning/counseling discussion [Z71.89]  Not Applicable    Acute encephalopathy [G93.40]  No    Cerebrovascular disease [I67.9]  Yes    Wheelchair dependence [Z99.3]  Not Applicable     Chronic    Hypokalemia [E87.6]  Yes    Essential hypertension [I10]  Yes     Chronic    Obesity (BMI 30.0-34.9) [E66.9]  Yes      Resolved Hospital Problems    Diagnosis Date Resolved POA    Upper GI bleeding [K92.2] 07/03/2022 Yes    Pain in the abdomen [R10.9] 07/03/2022 Yes    COVID-19 virus infection [U07.1] 07/03/2022 Yes       Patient is homebound due to:  GI bleed due to NSAIDs    Allergies:Review of patient's allergies indicates:  No Known Allergies    Vitals:     Every shift (Skilled Nursing patients)    Diet: Supplement:  1 can every three times a day with meals                         Type:  House      Tube Feeding:  Not on tube feeding    Acitivities:  - Up in a chair each morning as tolerated   - Ambulate with assistance to bathroom   - Scheduled walks once each shift (every 8 hours)   - May ambulate independently   - May use walker, cane, or self-propelled wheelchair   - Weight bearing: as tolerate     LABS:  Per facility protocol   CMP, CBC  Per facility protocol    Nursing Precautions:    - Aspiration precautions:             - Total assistance with  meals            -  Upright 90 degrees befor during and after meals             -  Suction at bedside          - Fall precautions per nursing home protocol   - Seizure precaution per halfway protocol   - Decubitus precautions:        -  for positioning   - Pressure reducing foam mattress   - Turn patient every two hours. Use wedge pillows to anchor patient    CONSULTS:     Physical Therapy to evaluate and treat     Occupational Therapy to evaluate and treat     Speech Therapy  to evaluate and treat     Nutrition to evaluate and recommend diet         MISCELLANEOUS CARE:              Colostomy Care:  Empty bag every shift and prn                                             Change and clean site every 48 hours     PEG Care:  Clean site every 24 hours     Haskins Care: Empty Haskins bag every shift.  Change Haskins every month     Routine Skin for Bedridden Patients:  Apply moisture barrier cream to all    skin folds and wet areas in perineal area daily and after baths and                           all bowel movements.     For [facility] Patients Only:    Alert [facility]  prior to discharge.  Phone # [phone]            # [phone] after hours    Fax discharge orders to [facility]: Fax # [phone]   [facility] Fax # [phone]                    DIABETES CARE:     Fingerstick blood sugar a.m and p.m.    Fingerstick blood sugar AC and HS   Fingerstick blood sugar every 6 hours if unable to eat      Report CBG < 60 or > 400 to physician.                                          Insulin Sliding Scale          Glucose  Novolog Insulin Subcutaneous        0 - 60   Orange juice or glucose tablet, hold insulin      No insulin   201-250  2 units   251-300  4 units   301-350  6 units   351-400  8 units   >400   10 units then call physician      Medications: See new list below.        Medication List      START taking these medications    apixaban 5 mg Tab  Commonly known as:  ELIQUIS  Take 2 tablets (10 mg total) by mouth 2 (two) times daily. 10mg BID for 7days then switch to 5mg BID     ferrous sulfate 325 (65 FE) MG EC tablet  Take 1 tablet (325 mg total) by mouth once daily.        CHANGE how you take these medications    * pantoprazole 40 MG tablet  Commonly known as: PROTONIX  Take 1 tablet (40 mg total) by mouth once daily.  What changed: Another medication with the same name was added. Make sure you understand how and when to take each.     * pantoprazole 40 MG tablet  Commonly known as: PROTONIX  Take 1 tablet (40 mg total) by mouth once daily.  What changed: You were already taking a medication with the same name, and this prescription was added. Make sure you understand how and when to take each.         * This list has 2 medication(s) that are the same as other medications prescribed for you. Read the directions carefully, and ask your doctor or other care provider to review them with you.            CONTINUE taking these medications    amLODIPine 10 MG tablet  Commonly known as: NORVASC  Take 1 tablet (10 mg total) by mouth once daily. Hold if SBP <120     atorvastatin 40 MG tablet  Commonly known as: LIPITOR  Take 1 tablet (40 mg total) by mouth once daily.     cetirizine 10 MG tablet  Commonly known as: ZYRTEC  Take 1 tablet (10 mg total) by mouth once daily.     ergocalciferol 50,000 unit Cap  Commonly known as: ERGOCALCIFEROL  Take 1 capsule (50,000 Units total) by mouth every 7 days.     ondansetron 4 MG Tbdl  Commonly known as: ZOFRAN-ODT  Take 1 tablet (4 mg total) by mouth every 4 (four) hours as needed (nausea/vomiting).     varicella-zoster gE-AS01B (PF) 50 mcg/0.5 mL injection  Commonly known as: SHINGRIX  Inject into the muscle.        STOP taking these medications    aspirin 81 MG EC tablet  Commonly known as: ECOTRIN     clopidogreL 75 mg tablet  Commonly known as: PLAVIX                  _________________________________  Barry Ross MD  07/13/2022

## 2022-07-13 NOTE — ASSESSMENT & PLAN NOTE
-Due to stroke and chronic  -PT/OT consulted  -Daughter states she can no longer care for her mom and would like to proceed with care home care at NH. Prefer [facility]  -CM/SW consulted to assist with placement.  Plan discharge to SNF w transition to NH after.

## 2022-07-13 NOTE — PLAN OF CARE
Pt may discharge to [facility] today. KATARZYNA attempted to contact [facility] admissions but had to leave a message.   KATARZYNA will follow.    12:59 PM  Called [facility] back and left another message with the  for a call back. [facility]. KATARZYNA spoke with Pt's son Mik Kidd to notify the family of the discharge.     1:30 PM  Orders have been sent to [facility] for review, along with CRX, PPD. Waiting on Covid test results.     2:30 PM Covid test result faxed to [facility]    JEFFREY Sood LMSW [facility]  [id number]

## 2022-07-13 NOTE — DISCHARGE SUMMARY
"[facility] Medicine  Discharge Summary      Patient Name: Holly Kidd  MRN: [id number]  Patient Class: IP- Inpatient  Admission Date: 7/2/2022  Hospital Length of Stay: 11 days  Discharge Date and Time:  07/13/2022 1:09 PM  Attending Physician: Barry Ross MD   Discharging Provider: Barry Ross MD  Primary Care Provider: DENYS Sterling      HPI:   Ms. Kidd is a 71 year old female with PMH of CVA ( facial droop and left sided weakness) , HTN, Dementia ( at baseline agitated and oriented x1) is being admitted from the ER after presenting for general malize, weakness and fatigue when the AC went out as well as daughter reporting patient has been eating less and noted to have what is described as melena this AM x1. She described a "motor oil black" tarry stool by patient but denies diarrhea or vomiting. Reports that mother refuses to go to the doctor and only takes the merari aspirin and amlodipine. She is completely bedbound and total care with daughter providing all ADL's . Daughter has a large caregiver burden. There was a report of epigastric pain but patient denies this. Noted to have a potassium of 2.7 in ER and denies any vomiting or diarrhea. No chills, shortness of breath, nausea or other associated symptoms. She currently only reports she is "cold" and does not like people "sticking her" with needles.      Note*Patient's daughter denies altered mental status and notes that the patient is agitated and physically/verbally abusive to others at baseline. She recognizes only her daughter usually and calls her by a nickname.       Procedure(s) (LRB):  EGD (ESOPHAGOGASTRODUODENOSCOPY) (N/A)      Hospital Course:   71 year old woman with history of CVA ( facial droop and left sided weakness), HTN, Dementia, and bedbound admitted on 07/02/2022 for hypokalemia and anemia, with suspicion for upper GI bleed. GI consulted and took her for EGD which did not reveal any source " of bleeding.  Hb remained stable but on 7/10 she was diagnosed with DVT and PE and started on heparin drip. She was hemodynamically stable all through her admission, with no hypoxia or hypotension except for intermittent tachycardia. She was on room air lmost her entire stay. TTE showed EF 65% with no right heart strain. She was eventually transitioned to eliquis and her Hg was stable all through. GI had recommended colonoscopy if her Hg drops further but it was stable.  She had an encephalopathy due to Proteus UTI and completed 5 days of antibiotics.  She was eventually discharged to SNF on eliquis and advised to follow up with her PCP and GI outpatient to continue mgt of her anemia and other chronic medical conditions.        Goals of Care Treatment Preferences:  Code Status: Partial Code      Consults:   Consults (From admission, onward)        Status Ordering Provider     Inpatient consult to Midline team  Once        Provider:  (Not yet assigned)    Acknowledged GENTRY GALVEZ     Inpatient virtual consult to Hospital Medicine  Once        Provider:  (Not yet assigned)    Completed GENTRY GALVEZ     Inpatient consult to Palliative Care  Once        Provider:  Franny Casillas NP    Completed GENTRY GALVEZ     Inpatient consult to Social Work  Once        Provider:  (Not yet assigned)    Completed DAV KRAUSE     Inpatient consult to Registered Dietitian/Nutritionist  Once        Provider:  (Not yet assigned)    Completed DAV KRAUSE.     IP consult to case management  Once        Provider:  (Not yet assigned)    Completed DAV KRAUSE.     Inpatient consult to Gastroenterology  Once        Provider:  Yony Beaulieu MD    Completed MICA VELAZQUEZ          * GI bleed due to NSAIDs  -DC to SNF, follow up with GI outpatient on need for colonoscopy    Pulmonary embolism on right  -Fever and tachycardia on 7/10 prompted investigation which discovered DVT and PE  -CTA chest showed  Pulmonary thromboembolism involving a few segmental branches to the right lower lobe  -Echo showed EF 65%, normal diastolic function and mildly dilated ascending aorta  -Troponin negative  -Mildly tachycardic (occasionally into 120s but generally upper 90s -119), but breathing comfortably on room air and no chest pain.  -PESI score of 70 indicating class II, low risk with 1.7-3.5% 30 day mortality  -Treating with heparin drip until stable H/H then convert to eliquis.    Acute deep vein thrombosis (DVT) of femoral vein of right lower extremity  DC on eliquis DVT/PE dosing    Acute cystitis  Completed 5days of antibiotics in-house    Debility  -Due to stroke and chronic  -PT/OT consulted  -Daughter states she can no longer care for her mom and would like to proceed with half-way care at NH. Prefer [facility]  -CM/SW consulted to assist with placement.  Plan discharge to SNF w transition to NH after.    Advanced care planning/counseling discussion  Advance Care Planning Per  Marker: Date: 07/03/2022 Code Status  In light of the patients advanced and life limiting illness,I engaged the the patient, family and healthcare power of   in a conversation about the patient's preferences for care  at the very end of life. The patient wishes to have CPR and cardio version if needed with medications when her heart stops. However, patient does not want to be intubated or placed on mechanical ventilation when her breathing stops. I communicated to the patient, family and healthcare power of   that her wishes align with partial code status.    Code status: Partial code. Patient agrees to CPR/cardioversion but does not want to be intubated or mechanically ventilated.   -Partial code status with CPR/cardioversion but without possibility of securing airway is considered illogical by many physicians.  Will consult palliative care to continue goals of care discussion and hopefully pursue more straight forward code  status - either full code or DNR.    Iron deficiency anemia  -Likely due to GI bleeding  -Treatment as above.    Acute encephalopathy  -Patient felt to be lethargic and less conversant 7/7  -TSH, folate and Ammonia normal but found to have UTI.  -Noted prior ESBL E.coli UTI - started zosyn 7/8, and complete treatment with ceftriaxone  -Maintain delirium precautions.  -Clinically improving - but lethargic/depressed at times.        Cerebrovascular disease  -History noted  -Has chronic L sided deficits and facial droop and is wheelchair dependent at home  -Daughter no longer able to care for her at home  -DC to SNF    Wheelchair dependence  -Baseline - daughter reports she slumps over in the wheelchair and she has to prop her up   -fall precautions  -DC to SNF     Obesity (BMI 30.0-34.9)  Body mass index is 33.82 kg/m². Morbid obesity complicates all aspects of disease management from diagnostic modalities to treatment. Weight loss encouraged and health benefits explained to patient.     Hypokalemia  Replete adequately    Essential hypertension  -Well controlled  -Continue norvasc      Final Active Diagnoses:    Diagnosis Date Noted POA    PRINCIPAL PROBLEM:  GI bleed due to NSAIDs [K92.2, T39.395A] 07/02/2022 Yes    Acute deep vein thrombosis (DVT) of femoral vein of right lower extremity [I82.411] 07/11/2022 No    Pulmonary embolism on right [I26.99] 07/11/2022 No    Acute cystitis [N30.00] 07/08/2022 No    Debility [R53.81] 07/04/2022 Yes    Iron deficiency anemia [D50.9] 07/03/2022 Yes    Advanced care planning/counseling discussion [Z71.89] 07/03/2022 Not Applicable    Acute encephalopathy [G93.40] 09/10/2021 No    Cerebrovascular disease [I67.9] 05/23/2019 Yes    Wheelchair dependence [Z99.3] 05/23/2019 Not Applicable     Chronic    Hypokalemia [E87.6] 06/12/2015 Yes    Essential hypertension [I10] 06/12/2015 Yes     Chronic    Obesity (BMI 30.0-34.9) [E66.9] 06/12/2015 Yes      Problems Resolved  During this Admission:    Diagnosis Date Noted Date Resolved POA    Upper GI bleeding [K92.2] 07/03/2022 07/03/2022 Yes    Pain in the abdomen [R10.9] 02/18/2022 07/03/2022 Yes    COVID-19 virus infection [U07.1] 09/10/2021 07/03/2022 Yes       Discharged Condition: stable    Disposition: Skilled Nursing Facility    Follow Up:    Patient Instructions:   No discharge procedures on file.    Significant Diagnostic Studies: Radiology: CT scan: CT ABDOMEN PELVIS WITH CONTRAST: No results found for this visit on 07/02/22.  Ultrasound:    Pending Diagnostic Studies:     Procedure Component Value Units Date/Time    EKG 12-LEAD [[id number]] Collected: 07/10/22 1513    Order Status: Sent Lab Status: No result          Medications:  Reconciled Home Medications:      Medication List      START taking these medications    apixaban 5 mg Tab  Commonly known as: ELIQUIS  Take 2 tablets (10 mg total) by mouth 2 (two) times daily. 10mg BID for 7days then switch to 5mg BID     ferrous sulfate 325 (65 FE) MG EC tablet  Take 1 tablet (325 mg total) by mouth once daily.        CHANGE how you take these medications    * pantoprazole 40 MG tablet  Commonly known as: PROTONIX  Take 1 tablet (40 mg total) by mouth once daily.  What changed: Another medication with the same name was added. Make sure you understand how and when to take each.     * pantoprazole 40 MG tablet  Commonly known as: PROTONIX  Take 1 tablet (40 mg total) by mouth once daily.  What changed: You were already taking a medication with the same name, and this prescription was added. Make sure you understand how and when to take each.         * This list has 2 medication(s) that are the same as other medications prescribed for you. Read the directions carefully, and ask your doctor or other care provider to review them with you.            CONTINUE taking these medications    amLODIPine 10 MG tablet  Commonly known as: NORVASC  Take 1 tablet (10 mg total) by mouth once  daily. Hold if SBP <120     atorvastatin 40 MG tablet  Commonly known as: LIPITOR  Take 1 tablet (40 mg total) by mouth once daily.     cetirizine 10 MG tablet  Commonly known as: ZYRTEC  Take 1 tablet (10 mg total) by mouth once daily.     ergocalciferol 50,000 unit Cap  Commonly known as: ERGOCALCIFEROL  Take 1 capsule (50,000 Units total) by mouth every 7 days.     ondansetron 4 MG Tbdl  Commonly known as: ZOFRAN-ODT  Take 1 tablet (4 mg total) by mouth every 4 (four) hours as needed (nausea/vomiting).     varicella-zoster gE-AS01B (PF) 50 mcg/0.5 mL injection  Commonly known as: SHINGRIX  Inject into the muscle.        STOP taking these medications    aspirin 81 MG EC tablet  Commonly known as: ECOTRIN     clopidogreL 75 mg tablet  Commonly known as: PLAVIX            Indwelling Lines/Drains at time of discharge:   Lines/Drains/Airways     Drain  Duration           Female External Urinary Catheter 07/10/22 0600 3 days                Time spent on the discharge of patient: 30 minutes         Barry Ross MD  Department of Hospital Medicine  [facility] [place] [facility]

## 2022-07-13 NOTE — PROGRESS NOTES
[facility] [place] - Telemetry  Adult Nutrition  Consult Note    SUMMARY     Recommendations     1) Continue IDDSI 5: Regular diet as tolerated.  2) Continue Boost Plus BID for additional kcal/protein  3) Encourage intake and assist with meals as needed  4) Monitor Nutrition related labs   - Hyokalemia    Goals:   1) Pt to meet greater than or equal to 75% EEN by RD follow up  2) Monitored labs to trend toward target ranges    Nutrition Goal Status: goal met, progressing towards goal  Communication of RD Recs: other (comment) (POC)    Assessment and Plan  Nutrition Problem  Swallowing difficulty    Related to (etiology):   Poor dentition, dysphagia    Signs and Symptoms (as evidenced by):   ST recommending IDDSI 5: Mechanical Soft Diet    Interventions(treatment strategy):  Collaboration with other providers  Commercial beverage  Texture modified diet    Nutrition Diagnosis Status:   Improving      Malnutrition Assessment  NFPE not performed per pt with increased lethargy of acute nature today.  Will continue to follow and perform as warranted.        Reason for Assessment  Reason For Assessment: RD follow-up  Relevant Medical History: CVA ( facial droop and left sided weakness) , HTN, Dementia ( at baseline agitated and oriented x1)  Interdisciplinary Rounds: did not attend  General Information Comments:     7/12 - Pt found to have DVT/PE on 7/10 - started on heparin. Hb began to trend down - per MD note if continues GI to be consulted for colonoscopy. No acute events overnight (7/11 - 7/12) PO intake has been good  75%+ ONS. Meeting needs at this time. Will continue to monitor. Pt cleared for d/c to SNF once hb stable and can transition to eliquis     7/7 - Pt not seen today per acute decrease in alertness and increased lethargy. Pt in room very lethargic and did not respond to my introduction - had tray at bedside untouched at time. PO intake approximately 50% - appears lunch was 75% per assistance from daughter today.  "    7/4 - Remote assessment for coverage. Pt admitted with increased weakness/fatigue due to AC out at home, decreased PO and melena per pt's daughter who is her caregiver. Pt has been bedbound since getting COVID about a year ago and dependent on daughter for all ADLs. LBM 7/3, 25-50% PO intake at meals/ NPO at midnight for EGD tomorrow. ST recommending pureed diet/nectar thick liquids, noted with very poor dentitian and oral hygeine and unable to swallow K pill per RN. Wt appears to fluctuate per history. -225 lbs x 1 year, current wt slightly under UBW.  Nutrition Discharge Planning: pending clinical course    Nutrition Risk Screen    Nutrition Risk Screen: dysphagia or difficulty swallowing    Nutrition/Diet History  Spiritual, Cultural Beliefs, Sikh Practices, Values that Affect Care: no  Food Allergies: NKFA  Factors Affecting Nutritional Intake: chewing difficulties/inability to chew food, difficulty/impaired swallowing, decreased appetite, impaired cognitive status/motor control    Anthropometrics  Temp: 98.4 °F (36.9 °C)  Height Method: Stated  Height: 5' 3" (160 cm)  Height (inches): 63 in  Weight Method: Bed Scale  Weight: 86.6 kg (190 lb 14.7 oz)  Weight (lb): 190.92 lb  Ideal Body Weight (IBW), Female: 115 lb  % Ideal Body Weight, Female (lb): 166.02 %  BMI (Calculated): 33.8  BMI Grade: 30 - 34.9- obesity - grade I       Lab/Procedures/Meds  Pertinent Labs Reviewed: reviewed  CBC:  Recent Labs   Lab 07/12/22  0413   WBC 11.04   HGB 9.0*   HCT 28.1*        CMP:  Recent Labs   Lab 07/12/22 0413   CALCIUM 9.3      K 3.3*   CO2 23      BUN 6*   CREATININE 0.4*     Pertinent Medications Reviewed: reviewed  Scheduled Meds:   amLODIPine  10 mg Oral Daily    cetirizine  10 mg Oral Daily    ferrous sulfate  1 tablet Oral Daily    pantoprazole  40 mg Oral Daily     Continuous Infusions:   sodium chloride 0.9% 100 mL/hr at 07/12/22 2024    heparin (porcine) in D5W 15 " Units/kg/hr (07/12/22 1804)     PRN Meds:.acetaminophen, heparin (PORCINE), heparin (PORCINE), loperamide, melatonin, ondansetron, sodium chloride 0.9%      Estimated/Assessed Needs  Weight Used For Calorie Calculations: 86.2 kg (190 lb)  Energy Calorie Requirements (kcal): 1350 kcal/day based on MSJ x no AF for BMI > 30  Energy Need Method: Jeff Davis-St Jeor  Protein Requirements:  g/day based on 1-1.2 g/kg  Weight Used For Protein Calculations: 86.2 kg (190 lb)  Fluid Requirements (mL): 1mL/kcal or per MD  Estimated Fluid Requirement Method: RDA Method  RDA Method (mL): 1350  CHO Requirement: 169g    Nutrition Prescription Ordered  Current Diet Order: IDDSI 5: Regular  Nutrition Order Comments: Alertness has been improving - still with some lethergy  Oral Nutrition Supplement: Boost (+)    Evaluation of Received Nutrient/Fluid Intake  Energy Calories Required: meeting needs  Protein Required: meeting needs  Fluid Required: meeting needs  Comments: LBM 7/11  Tolerance: tolerating  % Intake of Estimated Energy Needs: 75 - 100 %  % Meal Intake: 75 - 100 %    Intake/Output - Last 3 Shifts       07/11 0700  07/12 0659 07/12 0700  07/13 0659    P.O. 120 720    Total Intake(mL/kg) 120 (1.4) 720 (8.3)    Urine (mL/kg/hr) 650 (0.3) 500 (0.4)    Stool      Total Output 650 500    Net -530 +220                Nutrition Risk  Level of Risk/Frequency of Follow-up:  (1-2x weekly)     Monitor and Evaluation  Food and Nutrient Intake: energy intake, food and beverage intake  Food and Nutrient Adminstration: diet order  Knowledge/Beliefs/Attitudes: food and nutrition knowledge/skill  Physical Activity and Function: nutrition-related ADLs and IADLs  Anthropometric Measurements: body mass index, weight, weight change  Biochemical Data, Medical Tests and Procedures: electrolyte and renal panel, lipid profile, gastrointestinal profile, glucose/endocrine profile, inflammatory profile  Nutrition-Focused Physical Findings: overall  appearance, extremities, muscles and bones, skin     Nutrition Follow-Up  RD Follow-up?: Yes

## 2022-07-13 NOTE — PLAN OF CARE
Recommendations     1) Continue IDDSI 5: Regular diet as tolerated.  2) Continue Boost Plus BID for additional kcal/protein  3) Encourage intake and assist with meals as needed  4) Monitor Nutrition related labs   - Hyokalemia    Goals:   1) Pt to meet greater than or equal to 75% EEN by RD follow up  2) Monitored labs to trend toward target ranges    Nutrition Goal Status: goal met, progressing towards goal  Communication of RD Recs: other (comment) (POC)

## 2022-07-14 LAB
BACTERIA BLD CULT: NORMAL
BACTERIA BLD CULT: NORMAL

## 2022-07-14 NOTE — PHYSICIAN QUERY
PT Name: Holly Kidd  MR #: [id number]     Documentation Clarification      CDS/: UDAY Paige, RN, CCDS               Contact information: [email or web address]    This form is a permanent document in the medical record.     Query Date: July 14, 2022    By submitting this query, we are merely seeking further clarification of documentation.  Please utilize your independent clinical judgment when addressing the question(s) below.     The medical record contains the following:  Indicators Supporting Clinical Findings Location in Medical Record   X Chronic Illness PMH of CVA ( facial droop and left sided weakness) , HTN, Dementia ( at baseline agitated and oriented x1)   DCS: Dr. Ross 7/13   X Total Care or Max Assist completely bedbound and total care with daughter providing all ADL's . Daughter has a large caregiver burden   DCS: Dr. Ross 7/13   X Immobility or Debility Wheelchair dependence  -Baseline - daughter reports she slumps over in the wheelchair and she has to prop her up     Has chronic L sided deficits and facial droop and is wheelchair dependent at home   DCS: Dr. 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 Ross 7/13    Treatment      Other         Provider, please specify the diagnosis or diagnoses associated with above clinical findings:         [   ] Functional quadriplegia - lack of ability to use ones limbs due to extreme debility in the absence of damage or injury to the spinal cord, requiring total care with all activities of daily living; a permanent condition; not a true paralysis   [ x  ] General debility/deconditioning   [   ] Other diagnosis (please specify):____________________________   [  ] Clinically Undetermined                 Please document in your progress notes daily for the duration of treatment, until resolved, and include in your discharge summary.    Form  No. [id number]

## 2022-07-14 NOTE — PHYSICIAN QUERY
PT Name: Holly Kidd  MR #: [id number]    DOCUMENTATION CLARIFICATION     CDS/: UDAY Paige, RN, CCDS               Contact information: [email or web address]  This form is a permanent document in the medical record.     Query Date: July 14, 2022    By submitting this query, we are merely seeking further clarification of documentation. Please utilize your independent clinical judgment when addressing the question(s) below.    The Medical Record contains the following:   Indicators   Supporting Clinical Findings Location in Medical Record   X AMS, Confusion,  LOC, etc.   encephalopathy due to Proteus UTI and completed 5 days of antibiotics    Acute encephalopathy  -Patient felt to be lethargic and less conversant 7/7  -TSH, folate and Ammonia normal but found to have UTI.  -Noted prior ESBL E.coli UTI - started zosyn 7/8, and complete treatment with ceftriaxone   DCS: Dr. Ross 7/13   X Acute/Chronic Illness PMH of CVA ( facial droop and left sided weakness) , HTN, Dementia     GI bleed due to NSAIDs, Pulmonary embolism on right, DVT, acute cystitis, Iron deficiency anemia DCS: Dr. Ross 7/13    Radiology Findings      Electrolyte Imbalance      Medication      Treatment              Other       The noted clinical guidelines are only system guidelines and do not replace the providers clinical judgment.    The National [place] of Neurologic Disorders and Stroke (NINDS) of the NIH describes encephalopathy as any diffuse disease of the brain that alters brain function or structure.    Provider, please specify the type of encephalopathy associated with above clinical findings.    [   ] Metabolic Encephalopathy - Due to electrolyte imbalance, metabolic derangements, or infectious processes, includes Septic Encephalopathy, Uremic Encephalopathy   [   ] Encephalopathy, unspecified      [   ] Other Encephalopathy (please specify): ____________________   [ x  ] Other neurological condition- Includes  Post-ictal altered mental status (please specify condition): ___possibe UTI induced_______   [   ]  Clinically Undetermined     Please document in your progress notes daily for the duration of treatment until resolved, and include in your discharge summary.    References:  MARGUERITE Machuca, RN, CCDS. (2018, June 9). Notes from the Instructor: Encephalopathy tips. 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    ICD-10-CM/PCS [employer] Integrated Codebook (Version V.20.8.10.0) [Computer software]. (2020). Retrieved October 21, 2020.    National [place] of Neurological Disorders and Stroke. (2019, March 27). Retrieved October 22, 2020, from https://[email or web address].nih.gov/Disorders/[email or web address]-Disorders/Owmitljelatsmw-[email or web address]-Mkso    Form No. [id number]

## 2023-01-10 NOTE — H&P
"[facility] Medicine  History & Physical    Patient Name: Holly Kidd  MRN: [id number]  Admission Date: 05/23/2019  Attending Physician: Jaya Unger MD, MPH      PCP:     To Obtain Unable    CC:     Chief Complaint   Patient presents with    Behavior Change Episode     pt [facility] for episode of behavior change. pt reports she was sitting in wheelchair when she began drooling, her family was asking "do you know who I am". pt reports she remembers event, "was just slow to answer. like I felt silly". symptoms resolved. pt aaox4 at this time. hx stroke, weakness.        HISTORY OF PRESENT ILLNESS:     Holly Kidd is a 68 y.o. female that (in part)  has a past medical history of Essential hypertension, Obesity (BMI 35.0-39.9), Stroke, and Wheelchair dependence.  has a past surgical history that includes Hysterectomy. Presents to [facility] presents from home by EMS due to acute change in her mental status.  She was sitting in her wheelchair when she slumped over and spaced out.  Patient was experiencing what family describe as expressive aphasia.  She was subsequently slow to answer questions and appear confused.  Patient reports recalling this event states she just felt silly.  There was spontaneous resolution of her symptoms prior to the patient arriving at the hospital.  Short duration of approximately 5 min before returning to patient's baseline.  She reports similar feelings in the past when she had previous strokes.  Reports compliance with her home medication regimen, but the family reports that she has not taken any medications since her last visit to the hospital.  Chronically wheelchair dependent due to previous CVA and mobility issues.  No specific exacerbating factors.  No relieving factors.  Generalized radiation and location without focal deficit.  Moderate severely.    In the emergency department " routine laboratory studies, urinalysis, and stat head CT were performed.  She had evidence of UTI hypokalemia, but no abnormalities on head CT.  However given her history and presenting symptoms, an MRI of the brain was ordered which revealed evidence of acute stroke and several micro hemorrhages consistent with hypertensive microvascular disease of the brain.    Hospital medicine has been asked to admit for further evaluation and treatment.       REVIEW OF SYSTEMS:     -- Constitutional: No fever or chills.  -- Eyes: No visual changes, diplopia, pain, tearing, blind spots, or discharge.   -- Ears, nose, mouth, throat, and face: No congestion, sore throat, epistaxis, d/c, bleeding gums, neck stiffness masses, or dental issues.  -- Respiratory: No cough, shortness of breath, hemoptysis, stridor, wheezing, or night sweats.  -- Cardiovascular: No chest pain, MOON, syncope, PND, edema, cyanosis, or palpitations.   -- Gastrointestinal: No vomiting, abdominal pain, hematemesis, melena, dyspepsia, or change in bowel habits.  -- Genitourinary: No hematuria, dysuria, frequency, urgency, nocturia, polyuria, stones, or incontinence.  -- Integument/breast: No rash, pruritis, pigmentation changes, dryness, or changes in hair  -- Hematologic/lymphatic: No easy bruising or lymphadenopathy.   -- Musculoskeletal:  Chronic bilateral lower extremity weakness.  Ambulates with wheelchair..  -- Neurological:  As above in the HPI.  -- Behavioral/Psych: No auditory or visual hallucinations, depression, or suicidal/homicidal ideations.  -- Endocrine: No heat or cold intolerance, polydipsia, or unintentional weight gain / loss.  -- Allergy/Immunologic: No recurrent infections or adverse reaction to food, insects, or difficulty breathing.        PAST MEDICAL / SURGICAL HISTORY:     Past Medical History:   Diagnosis Date    Essential hypertension 6/12/2015    Obesity (BMI 35.0-39.9) 6/12/2015    Stroke 06/2015    Left side weakness     Wheelchair dependence      Past Surgical History:   Procedure Laterality Date    HYSTERECTOMY           FAMILY HISTORY:     Family History   Problem Relation Age of Onset    Stroke Mother     Cancer Father          SOCIAL HISTORY:     Social History     Socioeconomic History    Marital status: Single     Spouse name: Not on file    Number of children: Not on file    Years of education: Not on file    Highest education level: Not on file   Occupational History    Not on file   Social Needs    Financial resource strain: Not on file    Food insecurity:     Worry: Not on file     Inability: Not on file    Transportation needs:     Medical: Not on file     Non-medical: Not on file   Tobacco Use    Smoking status: Never Smoker    Smokeless tobacco: Never Used   Substance and Sexual Activity    Alcohol use: No    Drug use: No    Sexual activity: Not on file   Lifestyle    Physical activity:     Days per week: Not on file     Minutes per session: Not on file    Stress: Not on file   Relationships    Social connections:     Talks on phone: Not on file     Gets together: Not on file     Attends Zoroastrianism service: Not on file     Active member of club or organization: Not on file     Attends meetings of clubs or organizations: Not on file     Relationship status: Not on file   Other Topics Concern    Not on file   Social History Narrative    Not on file         ALLERGIES:       Review of patient's allergies indicates:  No Known Allergies        HOME MEDICATIONS:     Prior to Admission medications    Medication Sig Start Date End Date Taking? Authorizing Provider   amLODIPine (NORVASC) 5 MG tablet Take 1 tablet (5 mg total) by mouth once daily. 11/9/18   Zoey Fitzgerald MD   amLODIPine (NORVASC) 5 MG tablet Take 2 tablets (10 mg total) by mouth once daily. 1/21/19 1/21/20  Darci Ojeda MD   aspirin 325 MG tablet Take 1 tablet (325 mg total) by mouth once daily. 11/9/18 11/9/19  Zoey Fitzgerald MD    atorvastatin (LIPITOR) 40 MG tablet Take 1 tablet (40 mg total) by mouth once daily. 11/9/18 11/9/19  Zoey Fitzgerald MD   cetirizine (ZYRTEC) 10 MG tablet Take 1 tablet (10 mg total) by mouth once daily. 11/9/18 11/9/19  Zoey Fitzgerald MD   HYDROcodone-acetaminophen (NORCO) 5-325 mg per tablet Take 1 tablet by mouth every 4 (four) hours as needed. 11/9/18   Zoey Fitzgerald MD          HOSPITAL MEDICATIONS:     Scheduled Meds:    [START ON 5/24/2019] amLODIPine  10 mg Oral Daily    [START ON 5/24/2019] aspirin  325 mg Oral Daily    [START ON 5/24/2019] atorvastatin  40 mg Oral Daily    cefTRIAXone (ROCEPHIN) IVPB  1 g Intravenous Q24H     Continuous Infusions:   PRN Meds: sodium chloride 0.9%      PHYSICAL EXAM:     Wt Readings from Last 1 Encounters:   05/23/19 1200 102.1 kg (225 lb)     Body mass index is 35.24 kg/m².  Vitals:    05/23/19 1647 05/23/19 1702 05/23/19 1802 05/23/19 2013   BP: (!) 146/74 (!) 143/79 (!) 170/80 (!) 188/82   BP Location:    Left arm   Patient Position:    Lying   Pulse: 75 74 73 83   Resp:    (!) 22   Temp: 98.2 °F (36.8 °C)   99.4 °F (37.4 °C)   TempSrc:    Oral   SpO2: 97% 99% 100% 100%   Weight:       Height:              -- General appearance:  Chronically ill-appearing elderly female who is lying in bed.  No apparent distress.  well developed. appears stated age   -- Head: normocephalic, atraumatic   -- Eyes: conjunctivae clear. Extraocular muscles intact  -- Nose: Nares normal. Septum midline.   -- Mouth/Throat: lips, mucosa, and tongue normal. no throat erythema.   -- Neck: supple, symmetrical, trachea midline, no JVD and thyroid not grossly enlarged, appears symmetric  -- Lungs: clear to auscultation bilaterally. normal respiratory effort. No use of accessory muscles.   -- Chest wall: no tenderness. equal bilateral chest rise   -- Heart: regular rate and regular rhythm. S1, S2 normal.  no click, rub or gallop   -- Abdomen: soft, non-tender, non-distended, non-tympanic; bowel  sounds normal; no masses  -- Extremities: no cyanosis, clubbing or edema.   -- Pulses: 2+ and symmetric   -- Skin: color normal, texture normal, turgor normal. No rashes or lesions.   -- Neurologic:  Globally decreased muscle strength and tone.  Chronic bilateral lower extremity weakness.  No focal numbness or weakness in the upper extremities or face. CNII-XII intact. Vincent coma scale: 13.      LABORATORY STUDIES:     Recent Results (from the past 36 hour(s))   CBC auto differential    Collection Time: 05/23/19  1:15 PM   Result Value Ref Range    WBC 6.16 3.90 - 12.70 K/uL    RBC 3.81 (L) 4.00 - 5.40 M/uL    Hemoglobin 9.9 (L) 12.0 - 16.0 g/dL    Hematocrit 29.7 (L) 37.0 - 48.5 %    Mean Corpuscular Volume 78 (L) 82 - 98 fL    Mean Corpuscular Hemoglobin 26.0 (L) 27.0 - 31.0 pg    Mean Corpuscular Hemoglobin Conc 33.3 32.0 - 36.0 g/dL    RDW 13.5 11.5 - 14.5 %    Platelets 252 150 - 350 K/uL    MPV 9.7 9.2 - 12.9 fL    Gran # (ANC) 4.4 1.8 - 7.7 K/uL    Lymph # 1.1 1.0 - 4.8 K/uL    Mono # 0.6 0.3 - 1.0 K/uL    Eos # 0.0 0.0 - 0.5 K/uL    Baso # 0.01 0.00 - 0.20 K/uL    Gran% 71.7 38.0 - 73.0 %    Lymph% 17.9 (L) 18.0 - 48.0 %    Mono% 9.7 4.0 - 15.0 %    Eosinophil% 0.3 0.0 - 8.0 %    Basophil% 0.2 0.0 - 1.9 %    Differential Method Automated    Comprehensive metabolic panel    Collection Time: 05/23/19  1:15 PM   Result Value Ref Range    Sodium 141 136 - 145 mmol/L    Potassium 2.9 (L) 3.5 - 5.1 mmol/L    Chloride 110 95 - 110 mmol/L    CO2 23 23 - 29 mmol/L    Glucose 88 70 - 110 mg/dL    BUN, Bld 11 8 - 23 mg/dL    Creatinine 0.7 0.5 - 1.4 mg/dL    Calcium 8.7 8.7 - 10.5 mg/dL    Total Protein 7.1 6.0 - 8.4 g/dL    Albumin 3.4 (L) 3.5 - 5.2 g/dL    Total Bilirubin 0.3 0.1 - 1.0 mg/dL    Alkaline Phosphatase 96 55 - 135 U/L    AST 13 10 - 40 U/L    ALT 13 10 - 44 U/L    Anion Gap 8 8 - 16 mmol/L    eGFR if African American >60 >60 mL/min/1.73 m^2    eGFR if non African American >60 >60 mL/min/1.73 m^2    Troponin I    Collection Time: 05/23/19  1:15 PM   Result Value Ref Range    Troponin I 0.006 0.000 - 0.026 ng/mL   Magnesium    Collection Time: 05/23/19  1:15 PM   Result Value Ref Range    Magnesium 1.9 1.6 - 2.6 mg/dL   Urinalysis, Reflex to Urine Culture Urine, Clean Catch    Collection Time: 05/23/19  1:15 PM   Result Value Ref Range    Specimen UA Urine, Catheterized     Color, UA Yellow Yellow, Straw, Maite    Appearance, UA Hazy (A) Clear    pH, UA 5.0 5.0 - 8.0    Specific Gravity, UA 1.020 1.005 - 1.030    Protein, UA 1+ (A) Negative    Glucose, UA Negative Negative    Ketones, UA Negative Negative    Bilirubin (UA) Negative Negative    Occult Blood UA 1+ (A) Negative    Nitrite, UA Positive (A) Negative    Urobilinogen, UA Negative <2.0 EU/dL    Leukocytes, UA 2+ (A) Negative   Urinalysis Microscopic    Collection Time: 05/23/19  1:15 PM   Result Value Ref Range    RBC, UA 2 0 - 4 /hpf    WBC, UA 20 (H) 0 - 5 /hpf    Bacteria Moderate (A) None-Occ /hpf    Hyaline Casts, UA 0 0-1/lpf /lpf    Microscopic Comment SEE COMMENT        Lab Results   Component Value Date    INR 1.0 01/21/2019    INR 1.0 05/16/2018    INR 1.0 06/12/2015     Lab Results   Component Value Date    HGBA1C 5.8 06/13/2015     No results for input(s): POCTGLUCOSE in the last 72 hours.        MICROBIOLOGY DATA:     Urine Culture, Routine   Date Value Ref Range Status   01/21/2019 ESCHERICHIA COLI  >100,000 cfu/ml    Final   01/21/2019   Final    STREPTOCOCCUS AGALACTIAE (GROUP B)  10,000 - 49,999 cfu/ml  Beta-hemolytic streptococci are routinely susceptible to   penicillins,cephalosporins and carbapenems.     05/16/2018 ENTEROBACTER CLOACAE  >100,000 cfu/ml    Final       Microbiology x 7d:   Microbiology Results (last 7 days)     Procedure Component Value Units Date/Time    Urine culture [[id number]] Collected:  05/23/19 1315    Order Status:  No result Specimen:  Urine Updated:  05/23/19 2418            IMAGING:     Imaging Results            MRI Brain Without Contrast (Final result)  Result time 05/23/19 16:08:43    Final result by Gonzalez Flores MD (05/23/19 16:08:43)                 Impression:      Focus of diffusion restriction within the right caudate body, consistent with acute infarction.  Hemosiderin deposition in the region of the infarction, suggestive of small associated microhemorrhage.    Extensive areas of microhemorrhages throughout the supratentorial brain.  Overall findings are most suggestive of hypertensive microhemorrhages.    Changes of extensive chronic small vessel ischemic disease and cerebral volume loss.    This report was flagged in Epic as abnormal.      Electronically signed by: Gonzalez Flores MD  Date:    05/23/2019  Time:    16:08             Narrative:    EXAMINATION:  MRI BRAIN WITHOUT CONTRAST    CLINICAL HISTORY:  Stroke;    TECHNIQUE:  Multiplanar multisequence MR imaging of the brain was performed without contrast.    COMPARISON:  CT scan of the head dated 05/23/2019.    FINDINGS:  The craniocervical junction is within normal limits.  The midline structures appear intact.  The intracranial flow voids are within normal limits.    There is focus of diffusion restriction within the caudate body.  There is associated increased T2/FLAIR signal corresponding to the region of diffusion restriction.  No additional area of diffusion restriction is identified.    The ventricles and sulci are prominent, consistent with cerebral volume loss.  There are extensive T2/FLAIR signal hyperintensities within the periventricular and subcortical white matter.  There are old lacunar type infarctions in the basal ganglia, right thalamus, saige, and in the left cerebellum.    There are no extra-axial fluid collections.  There is no evidence of mass effect.  There is focus of GRE signal dropout corresponding to the region of diffusion restriction.  There also additional areas of GRE signal dropout artifact within the supratentorial  brain.    The orbits and intraorbital contents are unremarkable.  The paranasal sinuses and mastoid air cells are clear.  The calvarium is intact.                               CT Head Without Contrast (Final result)  Result time 05/23/19 13:10:07    Final result by Jonas Milner MD (05/23/19 13:10:07)                 Impression:      #1.  No significant acute intracranial abnormalities are identified.      Electronically signed by: Jonas Milner MD  Date:    05/23/2019  Time:    13:10             Narrative:    EXAMINATION:  CT HEAD WITHOUT CONTRAST    CLINICAL HISTORY:  tia;    TECHNIQUE:  Low dose axial images were obtained through the head.  Coronal and sagittal reformations were also performed. Contrast was not administered.    COMPARISON:  CT head 01/21/2019    FINDINGS:  There is a stable appearing somewhat linear left-sided corona radiata/left basal ganglia infarct.  There is chronic white matter ischemic change.  The brain parenchyma is otherwise unremarkable with no evidence of hemorrhage, mass, or acute infarct.  Other than compensatory enlargement, the ventricular system demonstrates no distortion by mass effect.    No extra-axial hemorrhage or mass.  There is carotid and vertebral artery atherosclerosis.  The extracranial structures are otherwise unremarkable.  The osseous structures are unremarkable.                                  CONSULTS:     IP CONSULT TO SOCIAL WORK/CASE MANAGEMENT       ASSESSMENT & PLAN:     Primary Diagnosis:  Acute ischemic stroke    Active Hospital Problems    Diagnosis  POA    *Acute ischemic stroke [I63.9]  Yes     Priority: 1 - High    Hypokalemia [E87.6]  Yes     Priority: 2     Acute cystitis without hematuria [N30.00]  Yes     Priority: 3     Wheelchair dependence [Z99.3]  Not Applicable    Cerebrovascular disease [I67.9]  Yes    History of stroke [Z86.73]  Not Applicable    Essential hypertension [I10]  Yes     Chronic    Obesity (BMI 35.0-39.9) [E66.9]   "Yes     Chronic      Resolved Hospital Problems   No resolved problems to display.         Acute Cerebral vascular accident  with known history of cerebrovascular disease and history of stroke  Right brain etiology as evidenced by physical exam and history  · Stroke risks include uncontrolled hypertension, obesity, known history of cerebral vascular disease and previous stroke  · Symptoms of altered mental status and expressive aphasia  · Differential diagnosis includes CVA, TIA, subarachnoid hemorrhage, complex migraine, seizure, encephalopathy, and/or tumor  · Initiated stroke protocol  · Trend NIH stroke scale  · STAT CT of head performed with no acute findings  · MRI brain reads:  "Focus of diffusion restriction within the right caudate body, consistent with acute infarction.  Hemosiderin deposition in the region of the infarction, suggestive of small associated microhemorrhage. Extensive areas of microhemorrhages throughout the supratentorial brain.  Overall findings are most suggestive of hypertensive microhemorrhages. Changes of extensive chronic small vessel ischemic disease and cerebral volume loss."  · Bilateral carotid ultrasound  · 2-D echo with bubble study  · Obtain fasting lipids  · Statin therapy: Atorvastatin- 40 mg oral daily  · HgA1c  · Hemoccult  · Talat's and SCDs given instead of Lovenox and ASA due to micro hemorrhages as noted above.  · Seizures precaution  · Ativan 1 mg PRN Seizures / call neurologist after first dose  · Hold ACEi, beta-blocker, etc while allowing permissive hypertension per stroke protocol  · Stroke education given and patient educated about both stroke prevention and symptoms to be mindful of. The patient was instructed to dial 911 for any signs or symptoms of stroke such as sudden weakness, numbness, dizziness, speech, gait, or visual changes  · PT OT consult - patient is chronically wheelchair-bound from previous CVA  · Consult neurology for further evaluation and " recommendations    Hypokalemia   · Due to GI losses or poor oral intake  · Check magnesium  · Replace potassium orally if possible, if not then IV  · Monitor with serial labs    Urinary tract infection, bacterial  · As evidenced by UA and History  · Urine culture and Gram stain obtained prior to antibiotics  · Given empiric antibiotics w/ Ceftriaxone  · Will tailor antibiotic regimen according to culture & sensitivity results  · Maintain euvolemic status with IV fluids      Hypertension (Permissive due to CVA)  · Allow for permissive HTN in first 24-48 hours  · Thereafter goal while inpatient is a systolic blood pressure less than 140mmHg  · BP in acceptable range at this time  · Continue current home regimen with hold parameters  · PRN antihypertensives available        Morbid obesity  Body mass index is 35.24 kg/m².  · Order a cardiac diet  · Counseling given  · Nutrition consult as an outpatient            VTE Risk Mitigation (From admission, onward)        Ordered     IP VTE HIGH RISK PATIENT  Once      05/23/19 1918     Place LUKE hose  Until discontinued      05/23/19 1918     Place sequential compression device  Until discontinued      05/23/19 1918            Adult PRN medications available   DVT prophylaxis given       DISPOSITION:     Will admit to the Hospital Medicine service for further evaluation and treatment.    Chart reviewed and updated where applicable.    High Risk Conditions:  Patient has an abrupt change in neurologic status:  Acute Stroke      ===============================================================    Jaya Unger MD, MPH  Department of Hospital Medicine   [facility]  [phone] pg  (7pm - 6am)          This note is dictated using [employer] voice recognition software.  There are word recognition mistakes that are occasionally missed on review.     no

## 2023-12-12 ENCOUNTER — HOSPITAL ENCOUNTER (INPATIENT)
Facility: HOSPITAL | Age: 73
LOS: 7 days | Discharge: HOME | DRG: 871 | End: 2023-12-19
Attending: EMERGENCY MEDICINE | Admitting: STUDENT IN AN ORGANIZED HEALTH CARE EDUCATION/TRAINING PROGRAM
Payer: MEDICARE

## 2023-12-12 DIAGNOSIS — R11.10 VOMITING: ICD-10-CM

## 2023-12-12 DIAGNOSIS — K56.609 SMALL BOWEL OBSTRUCTION: Primary | ICD-10-CM

## 2023-12-12 DIAGNOSIS — R07.9 CHEST PAIN: ICD-10-CM

## 2023-12-12 DIAGNOSIS — N39.0 URINARY TRACT INFECTION WITHOUT HEMATURIA, SITE UNSPECIFIED: ICD-10-CM

## 2023-12-12 PROBLEM — A41.9 SEPSIS: Status: ACTIVE | Noted: 2023-12-12

## 2023-12-12 LAB
ALBUMIN SERPL BCP-MCNC: 3.7 G/DL (ref 3.5–5.2)
ALP SERPL-CCNC: 122 U/L (ref 55–135)
ALT SERPL W/O P-5'-P-CCNC: 20 U/L (ref 10–44)
ANION GAP SERPL CALC-SCNC: 11 MMOL/L (ref 8–16)
AST SERPL-CCNC: 27 U/L (ref 10–40)
BACTERIA #/AREA URNS HPF: ABNORMAL /HPF
BASOPHILS # BLD AUTO: 0.06 K/UL (ref 0–0.2)
BASOPHILS NFR BLD: 0.2 % (ref 0–1.9)
BILIRUB SERPL-MCNC: 0.8 MG/DL (ref 0.1–1)
BILIRUB UR QL STRIP: NEGATIVE
BUN SERPL-MCNC: 63 MG/DL (ref 8–23)
CALCIUM SERPL-MCNC: 10.3 MG/DL (ref 8.7–10.5)
CHLORIDE SERPL-SCNC: 99 MMOL/L (ref 95–110)
CLARITY UR: ABNORMAL
CO2 SERPL-SCNC: 32 MMOL/L (ref 23–29)
COLOR UR: ABNORMAL
CREAT SERPL-MCNC: 1 MG/DL (ref 0.5–1.4)
DIFFERENTIAL METHOD: ABNORMAL
EOSINOPHIL # BLD AUTO: 0 K/UL (ref 0–0.5)
EOSINOPHIL NFR BLD: 0 % (ref 0–8)
ERYTHROCYTE [DISTWIDTH] IN BLOOD BY AUTOMATED COUNT: 14.8 % (ref 11.5–14.5)
EST. GFR  (NO RACE VARIABLE): 60 ML/MIN/1.73 M^2
GLUCOSE SERPL-MCNC: 154 MG/DL (ref 70–110)
GLUCOSE UR QL STRIP: NEGATIVE
HCT VFR BLD AUTO: 44 % (ref 37–48.5)
HGB BLD-MCNC: 14.7 G/DL (ref 12–16)
HGB UR QL STRIP: ABNORMAL
HYALINE CASTS #/AREA URNS LPF: 1 /LPF
IMM GRANULOCYTES # BLD AUTO: 0.25 K/UL (ref 0–0.04)
IMM GRANULOCYTES NFR BLD AUTO: 0.8 % (ref 0–0.5)
KETONES UR QL STRIP: NEGATIVE
LACTATE SERPL-SCNC: 2.3 MMOL/L (ref 0.5–2.2)
LACTATE SERPL-SCNC: 4.1 MMOL/L (ref 0.5–2.2)
LEUKOCYTE ESTERASE UR QL STRIP: ABNORMAL
LIPASE SERPL-CCNC: 52 U/L (ref 4–60)
LYMPHOCYTES # BLD AUTO: 1.1 K/UL (ref 1–4.8)
LYMPHOCYTES NFR BLD: 3.5 % (ref 18–48)
MCH RBC QN AUTO: 25.6 PG (ref 27–31)
MCHC RBC AUTO-ENTMCNC: 33.4 G/DL (ref 32–36)
MCV RBC AUTO: 77 FL (ref 82–98)
MICROSCOPIC COMMENT: ABNORMAL
MONOCYTES # BLD AUTO: 3.2 K/UL (ref 0.3–1)
MONOCYTES NFR BLD: 10 % (ref 4–15)
NEUTROPHILS # BLD AUTO: 27.4 K/UL (ref 1.8–7.7)
NEUTROPHILS NFR BLD: 85.5 % (ref 38–73)
NITRITE UR QL STRIP: NEGATIVE
NRBC BLD-RTO: 0 /100 WBC
PH UR STRIP: 7 [PH] (ref 5–8)
PLATELET # BLD AUTO: 401 K/UL (ref 150–450)
PMV BLD AUTO: 10.2 FL (ref 9.2–12.9)
POTASSIUM SERPL-SCNC: 3.4 MMOL/L (ref 3.5–5.1)
PROCALCITONIN SERPL IA-MCNC: 0.1 NG/ML
PROT SERPL-MCNC: 8.3 G/DL (ref 6–8.4)
PROT UR QL STRIP: ABNORMAL
RBC # BLD AUTO: 5.75 M/UL (ref 4–5.4)
RBC #/AREA URNS HPF: >100 /HPF (ref 0–4)
SODIUM SERPL-SCNC: 142 MMOL/L (ref 136–145)
SP GR UR STRIP: 1.02 (ref 1–1.03)
TOXIC GRANULES BLD QL SMEAR: PRESENT
URN SPEC COLLECT METH UR: ABNORMAL
UROBILINOGEN UR STRIP-ACNC: NEGATIVE EU/DL
WBC # BLD AUTO: 32.07 K/UL (ref 3.9–12.7)
WBC #/AREA URNS HPF: >100 /HPF (ref 0–5)
WBC CLUMPS URNS QL MICRO: ABNORMAL

## 2023-12-12 PROCEDURE — 83605 ASSAY OF LACTIC ACID: CPT | Mod: 91 | Performed by: STUDENT IN AN ORGANIZED HEALTH CARE EDUCATION/TRAINING PROGRAM

## 2023-12-12 PROCEDURE — 93010 EKG 12-LEAD: ICD-10-PCS | Mod: ,,, | Performed by: INTERNAL MEDICINE

## 2023-12-12 PROCEDURE — 87088 URINE BACTERIA CULTURE: CPT | Performed by: EMERGENCY MEDICINE

## 2023-12-12 PROCEDURE — 25000003 PHARM REV CODE 250: Performed by: EMERGENCY MEDICINE

## 2023-12-12 PROCEDURE — 83605 ASSAY OF LACTIC ACID: CPT | Performed by: EMERGENCY MEDICINE

## 2023-12-12 PROCEDURE — 87077 CULTURE AEROBIC IDENTIFY: CPT | Performed by: EMERGENCY MEDICINE

## 2023-12-12 PROCEDURE — 96361 HYDRATE IV INFUSION ADD-ON: CPT

## 2023-12-12 PROCEDURE — 12000002 HC ACUTE/MED SURGE SEMI-PRIVATE ROOM

## 2023-12-12 PROCEDURE — 93010 ELECTROCARDIOGRAM REPORT: CPT | Mod: 76,,, | Performed by: INTERNAL MEDICINE

## 2023-12-12 PROCEDURE — 87040 BLOOD CULTURE FOR BACTERIA: CPT | Performed by: EMERGENCY MEDICINE

## 2023-12-12 PROCEDURE — 99223 1ST HOSP IP/OBS HIGH 75: CPT | Mod: ,,, | Performed by: SURGERY

## 2023-12-12 PROCEDURE — 85025 COMPLETE CBC W/AUTO DIFF WBC: CPT | Performed by: EMERGENCY MEDICINE

## 2023-12-12 PROCEDURE — 99285 EMERGENCY DEPT VISIT HI MDM: CPT | Mod: 25

## 2023-12-12 PROCEDURE — 93005 ELECTROCARDIOGRAM TRACING: CPT

## 2023-12-12 PROCEDURE — 51702 INSERT TEMP BLADDER CATH: CPT

## 2023-12-12 PROCEDURE — 96375 TX/PRO/DX INJ NEW DRUG ADDON: CPT

## 2023-12-12 PROCEDURE — 81000 URINALYSIS NONAUTO W/SCOPE: CPT | Performed by: EMERGENCY MEDICINE

## 2023-12-12 PROCEDURE — 25500020 PHARM REV CODE 255: Performed by: EMERGENCY MEDICINE

## 2023-12-12 PROCEDURE — 93010 ELECTROCARDIOGRAM REPORT: CPT | Mod: ,,, | Performed by: INTERNAL MEDICINE

## 2023-12-12 PROCEDURE — 63600175 PHARM REV CODE 636 W HCPCS: Performed by: STUDENT IN AN ORGANIZED HEALTH CARE EDUCATION/TRAINING PROGRAM

## 2023-12-12 PROCEDURE — 25000003 PHARM REV CODE 250: Performed by: STUDENT IN AN ORGANIZED HEALTH CARE EDUCATION/TRAINING PROGRAM

## 2023-12-12 PROCEDURE — 87086 URINE CULTURE/COLONY COUNT: CPT | Performed by: EMERGENCY MEDICINE

## 2023-12-12 PROCEDURE — 80053 COMPREHEN METABOLIC PANEL: CPT | Performed by: EMERGENCY MEDICINE

## 2023-12-12 PROCEDURE — 96365 THER/PROPH/DIAG IV INF INIT: CPT

## 2023-12-12 PROCEDURE — 99223 PR INITIAL HOSPITAL CARE,LEVL III: ICD-10-PCS | Mod: ,,, | Performed by: SURGERY

## 2023-12-12 PROCEDURE — 87186 SC STD MICRODIL/AGAR DIL: CPT | Mod: 59 | Performed by: EMERGENCY MEDICINE

## 2023-12-12 PROCEDURE — 63600175 PHARM REV CODE 636 W HCPCS: Performed by: EMERGENCY MEDICINE

## 2023-12-12 PROCEDURE — 84145 PROCALCITONIN (PCT): CPT | Performed by: STUDENT IN AN ORGANIZED HEALTH CARE EDUCATION/TRAINING PROGRAM

## 2023-12-12 PROCEDURE — 83690 ASSAY OF LIPASE: CPT | Performed by: EMERGENCY MEDICINE

## 2023-12-12 RX ORDER — PSEUDOEPHEDRINE/ACETAMINOPHEN 30MG-500MG
100 TABLET ORAL
Status: COMPLETED | OUTPATIENT
Start: 2023-12-12 | End: 2023-12-12

## 2023-12-12 RX ORDER — VITAMIN A 3000 MCG
1 CAPSULE ORAL
COMMUNITY

## 2023-12-12 RX ORDER — ONDANSETRON 2 MG/ML
4 INJECTION INTRAMUSCULAR; INTRAVENOUS
Status: COMPLETED | OUTPATIENT
Start: 2023-12-12 | End: 2023-12-12

## 2023-12-12 RX ORDER — FLUTICASONE PROPIONATE 50 MCG
2 SPRAY, SUSPENSION (ML) NASAL DAILY
COMMUNITY
Start: 2023-11-08

## 2023-12-12 RX ORDER — SYRING-NEEDL,DISP,INSUL,0.3 ML 29 G X1/2"
296 SYRINGE, EMPTY DISPOSABLE MISCELLANEOUS
Status: DISPENSED | OUTPATIENT
Start: 2023-12-12 | End: 2023-12-13

## 2023-12-12 RX ORDER — SODIUM CHLORIDE 9 MG/ML
INJECTION, SOLUTION INTRAVENOUS CONTINUOUS
Status: DISCONTINUED | OUTPATIENT
Start: 2023-12-12 | End: 2023-12-14

## 2023-12-12 RX ORDER — ZINC SULFATE 50(220)MG
220 CAPSULE ORAL DAILY
Status: ON HOLD | COMMUNITY
End: 2023-12-19

## 2023-12-12 RX ORDER — POLYETHYLENE GLYCOL 3350 17 G/17G
17 POWDER, FOR SOLUTION ORAL 3 TIMES DAILY
COMMUNITY

## 2023-12-12 RX ORDER — ACETAMINOPHEN 325 MG/1
325 TABLET ORAL EVERY 6 HOURS PRN
COMMUNITY

## 2023-12-12 RX ORDER — ONDANSETRON 2 MG/ML
4 INJECTION INTRAMUSCULAR; INTRAVENOUS EVERY 8 HOURS PRN
Status: DISCONTINUED | OUTPATIENT
Start: 2023-12-12 | End: 2023-12-20 | Stop reason: HOSPADM

## 2023-12-12 RX ORDER — PSEUDOEPHEDRINE/ACETAMINOPHEN 30MG-500MG
100 TABLET ORAL
Status: DISPENSED | OUTPATIENT
Start: 2023-12-12 | End: 2023-12-13

## 2023-12-12 RX ORDER — IBUPROFEN 200 MG
16 TABLET ORAL
Status: DISCONTINUED | OUTPATIENT
Start: 2023-12-12 | End: 2023-12-20 | Stop reason: HOSPADM

## 2023-12-12 RX ORDER — ATORVASTATIN CALCIUM 10 MG/1
10 TABLET, FILM COATED ORAL NIGHTLY
COMMUNITY
Start: 2023-11-21

## 2023-12-12 RX ORDER — VITAMIN E MIXED 400 UNIT
CAPSULE ORAL DAILY
COMMUNITY
Start: 2023-12-07

## 2023-12-12 RX ORDER — GLUCAGON 1 MG
1 KIT INJECTION
Status: DISCONTINUED | OUTPATIENT
Start: 2023-12-12 | End: 2023-12-20 | Stop reason: HOSPADM

## 2023-12-12 RX ORDER — METOPROLOL TARTRATE 50 MG/1
50 TABLET ORAL 2 TIMES DAILY
COMMUNITY
Start: 2023-11-21

## 2023-12-12 RX ORDER — IPRATROPIUM BROMIDE AND ALBUTEROL SULFATE 2.5; .5 MG/3ML; MG/3ML
3 SOLUTION RESPIRATORY (INHALATION) EVERY 8 HOURS PRN
COMMUNITY
Start: 2023-08-17

## 2023-12-12 RX ORDER — NALOXONE HCL 0.4 MG/ML
0.02 VIAL (ML) INJECTION
Status: DISCONTINUED | OUTPATIENT
Start: 2023-12-12 | End: 2023-12-20 | Stop reason: HOSPADM

## 2023-12-12 RX ORDER — METOCLOPRAMIDE 5 MG/1
5 TABLET ORAL 4 TIMES DAILY
COMMUNITY
Start: 2023-11-14

## 2023-12-12 RX ORDER — IBUPROFEN 200 MG
24 TABLET ORAL
Status: DISCONTINUED | OUTPATIENT
Start: 2023-12-12 | End: 2023-12-20 | Stop reason: HOSPADM

## 2023-12-12 RX ORDER — ESOMEPRAZOLE MAGNESIUM 40 MG/1
GRANULE, DELAYED RELEASE ORAL
COMMUNITY
Start: 2023-11-09

## 2023-12-12 RX ORDER — ESCITALOPRAM OXALATE 20 MG/1
20 TABLET ORAL
COMMUNITY
Start: 2023-11-28

## 2023-12-12 RX ORDER — LORATADINE 10 MG/1
10 TABLET ORAL DAILY
COMMUNITY

## 2023-12-12 RX ORDER — MULTIVITAMIN
1 TABLET ORAL DAILY
COMMUNITY

## 2023-12-12 RX ORDER — ASCORBIC ACID 500 MG
500 TABLET ORAL DAILY
Status: ON HOLD | COMMUNITY
End: 2023-12-19

## 2023-12-12 RX ORDER — SYRING-NEEDL,DISP,INSUL,0.3 ML 29 G X1/2"
296 SYRINGE, EMPTY DISPOSABLE MISCELLANEOUS
Status: COMPLETED | OUTPATIENT
Start: 2023-12-12 | End: 2023-12-12

## 2023-12-12 RX ORDER — SENNOSIDES 8.6 MG/1
1 TABLET ORAL 2 TIMES DAILY
COMMUNITY

## 2023-12-12 RX ORDER — SODIUM CHLORIDE 0.9 % (FLUSH) 0.9 %
10 SYRINGE (ML) INJECTION EVERY 12 HOURS PRN
Status: DISCONTINUED | OUTPATIENT
Start: 2023-12-12 | End: 2023-12-20 | Stop reason: HOSPADM

## 2023-12-12 RX ADMIN — SODIUM CHLORIDE, POTASSIUM CHLORIDE, SODIUM LACTATE AND CALCIUM CHLORIDE 1000 ML: 600; 310; 30; 20 INJECTION, SOLUTION INTRAVENOUS at 02:12

## 2023-12-12 RX ADMIN — BE HEALTH MAGNESIUM CITRATE ORAL SOLUTION - LEMON 296 ML: 1.75 LIQUID ORAL at 02:12

## 2023-12-12 RX ADMIN — Medication 100 ML: at 02:12

## 2023-12-12 RX ADMIN — PIPERACILLIN AND TAZOBACTAM 4.5 G: 4; .5 INJECTION, POWDER, LYOPHILIZED, FOR SOLUTION INTRAVENOUS; PARENTERAL at 03:12

## 2023-12-12 RX ADMIN — CEFTRIAXONE 1 G: 1 INJECTION, POWDER, FOR SOLUTION INTRAMUSCULAR; INTRAVENOUS at 12:12

## 2023-12-12 RX ADMIN — SODIUM CHLORIDE, POTASSIUM CHLORIDE, SODIUM LACTATE AND CALCIUM CHLORIDE 1000 ML: 600; 310; 30; 20 INJECTION, SOLUTION INTRAVENOUS at 11:12

## 2023-12-12 RX ADMIN — ONDANSETRON 4 MG: 2 INJECTION INTRAMUSCULAR; INTRAVENOUS at 11:12

## 2023-12-12 RX ADMIN — IOHEXOL 100 ML: 350 INJECTION, SOLUTION INTRAVENOUS at 11:12

## 2023-12-12 RX ADMIN — SODIUM CHLORIDE: 9 INJECTION, SOLUTION INTRAVENOUS at 04:12

## 2023-12-12 RX ADMIN — SODIUM CHLORIDE 500 ML: 9 INJECTION, SOLUTION INTRAVENOUS at 02:12

## 2023-12-12 NOTE — ASSESSMENT & PLAN NOTE
UA consistent with urinary tract infection.  Initially given ceftriaxone in the emergency department however broadened to Zosyn.  Follow up urine cultures and adjusted accordingly.

## 2023-12-12 NOTE — ADMISSIONCARE
AdmissionCare    Guideline: Intestinal Obstruction, Inpatient    Based on the indications selected for the patient, the bed status of Admit to Inpatient was determined to be MET    The following indications were selected as present at the time of evaluation of the patient:      Signs and symptoms of bowel obstruction (eg, vomiting, inability to tolerate PO intake, pain, distention) that are severe (feculent vomiting, Hypotension, electrolyte abnormality, evidence of bowel ischemia or suspected perforation), or persistent (eg, NG tube placed and will need to be continued, IV hydration support required)   -    - Imaging study consistent with bowel obstruction (ie, alternative diagnosis not more likely)    AdmissionCare documentation entered by: Zay Herrera    [facility], 27th edition, Copyright © 2023 [facility], [facility] All Rights Reserved.  [id number]:00

## 2023-12-12 NOTE — ASSESSMENT & PLAN NOTE
Presents with acute mechanical small-bowel obstruction with point of transition in the deep midline/right lower abdomen and pelvis.  Recently had older G-tube cut and number the left inside.  This is what most likely causing obstruction at this time.  She was not a candidate for surgery given she was on aspirin, Plavix and Eliquis.  This places her at very high risk for any intervention at this moment.  Holding all anticoagulation.  Surgery recommending enema to help with stool burden distal to object.  NG tube as needed if patient begins to have further nausea and vomiting.

## 2023-12-12 NOTE — Clinical Note
Diagnosis: Vomiting [[id number]]   Future Attending Provider: CHRISTIE SERRANO [[id number]]   Admitting Provider:: CHRISTIE SERRANO [[id number]]   Reason for IP Medical Treatment  (Clinical interventions that can only be accomplished in the IP setting? ) :: SBO   I certify that Inpatient services for greater than or equal to 2 midnights are medically necessary:: Yes   Plans for Post-Acute care--if anticipated (pick the single best option):: A. No post acute care anticipated at this time   Special Needs:: No Special Needs [1]

## 2023-12-12 NOTE — SUBJECTIVE & OBJECTIVE
No current facility-administered medications on file prior to encounter.     Current Outpatient Medications on File Prior to Encounter   Medication Sig    amLODIPine (NORVASC) 10 MG tablet Take 1 tablet (10 mg total) by mouth once daily. Hold if SBP <120    apixaban (ELIQUIS) 5 mg Tab Take 2 tablets (10 mg total) by mouth 2 (two) times daily. 10mg BID for 7days then switch to 5mg BID    atorvastatin (LIPITOR) 40 MG tablet Take 1 tablet (40 mg total) by mouth once daily.    cetirizine (ZYRTEC) 10 MG tablet Take 1 tablet (10 mg total) by mouth once daily.    ergocalciferol (ERGOCALCIFEROL) 50,000 unit Cap Take 1 capsule (50,000 Units total) by mouth every 7 days.    ferrous sulfate 325 (65 FE) MG EC tablet Take 1 tablet (325 mg total) by mouth once daily.    ondansetron (ZOFRAN-ODT) 4 MG TbDL Take 1 tablet (4 mg total) by mouth every 4 (four) hours as needed (nausea/vomiting).    pantoprazole (PROTONIX) 40 MG tablet Take 1 tablet (40 mg total) by mouth once daily.    varicella-zoster gE-AS01B, PF, (SHINGRIX) 50 mcg/0.5 mL injection Inject into the muscle.    [DISCONTINUED] aspirin (ECOTRIN) 81 MG EC tablet Take 1 tablet (81 mg total) by mouth once daily.    [DISCONTINUED] clopidogreL (PLAVIX) 75 mg tablet Take 1 tablet (75 mg total) by mouth once daily. for 21 days       Review of patient's allergies indicates:  No Known Allergies    Past Medical History:   Diagnosis Date    COVID-19 09/09/2021    Essential hypertension 6/12/2015    Obese     Obesity (BMI 35.0-39.9) 6/12/2015    Stroke 06/2015    Left side weakness    Stroke-like symptoms 09/17/2021    NEW LEFT FACIAL DROOP, LEFT SIDE WEAKNESS & SLURRED SPEECH    Wheelchair dependence     Wheelchair dependence      Past Surgical History:   Procedure Laterality Date    ESOPHAGOGASTRODUODENOSCOPY N/A 7/5/2022    Procedure: EGD (ESOPHAGOGASTRODUODENOSCOPY);  Surgeon: Wilma Munguia MD;  Location: [facility];  Service: Endoscopy;  Laterality: N/A;    HYSTERECTOMY        Family History       Problem Relation (Age of Onset)    Cancer Father    Stroke Mother          Tobacco Use    Smoking status: Never    Smokeless tobacco: Never   Substance and Sexual Activity    Alcohol use: No    Drug use: No    Sexual activity: Not on file     Review of Systems   Unable to perform ROS: Patient nonverbal     Objective:     Vital Signs (Most Recent):  Temp: 98.8 °F (37.1 °C) (12/12/23 1118)  Pulse: 93 (12/12/23 1211)  Resp: 18 (12/12/23 1211)  BP: 115/70 (12/12/23 1211)  SpO2: 96 % (12/12/23 1211) Vital Signs (24h Range):  Temp:  [98.8 °F (37.1 °C)] 98.8 °F (37.1 °C)  Pulse:  [] 93  Resp:  [18-26] 18  SpO2:  [90 %-97 %] 96 %  BP: ()/(65-80) 115/70     Weight: 86.2 kg (190 lb)  Body mass index is 34.75 kg/m².     Physical Exam  Vitals and nursing note reviewed.   Constitutional:       Appearance: She is obese. She is ill-appearing.      Comments: Patient is nonverbal.  Does not participate in conversation.   HENT:      Head: Normocephalic.      Mouth/Throat:      Mouth: Mucous membranes are dry.      Pharynx: Oropharynx is clear.   Cardiovascular:      Rate and Rhythm: Normal rate.      Pulses: Normal pulses.   Pulmonary:      Effort: Pulmonary effort is normal. No respiratory distress.      Breath sounds: No wheezing.   Abdominal:      General: There is distension.      Tenderness: There is no guarding.      Comments: Abdomen is diffusely distended.  Patient does not respond to tactile stimulation of all 4 quadrants of her abdomen.  She was a G-tube in place with a mild amount of gastric appearing discharge surrounding the skin site.   Musculoskeletal:         General: No swelling or tenderness. Normal range of motion.      Cervical back: Normal range of motion.   Skin:     General: Skin is warm and dry.      Coloration: Skin is not jaundiced or pale.            I have reviewed all pertinent lab results within the past 24 hours.  CBC:   Recent Labs   Lab 12/12/23  1104   WBC 32.07*  "  RBC 5.75*   HGB 14.7   HCT 44.0      MCV 77*   MCH 25.6*   MCHC 33.4     CMP:   Recent Labs   Lab 12/12/23  1104   *   CALCIUM 10.3   ALBUMIN 3.7   PROT 8.3      K 3.4*   CO2 32*   CL 99   BUN 63*   CREATININE 1.0   ALKPHOS 122   ALT 20   AST 27   BILITOT 0.8     Coagulation: No results for input(s): "LABPROT", "INR", "APTT" in the last 168 hours.  Microbiology Results (last 7 days)       Procedure Component Value Units Date/Time    Blood Culture #2 **CANNOT BE ORDERED STAT** [[id number]]     Order Status: Sent Specimen: Blood     Blood Culture #1 **CANNOT BE ORDERED STAT** [[id number]]     Order Status: Sent Specimen: Blood     Urine culture [[id number]] Collected: 12/12/23 1043    Order Status: No result Specimen: Urine Updated: 12/12/23 1112            Significant Diagnostics:  I have reviewed all pertinent imaging results/findings within the past 24 hours.    "

## 2023-12-12 NOTE — HPI
73-year-old female with significant past medical history including hypertension, pulmonary embolism on long-term anticoagulation, cerebrovascular disease status post stroke was long-term neurologic deficits including wheelchair dependence, facial droop, and dependence upon others for activities of daily living.  She has a G-tube in place for nutrition and medication administration.  This was placed proximally 1 year ago at outside hospital.  One-week ago she had her G-tube exchange at outside hospital without apparent complication.  She presents to our emergency room today from her facility with nausea, vomiting, abdominal distention for the past few days.  Upon evaluation emergency room she was found to have significant leukocytosis of 30 with no evidence source of infection.  CT scan of her abdomen and pelvis reveals moderately distended loops of small bowel with the acute transition point in the mid abdomen which seems to be centered around a piece of an old gastrostomy tube.  Her distal small bowel is decompressed in her colon is relatively decompressed other than a large fecal burden in the rectum.  General surgery was consulted for evaluation.   36.8

## 2023-12-12 NOTE — HPI
Ms. Kidd is a 73-year-old female with a past medical history of CVA, dementia, peg tube placement, hypertension, DVT/PE on Eliquis who presents to the emergency department for evaluation of intractable nausea and vomiting.  Patient is not able to give history so all of the history was obtained from the ED physician and medical records.  The patient was recently seen at [facility] for dislodged PEG tube on 12/03/2023.  They were not able to remove it so the PEG tube was cut an umbrella left side with expectation of it to be passed in her stool.  In the emergency department, she was found to have WBC of 32,000, BUN 63 and lactate 2.3.  Urinalysis appeared brown with significant WBC greater than 100 and bacteria.  CT abdomen and pelvis with findings of acute mechanical small-bowel obstruction with point of transition in the deep midline/right paramedian lower abdomen/pelvis with proximity to intraluminal foreign body favored to represent dislodged G-tube apparatus component within small bowel loop just proximal to transition point.  She does have large volume stool distal colon/rectum.  General surgery consulted however given patient is on aspirin, Plavix and Eliquis, patient is high risk for intervention at this time.  Recommendation include supportive care, antibiotics and enema to help with stool burden.  Daughter is at bedside and discussed plan of care.  Patient is DNR at this time.

## 2023-12-12 NOTE — ASSESSMENT & PLAN NOTE
Advance Care Planning     Date: 12/12/2023    Code Status  In light of the patients advanced and life limiting illness,I engaged the the family in a voluntary conversation about the patient's preferences for care  at the very end of life. The patient wishes to have a natural, peaceful death.  Along those lines, the patient does not wish to have CPR or other invasive treatments performed when her heart and/or breathing stops. I communicated to the family that a DNR order would be placed in her medical record to reflect this preference.  I spent a total of 8 minutes engaging the patient in this advance care planning discussion.    Continue with current level of care at this time.  We will continue with ICU level of care, IV antibiotics.   Has a daughter and son, her daughter Lana was at bedside and unable to get in touch with son.

## 2023-12-12 NOTE — ASSESSMENT & PLAN NOTE
73-year-old female with significant past medical history including hypertension, pulmonary embolism on long-term anticoagulation, cerebrovascular disease status post stroke was long-term neurologic deficits including wheelchair dependence, facial droop, and dependence upon others for activities of daily living.  Admitted to the hospital today with a 3 day history of nausea, vomiting, and abdominal distention.  Leukocytosis of 30.  Evidence of mechanical small bowel obstruction with foreign object lodged in the mid small bowel.    - I had a long discussion with the patient's daughter who is her primary decision maker regarding the patient's condition.  Given patient's significant medical comorbidities, current medications, and current clinical status, I do not recommend urgent surgery at this time.  Patient's daughter states that she agrees that her mother would likely not want to undergo surgery, and would not want to live in her current state of health.  - recommend admission to hospital medicine team for further investigation into her leukocytosis and full septic workup including blood cultures, urinary analysis, chest x-ray.  - recommend initiation of broad-spectrum antibiotic therapy  - after admission we will likely treat her bowel obstruction conservatively.  This would include administration of Gastrografin via G tube as well as an aggressive rectal enema schedule due to her significant stool burden.  If conservative management of her bowel obstruction is not successful, discussed with patient's daughter involving hospice care and the palliative care team.  Patient's daughter is in agreeance.  - recommend palliative care consult  - general surgery will continue to follow closely

## 2023-12-12 NOTE — H&P
[facility] Medicine  History & Physical    Patient Name: Holly Kidd  MRN: [id number]  Patient Class: IP- Inpatient  Admission Date: 12/12/2023  Attending Physician: Tylor Villa MD   Primary Care Provider: Estrellita Obregon FNP         Patient information was obtained from relative(s), EMS personnel, past medical records, and ER records.     Subjective:     Principal Problem:Sepsis    Chief Complaint:   Chief Complaint   Patient presents with    Vomiting     EMS called to 74yo female that staff at [facility] reports has had N/V x2 days. Also reported to E[facility] that she is at her baseline mentally from previous CVA.         HPI: Ms. Kidd is a 73-year-old female with a past medical history of CVA, dementia, peg tube placement, hypertension, DVT/PE on Eliquis who presents to the emergency department for evaluation of intractable nausea and vomiting.  Patient is not able to give history so all of the history was obtained from the ED physician and medical records.  The patient was recently seen at [facility] for dislodged PEG tube on 12/03/2023.  They were not able to remove it so the PEG tube was cut an umbrella left side with expectation of it to be passed in her stool.  In the emergency department, she was found to have WBC of 32,000, BUN 63 and lactate 2.3.  Urinalysis appeared brown with significant WBC greater than 100 and bacteria.  CT abdomen and pelvis with findings of acute mechanical small-bowel obstruction with point of transition in the deep midline/right paramedian lower abdomen/pelvis with proximity to intraluminal foreign body favored to represent dislodged G-tube apparatus component within small bowel loop just proximal to transition point.  She does have large volume stool distal colon/rectum.  General surgery consulted however given patient is on aspirin, Plavix and Eliquis, patient is high risk for intervention at this time.  Recommendation include supportive care,  antibiotics and enema to help with stool burden.  Daughter is at bedside and discussed plan of care.  Patient is DNR at this time.    Past Medical History:   Diagnosis Date    COVID-19 09/09/2021    Essential hypertension 6/12/2015    Obese     Obesity (BMI 35.0-39.9) 6/12/2015    Stroke 06/2015    Left side weakness    Stroke-like symptoms 09/17/2021    NEW LEFT FACIAL DROOP, LEFT SIDE WEAKNESS & SLURRED SPEECH    Wheelchair dependence     Wheelchair dependence        Past Surgical History:   Procedure Laterality Date    ESOPHAGOGASTRODUODENOSCOPY N/A 7/5/2022    Procedure: EGD (ESOPHAGOGASTRODUODENOSCOPY);  Surgeon: Wilma Munguia MD;  Location: [facility];  Service: Endoscopy;  Laterality: N/A;    HYSTERECTOMY         Review of patient's allergies indicates:  No Known Allergies    No current facility-administered medications on file prior to encounter.     Current Outpatient Medications on File Prior to Encounter   Medication Sig    amLODIPine (NORVASC) 10 MG tablet Take 1 tablet (10 mg total) by mouth once daily. Hold if SBP <120    apixaban (ELIQUIS) 5 mg Tab Take 2 tablets (10 mg total) by mouth 2 (two) times daily. 10mg BID for 7days then switch to 5mg BID    atorvastatin (LIPITOR) 40 MG tablet Take 1 tablet (40 mg total) by mouth once daily.    cetirizine (ZYRTEC) 10 MG tablet Take 1 tablet (10 mg total) by mouth once daily.    ergocalciferol (ERGOCALCIFEROL) 50,000 unit Cap Take 1 capsule (50,000 Units total) by mouth every 7 days.    ferrous sulfate 325 (65 FE) MG EC tablet Take 1 tablet (325 mg total) by mouth once daily.    ondansetron (ZOFRAN-ODT) 4 MG TbDL Take 1 tablet (4 mg total) by mouth every 4 (four) hours as needed (nausea/vomiting).    pantoprazole (PROTONIX) 40 MG tablet Take 1 tablet (40 mg total) by mouth once daily.    varicella-zoster gE-AS01B, PF, (SHINGRIX) 50 mcg/0.5 mL injection Inject into the muscle.    [DISCONTINUED] aspirin (ECOTRIN) 81 MG EC tablet Take 1 tablet (81 mg total) by  mouth once daily.    [DISCONTINUED] clopidogreL (PLAVIX) 75 mg tablet Take 1 tablet (75 mg total) by mouth once daily. for 21 days     Family History       Problem Relation (Age of Onset)    Cancer Father    Stroke Mother          Tobacco Use    Smoking status: Never    Smokeless tobacco: Never   Substance and Sexual Activity    Alcohol use: No    Drug use: No    Sexual activity: Not on file     Review of Systems  Objective:     Vital Signs (Most Recent):  Temp: 98.8 °F (37.1 °C) (12/12/23 1118)  Pulse: 93 (12/12/23 1211)  Resp: 18 (12/12/23 1211)  BP: 115/70 (12/12/23 1211)  SpO2: 96 % (12/12/23 1211) Vital Signs (24h Range):  Temp:  [98.8 °F (37.1 °C)] 98.8 °F (37.1 °C)  Pulse:  [] 93  Resp:  [18-26] 18  SpO2:  [90 %-97 %] 96 %  BP: ()/(65-80) 115/70     Weight: 86.2 kg (190 lb)  Body mass index is 34.75 kg/m².     Physical Exam  Vitals reviewed.   Constitutional:       Appearance: Normal appearance. She is obese. She is not ill-appearing.   Cardiovascular:      Rate and Rhythm: Regular rhythm. Tachycardia present.      Pulses: Normal pulses.      Heart sounds: No murmur heard.  Pulmonary:      Effort: Pulmonary effort is normal. No respiratory distress.   Abdominal:      Comments: Distended, G tube in abdomen with mild drainage surrounding site. Minimal BS present   Genitourinary:     Comments: Haskins in place with brown, murky urine  Musculoskeletal:         General: No swelling.   Skin:     General: Skin is warm.   Neurological:      Mental Status: She is lethargic.      Comments: Resting comfortable but minimally responsive. Does not speak at baseline per daughter                Significant Labs: All pertinent labs within the past 24 hours have been reviewed.    Significant Imaging: I have reviewed all pertinent imaging results/findings within the past 24 hours.  Assessment/Plan:     * Sepsis  This patient does have evidence of infective focus.My overall impression is sepsis.  Source: Urinary  Tract  Antibiotics given-   Antibiotics (72h ago, onward)      Start     Stop Route Frequency Ordered    12/12/23 1515  piperacillin-tazobactam (ZOSYN) 4.5 g in dextrose 5 % in water (D5W) 100 mL IVPB (MB+)         -- IV Every 8 hours (non-standard times) 12/12/23 1411          Latest lactate reviewed-  Recent Labs   Lab 12/12/23  1104   LACTATE 2.3*     Organ dysfunction indicated by Encephalopathy    Fluid challenge Ideal Body Weight- The patient's ideal body weight is Ideal body weight: 50.1 kg (110 lb 7.2 oz) which will be used to calculate fluid bolus of 30 ml/kg for treatment of septic shock.      Post- resuscitation assessment Yes Perfusion exam was performed within 6 hours of septic shock presentation after bolus shows Adequate tissue perfusion assessed by non-invasive monitoring       Will Not start Pressors- Levophed for MAP of 65  Source control achieved by: IV antibiotics    Bowel obstruction  Presents with acute mechanical small-bowel obstruction with point of transition in the deep midline/right lower abdomen and pelvis.  Recently had older G-tube cut and number the left inside.  This is what most likely causing obstruction at this time.  She was not a candidate for surgery given she was on aspirin, Plavix and Eliquis.  This places her at very high risk for any intervention at this moment.  Holding all anticoagulation.  Surgery recommending enema to help with stool burden distal to object.  NG tube as needed if patient begins to have further nausea and vomiting.      Acute cystitis  UA consistent with urinary tract infection.  Initially given ceftriaxone in the emergency department however broadened to Zosyn.  Follow up urine cultures and adjusted accordingly.      Advanced care planning/counseling discussion  Advance Care Planning    Date: 12/12/2023    Code Status  In light of the patients advanced and life limiting illness,I engaged the the family in a voluntary conversation about the patient's  preferences for care  at the very end of life. The patient wishes to have a natural, peaceful death.  Along those lines, the patient does not wish to have CPR or other invasive treatments performed when her heart and/or breathing stops. I communicated to the family that a DNR order would be placed in her medical record to reflect this preference.  I spent a total of 8 minutes engaging the patient in this advance care planning discussion.    Continue with current level of care at this time.  We will continue with ICU level of care, IV antibiotics.   Has a daughter and son, her daughter Lana was at bedside and unable to get in touch with son.          Leukocytosis  See sepsis      Acute encephalopathy  Lethargic with history of CVA and now with sepsis and small-bowel obstruction.  Treat underlying issues at this time.      Cerebrovascular disease  Hx of CVA, now with PEG in place and debility  - holding ASA/Plavix with SBO      Essential hypertension  - Chronic in nature, holding home BP medications in setting of sepsis  - will add back as needed      VTE Risk Mitigation (From admission, onward)           Ordered     IP VTE HIGH RISK PATIENT  Once         12/12/23 1347     Place sequential compression device  Until discontinued         12/12/23 1347                               AdmissionCare    Guideline: Intestinal Obstruction, Inpatient    Based on the indications selected for the patient, the bed status of Admit to Inpatient was determined to be MET    The following indications were selected as present at the time of evaluation of the patient:      Signs and symptoms of bowel obstruction (eg, vomiting, inability to tolerate PO intake, pain, distention) that are severe (feculent vomiting, Hypotension, electrolyte abnormality, evidence of bowel ischemia or suspected perforation), or persistent (eg, NG tube placed and will need to be continued, IV hydration support required)   -    - Imaging study consistent with  bowel obstruction (ie, alternative diagnosis not more likely)    AdmissionCare documentation entered by: Zay Herrera    [facility], 27th edition, Copyright © 2023 [facility] [employer], [facility] All Rights Reserved.  [id number]    Tylor Villa MD  Department of Hospital Medicine  [facility] [place] - Emergency Dept

## 2023-12-12 NOTE — ED TRIAGE NOTES
Pt in with EMS from [place] with complaints of N/V x 2 days. Pt abdomen distended & firm. Pt unable to verbalize needs at moment. Hx of stroke.

## 2023-12-12 NOTE — SUBJECTIVE & OBJECTIVE
Past Medical History:   Diagnosis Date    COVID-19 09/09/2021    Essential hypertension 6/12/2015    Obese     Obesity (BMI 35.0-39.9) 6/12/2015    Stroke 06/2015    Left side weakness    Stroke-like symptoms 09/17/2021    NEW LEFT FACIAL DROOP, LEFT SIDE WEAKNESS & SLURRED SPEECH    Wheelchair dependence     Wheelchair dependence        Past Surgical History:   Procedure Laterality Date    ESOPHAGOGASTRODUODENOSCOPY N/A 7/5/2022    Procedure: EGD (ESOPHAGOGASTRODUODENOSCOPY);  Surgeon: Wilma Munguia MD;  Location: [facility];  Service: Endoscopy;  Laterality: N/A;    HYSTERECTOMY         Review of patient's allergies indicates:  No Known Allergies    No current facility-administered medications on file prior to encounter.     Current Outpatient Medications on File Prior to Encounter   Medication Sig    amLODIPine (NORVASC) 10 MG tablet Take 1 tablet (10 mg total) by mouth once daily. Hold if SBP <120    apixaban (ELIQUIS) 5 mg Tab Take 2 tablets (10 mg total) by mouth 2 (two) times daily. 10mg BID for 7days then switch to 5mg BID    atorvastatin (LIPITOR) 40 MG tablet Take 1 tablet (40 mg total) by mouth once daily.    cetirizine (ZYRTEC) 10 MG tablet Take 1 tablet (10 mg total) by mouth once daily.    ergocalciferol (ERGOCALCIFEROL) 50,000 unit Cap Take 1 capsule (50,000 Units total) by mouth every 7 days.    ferrous sulfate 325 (65 FE) MG EC tablet Take 1 tablet (325 mg total) by mouth once daily.    ondansetron (ZOFRAN-ODT) 4 MG TbDL Take 1 tablet (4 mg total) by mouth every 4 (four) hours as needed (nausea/vomiting).    pantoprazole (PROTONIX) 40 MG tablet Take 1 tablet (40 mg total) by mouth once daily.    varicella-zoster gE-AS01B, PF, (SHINGRIX) 50 mcg/0.5 mL injection Inject into the muscle.    [DISCONTINUED] aspirin (ECOTRIN) 81 MG EC tablet Take 1 tablet (81 mg total) by mouth once daily.    [DISCONTINUED] clopidogreL (PLAVIX) 75 mg tablet Take 1 tablet (75 mg total) by mouth once daily. for 21 days      Family History       Problem Relation (Age of Onset)    Cancer Father    Stroke Mother          Tobacco Use    Smoking status: Never    Smokeless tobacco: Never   Substance and Sexual Activity    Alcohol use: No    Drug use: No    Sexual activity: Not on file     Review of Systems  Objective:     Vital Signs (Most Recent):  Temp: 98.8 °F (37.1 °C) (12/12/23 1118)  Pulse: 93 (12/12/23 1211)  Resp: 18 (12/12/23 1211)  BP: 115/70 (12/12/23 1211)  SpO2: 96 % (12/12/23 1211) Vital Signs (24h Range):  Temp:  [98.8 °F (37.1 °C)] 98.8 °F (37.1 °C)  Pulse:  [] 93  Resp:  [18-26] 18  SpO2:  [90 %-97 %] 96 %  BP: ()/(65-80) 115/70     Weight: 86.2 kg (190 lb)  Body mass index is 34.75 kg/m².     Physical Exam  Vitals reviewed.   Constitutional:       Appearance: Normal appearance. She is obese. She is not ill-appearing.   Cardiovascular:      Rate and Rhythm: Regular rhythm. Tachycardia present.      Pulses: Normal pulses.      Heart sounds: No murmur heard.  Pulmonary:      Effort: Pulmonary effort is normal. No respiratory distress.   Abdominal:      Comments: Distended, G tube in abdomen with mild drainage surrounding site. Minimal BS present   Genitourinary:     Comments: Haskins in place with brown, murky urine  Musculoskeletal:         General: No swelling.   Skin:     General: Skin is warm.   Neurological:      Mental Status: She is lethargic.      Comments: Resting comfortable but minimally responsive. Does not speak at baseline per daughter                Significant Labs: All pertinent labs within the past 24 hours have been reviewed.    Significant Imaging: I have reviewed all pertinent imaging results/findings within the past 24 hours.

## 2023-12-12 NOTE — ASSESSMENT & PLAN NOTE
Lethargic with history of CVA and now with sepsis and small-bowel obstruction.  Treat underlying issues at this time.

## 2023-12-12 NOTE — ASSESSMENT & PLAN NOTE
This patient does have evidence of infective focus.My overall impression is sepsis.  Source: Urinary Tract  Antibiotics given-   Antibiotics (72h ago, onward)      Start     Stop Route Frequency Ordered    12/12/23 1515  piperacillin-tazobactam (ZOSYN) 4.5 g in dextrose 5 % in water (D5W) 100 mL IVPB (MB+)         -- IV Every 8 hours (non-standard times) 12/12/23 1411          Latest lactate reviewed-  Recent Labs   Lab 12/12/23  1104   LACTATE 2.3*     Organ dysfunction indicated by Encephalopathy    Fluid challenge Ideal Body Weight- The patient's ideal body weight is Ideal body weight: 50.1 kg (110 lb 7.2 oz) which will be used to calculate fluid bolus of 30 ml/kg for treatment of septic shock.      Post- resuscitation assessment Yes Perfusion exam was performed within 6 hours of septic shock presentation after bolus shows Adequate tissue perfusion assessed by non-invasive monitoring       Will Not start Pressors- Levophed for MAP of 65  Source control achieved by: IV antibiotics

## 2023-12-12 NOTE — ED PROVIDER NOTES
Encounter Date: 12/12/2023    SCRIBE #1 NOTE: I, Elisha Wilkins, am scribing for, and in the presence of,  Reynold Odonnell Jr., MD. I have scribed the following portions of the note - Other sections scribed: HPI, ROS.       History     Chief Complaint   Patient presents with    Vomiting     EMS called to 74yo female that staff at [facility] reports has had N/V x2 days. Also reported to Ems that she is at her baseline mentally from previous CVA.      CC: vomiting    HPI: This is a 73 y.o.female patient, with a PMHx of COVID-19, HTN, and Stroke (left-sided weakness), presenting to the ED via EMS from [facility] for further evaluation of nausea and vomiting beginning 2 days ago. History obtained from independent historian: EMS personnel and patient's daughter. EMS states patient is at baseline mental status. Daughter reports she is unaware of what happened as she just received a call from the nursing home saying they are bringing the patient to the hospital. Daughter reports patient has a PEG tube in place. No prior Tx. No known drug allergies.        The history is provided by the EMS personnel, the nursing home and a relative (daughter). No  was used.     Review of patient's allergies indicates:  No Known Allergies  Past Medical History:   Diagnosis Date    COVID-19 09/09/2021    Essential hypertension 6/12/2015    Obese     Obesity (BMI 35.0-39.9) 6/12/2015    Stroke 06/2015    Left side weakness    Stroke-like symptoms 09/17/2021    NEW LEFT FACIAL DROOP, LEFT SIDE WEAKNESS & SLURRED SPEECH    Wheelchair dependence     Wheelchair dependence      Past Surgical History:   Procedure Laterality Date    ESOPHAGOGASTRODUODENOSCOPY N/A 7/5/2022    Procedure: EGD (ESOPHAGOGASTRODUODENOSCOPY);  Surgeon: Wilma Munguia MD;  Location: [facility];  Service: Endoscopy;  Laterality: N/A;    HYSTERECTOMY       Family History   Problem Relation Age of Onset    Stroke Mother     Cancer Father       Social History     Tobacco Use    Smoking status: Never    Smokeless tobacco: Never   Substance Use Topics    Alcohol use: No    Drug use: No     Review of Systems   Unable to perform ROS: Acuity of condition   Gastrointestinal:  Positive for nausea and vomiting.       Physical Exam     Initial Vitals   BP Pulse Resp Temp SpO2   12/12/23 0956 12/12/23 0956 12/12/23 0956 12/12/23 1118 12/12/23 0956   103/65 (!) 135 (!) 24 98.8 °F (37.1 °C) (!) 94 %      MAP       --                Physical Exam    Nursing note and vitals reviewed.  Constitutional: She appears well-developed. She appears distressed.   Patient is lying in bed with her head tilted forward and to the right.  She is staring straight ahead.   HENT:   Head: Normocephalic and atraumatic.   Right Ear: External ear normal.   Left Ear: External ear normal.   Nose: Nose normal.   Mouth/Throat: Oropharynx is clear and moist.   Eyes: Conjunctivae and EOM are normal. Pupils are equal, round, and reactive to light. Right eye exhibits no discharge. Left eye exhibits no discharge. No scleral icterus.   Neck: Neck supple.   Normal range of motion.  Cardiovascular:  Regular rhythm, normal heart sounds and intact distal pulses.           No murmur heard.  Tachycardic, regular rhythm.   Pulmonary/Chest: Breath sounds normal. No respiratory distress. She has no wheezes. She has no rhonchi. She has no rales.   Abdominal: Abdomen is soft. She exhibits distension. She exhibits no mass. There is no abdominal tenderness.   Abdomen is distended.  Bowel sounds are decreased.  No palpable hernias.  No surgical scars.  PEG tube in place. There is no rebound and no guarding.   Musculoskeletal:         General: No edema.      Cervical back: Normal range of motion and neck supple.     Neurological:   Patient is nonverbal and staring straight ahead.  Daughter at bedside reports this is baseline.   Skin: Skin is warm and dry. No rash noted. No erythema. No pallor.         ED Course    Critical Care    Date/Time: 12/12/2023 1:31 PM    Performed by: Reynodl Odonnell Jr., MD  Authorized by: Reynold Odonnell Jr., MD  Direct patient critical care time: 10 minutes  Additional history critical care time: 10 minutes  Ordering / reviewing critical care time: 10 minutes  Documentation critical care time: 5 minutes  Consulting other physicians critical care time: 5 minutes  Consult with family critical care time: 5 minutes  Total critical care time (exclusive of procedural time) : 45 minutes  Critical care was necessary to treat or prevent imminent or life-threatening deterioration of the following conditions: sepsis.  Critical care was time spent personally by me on the following activities: development of treatment plan with patient or surrogate, discussions with consultants, interpretation of cardiac output measurements, evaluation of patient's response to treatment, examination of patient, obtaining history from patient or surrogate, ordering and performing treatments and interventions, ordering and review of laboratory studies, ordering and review of radiographic studies, pulse oximetry, re-evaluation of patient's condition and review of old charts.        Labs Reviewed   CBC W/ AUTO DIFFERENTIAL - Abnormal; Notable for the following components:       Result Value    WBC 32.07 (*)     RBC 5.75 (*)     MCV 77 (*)     MCH 25.6 (*)     RDW 14.8 (*)     Immature Granulocytes 0.8 (*)     Gran # (ANC) 27.4 (*)     Immature Grans (Abs) 0.25 (*)     Mono # 3.2 (*)     Gran % 85.5 (*)     Lymph % 3.5 (*)     All other components within normal limits   COMPREHENSIVE METABOLIC PANEL - Abnormal; Notable for the following components:    Potassium 3.4 (*)     CO2 32 (*)     Glucose 154 (*)     BUN 63 (*)     All other components within normal limits   URINALYSIS, REFLEX TO URINE CULTURE - Abnormal; Notable for the following components:    Color, UA Brown (*)     Appearance, UA Cloudy (*)     Protein, UA 2+  (*)     Occult Blood UA 3+ (*)     Leukocytes, UA 3+ (*)     All other components within normal limits    Narrative:     Specimen Source->Urine   LACTIC ACID, PLASMA - Abnormal; Notable for the following components:    Lactate (Lactic Acid) 2.3 (*)     All other components within normal limits   URINALYSIS MICROSCOPIC - Abnormal; Notable for the following components:    RBC, UA >100 (*)     WBC, UA >100 (*)     Bacteria Many (*)     All other components within normal limits    Narrative:     Specimen Source->Urine   CULTURE, URINE   CULTURE, BLOOD   CULTURE, BLOOD   LIPASE   PROCALCITONIN   LACTIC ACID, PLASMA     EKG Readings: (Independently Interpreted)   Initial Reading: No STEMI. Ectopy: No Ectopy.   EKG reviewed and interpreted by me shows sinus tachycardia with PACs.  Rate is 128 beats per minute.  The UT, QRS, and QTC intervals are grossly within normal limits.  There is a left axis deviation.  There is poor R-wave progression across the precordium.  There are no ST segment or T-wave ischemic findings.         Imaging Results               CT Abdomen Pelvis With IV Contrast NO Oral Contrast (Final result)  Result time 12/12/23 13:21:39      Final result by Shiraz Isbell MD (12/12/23 13:21:39)                   Impression:      1. Findings in keeping with acute mechanical small bowel obstruction, with point of transition in the deep midline/right paramedian lower abdomen/pelvis with proximity to intraluminal foreign body favored to represent dislodged gastrostomy tube apparatus component within small bowel loop just proximal to transition point.  2. There is a staghorn type calculus measuring on the order of 20 mm in aggregate at the mid to lower pole of the right kidney contributing to mild hydronephrosis.  There is urothelial thickening hyperenhancement in keeping with age indeterminate inflammation.  3. Urinary bladder decompressed about Haskins catheter.  Nondependent air within the bladder could  relate to recent Haskins catheter placement and/or manipulation.  Nonspecific mucosal hyperemia of the urinary bladder wall could reflect acute inflammation in appropriate clinical context.  4. Large volume stool within the distal colon rectum.  Nonspecific mild wall thickening and inflammatory changes in the adjoining fat planes could indicate stercoral proctitis in the appropriate clinical context.  5. Mild wedging of the L1 vertebral body superior endplate appears new when compared to the 02/18/2022 CT of the abdomen and pelvis, but is otherwise age indeterminate.  Recommend clinical correlation.  MRI could be considered for more precise characterization of this fracture.  6. Additional details, as provided in the body of the report.  This report was flagged in Epic as abnormal.      Electronically signed by: Shiraz Isbell  Date:    12/12/2023  Time:    13:21               Narrative:    EXAMINATION:  CT ABDOMEN PELVIS WITH IV CONTRAST    CLINICAL HISTORY:  Abdominal pain, acute, nonlocalized;    TECHNIQUE:  Low dose axial images, sagittal and coronal reformations were obtained from the lung bases to the pubic symphysis following the IV administration of 100 mL of Omnipaque 350    COMPARISON:  CT of the abdomen and pelvis performed 02/18/2022.    FINDINGS:  Lower chest: Trace bilateral pleural effusions.  Atelectasis and scar is noted in both lungs.    Liver: Unremarkable.    Gallbladder and bile ducts: Unremarkable. No biliary ductal dilatation.    Pancreas: Unremarkable.    Spleen: Unremarkable.    Adrenals: Unremarkable.    Kidneys: On the right, there is a staghorn type calculus measuring on the order of 20 mm in aggregate at the mid to lower pole contributing to mild hydronephrosis.  There is urothelial thickening hyperenhancement in keeping with age indeterminate inflammation.    On the left, no definite radiopaque urolithiasis or obstructive uropathy.  Nonspecific curvilinear cortically based calcifications  at the lateral mid to upper pole left kidney.    Lymph nodes: No abdominal or pelvic lymphadenopathy.    Bowel and mesentery: Dilated fluid-filled small bowel loops are noted measuring up to 45 mm there is marked gastric distension of the stomach as well.  There appears to be a transition to decompressed small bowel in the deep midline/right paramedian lower pelvis (axial series 2, image 135).In this region, there appears to be intraluminal foreign body within dilated small bowel just proximal to the transition point, which is favored related to gastrostomy tube apparatus.    Large volume stool noted within the distal colon and rectum with mild rectal wall thickening.  Relatively modest inflammatory changes are noted in the perirectal fat planes.  Colonic diverticulosis is noted.  Unremarkable appearance of the appendix.    Abdominal aorta: Nonaneurysmal.  Moderate atherosclerotic calcifications.    Inferior vena cava: Unremarkable.    Free fluid or free air: No definite free air.  Small volume simple appearing free fluid dependently within the pelvis.    Pelvis: Urinary bladder decompressed about Haskins catheter.  Nondependent air within the bladder could relate to recent Haskins catheter placement and/or manipulation.  Nonspecific mucosal hyperemia of the urinary bladder wall could reflect acute inflammation in appropriate clinical context.    Body wall: No definite drainable fluid collection or inflammatory changes noted in the anterior abdominal wall about the extra-portion of the gastrostomy tube.  The gastrostomy tube balloon appears deflated.    Bones: Query osseous demineralization.  Degenerative changes are noted involving the spine.    There is new mild wedging of the L1 vertebral body superior endplate without discrete fracture lines, when compared to the 02/18/2022 CT of the abdomen pelvis.  No definite retropulsion.                                       Medications   lactated ringers bolus 1,000 mL (1,000  mLs Intravenous New Bag 12/12/23 1400)   sodium chloride 0.9% flush 10 mL (has no administration in time range)   naloxone 0.4 mg/mL injection 0.02 mg (has no administration in time range)   glucose chewable tablet 16 g (has no administration in time range)   glucose chewable tablet 24 g (has no administration in time range)   glucagon (human recombinant) injection 1 mg (has no administration in time range)   ondansetron injection 4 mg (has no administration in time range)   dextrose 10% bolus 125 mL 125 mL (has no administration in time range)   dextrose 10% bolus 250 mL 250 mL (has no administration in time range)   glycerin 99.5% topical solution 100 mL (has no administration in time range)     And   magnesium citrate solution 296 mL (has no administration in time range)     And   sodium chloride 0.9% bolus 500 mL 500 mL (has no administration in time range)   piperacillin-tazobactam (ZOSYN) 4.5 g in dextrose 5 % in water (D5W) 100 mL IVPB (MB+) (has no administration in time range)   glycerin 99.5% topical solution 100 mL (has no administration in time range)     And   magnesium citrate solution 296 mL (has no administration in time range)     And   sodium chloride 0.9% bolus 500 mL 500 mL (has no administration in time range)   lactated ringers bolus 1,000 mL (0 mLs Intravenous Stopped 12/12/23 1205)   ondansetron injection 4 mg (4 mg Intravenous Given 12/12/23 1102)   cefTRIAXone (ROCEPHIN) 1 g in dextrose 5 % in water (D5W) 100 mL IVPB (MB+) (0 g Intravenous Stopped 12/12/23 1324)   iohexoL (OMNIPAQUE 350) injection 100 mL (100 mLs Intravenous Given 12/12/23 1157)     Medical Decision Making  This is the emergent evaluation of a 73-year-old female who presents emergency department from [facility] did 2 days of nausea and vomiting.  Patient has PEG tube in place.  This was recently changed, per daughter at bedside.  Differential diagnosis at the time of initial evaluation included, but was not  limited to:  Small-bowel obstruction, intra-abdominal infection, pancreatitis, viral illness, acute kidney injury, other metabolic derangement, dehydration.       This patient has a pronounced leukocytosis with a WBC of 32,000.  Lactic acid is elevated.  Patient has obvious urinary tract infection which I have ordered antibiotics for.  Additionally, patient has findings on CT scan of small-bowel obstruction due to dislodgement of piece of gastrostomy tube that is lodged in the small bowel.  Case discussed with general surgery who plans to take the patient to the operating room given multiple medical comorbidities, recommending Internal Medicine admission.  I will discuss case with the hospitalist.    1:37 PM general surgery evaluated the patient.  Patient's daughter reports that patient likely would not want to live this way.  She notes that the patient is on Plavix.  General surgery recommends large volume enema due to stool burden and possibly Gastrografin given to see if this might possibly dislodge the obstruction.  Dr. Villa accepting patient to ICU for close monitoring.    Amount and/or Complexity of Data Reviewed  Independent Historian: caregiver and EMS     Details: daughter  Labs: ordered.  Radiology: ordered and independent interpretation performed. Decision-making details documented in ED Course.  ECG/medicine tests: ordered and independent interpretation performed. Decision-making details documented in ED Course.    Risk  OTC drugs.  Prescription drug management.  Decision regarding hospitalization.            Scribe Attestation:   Scribe #1: I performed the above scribed service and the documentation accurately describes the services I performed. I attest to the accuracy of the note.                               Clinical Impression:  Final diagnoses:  [R11.10] Vomiting  [K56.609] Small bowel obstruction (Primary)  [N39.0] Urinary tract infection without hematuria, site unspecified          ED  Disposition Condition    Admit              I, Reynold Odonnell MD, personally performed the services described in this documentation. All medical record entries made by the scribe were at my direction and in my presence. I have reviewed the chart and agree that the record reflects my personal performance and is accurate and complete.       Reynold Odonnell Jr., MD  12/12/23 6054       Reynold Odonnell Jr., MD  12/12/23 7937       Reynold Odonnell Jr., MD  12/12/23 3462

## 2023-12-12 NOTE — CONSULTS
[facility] Emergency Dept  General Surgery  Consult Note    Patient Name: Holly Kidd  MRN: [id number]  Code Status: DNR  Admission Date: 12/12/2023  Hospital Length of Stay: 0 days  Attending Physician: Tylor Villa MD  Primary Care Provider: Estrellita Obregon FNP    Patient information was obtained from relative(s), past medical records, and ER records.     Inpatient consult to General Surgery  Consult performed by: Rosendo Lynch MD  Consult ordered by: Reynold Odonnell Jr., MD  Reason for consult: Bowel Obstruction        Subjective:     Principal Problem: <principal problem not specified>    History of Present Illness: 73-year-old female with significant past medical history including hypertension, pulmonary embolism on long-term anticoagulation, cerebrovascular disease status post stroke was long-term neurologic deficits including wheelchair dependence, facial droop, and dependence upon others for activities of daily living.  She has a G-tube in place for nutrition and medication administration.  This was placed proximally 1 year ago at outside hospital.  One-week ago she had her G-tube exchange at outside hospital without apparent complication.  She presents to our emergency room today from her facility with nausea, vomiting, abdominal distention for the past few days.  Upon evaluation emergency room she was found to have significant leukocytosis of 30 with no evidence source of infection.  CT scan of her abdomen and pelvis reveals moderately distended loops of small bowel with the acute transition point in the mid abdomen which seems to be centered around a piece of an old gastrostomy tube.  Her distal small bowel is decompressed in her colon is relatively decompressed other than a large fecal burden in the rectum.  General surgery was consulted for evaluation.    No current facility-administered medications on file prior to encounter.     Current Outpatient Medications on File Prior to  Encounter   Medication Sig    amLODIPine (NORVASC) 10 MG tablet Take 1 tablet (10 mg total) by mouth once daily. Hold if SBP <120    apixaban (ELIQUIS) 5 mg Tab Take 2 tablets (10 mg total) by mouth 2 (two) times daily. 10mg BID for 7days then switch to 5mg BID    atorvastatin (LIPITOR) 40 MG tablet Take 1 tablet (40 mg total) by mouth once daily.    cetirizine (ZYRTEC) 10 MG tablet Take 1 tablet (10 mg total) by mouth once daily.    ergocalciferol (ERGOCALCIFEROL) 50,000 unit Cap Take 1 capsule (50,000 Units total) by mouth every 7 days.    ferrous sulfate 325 (65 FE) MG EC tablet Take 1 tablet (325 mg total) by mouth once daily.    ondansetron (ZOFRAN-ODT) 4 MG TbDL Take 1 tablet (4 mg total) by mouth every 4 (four) hours as needed (nausea/vomiting).    pantoprazole (PROTONIX) 40 MG tablet Take 1 tablet (40 mg total) by mouth once daily.    varicella-zoster gE-AS01B, PF, (SHINGRIX) 50 mcg/0.5 mL injection Inject into the muscle.    [DISCONTINUED] aspirin (ECOTRIN) 81 MG EC tablet Take 1 tablet (81 mg total) by mouth once daily.    [DISCONTINUED] clopidogreL (PLAVIX) 75 mg tablet Take 1 tablet (75 mg total) by mouth once daily. for 21 days       Review of patient's allergies indicates:  No Known Allergies    Past Medical History:   Diagnosis Date    COVID-19 09/09/2021    Essential hypertension 6/12/2015    Obese     Obesity (BMI 35.0-39.9) 6/12/2015    Stroke 06/2015    Left side weakness    Stroke-like symptoms 09/17/2021    NEW LEFT FACIAL DROOP, LEFT SIDE WEAKNESS & SLURRED SPEECH    Wheelchair dependence     Wheelchair dependence      Past Surgical History:   Procedure Laterality Date    ESOPHAGOGASTRODUODENOSCOPY N/A 7/5/2022    Procedure: EGD (ESOPHAGOGASTRODUODENOSCOPY);  Surgeon: Wilma Munguia MD;  Location: [facility];  Service: Endoscopy;  Laterality: N/A;    HYSTERECTOMY       Family History       Problem Relation (Age of Onset)    Cancer Father    Stroke Mother          Tobacco Use    Smoking  status: Never    Smokeless tobacco: Never   Substance and Sexual Activity    Alcohol use: No    Drug use: No    Sexual activity: Not on file     Review of Systems   Unable to perform ROS: Patient nonverbal     Objective:     Vital Signs (Most Recent):  Temp: 98.8 °F (37.1 °C) (12/12/23 1118)  Pulse: 93 (12/12/23 1211)  Resp: 18 (12/12/23 1211)  BP: 115/70 (12/12/23 1211)  SpO2: 96 % (12/12/23 1211) Vital Signs (24h Range):  Temp:  [98.8 °F (37.1 °C)] 98.8 °F (37.1 °C)  Pulse:  [] 93  Resp:  [18-26] 18  SpO2:  [90 %-97 %] 96 %  BP: ()/(65-80) 115/70     Weight: 86.2 kg (190 lb)  Body mass index is 34.75 kg/m².     Physical Exam  Vitals and nursing note reviewed.   Constitutional:       Appearance: She is obese. She is ill-appearing.      Comments: Patient is nonverbal.  Does not participate in conversation.   HENT:      Head: Normocephalic.      Mouth/Throat:      Mouth: Mucous membranes are dry.      Pharynx: Oropharynx is clear.   Cardiovascular:      Rate and Rhythm: Normal rate.      Pulses: Normal pulses.   Pulmonary:      Effort: Pulmonary effort is normal. No respiratory distress.      Breath sounds: No wheezing.   Abdominal:      General: There is distension.      Tenderness: There is no guarding.      Comments: Abdomen is diffusely distended.  Patient does not respond to tactile stimulation of all 4 quadrants of her abdomen.  She was a G-tube in place with a mild amount of gastric appearing discharge surrounding the skin site.   Musculoskeletal:         General: No swelling or tenderness. Normal range of motion.      Cervical back: Normal range of motion.   Skin:     General: Skin is warm and dry.      Coloration: Skin is not jaundiced or pale.            I have reviewed all pertinent lab results within the past 24 hours.  CBC:   Recent Labs   Lab 12/12/23  1104   WBC 32.07*   RBC 5.75*   HGB 14.7   HCT 44.0      MCV 77*   MCH 25.6*   MCHC 33.4     CMP:   Recent Labs   Lab 12/12/23  1104  "  *   CALCIUM 10.3   ALBUMIN 3.7   PROT 8.3      K 3.4*   CO2 32*   CL 99   BUN 63*   CREATININE 1.0   ALKPHOS 122   ALT 20   AST 27   BILITOT 0.8     Coagulation: No results for input(s): "LABPROT", "INR", "APTT" in the last 168 hours.  Microbiology Results (last 7 days)       Procedure Component Value Units Date/Time    Blood Culture #2 **CANNOT BE ORDERED STAT** [[id number]]     Order Status: Sent Specimen: Blood     Blood Culture #1 **CANNOT BE ORDERED STAT** [[id number]]     Order Status: Sent Specimen: Blood     Urine culture [[id number]] Collected: 12/12/23 1043    Order Status: No result Specimen: Urine Updated: 12/12/23 1112            Significant Diagnostics:  I have reviewed all pertinent imaging results/findings within the past 24 hours.    Assessment/Plan:     Bowel obstruction  73-year-old female with significant past medical history including hypertension, pulmonary embolism on long-term anticoagulation, cerebrovascular disease status post stroke was long-term neurologic deficits including wheelchair dependence, facial droop, and dependence upon others for activities of daily living.  Admitted to the hospital today with a 3 day history of nausea, vomiting, and abdominal distention.  Leukocytosis of 30.  Evidence of mechanical small bowel obstruction with foreign object lodged in the mid small bowel.    - I had a long discussion with the patient's daughter who is her primary decision maker regarding the patient's condition.  Given patient's significant medical comorbidities, current medications, and current clinical status, I do not recommend urgent surgery at this time.  Patient's daughter states that she agrees that her mother would likely not want to undergo surgery, and would not want to live in her current state of health.  - recommend admission to hospital medicine team for further investigation into her leukocytosis and full septic workup including blood cultures, urinary analysis, " chest x-ray.  - recommend initiation of broad-spectrum antibiotic therapy  - after admission we will likely treat her bowel obstruction conservatively.  This would include administration of Gastrografin via G tube as well as an aggressive rectal enema schedule due to her significant stool burden.  If conservative management of her bowel obstruction is not successful, discussed with patient's daughter involving hospice care and the palliative care team.  Patient's daughter is in agreeance.  - recommend palliative care consult  - general surgery will continue to follow closely      VTE Risk Mitigation (From admission, onward)           Ordered     IP VTE HIGH RISK PATIENT  Once         12/12/23 1347     Place sequential compression device  Until discontinued         12/12/23 1347                    Thank you for your consult. I will follow-up with patient. Please contact us if you have any additional questions.    Rosendo Lynch MD  General Surgery  [facility] - Emergency Dept

## 2023-12-12 NOTE — CLINICAL REVIEW
Sepsis Screen (most recent)       Sepsis Screen (IP) - 12/12/23 3750       Is the patient's history or complaint suggestive of a possible infection? Yes  -    Are there at least two of the following signs and symptoms present? Yes  -    Sepsis signs/symptoms - Tachycardia Tachycardia     >90  -    Sepsis signs/symptoms - WBC WBC < 4,000 or WBC > 12,000  -    Are any of the following organ dysfunction criteria present and not considered to be due to a chronic condition? Yes  -    Organ Dysfunction Criteria Lactate > 2.0  -    Organ Dysfunction Criteria - O2 O2 Saturation < 95% on room air  -    Initiate Sepsis Protocol No  -    Reason sepsis not considered Pt. receiving appropriate management  -              User Key  (r) = Recorded By, (t) = Taken By, (c) = Cosigned By      Initials Name    Megan Ramos RN

## 2023-12-12 NOTE — PHARMACY MED REC
"Admission Medication History     The home medication history was taken by Merry Gonzalez CPhT.      You may go to "Admission" then "Reconcile Home Medications" tabs to review and/or act upon these items.     The home medication list has been updated by the Pharmacy department.   Please read ALL comments highlighted in yellow.   Please address this information as you see fit.    Feel free to contact us if you have any questions or require assistance.      The medications listed below were removed from the home medication list. Please reorder if appropriate:  Patient reports no longer taking the following medication(s):  ZYRTEC 10 MG   LIPITOR 40 MG  NORVASC 10 MG  65  MG TAB  ECOTRIN 81 MG TAB  PLAVIX 75 MG       Medications listed below were obtained from: [employer] software- [employer] and Nursing home  (Not in a hospital admission)              Merry Gonzalez CPhT.  [phone]                  .        "

## 2023-12-13 LAB
ALBUMIN SERPL BCP-MCNC: 3.2 G/DL (ref 3.5–5.2)
ALP SERPL-CCNC: 110 U/L (ref 55–135)
ALT SERPL W/O P-5'-P-CCNC: 18 U/L (ref 10–44)
ANION GAP SERPL CALC-SCNC: 16 MMOL/L (ref 8–16)
AST SERPL-CCNC: 25 U/L (ref 10–40)
BASOPHILS # BLD AUTO: 0.02 K/UL (ref 0–0.2)
BASOPHILS NFR BLD: 0.1 % (ref 0–1.9)
BILIRUB SERPL-MCNC: 1.2 MG/DL (ref 0.1–1)
BUN SERPL-MCNC: 51 MG/DL (ref 8–23)
CALCIUM SERPL-MCNC: 9.1 MG/DL (ref 8.7–10.5)
CHLORIDE SERPL-SCNC: 101 MMOL/L (ref 95–110)
CO2 SERPL-SCNC: 28 MMOL/L (ref 23–29)
CREAT SERPL-MCNC: 0.7 MG/DL (ref 0.5–1.4)
DIFFERENTIAL METHOD: ABNORMAL
EOSINOPHIL # BLD AUTO: 0 K/UL (ref 0–0.5)
EOSINOPHIL NFR BLD: 0 % (ref 0–8)
ERYTHROCYTE [DISTWIDTH] IN BLOOD BY AUTOMATED COUNT: 15 % (ref 11.5–14.5)
EST. GFR  (NO RACE VARIABLE): >60 ML/MIN/1.73 M^2
GLUCOSE SERPL-MCNC: 147 MG/DL (ref 70–110)
HCT VFR BLD AUTO: 39.9 % (ref 37–48.5)
HGB BLD-MCNC: 12.9 G/DL (ref 12–16)
IMM GRANULOCYTES # BLD AUTO: 0.08 K/UL (ref 0–0.04)
IMM GRANULOCYTES NFR BLD AUTO: 0.4 % (ref 0–0.5)
LACTATE SERPL-SCNC: 2.2 MMOL/L (ref 0.5–2.2)
LYMPHOCYTES # BLD AUTO: 1.3 K/UL (ref 1–4.8)
LYMPHOCYTES NFR BLD: 5.9 % (ref 18–48)
MAGNESIUM SERPL-MCNC: 2.4 MG/DL (ref 1.6–2.6)
MCH RBC QN AUTO: 25.8 PG (ref 27–31)
MCHC RBC AUTO-ENTMCNC: 32.3 G/DL (ref 32–36)
MCV RBC AUTO: 80 FL (ref 82–98)
MONOCYTES # BLD AUTO: 2.3 K/UL (ref 0.3–1)
MONOCYTES NFR BLD: 10.4 % (ref 4–15)
NEUTROPHILS # BLD AUTO: 18.7 K/UL (ref 1.8–7.7)
NEUTROPHILS NFR BLD: 83.2 % (ref 38–73)
NRBC BLD-RTO: 0 /100 WBC
PHOSPHATE SERPL-MCNC: 3.5 MG/DL (ref 2.7–4.5)
PLATELET # BLD AUTO: 358 K/UL (ref 150–450)
PMV BLD AUTO: 9.9 FL (ref 9.2–12.9)
POCT GLUCOSE: 116 MG/DL (ref 70–110)
POCT GLUCOSE: 120 MG/DL (ref 70–110)
POCT GLUCOSE: 129 MG/DL (ref 70–110)
POCT GLUCOSE: 141 MG/DL (ref 70–110)
POTASSIUM SERPL-SCNC: 2.8 MMOL/L (ref 3.5–5.1)
PROT SERPL-MCNC: 7.1 G/DL (ref 6–8.4)
RBC # BLD AUTO: 5 M/UL (ref 4–5.4)
SODIUM SERPL-SCNC: 145 MMOL/L (ref 136–145)
WBC # BLD AUTO: 22.47 K/UL (ref 3.9–12.7)

## 2023-12-13 PROCEDURE — 99497 ADVNCD CARE PLAN 30 MIN: CPT | Mod: 25,,, | Performed by: STUDENT IN AN ORGANIZED HEALTH CARE EDUCATION/TRAINING PROGRAM

## 2023-12-13 PROCEDURE — 83735 ASSAY OF MAGNESIUM: CPT | Performed by: STUDENT IN AN ORGANIZED HEALTH CARE EDUCATION/TRAINING PROGRAM

## 2023-12-13 PROCEDURE — 63600175 PHARM REV CODE 636 W HCPCS: Performed by: STUDENT IN AN ORGANIZED HEALTH CARE EDUCATION/TRAINING PROGRAM

## 2023-12-13 PROCEDURE — 93010 ELECTROCARDIOGRAM REPORT: CPT | Mod: ,,, | Performed by: INTERNAL MEDICINE

## 2023-12-13 PROCEDURE — 20000000 HC ICU ROOM

## 2023-12-13 PROCEDURE — 94761 N-INVAS EAR/PLS OXIMETRY MLT: CPT

## 2023-12-13 PROCEDURE — 25000003 PHARM REV CODE 250: Performed by: STUDENT IN AN ORGANIZED HEALTH CARE EDUCATION/TRAINING PROGRAM

## 2023-12-13 PROCEDURE — 99497 PR ADVNCD CARE PLAN 30 MIN: ICD-10-PCS | Mod: 25,,, | Performed by: STUDENT IN AN ORGANIZED HEALTH CARE EDUCATION/TRAINING PROGRAM

## 2023-12-13 PROCEDURE — 80053 COMPREHEN METABOLIC PANEL: CPT | Performed by: STUDENT IN AN ORGANIZED HEALTH CARE EDUCATION/TRAINING PROGRAM

## 2023-12-13 PROCEDURE — 93010 EKG 12-LEAD: ICD-10-PCS | Mod: ,,, | Performed by: INTERNAL MEDICINE

## 2023-12-13 PROCEDURE — 99223 1ST HOSP IP/OBS HIGH 75: CPT | Mod: ,,, | Performed by: STUDENT IN AN ORGANIZED HEALTH CARE EDUCATION/TRAINING PROGRAM

## 2023-12-13 PROCEDURE — 83605 ASSAY OF LACTIC ACID: CPT | Performed by: STUDENT IN AN ORGANIZED HEALTH CARE EDUCATION/TRAINING PROGRAM

## 2023-12-13 PROCEDURE — 63600175 PHARM REV CODE 636 W HCPCS: Performed by: HOSPITALIST

## 2023-12-13 PROCEDURE — 93005 ELECTROCARDIOGRAM TRACING: CPT

## 2023-12-13 PROCEDURE — 84100 ASSAY OF PHOSPHORUS: CPT | Performed by: STUDENT IN AN ORGANIZED HEALTH CARE EDUCATION/TRAINING PROGRAM

## 2023-12-13 PROCEDURE — 36415 COLL VENOUS BLD VENIPUNCTURE: CPT | Performed by: STUDENT IN AN ORGANIZED HEALTH CARE EDUCATION/TRAINING PROGRAM

## 2023-12-13 PROCEDURE — 85025 COMPLETE CBC W/AUTO DIFF WBC: CPT | Performed by: STUDENT IN AN ORGANIZED HEALTH CARE EDUCATION/TRAINING PROGRAM

## 2023-12-13 PROCEDURE — 99223 PR INITIAL HOSPITAL CARE,LEVL III: ICD-10-PCS | Mod: ,,, | Performed by: STUDENT IN AN ORGANIZED HEALTH CARE EDUCATION/TRAINING PROGRAM

## 2023-12-13 RX ORDER — HYDROMORPHONE HYDROCHLORIDE 1 MG/ML
0.5 INJECTION, SOLUTION INTRAMUSCULAR; INTRAVENOUS; SUBCUTANEOUS EVERY 6 HOURS PRN
Status: DISCONTINUED | OUTPATIENT
Start: 2023-12-13 | End: 2023-12-20 | Stop reason: HOSPADM

## 2023-12-13 RX ORDER — POTASSIUM CHLORIDE 7.45 MG/ML
40 INJECTION INTRAVENOUS
Status: DISCONTINUED | OUTPATIENT
Start: 2023-12-13 | End: 2023-12-13

## 2023-12-13 RX ORDER — HYDROMORPHONE HYDROCHLORIDE 1 MG/ML
1 INJECTION, SOLUTION INTRAMUSCULAR; INTRAVENOUS; SUBCUTANEOUS ONCE
Status: COMPLETED | OUTPATIENT
Start: 2023-12-13 | End: 2023-12-13

## 2023-12-13 RX ORDER — POTASSIUM CHLORIDE 7.45 MG/ML
80 INJECTION INTRAVENOUS
Status: DISCONTINUED | OUTPATIENT
Start: 2023-12-13 | End: 2023-12-13

## 2023-12-13 RX ORDER — POTASSIUM CHLORIDE 7.45 MG/ML
40 INJECTION INTRAVENOUS
Status: DISCONTINUED | OUTPATIENT
Start: 2023-12-13 | End: 2023-12-20 | Stop reason: HOSPADM

## 2023-12-13 RX ORDER — SYRING-NEEDL,DISP,INSUL,0.3 ML 29 G X1/2"
296 SYRINGE, EMPTY DISPOSABLE MISCELLANEOUS
Status: COMPLETED | OUTPATIENT
Start: 2023-12-13 | End: 2023-12-13

## 2023-12-13 RX ORDER — POTASSIUM CHLORIDE 7.45 MG/ML
60 INJECTION INTRAVENOUS
Status: DISCONTINUED | OUTPATIENT
Start: 2023-12-13 | End: 2023-12-13

## 2023-12-13 RX ORDER — CALCIUM GLUCONATE 20 MG/ML
2 INJECTION, SOLUTION INTRAVENOUS
Status: DISCONTINUED | OUTPATIENT
Start: 2023-12-13 | End: 2023-12-20 | Stop reason: HOSPADM

## 2023-12-13 RX ORDER — MAGNESIUM SULFATE HEPTAHYDRATE 40 MG/ML
4 INJECTION, SOLUTION INTRAVENOUS
Status: DISCONTINUED | OUTPATIENT
Start: 2023-12-13 | End: 2023-12-20 | Stop reason: HOSPADM

## 2023-12-13 RX ORDER — CALCIUM GLUCONATE 20 MG/ML
3 INJECTION, SOLUTION INTRAVENOUS
Status: DISCONTINUED | OUTPATIENT
Start: 2023-12-13 | End: 2023-12-20 | Stop reason: HOSPADM

## 2023-12-13 RX ORDER — MAGNESIUM SULFATE HEPTAHYDRATE 40 MG/ML
2 INJECTION, SOLUTION INTRAVENOUS
Status: DISCONTINUED | OUTPATIENT
Start: 2023-12-13 | End: 2023-12-20 | Stop reason: HOSPADM

## 2023-12-13 RX ORDER — METOPROLOL TARTRATE 1 MG/ML
5 INJECTION, SOLUTION INTRAVENOUS EVERY 5 MIN PRN
Status: DISCONTINUED | OUTPATIENT
Start: 2023-12-13 | End: 2023-12-20 | Stop reason: HOSPADM

## 2023-12-13 RX ORDER — CALCIUM GLUCONATE 20 MG/ML
1 INJECTION, SOLUTION INTRAVENOUS
Status: DISCONTINUED | OUTPATIENT
Start: 2023-12-13 | End: 2023-12-20 | Stop reason: HOSPADM

## 2023-12-13 RX ORDER — PSEUDOEPHEDRINE/ACETAMINOPHEN 30MG-500MG
100 TABLET ORAL
Status: COMPLETED | OUTPATIENT
Start: 2023-12-13 | End: 2023-12-13

## 2023-12-13 RX ADMIN — HYDROMORPHONE HYDROCHLORIDE 1 MG: 1 INJECTION, SOLUTION INTRAMUSCULAR; INTRAVENOUS; SUBCUTANEOUS at 02:12

## 2023-12-13 RX ADMIN — SODIUM CHLORIDE: 9 INJECTION, SOLUTION INTRAVENOUS at 01:12

## 2023-12-13 RX ADMIN — SODIUM CHLORIDE: 9 INJECTION, SOLUTION INTRAVENOUS at 08:12

## 2023-12-13 RX ADMIN — PIPERACILLIN AND TAZOBACTAM 4.5 G: 4; .5 INJECTION, POWDER, LYOPHILIZED, FOR SOLUTION INTRAVENOUS; PARENTERAL at 11:12

## 2023-12-13 RX ADMIN — POTASSIUM CHLORIDE 10 MEQ: 7.46 INJECTION, SOLUTION INTRAVENOUS at 05:12

## 2023-12-13 RX ADMIN — Medication 100 ML: at 01:12

## 2023-12-13 RX ADMIN — POTASSIUM CHLORIDE 10 MEQ: 7.46 INJECTION, SOLUTION INTRAVENOUS at 09:12

## 2023-12-13 RX ADMIN — PIPERACILLIN AND TAZOBACTAM 4.5 G: 4; .5 INJECTION, POWDER, LYOPHILIZED, FOR SOLUTION INTRAVENOUS; PARENTERAL at 12:12

## 2023-12-13 RX ADMIN — PIPERACILLIN AND TAZOBACTAM 4.5 G: 4; .5 INJECTION, POWDER, LYOPHILIZED, FOR SOLUTION INTRAVENOUS; PARENTERAL at 03:12

## 2023-12-13 RX ADMIN — METOROPROLOL TARTRATE 5 MG: 5 INJECTION, SOLUTION INTRAVENOUS at 04:12

## 2023-12-13 RX ADMIN — SODIUM CHLORIDE: 9 INJECTION, SOLUTION INTRAVENOUS at 10:12

## 2023-12-13 RX ADMIN — POTASSIUM CHLORIDE 10 MEQ: 7.46 INJECTION, SOLUTION INTRAVENOUS at 11:12

## 2023-12-13 RX ADMIN — PIPERACILLIN AND TAZOBACTAM 4.5 G: 4; .5 INJECTION, POWDER, LYOPHILIZED, FOR SOLUTION INTRAVENOUS; PARENTERAL at 07:12

## 2023-12-13 RX ADMIN — POTASSIUM CHLORIDE 10 MEQ: 7.46 INJECTION, SOLUTION INTRAVENOUS at 04:12

## 2023-12-13 RX ADMIN — BE HEALTH MAGNESIUM CITRATE ORAL SOLUTION - LEMON 296 ML: 1.75 LIQUID ORAL at 01:12

## 2023-12-13 NOTE — CONSULTS
"[facility] - Intensive Care  Palliative Medicine  Consult Note    Patient Name: Holly Kidd  MRN: [id number]  Admission Date: 12/12/2023  Hospital Length of Stay: 1 days  Code Status: DNR   Attending Provider: Bari Hermosillo III, MD  Consulting Provider: Donald Sandoval MD  Primary Care Physician: Estrellita Obregon FNP  Principal Problem:Sepsis    Patient information was obtained from relative(s), past medical records, and primary team.      Inpatient consult to Palliative Care  Consult performed by: Donald Sandoval MD  Consult ordered by: Bari Hermosillo III, MD  Reason for consult: goals of care        Assessment/Plan:     Neuro  Cerebrovascular disease  - noted hx of stroke 2022   - bedbound since and with PEG  - dependent for ADLs  - was living at [facility]    Renal/  Acute cystitis  - noted  - abx per primary team     Oncology  Leukocytosis  - noted  - abx per primary team    GI  Bowel obstruction  - noted   - "The patient was recently seen at [facility] for dislodged PEG tube on 12/03/2023.  They were not able to remove it so the PEG tube was cut and umbrella left inside with expectation of it to be passed in her stool."  - daughter understandably frustrated  - surgery following, talked with family with plan for non-surgical interventions such as enema +/- gastrograffin  - management per surgery and primary team     Palliative Care  Advanced care/goals of care planning/counseling discussion  12/13/2023:  - chart reviewed in depth  - discussed with primary team and bedside nurse  - patient seen at bedside, she was sleeping and did not open eyes to verbal or gentle physical stimuli  - son at bedside stating that is how she has been for him too  - he states that at baseline she is very selective on who she engages with. Has not talked with him but did recognize and talk with the  in the hospital last night.   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 Loves being around children   - discussed the concern with her SBO and hope for improvement and what things look like if there is improvement (back to nursing home with hospice) vs if there isn't (? Transition to comfort focused care). Discussed importance of taking things one day and one step at a time. He is going to discuss further with his sister too step by step.   - after visit proceeded to talk with patients daughter Clementina who used to be her primary caregiver prior to shift to nursing home around a year ago  - she shared that patient was always selective on who she engaged with and when she last saw her 2 weeks ago talked some with her and held her hand. Recognized her and was awake. Unable to have a consistent meaningful conversation even at that time.   - she shared her feeling that although patient was bed bound and generally preferred to not be bothered, things truly started going more downhill after the situation at [facility] he feeding tube, where part of it was left to be defecated out.   - she shared her frustrations, including about her worries regarding the care she gets at the nursing home  - allowed her a safe space to vent. Provided emotional support and listening ear  - we talked through the difficulty of the situation and where her mother is now  - she has a strong understanding of her mothers medical on lisa in regards to the SBO and UTI. She had talked with ED/surgery last night.   - made her aware of elevated HR episode last night resolved with pain medication. Shared difficulty in management as patient cannot consistently share when she is in discomfort and difficulty with pain medication slowing the bowels. Thereby important to discuss further regarding comfort focused care if no improvement of condition and seems to be  "having excessive discomfort/pain.   - she shared her mother preference was to focus on her comfort/qol, not wanting to be bothered too much  - we talked through where we are now with the concern regarding the SBO and what things look like if there is improvement  (back to nursing home, but with hospice) vs if there isn't which would include consideration of possible transition to comfort focused care if she seems to be suffering and outlook remaining poor. She is aware of blood pressure being monitored incase pressors become needed, per primary team. Lana stated her understanding and will be by to see her later today. We talked through importance of taking things one day and one step at a time.   [] continue with current management with hope of improvement of condition, but with limits to invasive interventions  [] if has improvement of condition, family will need hospice information session for likely arrangement of hospice support at nursing home if gets to point of discharge.   [] if does not have improvement of condition as hopes or deteriorates, important to discuss further with family regarding possible transition to comfort focused care inpatient to ensure she has the medication she needs for management of her pain/discomfort given opioids use difficulty in SBO. Does not seem family feels patient would want overly invasive interventions and would not want to go through too much suffering. They will assess day by day.   [] management of SBO per primary team and surgery team        Thank you for your consult. I will follow-up with patient. Please contact us if you have any additional questions.    Subjective:     HPI:   Per H&P: "Ms. Kidd is a 73-year-old female with a past medical history of CVA, dementia, peg tube placement, hypertension, DVT/PE on Eliquis who presents to the emergency department for evaluation of intractable nausea and vomiting.  Patient is not able to give history so all of the " "history was obtained from the ED physician and medical records.  The patient was recently seen at [facility] for dislodged PEG tube on 12/03/2023.  They were not able to remove it so the PEG tube was cut an umbrella left side with expectation of it to be passed in her stool.  In the emergency department, she was found to have WBC of 32,000, BUN 63 and lactate 2.3.  Urinalysis appeared brown with significant WBC greater than 100 and bacteria.  CT abdomen and pelvis with findings of acute mechanical small-bowel obstruction with point of transition in the deep midline/right paramedian lower abdomen/pelvis with proximity to intraluminal foreign body favored to represent dislodged G-tube apparatus component within small bowel loop just proximal to transition point.  She does have large volume stool distal colon/rectum.  General surgery consulted however given patient is on aspirin, Plavix and Eliquis, patient is high risk for intervention at this time.  Recommendation include supportive care, antibiotics and enema to help with stool burden.  Daughter is at bedside and discussed plan of care.  Patient is DNR at this time."    Palliative medicine consulted for assistance with GOC.     Hospital Course:  No notes on file      Past Medical History:   Diagnosis Date    COVID-19 09/09/2021    Essential hypertension 6/12/2015    Obese     Obesity (BMI 35.0-39.9) 6/12/2015    Stroke 06/2015    Left side weakness    Stroke-like symptoms 09/17/2021    NEW LEFT FACIAL DROOP, LEFT SIDE WEAKNESS & SLURRED SPEECH    Wheelchair dependence     Wheelchair dependence        Past Surgical History:   Procedure Laterality Date    ESOPHAGOGASTRODUODENOSCOPY N/A 7/5/2022    Procedure: EGD (ESOPHAGOGASTRODUODENOSCOPY);  Surgeon: Wilma Mnuguia MD;  Location: [facility];  Service: Endoscopy;  Laterality: N/A;    HYSTERECTOMY         Review of patient's allergies indicates:  No Known Allergies    Medications:  Continuous Infusions:   sodium chloride " 0.9% 100 mL/hr at 12/13/23 1036     Scheduled Meds:   piperacillin-tazobactam (Zosyn) IV (PEDS and ADULTS) (extended infusion is not appropriate)  4.5 g Intravenous Q8H     PRN Meds:calcium gluconate IVPB, calcium gluconate IVPB, calcium gluconate IVPB, dextrose 10%, dextrose 10%, glucagon (human recombinant), glucose, glucose, HYDROmorphone, magnesium sulfate IVPB, magnesium sulfate IVPB, metoprolol, naloxone, ondansetron, potassium chloride **AND** potassium chloride **AND** potassium chloride, sodium chloride 0.9%, sodium phosphate 15 mmol in dextrose 5 % (D5W) 250 mL IVPB, sodium phosphate 20.01 mmol in dextrose 5 % (D5W) 250 mL IVPB, sodium phosphate 30 mmol in dextrose 5 % (D5W) 250 mL IVPB    Family History       Problem Relation (Age of Onset)    Cancer Father    Stroke Mother          Tobacco Use    Smoking status: Never    Smokeless tobacco: Never   Substance and Sexual Activity    Alcohol use: No    Drug use: No    Sexual activity: Not on file       Objective:     Vital Signs (Most Recent):  Temp: 97.7 °F (36.5 °C) (12/13/23 0715)  Pulse: 82 (12/13/23 1000)  Resp: 16 (12/13/23 1000)  BP: (!) 102/56 (12/13/23 1000)  SpO2: 98 % (12/13/23 1000) Vital Signs (24h Range):  Temp:  [97.7 °F (36.5 °C)-98.9 °F (37.2 °C)] 97.7 °F (36.5 °C)  Pulse:  [] 82  Resp:  [8-26] 16  SpO2:  [90 %-98 %] 98 %  BP: ()/(49-80) 102/56     Weight: 85.5 kg (188 lb 7.9 oz)  Body mass index is 34.48 kg/m².       Physical Exam  Vitals and nursing note reviewed.   Constitutional:       Comments: somnolent   HENT:      Head: Normocephalic and atraumatic.   Eyes:      Comments: Eyes remained closed   Pulmonary:      Effort: No respiratory distress.   Neurological:      Comments: Somnolent. Did not open eyes or respond to verbal or gentle physical stimuli.             Review of Symptoms        Psychosocial/Cultural:   See Palliative Psychosocial Note: Yes  - was living at [facility]   - has 3 children of whom Mik  addison Mcrae are involved   **Primary  to Follow**  Palliative Care  Consult: No        Advance Care Planning  Advance Directives:   Do Not Resuscitate Status: Yes      Decision Making:  Patient unable to communicate due to disease severity/cognitive impairment  Goals of Care: The family endorses that what is most important right now is to focus on improvement in condition but with limits to invasive therapies    Accordingly, we have decided that the best plan to meet the patient's goals includes continuing with treatment in hope of improvement.       Significant Labs: reviewed  CBC:   Recent Labs   Lab 12/13/23 0338   WBC 22.47*   HGB 12.9   HCT 39.9   MCV 80*        BMP:  Recent Labs   Lab 12/13/23 0338   *      K 2.8*      CO2 28   BUN 51*   CREATININE 0.7   CALCIUM 9.1   MG 2.4     LFT:  Lab Results   Component Value Date    AST 25 12/13/2023    ALKPHOS 110 12/13/2023    BILITOT 1.2 (H) 12/13/2023     Albumin:   Albumin   Date Value Ref Range Status   12/13/2023 3.2 (L) 3.5 - 5.2 g/dL Final     Protein:   Total Protein   Date Value Ref Range Status   12/13/2023 7.1 6.0 - 8.4 g/dL Final     Lactic acid:   Lab Results   Component Value Date    LACTATE 2.2 12/13/2023    LACTATE 4.1 (HH) 12/12/2023       Significant Imaging: reviewed      70 minutes face to face time in discussion of symptom assessment, coordination of care, exploring burden/ benefit of various approaches of treatment and discharge planning.  This also includes non-face to face time preparing to see the patient (eg, review of tests/imaging), obtaining and/or reviewing separately obtained history, documenting clinical information in the electronic or other health record, independently interpreting results and communicating results to the patient/family/caregiver, or care coordinator.     16 minutes ACP time spent: goals of care, emotional support, formulating goals and communication of  prognosis      Donald Sandoval MD  Palliative Medicine  [facility] [place] - Intensive Care

## 2023-12-13 NOTE — ASSESSMENT & PLAN NOTE
This patient does have evidence of infective focus.My overall impression is sepsis.  Source: Urinary Tract  Antibiotics given-   Antibiotics (72h ago, onward)    Start     Stop Route Frequency Ordered    12/12/23 1515  piperacillin-tazobactam (ZOSYN) 4.5 g in dextrose 5 % in water (D5W) 100 mL IVPB (MB+)         -- IV Every 8 hours (non-standard times) 12/12/23 1411        Latest lactate reviewed-  Recent Labs   Lab 12/12/23  1104 12/12/23  1422 12/13/23  0338   LACTATE 2.3* 4.1* 2.2       Organ dysfunction indicated by Encephalopathy    Fluid challenge Ideal Body Weight- The patient's ideal body weight is Ideal body weight: 50.1 kg (110 lb 7.2 oz) which will be used to calculate fluid bolus of 30 ml/kg for treatment of septic shock.      Post- resuscitation assessment Yes Perfusion exam was performed within 6 hours of septic shock presentation after bolus shows Adequate tissue perfusion assessed by non-invasive monitoring       Will Not start Pressors- Levophed for MAP of 65  Source control achieved by: IV antibiotics

## 2023-12-13 NOTE — ED NOTES
Secure chat to Dr. Ruffin regarding pt's heart rate. No new orders received.  Will continue to monitor.

## 2023-12-13 NOTE — PROGRESS NOTES
[facility] - Intensive Care  General Surgery  Progress Note    Subjective:     History of Present Illness:  73-year-old female with significant past medical history including hypertension, pulmonary embolism on long-term anticoagulation, cerebrovascular disease status post stroke was long-term neurologic deficits including wheelchair dependence, facial droop, and dependence upon others for activities of daily living.  She has a G-tube in place for nutrition and medication administration.  This was placed proximally 1 year ago at outside hospital.  One-week ago she had her G-tube exchange at outside hospital without apparent complication.  She presents to our emergency room today from her facility with nausea, vomiting, abdominal distention for the past few days.  Upon evaluation emergency room she was found to have significant leukocytosis of 30 with no evidence source of infection.  CT scan of her abdomen and pelvis reveals moderately distended loops of small bowel with the acute transition point in the mid abdomen which seems to be centered around a piece of an old gastrostomy tube.  Her distal small bowel is decompressed in her colon is relatively decompressed other than a large fecal burden in the rectum.  General surgery was consulted for evaluation.    Post-Op Info:  * No surgery found *         Interval History:  No significant changes overnight.  Leukocytosis improving.  Found to have UTI on UA.  Enema administered with scant output.  Abdomen remains distended.  G-tube to gravity.    Medications:  Continuous Infusions:   sodium chloride 0.9% 100 mL/hr at 12/13/23 1115     Scheduled Meds:   piperacillin-tazobactam (Zosyn) IV (PEDS and ADULTS) (extended infusion is not appropriate)  4.5 g Intravenous Q8H     PRN Meds:calcium gluconate IVPB, calcium gluconate IVPB, calcium gluconate IVPB, dextrose 10%, dextrose 10%, glucagon (human recombinant), glucose, glucose, HYDROmorphone, magnesium sulfate IVPB, magnesium  sulfate IVPB, metoprolol, naloxone, ondansetron, potassium chloride **AND** potassium chloride **AND** potassium chloride, sodium chloride 0.9%, sodium phosphate 15 mmol in dextrose 5 % (D5W) 250 mL IVPB, sodium phosphate 20.01 mmol in dextrose 5 % (D5W) 250 mL IVPB, sodium phosphate 30 mmol in dextrose 5 % (D5W) 250 mL IVPB     Review of patient's allergies indicates:  No Known Allergies  Objective:     Vital Signs (Most Recent):  Temp: 97.2 °F (36.2 °C) (12/13/23 1115)  Pulse: 80 (12/13/23 1100)  Resp: 10 (12/13/23 1100)  BP: (!) 102/55 (12/13/23 1100)  SpO2: 99 % (12/13/23 1100) Vital Signs (24h Range):  Temp:  [97.2 °F (36.2 °C)-98.9 °F (37.2 °C)] 97.2 °F (36.2 °C)  Pulse:  [] 80  Resp:  [8-21] 10  SpO2:  [90 %-99 %] 99 %  BP: ()/(49-73) 102/55     Weight: 85.5 kg (188 lb 7.9 oz)  Body mass index is 34.48 kg/m².    Intake/Output - Last 3 Shifts         12/11 0700  12/12 0659 12/12 0700  12/13 0659 12/13 0700  12/14 0659    I.V. (mL/kg)  1382.2 (16.2) 517 (6)    IV Piggyback  2513.4 245.1    Total Intake(mL/kg)  3895.6 (45.6) 762 (8.9)    Urine (mL/kg/hr)  200 185 (0.4)    Drains  800 245    Total Output  1000 430    Net  +2895.6 +332                    Physical Exam  Vitals and nursing note reviewed.   Constitutional:       Appearance: She is obese. She is ill-appearing.      Comments: Patient is nonverbal.  Does not participate in conversation.   HENT:      Head: Normocephalic.      Mouth/Throat:      Mouth: Mucous membranes are dry.      Pharynx: Oropharynx is clear.   Cardiovascular:      Rate and Rhythm: Normal rate.      Pulses: Normal pulses.   Pulmonary:      Effort: Pulmonary effort is normal. No respiratory distress.      Breath sounds: No wheezing.   Abdominal:      General: There is distension.      Tenderness: There is no guarding.      Comments: Abdomen is diffusely distended.  Patient does not respond to tactile stimulation of all 4 quadrants of her abdomen.  G-tube to gravity drainage    Musculoskeletal:         General: No swelling or tenderness. Normal range of motion.      Cervical back: Normal range of motion.   Skin:     General: Skin is warm and dry.      Coloration: Skin is not jaundiced or pale.          Significant Labs:  I have reviewed all pertinent lab results within the past 24 hours.  CBC:   Recent Labs   Lab 12/13/23  0338   WBC 22.47*   RBC 5.00   HGB 12.9   HCT 39.9      MCV 80*   MCH 25.8*   MCHC 32.3     CMP:   Recent Labs   Lab 12/13/23  0338   *   CALCIUM 9.1   ALBUMIN 3.2*   PROT 7.1      K 2.8*   CO2 28      BUN 51*   CREATININE 0.7   ALKPHOS 110   ALT 18   AST 25   BILITOT 1.2*       Significant Diagnostics:  I have reviewed all pertinent imaging results/findings within the past 24 hours.  Assessment/Plan:     Bowel obstruction  73-year-old female with significant past medical history including hypertension, pulmonary embolism on long-term anticoagulation, cerebrovascular disease status post stroke was long-term neurologic deficits including wheelchair dependence, facial droop, and dependence upon others for activities of daily living.  Admitted to the hospital today with a 3 day history of nausea, vomiting, and abdominal distention.  Leukocytosis of 30.  Evidence of mechanical small bowel obstruction with foreign object lodged in the mid small bowel.    - I had a long discussion with the patient's daughter who is her primary decision maker regarding the patient's condition.  Given patient's significant medical comorbidities, current medications, and current clinical status, I do not recommend urgent surgery at this time.  Patient's daughter states that she agrees that her mother would likely not want to undergo surgery, and would not want to live in her current state of health.  - Recommend admission to hospital medicine team for further investigation into her leukocytosis and full septic workup including blood cultures, urinary analysis, chest  x-ray.  - Continue broad-spectrum antibiotic therapy  - Continue conservative treatment. Needs additional enema today prior to attempting gastrograffin administration  - Agree with palliative care  - General surgery will continue to follow closely        Rosendo Lynch MD  General Surgery  [facility] - [place], [place] - Intensive Care

## 2023-12-13 NOTE — SUBJECTIVE & OBJECTIVE
Past Medical History:   Diagnosis Date    COVID-19 09/09/2021    Essential hypertension 6/12/2015    Obese     Obesity (BMI 35.0-39.9) 6/12/2015    Stroke 06/2015    Left side weakness    Stroke-like symptoms 09/17/2021    NEW LEFT FACIAL DROOP, LEFT SIDE WEAKNESS & SLURRED SPEECH    Wheelchair dependence     Wheelchair dependence        Past Surgical History:   Procedure Laterality Date    ESOPHAGOGASTRODUODENOSCOPY N/A 7/5/2022    Procedure: EGD (ESOPHAGOGASTRODUODENOSCOPY);  Surgeon: Wilma Munguia MD;  Location: [facility];  Service: Endoscopy;  Laterality: N/A;    HYSTERECTOMY         Review of patient's allergies indicates:  No Known Allergies    Medications:  Continuous Infusions:   sodium chloride 0.9% 100 mL/hr at 12/13/23 1036     Scheduled Meds:   piperacillin-tazobactam (Zosyn) IV (PEDS and ADULTS) (extended infusion is not appropriate)  4.5 g Intravenous Q8H     PRN Meds:calcium gluconate IVPB, calcium gluconate IVPB, calcium gluconate IVPB, dextrose 10%, dextrose 10%, glucagon (human recombinant), glucose, glucose, HYDROmorphone, magnesium sulfate IVPB, magnesium sulfate IVPB, metoprolol, naloxone, ondansetron, potassium chloride **AND** potassium chloride **AND** potassium chloride, sodium chloride 0.9%, sodium phosphate 15 mmol in dextrose 5 % (D5W) 250 mL IVPB, sodium phosphate 20.01 mmol in dextrose 5 % (D5W) 250 mL IVPB, sodium phosphate 30 mmol in dextrose 5 % (D5W) 250 mL IVPB    Family History       Problem Relation (Age of Onset)    Cancer Father    Stroke Mother          Tobacco Use    Smoking status: Never    Smokeless tobacco: Never   Substance and Sexual Activity    Alcohol use: No    Drug use: No    Sexual activity: Not on file       Objective:     Vital Signs (Most Recent):  Temp: 97.7 °F (36.5 °C) (12/13/23 0715)  Pulse: 82 (12/13/23 1000)  Resp: 16 (12/13/23 1000)  BP: (!) 102/56 (12/13/23 1000)  SpO2: 98 % (12/13/23 1000) Vital Signs (24h Range):  Temp:  [97.7 °F (36.5 °C)-98.9 °F  (37.2 °C)] 97.7 °F (36.5 °C)  Pulse:  [] 82  Resp:  [8-26] 16  SpO2:  [90 %-98 %] 98 %  BP: ()/(49-80) 102/56     Weight: 85.5 kg (188 lb 7.9 oz)  Body mass index is 34.48 kg/m².       Physical Exam  Vitals and nursing note reviewed.   Constitutional:       Comments: somnolent   HENT:      Head: Normocephalic and atraumatic.   Eyes:      Comments: Eyes remained closed   Pulmonary:      Effort: No respiratory distress.   Neurological:      Comments: Somnolent. Did not open eyes or respond to verbal or gentle physical stimuli.             Review of Symptoms        Psychosocial/Cultural:   See Palliative Psychosocial Note: Yes  - was living at [facility]   - has 3 children of whom [place] are involved   **Primary  to Follow**  Palliative Care  Consult: No        Advance Care Planning   Advance Directives:   Do Not Resuscitate Status: Yes      Decision Making:  Patient unable to communicate due to disease severity/cognitive impairment  Goals of Care: The family endorses that what is most important right now is to focus on improvement in condition but with limits to invasive therapies    Accordingly, we have decided that the best plan to meet the patient's goals includes continuing with treatment in hope of improvement.       Significant Labs: reviewed  CBC:   Recent Labs   Lab 12/13/23 0338   WBC 22.47*   HGB 12.9   HCT 39.9   MCV 80*        BMP:  Recent Labs   Lab 12/13/23 0338   *      K 2.8*      CO2 28   BUN 51*   CREATININE 0.7   CALCIUM 9.1   MG 2.4     LFT:  Lab Results   Component Value Date    AST 25 12/13/2023    ALKPHOS 110 12/13/2023    BILITOT 1.2 (H) 12/13/2023     Albumin:   Albumin   Date Value Ref Range Status   12/13/2023 3.2 (L) 3.5 - 5.2 g/dL Final     Protein:   Total Protein   Date Value Ref Range Status   12/13/2023 7.1 6.0 - 8.4 g/dL Final     Lactic acid:   Lab Results   Component Value Date    LACTATE 2.2  12/13/2023    LACTATE 4.1 () 12/12/2023       Significant Imaging: reviewed

## 2023-12-13 NOTE — ASSESSMENT & PLAN NOTE
"- noted   - "The patient was recently seen at [facility] for dislodged PEG tube on 12/03/2023.  They were not able to remove it so the PEG tube was cut and umbrella left inside with expectation of it to be passed in her stool."  - daughter understandably frustrated  - surgery following, talked with family with plan for non-surgical interventions such as enema +/- gastrograffin  - management per surgery and primary team   "

## 2023-12-13 NOTE — NURSING
[facility] [place]  Nurses Note -- 4 Eyes      12/13/2023       Skin assessed on: Q Shift      [] No Pressure Injuries Present    []Prevention Measures Documented    [x] Yes LDA  for Pressure Injury Previously documented     [] Yes New Pressure Injury Discovered   [] LDA for New Pressure Injury Added      Attending RN:  Karissa Cadena, RN     Second RN:  Rosalina Scott RN

## 2023-12-13 NOTE — NURSING
[facility] [place] Intensive Care  ICU Shift Summary  Date: 12/13/2023      Prehospitalization: Nursing Home  Admit Date / LOS : 12/12/2023/ 1 days    Diagnosis: Sepsis    Consults:        Active: Gen Surg, palliative.       Needed: N/A     Code Status: DNR   Advanced Directive: <no information>    LDA:  Lines/Drains/Airways       Drain  Duration                  Gastrostomy/Enterostomy Percutaneous endoscopic gastrostomy (PEG) midline feeding -- days         Urethral Catheter 12/12/23 1100 Non-latex 16 Fr. 1 day              Peripheral Intravenous Line  Duration                  Peripheral IV - Single Lumen 12/12/23 1057 20 G Left Antecubital 1 day         Peripheral IV - Single Lumen 12/12/23 1900 20 G Right;Anterior Antecubital <1 day                  Central Lines/Site/Justification:Patient Does Not Have Central Line  Urinary Cath/Order/Justification:Urinary Retention and Critically Ill in the ICU and requiring intensive monitoring    Vasopressors/Infusions:    sodium chloride 0.9% 100 mL/hr at 12/13/23 1700          GOALS: Volume/ Hemodynamic: N/A                     RASS: N/A    Pain Management: IV       Pain Controlled: yes     Rhythm: NSR    Respiratory Device: Nasal Cannula                      Most Recent SBT/ SAT: N/A       MOVE Screen: NA  ICU Liberation: not applicable    VTE Prophylaxis: Mechanical  Mobility: Bedrest  Stress Ulcer Prophylaxis: No    Isolation: No active isolations    Dietary:   Current Diet Order   Procedures    Diet NPO      Tolerance: not applicable  Advancement: no    I & O (24h):    Intake/Output Summary (Last 24 hours) at 12/13/2023 1720  Last data filed at 12/13/2023 1700  Gross per 24 hour   Intake 3333.56 ml   Output 1825 ml   Net 1508.56 ml        Restraints: No    Significant Dates:  Post Op Date: N/A  Rescue Date: N/A  Imaging/ Diagnostics: N/A    Noteworthy Labs:  none    COVID Test: (--)  CBC/Anemia Labs: Coags:    Recent Labs   Lab 12/12/23  1104 12/13/23  0338   WBC 32.07*  "22.47*   HGB 14.7 12.9   HCT 44.0 39.9    358   MCV 77* 80*   RDW 14.8* 15.0*    No results for input(s): "PT", "INR", "APTT" in the last 168 hours.     Chemistries:   Recent Labs   Lab 12/12/23  1104 12/13/23  0338    145   K 3.4* 2.8*   CL 99 101   CO2 32* 28   BUN 63* 51*   CREATININE 1.0 0.7   CALCIUM 10.3 9.1   PROT 8.3 7.1   BILITOT 0.8 1.2*   ALKPHOS 122 110   ALT 20 18   AST 27 25   MG  --  2.4   PHOS  --  3.5        Cardiac Enzymes: Ejection Fractions:    No results for input(s): "CPK", "CPKMB", "MB", "TROPONINI" in the last 72 hours. EF   Date Value Ref Range Status   07/11/2022 65 % Final        POCT Glucose: HbA1c:    Recent Labs   Lab 12/13/23  0819 12/13/23  1132 12/13/23  1705   POCTGLUCOSE 129* 141* 120*    Hemoglobin A1C   Date Value Ref Range Status   07/21/2022 5.0 4.0 - 6.0 % Final   09/17/2021 5.9 (H) 4.0 - 5.6 % Final     Comment:     ADA Screening Guidelines:  5.7-6.4%  Consistent with prediabetes  >or=6.5%  Consistent with diabetes    High levels of fetal hemoglobin interfere with the HbA1C  assay. Heterozygous hemoglobin variants (HbS, HgC, etc)do  not significantly interfere with this assay.   However, presence of multiple variants may affect accuracy.             ICU LOS 16h  Level of Care: Critical Care    Chart Check: 12 HR Done  Shift Summary/Plan for the shift: The patient had enema today but only light yellow liquid came out. Surgery team was updated and pending further recommendations.     "

## 2023-12-13 NOTE — PROGRESS NOTES
eICU Brief Admission Note       Briefly, 73-year-old female with a past medical history of CVA, dementia, peg tube placement, hypertension, DVT/PE on Eliquis who presents to the emergency department for evaluation of intractable nausea and vomiting.  Patient is not able to give history so all of the history was obtained from the ED physician and medical records.  The patient was recently seen at [facility] for dislodged PEG tube on 12/03/2023.  They were not able to remove it so the PEG tube was cut an umbrella left side with expectation of it to be passed in her stool.      Video assessment :  Lying in bed     Vitals reviewed   Afebrile, stable vitals     LABs reviewed       Radiology reviewed     CT A/P  1. Findings in keeping with acute mechanical small bowel obstruction, with point of transition in the deep midline/right paramedian lower abdomen/pelvis with proximity to intraluminal foreign body favored to represent dislodged gastrostomy tube apparatus component within small bowel loop just proximal to transition point.  2. There is a staghorn type calculus measuring on the order of 20 mm in aggregate at the mid to lower pole of the right kidney contributing to mild hydronephrosis.  There is urothelial thickening hyperenhancement in keeping with age indeterminate inflammation.  3. Urinary bladder decompressed about Haskins catheter.  Nondependent air within the bladder could relate to recent Haskins catheter placement and/or manipulation.  Nonspecific mucosal hyperemia of the urinary bladder wall could reflect acute inflammation in appropriate clinical context.  4. Large volume stool within the distal colon rectum.  Nonspecific mild wall thickening and inflammatory changes in the adjoining fat planes could indicate stercoral proctitis in the appropriate clinical context.  5. Mild wedging of the L1 vertebral body superior endplate appears new when compared to the 02/18/2022 CT of the abdomen and pelvis, but is otherwise  age indeterminate.  Recommend clinical correlation.  MRI could be considered for more precise characterization of this fracture.  6. Additional details, as provided in the body of the report.        Assessment / Plan :  Sepsis, lactic acidosis -- possible UTI with mild hydronephrosis from renal stone   Acute mechanical intestinal obstruction   Large volume stool within the distal colon rectum  Electrolyte imbalance   SVT -- resolved after IV pain meds   - on Zosyn; f/u cultures, trend lactate   - if renal hydronephrosis increasing in size, will need intervention by Urologist   - f/u with surgery   - replace electrolytes as per protocol       DVT Px : Heparin SQ / Lovenox SQ after possible intervention   GI Px : PPI may be added       Patient seen over video : Yes  Chart reviewed : Yes

## 2023-12-13 NOTE — ASSESSMENT & PLAN NOTE
Advance Care Planning     Date: 12/12/2023    Code Status  In light of the patients advanced and life limiting illness,I engaged the the family in a voluntary conversation about the patient's preferences for care  at the very end of life. The patient wishes to have a natural, peaceful death.  Along those lines, the patient does not wish to have CPR or other invasive treatments performed when her heart and/or breathing stops. I communicated to the family that a DNR order would be placed in her medical record to reflect this preference.  I spent a total of 8 minutes engaging the patient in this advance care planning discussion.    Continue with current level of care at this time.  We will continue with ICU level of care, IV antibiotics.   Has a daughter and son, her daughter Lana was at bedside and unable to get in touch with son.  12/13: spoke to son and updated. Seems to understand overall poor prognosis and our concerns

## 2023-12-13 NOTE — HPI
"Per H&P: "Ms. Kidd is a 73-year-old female with a past medical history of CVA, dementia, peg tube placement, hypertension, DVT/PE on Eliquis who presents to the emergency department for evaluation of intractable nausea and vomiting.  Patient is not able to give history so all of the history was obtained from the ED physician and medical records.  The patient was recently seen at [facility] for dislodged PEG tube on 12/03/2023.  They were not able to remove it so the PEG tube was cut an umbrella left side with expectation of it to be passed in her stool.  In the emergency department, she was found to have WBC of 32,000, BUN 63 and lactate 2.3.  Urinalysis appeared brown with significant WBC greater than 100 and bacteria.  CT abdomen and pelvis with findings of acute mechanical small-bowel obstruction with point of transition in the deep midline/right paramedian lower abdomen/pelvis with proximity to intraluminal foreign body favored to represent dislodged G-tube apparatus component within small bowel loop just proximal to transition point.  She does have large volume stool distal colon/rectum.  General surgery consulted however given patient is on aspirin, Plavix and Eliquis, patient is high risk for intervention at this time.  Recommendation include supportive care, antibiotics and enema to help with stool burden.  Daughter is at bedside and discussed plan of care.  Patient is DNR at this time."    Palliative medicine consulted for assistance with GOC.   "

## 2023-12-13 NOTE — SUBJECTIVE & OBJECTIVE
Interval History: Pt awkes to voice, but otherwise does not answer questions. Son at bedside states pt talked minimally previous time he saw her which was about a month ago.     Review of Systems  Objective:     Vital Signs (Most Recent):  Temp: 97.2 °F (36.2 °C) (12/13/23 1115)  Pulse: 80 (12/13/23 1100)  Resp: 10 (12/13/23 1100)  BP: (!) 102/55 (12/13/23 1100)  SpO2: 99 % (12/13/23 1100) Vital Signs (24h Range):  Temp:  [97.2 °F (36.2 °C)-98.9 °F (37.2 °C)] 97.2 °F (36.2 °C)  Pulse:  [] 80  Resp:  [8-21] 10  SpO2:  [90 %-99 %] 99 %  BP: ()/(49-73) 102/55     Weight: 85.5 kg (188 lb 7.9 oz)  Body mass index is 34.48 kg/m².    Intake/Output Summary (Last 24 hours) at 12/13/2023 1424  Last data filed at 12/13/2023 1300  Gross per 24 hour   Intake 3557.67 ml   Output 1525 ml   Net 2032.67 ml         Physical Exam    Vitals reviewed.   Constitutional:       Appearance: Normal appearance. She is obese. She is not ill-appearing.   Cardiovascular:      Rate and Rhythm: Regular rhythm. Tachycardia present.      Pulses: Normal pulses.      Heart sounds: No murmur heard.  Pulmonary:      Effort: Pulmonary effort is normal. No respiratory distress.   Abdominal:      Comments: Distended, G tube in abdomen   Genitourinary:     Comments: Haskins in place with brown, murky urine  Musculoskeletal:         General: No swelling.   Skin:     General: Skin is warm.   Neurological:      Mental Status: She is lethargic.      Comments: Resting comfortable but minimally responsive. Does not speak at baseline per daughter     Significant Labs: All pertinent labs within the past 24 hours have been reviewed.    Significant Imaging: I have reviewed all pertinent imaging results/findings within the past 24 hours.

## 2023-12-13 NOTE — SUBJECTIVE & OBJECTIVE
Interval History:  No significant changes overnight.  Leukocytosis improving.  Found to have UTI on UA.  Enema administered with scant output.  Abdomen remains distended.  G-tube to gravity.    Medications:  Continuous Infusions:   sodium chloride 0.9% 100 mL/hr at 12/13/23 1115     Scheduled Meds:   piperacillin-tazobactam (Zosyn) IV (PEDS and ADULTS) (extended infusion is not appropriate)  4.5 g Intravenous Q8H     PRN Meds:calcium gluconate IVPB, calcium gluconate IVPB, calcium gluconate IVPB, dextrose 10%, dextrose 10%, glucagon (human recombinant), glucose, glucose, HYDROmorphone, magnesium sulfate IVPB, magnesium sulfate IVPB, metoprolol, naloxone, ondansetron, potassium chloride **AND** potassium chloride **AND** potassium chloride, sodium chloride 0.9%, sodium phosphate 15 mmol in dextrose 5 % (D5W) 250 mL IVPB, sodium phosphate 20.01 mmol in dextrose 5 % (D5W) 250 mL IVPB, sodium phosphate 30 mmol in dextrose 5 % (D5W) 250 mL IVPB     Review of patient's allergies indicates:  No Known Allergies  Objective:     Vital Signs (Most Recent):  Temp: 97.2 °F (36.2 °C) (12/13/23 1115)  Pulse: 80 (12/13/23 1100)  Resp: 10 (12/13/23 1100)  BP: (!) 102/55 (12/13/23 1100)  SpO2: 99 % (12/13/23 1100) Vital Signs (24h Range):  Temp:  [97.2 °F (36.2 °C)-98.9 °F (37.2 °C)] 97.2 °F (36.2 °C)  Pulse:  [] 80  Resp:  [8-21] 10  SpO2:  [90 %-99 %] 99 %  BP: ()/(49-73) 102/55     Weight: 85.5 kg (188 lb 7.9 oz)  Body mass index is 34.48 kg/m².    Intake/Output - Last 3 Shifts         12/11 0700  12/12 0659 12/12 0700  12/13 0659 12/13 0700  12/14 0659    I.V. (mL/kg)  1382.2 (16.2) 517 (6)    IV Piggyback  2513.4 245.1    Total Intake(mL/kg)  3895.6 (45.6) 762 (8.9)    Urine (mL/kg/hr)  200 185 (0.4)    Drains  800 245    Total Output  1000 430    Net  +2895.6 +332                    Physical Exam  Vitals and nursing note reviewed.   Constitutional:       Appearance: She is obese. She is ill-appearing.      Comments:  Patient is nonverbal.  Does not participate in conversation.   HENT:      Head: Normocephalic.      Mouth/Throat:      Mouth: Mucous membranes are dry.      Pharynx: Oropharynx is clear.   Cardiovascular:      Rate and Rhythm: Normal rate.      Pulses: Normal pulses.   Pulmonary:      Effort: Pulmonary effort is normal. No respiratory distress.      Breath sounds: No wheezing.   Abdominal:      General: There is distension.      Tenderness: There is no guarding.      Comments: Abdomen is diffusely distended.  Patient does not respond to tactile stimulation of all 4 quadrants of her abdomen.  G-tube to gravity drainage   Musculoskeletal:         General: No swelling or tenderness. Normal range of motion.      Cervical back: Normal range of motion.   Skin:     General: Skin is warm and dry.      Coloration: Skin is not jaundiced or pale.          Significant Labs:  I have reviewed all pertinent lab results within the past 24 hours.  CBC:   Recent Labs   Lab 12/13/23  0338   WBC 22.47*   RBC 5.00   HGB 12.9   HCT 39.9      MCV 80*   MCH 25.8*   MCHC 32.3     CMP:   Recent Labs   Lab 12/13/23  0338   *   CALCIUM 9.1   ALBUMIN 3.2*   PROT 7.1      K 2.8*   CO2 28      BUN 51*   CREATININE 0.7   ALKPHOS 110   ALT 18   AST 25   BILITOT 1.2*       Significant Diagnostics:  I have reviewed all pertinent imaging results/findings within the past 24 hours.

## 2023-12-13 NOTE — ASSESSMENT & PLAN NOTE
12/13/2023:  - chart reviewed in depth  - discussed with primary team and bedside nurse  - patient seen at bedside, she was sleeping and did not open eyes to verbal or gentle physical stimuli  - son at bedside stating that is how she has been for him too  - he states that at baseline she is very selective on who she engages with. Has not talked with him but did recognize and talk with the  in the hospital last night.   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 Loves being around children   - discussed the concern with her SBO and hope for improvement and what things look like if there is improvement (back to nursing home with hospice) vs if there isn't (? Transition to comfort focused care). Discussed importance of taking things one day and one step at a time. He is going to discuss further with his sister too step by step.   - after visit proceeded to talk with patients daughter Clementina who used to be her primary caregiver prior to shift to nursing home around a year ago  - she shared that patient was always selective on who she engaged with and when she last saw her 2 weeks ago talked some with her and held her hand. Recognized her and was awake. Unable to have a consistent meaningful conversation even at that time.   - she shared her feeling that although patient was bed bound and generally preferred to not be bothered, things truly started going more downhill after the situation at [place] with he feeding tube, where part of it was left to be defecated out.   - she shared her frustrations, including about her worries regarding the care she gets at the nursing home  - allowed her a safe space to vent. Provided emotional support and listening ear  - we talked through the difficulty of the situation and  where her mother is now  - she has a strong understanding of her mothers medical on lisa in regards to the SBO and UTI. She had talked with ED/surgery last night.   - she shared her mother preference was to focus on her comfort/qol, not wanting to be bothered too much  - we talked through where we are now with the concern regarding the SBO and what things look like if there is improvement  (back to nursing home, but with hospice) vs if there isn't which would include consideration of possible transition to comfort focused care if she seems to be suffering and outlook remaining poor. She is aware of blood pressure being monitored incase pressors become needed, per primary team. Lana stated her understanding and will be by to see her later today. We talked through importance of taking things one day and one step at a time.   [] continue with current management with hope of improvement of condition, but with limits to invasive interventions  [] if has improvement of condition, family will need hospice information session for likely arrangement of hospice support at nursing home if gets to point of discharge.   [] if does not have improvement of condition as hopes or deteriorates, important to discuss further with family regarding possible transition to comfort focused care inpatient. Does not seem family feels patient would want overly invasive interventions and would not want to go through too much suffering.

## 2023-12-13 NOTE — CARE UPDATE
[facility]  Nurses Note -- 4 Eyes      12/13/2023       Skin assessed on: Admit      [] No Pressure Injuries Present    []Prevention Measures Documented    [x] Yes LDA  for Pressure Injury Previously documented     [] Yes New Pressure Injury Discovered   [] LDA for New Pressure Injury Added      Attending RN:  Rosalina Scott RN     Second RN:  RAJENDRA Soto

## 2023-12-13 NOTE — PROGRESS NOTES
[facility] Medicine  Progress Note    Patient Name: Holly Kidd  MRN: [id number]  Patient Class: IP- Inpatient   Admission Date: 12/12/2023  Length of Stay: 1 days  Attending Physician: Bari Hermosillo III, MD  Primary Care Provider: Estrellita Obregon FNP        Subjective:     Principal Problem:Sepsis        HPI:  Ms. Kidd is a 73-year-old female with a past medical history of CVA, dementia, peg tube placement, hypertension, DVT/PE on Eliquis who presents to the emergency department for evaluation of intractable nausea and vomiting.  Patient is not able to give history so all of the history was obtained from the ED physician and medical records.  The patient was recently seen at [facility] for dislodged PEG tube on 12/03/2023.  They were not able to remove it so the PEG tube was cut an umbrella left side with expectation of it to be passed in her stool.  In the emergency department, she was found to have WBC of 32,000, BUN 63 and lactate 2.3.  Urinalysis appeared brown with significant WBC greater than 100 and bacteria.  CT abdomen and pelvis with findings of acute mechanical small-bowel obstruction with point of transition in the deep midline/right paramedian lower abdomen/pelvis with proximity to intraluminal foreign body favored to represent dislodged G-tube apparatus component within small bowel loop just proximal to transition point.  She does have large volume stool distal colon/rectum.  General surgery consulted however given patient is on aspirin, Plavix and Eliquis, patient is high risk for intervention at this time.  Recommendation include supportive care, antibiotics and enema to help with stool burden.  Daughter is at bedside and discussed plan of care.  Patient is DNR at this time.    Overview/Hospital Course:  No notes on file    Interval History: Pt awkes to voice, but otherwise does not answer questions. Son at bedside states pt talked minimally previous time he saw her  which was about a month ago.     Review of Systems  Objective:     Vital Signs (Most Recent):  Temp: 97.2 °F (36.2 °C) (12/13/23 1115)  Pulse: 80 (12/13/23 1100)  Resp: 10 (12/13/23 1100)  BP: (!) 102/55 (12/13/23 1100)  SpO2: 99 % (12/13/23 1100) Vital Signs (24h Range):  Temp:  [97.2 °F (36.2 °C)-98.9 °F (37.2 °C)] 97.2 °F (36.2 °C)  Pulse:  [] 80  Resp:  [8-21] 10  SpO2:  [90 %-99 %] 99 %  BP: ()/(49-73) 102/55     Weight: 85.5 kg (188 lb 7.9 oz)  Body mass index is 34.48 kg/m².    Intake/Output Summary (Last 24 hours) at 12/13/2023 1424  Last data filed at 12/13/2023 1300  Gross per 24 hour   Intake 3557.67 ml   Output 1525 ml   Net 2032.67 ml         Physical Exam    Vitals reviewed.   Constitutional:       Appearance: Normal appearance. She is obese. She is not ill-appearing.   Cardiovascular:      Rate and Rhythm: Regular rhythm. Tachycardia present.      Pulses: Normal pulses.      Heart sounds: No murmur heard.  Pulmonary:      Effort: Pulmonary effort is normal. No respiratory distress.   Abdominal:      Comments: Distended, G tube in abdomen   Genitourinary:     Comments: Haskins in place with brown, murky urine  Musculoskeletal:         General: No swelling.   Skin:     General: Skin is warm.   Neurological:      Mental Status: She is lethargic.      Comments: Resting comfortable but minimally responsive. Does not speak at baseline per daughter     Significant Labs: All pertinent labs within the past 24 hours have been reviewed.    Significant Imaging: I have reviewed all pertinent imaging results/findings within the past 24 hours.    Assessment/Plan:      * Sepsis  This patient does have evidence of infective focus.My overall impression is sepsis.  Source: Urinary Tract  Antibiotics given-   Antibiotics (72h ago, onward)      Start     Stop Route Frequency Ordered    12/12/23 1515  piperacillin-tazobactam (ZOSYN) 4.5 g in dextrose 5 % in water (D5W) 100 mL IVPB (MB+)         -- IV Every 8  hours (non-standard times) 12/12/23 1411          Latest lactate reviewed-  Recent Labs   Lab 12/12/23  1104 12/12/23  1422 12/13/23  0338   LACTATE 2.3* 4.1* 2.2       Organ dysfunction indicated by Encephalopathy    Fluid challenge Ideal Body Weight- The patient's ideal body weight is Ideal body weight: 50.1 kg (110 lb 7.2 oz) which will be used to calculate fluid bolus of 30 ml/kg for treatment of septic shock.      Post- resuscitation assessment Yes Perfusion exam was performed within 6 hours of septic shock presentation after bolus shows Adequate tissue perfusion assessed by non-invasive monitoring       Will Not start Pressors- Levophed for MAP of 65  Source control achieved by: IV antibiotics    Bowel obstruction  Presents with acute mechanical small-bowel obstruction with point of transition in the deep midline/right lower abdomen and pelvis.  Recently had older G-tube cut and number the left inside.  This is what most likely causing obstruction at this time.  She was not a candidate for surgery given she was on aspirin, Plavix and Eliquis.  This places her at very high risk for any intervention at this moment.  Holding all anticoagulation.  Surgery recommending enema to help with stool burden distal to object.  NG tube as needed if patient begins to have further nausea and vomiting.   -PEG to gravity with management per surgery.      Acute cystitis  UA consistent with urinary tract infection.  Initially given ceftriaxone in the emergency department however broadened to Zosyn.  Follow up urine cultures and adjusted accordingly.      Advanced care planning/counseling discussion  Advance Care Planning    Date: 12/12/2023    Code Status  In light of the patients advanced and life limiting illness,I engaged the the family in a voluntary conversation about the patient's preferences for care  at the very end of life. The patient wishes to have a natural, peaceful death.  Along those lines, the patient does not  wish to have CPR or other invasive treatments performed when her heart and/or breathing stops. I communicated to the family that a DNR order would be placed in her medical record to reflect this preference.  I spent a total of 8 minutes engaging the patient in this advance care planning discussion.    Continue with current level of care at this time.  We will continue with ICU level of care, IV antibiotics.   Has a daughter and son, her daughter Lana was at bedside and unable to get in touch with son.  12/13: spoke to son and updated. Seems to understand overall poor prognosis and our concerns          Leukocytosis  See sepsis      Acute encephalopathy  Lethargic with history of CVA and now with sepsis and small-bowel obstruction.  Treat underlying issues at this time.      Cerebrovascular disease  Hx of CVA, now with PEG in place and debility  - holding ASA/Plavix with SBO      Essential hypertension  - Chronic in nature, holding home BP medications in setting of sepsis  - will add back as needed      VTE Risk Mitigation (From admission, onward)           Ordered     IP VTE HIGH RISK PATIENT  Once         12/12/23 1347     Place sequential compression device  Until discontinued         12/12/23 1347                    Discharge Planning   ANISH: 12/16/2023     Code Status: DNR   Is the patient medically ready for discharge?:     Reason for patient still in hospital (select all that apply): Treatment and Consult recommendations  Discharge Plan A: Return to nursing home            Critical care time spent on the evaluation and treatment of severe organ dysfunction, review of pertinent labs and imaging studies, discussions with consulting providers and discussions with patient/family: 32 minutes.      Bari Hermosillo III, MD  Department of Hospital Medicine   [facility] - Intensive Care

## 2023-12-13 NOTE — CONSULTS
"1852:     Consult was conducted with the patient , her medical provider, the patients" son, daughter and two grand daughters. Medical Provider reported that the patient's family is at this time waiting for the daughter who makes the final decision to return to the hospital room. During my consult. the daughter returned and did not fully understand the process as to whether her Mother would continue to stay in the facility that she was in? The Medical Provider and the  were requested to return to the room and explain the procedures to the family. Prayers were offered for the patient and her family.  The Spiritual Care Team will continue to provide spiritual support to the patient and her family.        Irene LOCKHART   [phone]  "

## 2023-12-13 NOTE — ASSESSMENT & PLAN NOTE
Presents with acute mechanical small-bowel obstruction with point of transition in the deep midline/right lower abdomen and pelvis.  Recently had older G-tube cut and number the left inside.  This is what most likely causing obstruction at this time.  She was not a candidate for surgery given she was on aspirin, Plavix and Eliquis.  This places her at very high risk for any intervention at this moment.  Holding all anticoagulation.  Surgery recommending enema to help with stool burden distal to object.  NG tube as needed if patient begins to have further nausea and vomiting.   -PEG to gravity with management per surgery.

## 2023-12-13 NOTE — PLAN OF CARE
Case Management Assessment     PCP: Estrellita Obregon  Pharmacy: Nursing home pharmacy    Patient Arrived From: [place]  Existing Help at Home: nursing home staff    Barriers to Discharge: none    Discharge Plan:    A. Return to nursing home with Hospice   B. Hospice      SW completed initial assessment and discussed discharge planning with patient and her daughter at her bedside. Patient is currently lives at [facility]. Patient's daughter would like hospice for mom upon discharge.      12/12/23 5562   Discharge Assessment   Assessment Type Discharge Planning Assessment   Confirmed/corrected address, phone number and insurance Yes   Confirmed Demographics Correct on Facesheet   Source of Information patient   Communicated ANISH with patient/caregiver Date not available/Unable to determine   Reason For Admission Sepsis   People in Home facility resident   Do you expect to return to your current living situation? Yes   Do you have help at home or someone to help you manage your care at home? Yes   Who are your caregiver(s) and their phone number(s)? Nursing Home staff   Prior to hospitilization cognitive status: Unable to Assess   Current cognitive status: Unable to Assess   Walking or Climbing Stairs Difficulty yes   Walking or Climbing Stairs ambulation difficulty, requires equipment   Mobility Management Wheelchair   Dressing/Bathing Difficulty yes   Dressing/Bathing bathing difficulty, requires equipment   Dressing/Bathing Management Total assistance   Equipment Currently Used at Home wheelchair   Readmission within 30 days? No   Patient currently being followed by outpatient case management? No   Do you have prescription coverage? Yes   Coverage Medicare   Do you have any problems affording any of your prescribed medications? No   Is the patient taking medications as prescribed? yes   Who is going to help you get home at discharge? Patient will need an ambulance   Are you on dialysis? No   Do you take  coumadin? No   Discharge Plan A Return to nursing home   Discharge Plan B Return to Nursing Home   Discharge Plan discussed with: Adult children   Transition of Care Barriers None

## 2023-12-13 NOTE — PLAN OF CARE
The patient ist still NPO with PEG to gravity. Surgery team recommended enema enema today prior to attempting gastrograffin administration.

## 2023-12-13 NOTE — ASSESSMENT & PLAN NOTE
73-year-old female with significant past medical history including hypertension, pulmonary embolism on long-term anticoagulation, cerebrovascular disease status post stroke was long-term neurologic deficits including wheelchair dependence, facial droop, and dependence upon others for activities of daily living.  Admitted to the hospital today with a 3 day history of nausea, vomiting, and abdominal distention.  Leukocytosis of 30.  Evidence of mechanical small bowel obstruction with foreign object lodged in the mid small bowel.    - I had a long discussion with the patient's daughter who is her primary decision maker regarding the patient's condition.  Given patient's significant medical comorbidities, current medications, and current clinical status, I do not recommend urgent surgery at this time.  Patient's daughter states that she agrees that her mother would likely not want to undergo surgery, and would not want to live in her current state of health.  - Recommend admission to hospital medicine team for further investigation into her leukocytosis and full septic workup including blood cultures, urinary analysis, chest x-ray.  - Continue broad-spectrum antibiotic therapy  - Continue conservative treatment. Needs additional enema today prior to attempting gastrograffin administration  - Agree with palliative care  - General surgery will continue to follow closely

## 2023-12-13 NOTE — SIGNIFICANT EVENT
Heart rate increased to 170-180. Change in axis. No discernable p waves but appears to be regular. Concern for SVT, possibly exacerbated by discomfort from significant stool burden and SBO. Hydromorphone administered with resolution of SVT and improvement in heart rate.

## 2023-12-13 NOTE — ASSESSMENT & PLAN NOTE
- noted hx of stroke 2022   - bedbound since and with PEG  - dependent for ADLs  - was living at [facility]

## 2023-12-14 LAB
ALBUMIN SERPL BCP-MCNC: 3 G/DL (ref 3.5–5.2)
ALP SERPL-CCNC: 89 U/L (ref 55–135)
ALT SERPL W/O P-5'-P-CCNC: 15 U/L (ref 10–44)
ANION GAP SERPL CALC-SCNC: 9 MMOL/L (ref 8–16)
AST SERPL-CCNC: 22 U/L (ref 10–40)
BACTERIA UR CULT: ABNORMAL
BACTERIA UR CULT: ABNORMAL
BASOPHILS # BLD AUTO: 0.02 K/UL (ref 0–0.2)
BASOPHILS NFR BLD: 0.1 % (ref 0–1.9)
BILIRUB SERPL-MCNC: 0.8 MG/DL (ref 0.1–1)
BUN SERPL-MCNC: 39 MG/DL (ref 8–23)
CALCIUM SERPL-MCNC: 8.7 MG/DL (ref 8.7–10.5)
CHLORIDE SERPL-SCNC: 106 MMOL/L (ref 95–110)
CO2 SERPL-SCNC: 27 MMOL/L (ref 23–29)
CREAT SERPL-MCNC: 0.6 MG/DL (ref 0.5–1.4)
DIFFERENTIAL METHOD: ABNORMAL
EOSINOPHIL # BLD AUTO: 0 K/UL (ref 0–0.5)
EOSINOPHIL NFR BLD: 0.1 % (ref 0–8)
ERYTHROCYTE [DISTWIDTH] IN BLOOD BY AUTOMATED COUNT: 14.9 % (ref 11.5–14.5)
EST. GFR  (NO RACE VARIABLE): >60 ML/MIN/1.73 M^2
GLUCOSE SERPL-MCNC: 97 MG/DL (ref 70–110)
HCT VFR BLD AUTO: 36.2 % (ref 37–48.5)
HGB BLD-MCNC: 11.6 G/DL (ref 12–16)
IMM GRANULOCYTES # BLD AUTO: 0.1 K/UL (ref 0–0.04)
IMM GRANULOCYTES NFR BLD AUTO: 0.5 % (ref 0–0.5)
LYMPHOCYTES # BLD AUTO: 2 K/UL (ref 1–4.8)
LYMPHOCYTES NFR BLD: 10.5 % (ref 18–48)
MAGNESIUM SERPL-MCNC: 2.6 MG/DL (ref 1.6–2.6)
MCH RBC QN AUTO: 26 PG (ref 27–31)
MCHC RBC AUTO-ENTMCNC: 32 G/DL (ref 32–36)
MCV RBC AUTO: 81 FL (ref 82–98)
MONOCYTES # BLD AUTO: 2.4 K/UL (ref 0.3–1)
MONOCYTES NFR BLD: 12.8 % (ref 4–15)
NEUTROPHILS # BLD AUTO: 14.2 K/UL (ref 1.8–7.7)
NEUTROPHILS NFR BLD: 76 % (ref 38–73)
NRBC BLD-RTO: 0 /100 WBC
PHOSPHATE SERPL-MCNC: 2 MG/DL (ref 2.7–4.5)
PHOSPHATE SERPL-MCNC: 2.1 MG/DL (ref 2.7–4.5)
PLATELET # BLD AUTO: 243 K/UL (ref 150–450)
PMV BLD AUTO: 10.1 FL (ref 9.2–12.9)
POCT GLUCOSE: 100 MG/DL (ref 70–110)
POCT GLUCOSE: 105 MG/DL (ref 70–110)
POCT GLUCOSE: 112 MG/DL (ref 70–110)
POTASSIUM SERPL-SCNC: 2.5 MMOL/L (ref 3.5–5.1)
POTASSIUM SERPL-SCNC: 2.9 MMOL/L (ref 3.5–5.1)
PROT SERPL-MCNC: 6.6 G/DL (ref 6–8.4)
RBC # BLD AUTO: 4.47 M/UL (ref 4–5.4)
SODIUM SERPL-SCNC: 142 MMOL/L (ref 136–145)
WBC # BLD AUTO: 18.64 K/UL (ref 3.9–12.7)

## 2023-12-14 PROCEDURE — 27000221 HC OXYGEN, UP TO 24 HOURS

## 2023-12-14 PROCEDURE — 84100 ASSAY OF PHOSPHORUS: CPT | Mod: 91 | Performed by: STUDENT IN AN ORGANIZED HEALTH CARE EDUCATION/TRAINING PROGRAM

## 2023-12-14 PROCEDURE — 85025 COMPLETE CBC W/AUTO DIFF WBC: CPT | Performed by: STUDENT IN AN ORGANIZED HEALTH CARE EDUCATION/TRAINING PROGRAM

## 2023-12-14 PROCEDURE — 99233 PR SUBSEQUENT HOSPITAL CARE,LEVL III: ICD-10-PCS | Mod: ,,, | Performed by: REGISTERED NURSE

## 2023-12-14 PROCEDURE — 20000000 HC ICU ROOM

## 2023-12-14 PROCEDURE — 63600175 PHARM REV CODE 636 W HCPCS: Performed by: STUDENT IN AN ORGANIZED HEALTH CARE EDUCATION/TRAINING PROGRAM

## 2023-12-14 PROCEDURE — 99233 PR SUBSEQUENT HOSPITAL CARE,LEVL III: ICD-10-PCS | Mod: ,,, | Performed by: SURGERY

## 2023-12-14 PROCEDURE — 99233 SBSQ HOSP IP/OBS HIGH 50: CPT | Mod: ,,, | Performed by: REGISTERED NURSE

## 2023-12-14 PROCEDURE — 94761 N-INVAS EAR/PLS OXIMETRY MLT: CPT

## 2023-12-14 PROCEDURE — 25500020 PHARM REV CODE 255: Performed by: STUDENT IN AN ORGANIZED HEALTH CARE EDUCATION/TRAINING PROGRAM

## 2023-12-14 PROCEDURE — 25000003 PHARM REV CODE 250: Performed by: STUDENT IN AN ORGANIZED HEALTH CARE EDUCATION/TRAINING PROGRAM

## 2023-12-14 PROCEDURE — 83735 ASSAY OF MAGNESIUM: CPT | Performed by: STUDENT IN AN ORGANIZED HEALTH CARE EDUCATION/TRAINING PROGRAM

## 2023-12-14 PROCEDURE — 36415 COLL VENOUS BLD VENIPUNCTURE: CPT | Performed by: STUDENT IN AN ORGANIZED HEALTH CARE EDUCATION/TRAINING PROGRAM

## 2023-12-14 PROCEDURE — 84132 ASSAY OF SERUM POTASSIUM: CPT | Performed by: STUDENT IN AN ORGANIZED HEALTH CARE EDUCATION/TRAINING PROGRAM

## 2023-12-14 PROCEDURE — 84100 ASSAY OF PHOSPHORUS: CPT | Performed by: STUDENT IN AN ORGANIZED HEALTH CARE EDUCATION/TRAINING PROGRAM

## 2023-12-14 PROCEDURE — 99233 SBSQ HOSP IP/OBS HIGH 50: CPT | Mod: ,,, | Performed by: SURGERY

## 2023-12-14 PROCEDURE — 25000003 PHARM REV CODE 250: Performed by: HOSPITALIST

## 2023-12-14 PROCEDURE — 80053 COMPREHEN METABOLIC PANEL: CPT | Performed by: STUDENT IN AN ORGANIZED HEALTH CARE EDUCATION/TRAINING PROGRAM

## 2023-12-14 RX ORDER — MUPIROCIN 20 MG/G
OINTMENT TOPICAL 2 TIMES DAILY
Status: COMPLETED | OUTPATIENT
Start: 2023-12-14 | End: 2023-12-18

## 2023-12-14 RX ORDER — PSEUDOEPHEDRINE/ACETAMINOPHEN 30MG-500MG
100 TABLET ORAL
Status: COMPLETED | OUTPATIENT
Start: 2023-12-14 | End: 2023-12-14

## 2023-12-14 RX ORDER — SYRING-NEEDL,DISP,INSUL,0.3 ML 29 G X1/2"
296 SYRINGE, EMPTY DISPOSABLE MISCELLANEOUS
Status: COMPLETED | OUTPATIENT
Start: 2023-12-14 | End: 2023-12-14

## 2023-12-14 RX ORDER — SODIUM CHLORIDE, SODIUM LACTATE, POTASSIUM CHLORIDE, CALCIUM CHLORIDE 600; 310; 30; 20 MG/100ML; MG/100ML; MG/100ML; MG/100ML
INJECTION, SOLUTION INTRAVENOUS CONTINUOUS
Status: DISCONTINUED | OUTPATIENT
Start: 2023-12-14 | End: 2023-12-17

## 2023-12-14 RX ADMIN — POTASSIUM CHLORIDE 10 MEQ: 7.46 INJECTION, SOLUTION INTRAVENOUS at 11:12

## 2023-12-14 RX ADMIN — POTASSIUM CHLORIDE 10 MEQ: 7.46 INJECTION, SOLUTION INTRAVENOUS at 08:12

## 2023-12-14 RX ADMIN — MUPIROCIN: 20 OINTMENT TOPICAL at 09:12

## 2023-12-14 RX ADMIN — POTASSIUM CHLORIDE 40 MEQ: 7.46 INJECTION, SOLUTION INTRAVENOUS at 04:12

## 2023-12-14 RX ADMIN — POTASSIUM CHLORIDE 10 MEQ: 7.46 INJECTION, SOLUTION INTRAVENOUS at 10:12

## 2023-12-14 RX ADMIN — PIPERACILLIN AND TAZOBACTAM 4.5 G: 4; .5 INJECTION, POWDER, LYOPHILIZED, FOR SOLUTION INTRAVENOUS; PARENTERAL at 09:12

## 2023-12-14 RX ADMIN — Medication 100 ML: at 11:12

## 2023-12-14 RX ADMIN — PIPERACILLIN AND TAZOBACTAM 4.5 G: 4; .5 INJECTION, POWDER, LYOPHILIZED, FOR SOLUTION INTRAVENOUS; PARENTERAL at 11:12

## 2023-12-14 RX ADMIN — SODIUM PHOSPHATE, MONOBASIC, MONOHYDRATE AND SODIUM PHOSPHATE, DIBASIC, ANHYDROUS 20.01 MMOL: 142; 276 INJECTION, SOLUTION INTRAVENOUS at 04:12

## 2023-12-14 RX ADMIN — SODIUM PHOSPHATE, MONOBASIC, MONOHYDRATE AND SODIUM PHOSPHATE, DIBASIC, ANHYDROUS 20.01 MMOL: 142; 276 INJECTION, SOLUTION INTRAVENOUS at 09:12

## 2023-12-14 RX ADMIN — SODIUM CHLORIDE, POTASSIUM CHLORIDE, SODIUM LACTATE AND CALCIUM CHLORIDE: 600; 310; 30; 20 INJECTION, SOLUTION INTRAVENOUS at 05:12

## 2023-12-14 RX ADMIN — ONDANSETRON 4 MG: 2 INJECTION INTRAMUSCULAR; INTRAVENOUS at 02:12

## 2023-12-14 RX ADMIN — SODIUM CHLORIDE: 9 INJECTION, SOLUTION INTRAVENOUS at 01:12

## 2023-12-14 RX ADMIN — SODIUM CHLORIDE 500 ML: 9 INJECTION, SOLUTION INTRAVENOUS at 11:12

## 2023-12-14 RX ADMIN — PIPERACILLIN AND TAZOBACTAM 4.5 G: 4; .5 INJECTION, POWDER, LYOPHILIZED, FOR SOLUTION INTRAVENOUS; PARENTERAL at 04:12

## 2023-12-14 RX ADMIN — SODIUM CHLORIDE: 9 INJECTION, SOLUTION INTRAVENOUS at 04:12

## 2023-12-14 RX ADMIN — POTASSIUM CHLORIDE 10 MEQ: 7.46 INJECTION, SOLUTION INTRAVENOUS at 06:12

## 2023-12-14 RX ADMIN — DIATRIZOATE MEGLUMINE AND DIATRIZOATE SODIUM 120 ML: 660; 100 LIQUID ORAL; RECTAL at 08:12

## 2023-12-14 RX ADMIN — MAGNESIUM CITRATE 296 ML: 1.75 LIQUID ORAL at 11:12

## 2023-12-14 NOTE — ASSESSMENT & PLAN NOTE
12/14/2023   - interval chart reviewed; discussed pt with ICU MDT   - initial palliative consult with Dr. Sandoval, 12/13 - please see initial consult note for indepth GOC/ACP discussion   - pt lying in bed with eyes closed at time of visit; similar to Dr. Sandoval's assessment of pt 12/13, did not respond to voice or light physical stimuli   - family at bedside; reports pt has appeared comfortable since yesterday, occasional mild moan but otherwise no non-verbal indications of pain or discomfort   - family denies any questions following initial palliative consult and discussion with Dr. Sandoval, 12/13  - continued GOC for continued medical treatment with limits to invasive interventions  - emotional support provided; allowed time for questions  - expressed continued availability/support of palliative medicine team throughout admission, and potential for additional GOC/ACP as pt progresses through admission

## 2023-12-14 NOTE — PLAN OF CARE
"Care Plan    POC reviewed with Holly Kidd and daughter throughout shift. Pt's daughter verbalized understanding, no evidence of learning from pt. Questions and concerns addressed. No acute events today. Pt progressing toward goals. Lyte replacements initiated. Will continue to monitor. See below and flowsheets for full assessment and VS info.     Neuro:  [place] Coma Scale  Best Eye Response: 4-->(E4) spontaneous  Best Motor Response: 4-->(M4) withdraws from pain  Best Verbal Response: 1-->(V1) none  [place] Coma Scale Score: 9  Assessment Qualifiers: patient not sedated/intubated  Pupil PERRLA: yes     24 hr Temp:  [96.1 °F (35.6 °C)-98.3 °F (36.8 °C)]     CV:   Rhythm: normal sinus rhythm  BP goals:   SBP < 180  MAP > 65    Resp:      Oxygen Concentration (%): 100    Plan: N/A    GI/:     Diet/Nutrition Received: NPO  Last Bowel Movement: 12/07/23  Voiding Characteristics: urethral catheter (bladder)    Intake/Output Summary (Last 24 hours) at 12/14/2023 0531  Last data filed at 12/14/2023 0500  Gross per 24 hour   Intake 3389.43 ml   Output 1685 ml   Net 1704.43 ml     Unmeasured Output  Stool Occurrence: 0    Labs/Accuchecks:  Recent Labs   Lab 12/14/23  0330   WBC 18.64*   RBC 4.47   HGB 11.6*   HCT 36.2*         Recent Labs   Lab 12/14/23  0330      K 2.5*   CO2 27      BUN 39*   CREATININE 0.6   ALKPHOS 89   ALT 15   AST 22   BILITOT 0.8    No results for input(s): "PROTIME", "INR", "APTT", "HEPANTIXA" in the last 168 hours. No results for input(s): "CPK", "CPKMB", "TROPONINI", "MB" in the last 168 hours.    Electrolytes: Electrolytes replaced  Accuchecks: Q6H    Gtts:   sodium chloride 0.9% 100 mL/hr at 12/14/23 0500       LDA/Wounds:  Lines/Drains/Airways       Drain  Duration                  Gastrostomy/Enterostomy Percutaneous endoscopic gastrostomy (PEG) midline feeding -- days         Urethral Catheter 12/12/23 1100 Non-latex 16 Fr. 1 day              Peripheral Intravenous " Line  Duration                  Peripheral IV - Single Lumen 12/12/23 1057 20 G Left Antecubital 1 day         Peripheral IV - Single Lumen 12/12/23 1900 20 G Right;Anterior Antecubital 1 day                  Wounds: No  Wound care consulted: No    Problem: Coping Ineffective  Goal: Effective Coping  Intervention: Support and Enhance Coping Strategies  Flowsheets (Taken 12/14/2023 0528)  Family/Support System Care:   involvement promoted   support provided   presence promoted  Environmental Support:   calm environment promoted   caregiver consistency promoted   rooming-in facilitated     Problem: Glycemic Control Impaired (Sepsis/Septic Shock)  Goal: Blood Glucose Level Within Desired Range  Intervention: Optimize Glycemic Control  Flowsheets (Taken 12/14/2023 0528)  Glycemic Management: blood glucose monitored     Problem: Impaired Wound Healing  Goal: Optimal Wound Healing  Intervention: Promote Wound Healing  Flowsheets (Taken 12/14/2023 0528)  Sleep/Rest Enhancement:   family presence promoted   awakenings minimized   relaxation techniques promoted   room darkened  Activity Management: Patient unable to perform activities  Pain Management Interventions:   care clustered   relaxation techniques promoted   pillow support provided   quiet environment facilitated

## 2023-12-14 NOTE — HOSPITAL COURSE
72 y/o F with pmh of cva, dementa, peg tube, dvt/pe on eliquis who presented to due intractable N/V.   The patient was recently seen at [facility] for dislodged PEG tube on 12/03/2023.  They were not able to remove it so the PEG tube was cut an umbrella left side with expectation of it to be passed in her stool. CT abdomen and pelvis with findings of acute mechanical small-bowel obstruction with point of transition in the deep midline/right paramedian lower abdomen/pelvis with proximity to intraluminal foreign body favored to represent dislodged G-tube apparatus component within small bowel loop just proximal to transition point.  She does have large volume stool distal colon/rectum.  General surgery consulted however given patient is on aspirin, Plavix and Eliquis, patient is high risk for intervention at this time.  Recommendation include supportive care, antibiotics for UTI and enema to help with stool burden. Continuing with conservative tx. GGC on 12/14 and enema with some stool passed, but no foreign body noted.  No evidence of obstruction on gastrograffin challenge.  Patient treated for ESBL Ecoli and Providencia UTI with IV ABx's during hospital stay.  She has completed course of ABX's.  Patient started on tube feeds and tolerating without issue.  She has remained afebrile and hemodynamically stable.  Patient is at her baseline mental status.  She will be discharged back to NH.

## 2023-12-14 NOTE — ASSESSMENT & PLAN NOTE
"- noted   - "The patient was recently seen at [facility] for dislodged PEG tube on 12/03/2023.  They were not able to remove it so the PEG tube was cut and umbrella left inside with expectation of it to be passed in her stool."  - daughter understandably frustrated  - surgery following, talked with family with plan for non-surgical interventions such as enema +/- gastrograffin  - management per surgery and primary team   "

## 2023-12-14 NOTE — SUBJECTIVE & OBJECTIVE
Medications:  Continuous Infusions:   sodium chloride 0.9% 100 mL/hr at 12/14/23 0600     Scheduled Meds:   glycerin 99.5%  100 mL Rectal ED 1 Time    And    magnesium citrate  296 mL Rectal ED 1 Time    And    sodium chloride 0.9%  500 mL Rectal ED 1 Time    mupirocin   Nasal BID    piperacillin-tazobactam (Zosyn) IV (PEDS and ADULTS) (extended infusion is not appropriate)  4.5 g Intravenous Q8H     PRN Meds:calcium gluconate IVPB, calcium gluconate IVPB, calcium gluconate IVPB, dextrose 10%, dextrose 10%, glucagon (human recombinant), glucose, glucose, HYDROmorphone, magnesium sulfate IVPB, magnesium sulfate IVPB, metoprolol, naloxone, ondansetron, potassium chloride **AND** potassium chloride **AND** potassium chloride, sodium chloride 0.9%, sodium phosphate 15 mmol in dextrose 5 % (D5W) 250 mL IVPB, sodium phosphate 20.01 mmol in dextrose 5 % (D5W) 250 mL IVPB, sodium phosphate 30 mmol in dextrose 5 % (D5W) 250 mL IVPB    Objective:     Vital Signs (Most Recent):  Temp: 98.3 °F (36.8 °C) (12/14/23 0305)  Pulse: 75 (12/14/23 0605)  Resp: 11 (12/14/23 0605)  BP: 116/69 (12/14/23 0605)  SpO2: 100 % (12/14/23 0819) Vital Signs (24h Range):  Temp:  [96.1 °F (35.6 °C)-98.3 °F (36.8 °C)] 98.3 °F (36.8 °C)  Pulse:  [] 75  Resp:  [10-20] 11  SpO2:  [96 %-100 %] 100 %  BP: ()/(54-71) 116/69     Weight: 85.5 kg (188 lb 7.9 oz)  Body mass index is 34.48 kg/m².       Physical Exam  Vitals and nursing note reviewed.   Constitutional:       Comments: somnolent   HENT:      Head: Normocephalic and atraumatic.   Eyes:      Comments: Eyes remained closed   Pulmonary:      Effort: No respiratory distress.   Neurological:      Comments: Somnolent. Did not open eyes or respond to verbal or gentle physical stimuli.        Advance Care Planning   Advance Directives:   Living Will: No    LaPOST: No    Do Not Resuscitate Status: Yes    Medical Power of : No      Decision Making:  Patient unable to communicate due to  disease severity/cognitive impairment  Goals of Care: What is most important right now is to focus on improvement in condition but with limits to invasive therapies. Accordingly, we have decided that the best plan to meet the patient's goals includes continuing with treatment.     Significant Labs: All pertinent labs within the past 24 hours have been reviewed.  CBC:   Recent Labs   Lab 12/14/23 0330   WBC 18.64*   HGB 11.6*   HCT 36.2*   MCV 81*        BMP:  Recent Labs   Lab 12/14/23 0330   GLU 97      K 2.5*      CO2 27   BUN 39*   CREATININE 0.6   CALCIUM 8.7   MG 2.6     LFT:  Lab Results   Component Value Date    AST 22 12/14/2023    ALKPHOS 89 12/14/2023    BILITOT 0.8 12/14/2023     Albumin:   Albumin   Date Value Ref Range Status   12/14/2023 3.0 (L) 3.5 - 5.2 g/dL Final     Protein:   Total Protein   Date Value Ref Range Status   12/14/2023 6.6 6.0 - 8.4 g/dL Final     Lactic acid:   Lab Results   Component Value Date    LACTATE 2.2 12/13/2023    LACTATE 4.1 (HH) 12/12/2023     Significant Imaging: I have reviewed all pertinent imaging results/findings within the past 24 hours.

## 2023-12-14 NOTE — ASSESSMENT & PLAN NOTE
- noted hx of stroke 2022   - bedbound since and with PEG  - dependent for ADLs  - was living at [facility]

## 2023-12-14 NOTE — ASSESSMENT & PLAN NOTE
73-year-old female with significant past medical history including hypertension, pulmonary embolism on long-term anticoagulation, cerebrovascular disease status post stroke was long-term neurologic deficits including wheelchair dependence, facial droop, and dependence upon others for activities of daily living.  Admitted to the hospital today with a 3 day history of nausea, vomiting, and abdominal distention.  Leukocytosis of 30.  Evidence of mechanical small bowel obstruction with foreign object lodged in the mid small bowel.    - I had a long discussion with the patient's daughter who is her primary decision maker regarding the patient's condition.  Given patient's significant medical comorbidities, current medications, and current clinical status, I do not recommend urgent surgery at this time.  Patient's daughter states that she agrees that her mother would likely not want to undergo surgery, and would not want to live in her current state of health.  - Continue broad-spectrum antibiotic therapy  - Continue conservative treatment of small bowel obstruction. Manual disimpaction performed this morning by surgery team. Recommend daily enema's until her bowel function is appropriate. Will order gastrograffin administration via her G tube for today.   - Agree with palliative care  - General surgery will sign off following today as there is no surgical plans for her. If current conservative measures are unsuccessful we recommend discharge with Hospice care. Please feel free to reach out for any further questions or concerns.

## 2023-12-14 NOTE — ASSESSMENT & PLAN NOTE
This patient does have evidence of infective focus.My overall impression is sepsis.  Source: Urinary Tract  Antibiotics given-   Antibiotics (72h ago, onward)    Start     Stop Route Frequency Ordered    12/14/23 0900  mupirocin 2 % ointment         12/19/23 0859 Nasl 2 times daily 12/14/23 0732    12/12/23 1515  piperacillin-tazobactam (ZOSYN) 4.5 g in dextrose 5 % in water (D5W) 100 mL IVPB (MB+)         -- IV Every 8 hours (non-standard times) 12/12/23 1411        Latest lactate reviewed-  Recent Labs   Lab 12/12/23  1104 12/12/23  1422 12/13/23  0338   LACTATE 2.3* 4.1* 2.2       Organ dysfunction indicated by Encephalopathy    Fluid challenge Ideal Body Weight- The patient's ideal body weight is Ideal body weight: 50.1 kg (110 lb 7.2 oz) which will be used to calculate fluid bolus of 30 ml/kg for treatment of septic shock.      Post- resuscitation assessment Yes Perfusion exam was performed within 6 hours of septic shock presentation after bolus shows Adequate tissue perfusion assessed by non-invasive monitoring       Will Not start Pressors- Levophed for MAP of 65  Source control achieved by: IV antibiotics

## 2023-12-14 NOTE — PROGRESS NOTES
"[facility] [place] - Intensive Care  Palliative Medicine  Progress Note    Patient Name: Holly Kidd  MRN: [id number]  Admission Date: 12/12/2023  Hospital Length of Stay: 2 days  Code Status: DNR   Attending Provider: Bari Hermosillo III, MD  Consulting Provider: Massiel Hinojosa NP  Primary Care Physician: Estrellita Obregon FNP  Principal Problem:Sepsis    Patient information was obtained from relative(s) and primary team.      Assessment/Plan:   Advance Care Planning     Palliative Care  Advanced care planning/counseling discussion  12/14/2023   - interval chart reviewed; discussed pt with ICU MDT   - initial palliative consult with Dr. Sandoval, 12/13 - please see initial consult note for indepth GOC/ACP discussion   - pt lying in bed with eyes closed at time of visit; similar to Dr. Sandoval's assessment of pt 12/13, did not respond to voice or light physical stimuli   - family at bedside; reports pt has appeared comfortable since yesterday, occasional mild moan but otherwise no non-verbal indications of pain or discomfort   - family denies any questions following initial palliative consult and discussion with Dr. Sandoval, 12/13  - continued GOC for continued medical treatment with limits to invasive interventions  - emotional support provided; allowed time for questions  - expressed continued availability/support of palliative medicine team throughout admission, and potential for additional GOC/ACP as pt progresses through admission     Neuro  Cerebrovascular disease  - noted hx of stroke 2022   - bedbound since and with PEG  - dependent for ADLs  - was living at [facility]    Renal/  Acute cystitis  - noted  - abx per primary team     Oncology  Leukocytosis  - noted  - abx per primary team    GI  Bowel obstruction  - noted   - "The patient was recently seen at [facility] for dislodged PEG tube on 12/03/2023.  They were not able to remove it so the PEG tube was cut and umbrella left inside with expectation of it " "to be passed in her stool."  - daughter understandably frustrated  - surgery following, talked with family with plan for non-surgical interventions such as enema +/- gastrograffin  - management per surgery and primary team     I will follow-up with patient. Please contact us if you have any additional questions.    Total visit time: 35 minutes    35 min visit time including: face to face time in discussion of symptom assessment, and exploring options and burdens of offered treatments.  This also includes non-face to face time preparing to see the patient including chart review, obtaining and/or reviewing separately obtained history, documenting clinical information in the electronic or other health record, independently interpreting results and communicating results to the patient/family/caregiver, family discussions by phone if not able to be present, coordination of care with other specialists, and discharge planning.     Massiel Hinojosa NP  Palliative Medicine  [facility]    Subjective:     Chief Complaint:   Chief Complaint   Patient presents with    Vomiting     EMS called to 72yo female that staff at [facility] reports has had N/V x2 days. Also reported to [facility] that she is at her baseline mentally from previous CVA.        HPI:   Per H&P: "Ms. Kidd is a 73-year-old female with a past medical history of CVA, dementia, peg tube placement, hypertension, DVT/PE on Eliquis who presents to the emergency department for evaluation of intractable nausea and vomiting.  Patient is not able to give history so all of the history was obtained from the ED physician and medical records.  The patient was recently seen at [facility] for dislodged PEG tube on 12/03/2023.  They were not able to remove it so the PEG tube was cut an umbrella left side with expectation of it to be passed in her stool.  In the emergency department, she was found to have WBC of 32,000, BUN 63 and lactate 2.3.  Urinalysis " "appeared brown with significant WBC greater than 100 and bacteria.  CT abdomen and pelvis with findings of acute mechanical small-bowel obstruction with point of transition in the deep midline/right paramedian lower abdomen/pelvis with proximity to intraluminal foreign body favored to represent dislodged G-tube apparatus component within small bowel loop just proximal to transition point.  She does have large volume stool distal colon/rectum.  General surgery consulted however given patient is on aspirin, Plavix and Eliquis, patient is high risk for intervention at this time.  Recommendation include supportive care, antibiotics and enema to help with stool burden.  Daughter is at bedside and discussed plan of care.  Patient is DNR at this time."    Palliative medicine consulted for assistance with GOC.     Hospital Course:  No notes on file    Medications:  Continuous Infusions:   sodium chloride 0.9% 100 mL/hr at 12/14/23 0600     Scheduled Meds:   glycerin 99.5%  100 mL Rectal ED 1 Time    And    magnesium citrate  296 mL Rectal ED 1 Time    And    sodium chloride 0.9%  500 mL Rectal ED 1 Time    mupirocin   Nasal BID    piperacillin-tazobactam (Zosyn) IV (PEDS and ADULTS) (extended infusion is not appropriate)  4.5 g Intravenous Q8H     PRN Meds:calcium gluconate IVPB, calcium gluconate IVPB, calcium gluconate IVPB, dextrose 10%, dextrose 10%, glucagon (human recombinant), glucose, glucose, HYDROmorphone, magnesium sulfate IVPB, magnesium sulfate IVPB, metoprolol, naloxone, ondansetron, potassium chloride **AND** potassium chloride **AND** potassium chloride, sodium chloride 0.9%, sodium phosphate 15 mmol in dextrose 5 % (D5W) 250 mL IVPB, sodium phosphate 20.01 mmol in dextrose 5 % (D5W) 250 mL IVPB, sodium phosphate 30 mmol in dextrose 5 % (D5W) 250 mL IVPB    Objective:     Vital Signs (Most Recent):  Temp: 98.3 °F (36.8 °C) (12/14/23 0305)  Pulse: 75 (12/14/23 0605)  Resp: 11 (12/14/23 0605)  BP: 116/69 " (12/14/23 0605)  SpO2: 100 % (12/14/23 0819) Vital Signs (24h Range):  Temp:  [96.1 °F (35.6 °C)-98.3 °F (36.8 °C)] 98.3 °F (36.8 °C)  Pulse:  [] 75  Resp:  [10-20] 11  SpO2:  [96 %-100 %] 100 %  BP: ()/(54-71) 116/69     Weight: 85.5 kg (188 lb 7.9 oz)  Body mass index is 34.48 kg/m².       Physical Exam  Vitals and nursing note reviewed.   Constitutional:       Comments: somnolent   HENT:      Head: Normocephalic and atraumatic.   Eyes:      Comments: Eyes remained closed   Pulmonary:      Effort: No respiratory distress.   Neurological:      Comments: Somnolent. Did not open eyes or respond to verbal or gentle physical stimuli.        Advance Care Planning  Advance Directives:   Living Will: No    LaPOST: No    Do Not Resuscitate Status: Yes    Medical Power of : No      Decision Making:  Patient unable to communicate due to disease severity/cognitive impairment  Goals of Care: What is most important right now is to focus on improvement in condition but with limits to invasive therapies. Accordingly, we have decided that the best plan to meet the patient's goals includes continuing with treatment.     Significant Labs: All pertinent labs within the past 24 hours have been reviewed.  CBC:   Recent Labs   Lab 12/14/23 0330   WBC 18.64*   HGB 11.6*   HCT 36.2*   MCV 81*        BMP:  Recent Labs   Lab 12/14/23  0330   GLU 97      K 2.5*      CO2 27   BUN 39*   CREATININE 0.6   CALCIUM 8.7   MG 2.6     LFT:  Lab Results   Component Value Date    AST 22 12/14/2023    ALKPHOS 89 12/14/2023    BILITOT 0.8 12/14/2023     Albumin:   Albumin   Date Value Ref Range Status   12/14/2023 3.0 (L) 3.5 - 5.2 g/dL Final     Protein:   Total Protein   Date Value Ref Range Status   12/14/2023 6.6 6.0 - 8.4 g/dL Final     Lactic acid:   Lab Results   Component Value Date    LACTATE 2.2 12/13/2023    LACTATE 4.1 (HH) 12/12/2023     Significant Imaging: I have reviewed all pertinent imaging  results/findings within the past 24 hours.    Massiel Hinojosa NP  Palliative Medicine  [facility] - Intensive Care

## 2023-12-14 NOTE — PROGRESS NOTES
[facility] - Intensive Care  General Surgery  Progress Note    Subjective:     History of Present Illness:  73-year-old female with significant past medical history including hypertension, pulmonary embolism on long-term anticoagulation, cerebrovascular disease status post stroke was long-term neurologic deficits including wheelchair dependence, facial droop, and dependence upon others for activities of daily living.  She has a G-tube in place for nutrition and medication administration.  This was placed proximally 1 year ago at outside hospital.  One-week ago she had her G-tube exchange at outside hospital without apparent complication.  She presents to our emergency room today from her facility with nausea, vomiting, abdominal distention for the past few days.  Upon evaluation emergency room she was found to have significant leukocytosis of 30 with no evidence source of infection.  CT scan of her abdomen and pelvis reveals moderately distended loops of small bowel with the acute transition point in the mid abdomen which seems to be centered around a piece of an old gastrostomy tube.  Her distal small bowel is decompressed in her colon is relatively decompressed other than a large fecal burden in the rectum.  General surgery was consulted for evaluation.    Post-Op Info:  * No surgery found *         Interval History:  No significant changes overnight.  One bowel movement recorded following administration of enema.  White blood cell count downtrending.  Hypokalemic.  Manual disimpaction performed this morning with scant stool.    Medications:  Continuous Infusions:   sodium chloride 0.9% 100 mL/hr at 12/14/23 0600     Scheduled Meds:   piperacillin-tazobactam (Zosyn) IV (PEDS and ADULTS) (extended infusion is not appropriate)  4.5 g Intravenous Q8H     PRN Meds:calcium gluconate IVPB, calcium gluconate IVPB, calcium gluconate IVPB, dextrose 10%, dextrose 10%, glucagon (human recombinant), glucose, glucose,  HYDROmorphone, magnesium sulfate IVPB, magnesium sulfate IVPB, metoprolol, naloxone, ondansetron, potassium chloride **AND** potassium chloride **AND** potassium chloride, sodium chloride 0.9%, sodium phosphate 15 mmol in dextrose 5 % (D5W) 250 mL IVPB, sodium phosphate 20.01 mmol in dextrose 5 % (D5W) 250 mL IVPB, sodium phosphate 30 mmol in dextrose 5 % (D5W) 250 mL IVPB     Review of patient's allergies indicates:  No Known Allergies  Objective:     Vital Signs (Most Recent):  Temp: 98.3 °F (36.8 °C) (12/14/23 0305)  Pulse: 75 (12/14/23 0605)  Resp: 11 (12/14/23 0605)  BP: 116/69 (12/14/23 0605)  SpO2: 97 % (12/14/23 0605) Vital Signs (24h Range):  Temp:  [96.1 °F (35.6 °C)-98.3 °F (36.8 °C)] 98.3 °F (36.8 °C)  Pulse:  [] 75  Resp:  [8-20] 11  SpO2:  [96 %-100 %] 97 %  BP: ()/(53-71) 116/69     Weight: 85.5 kg (188 lb 7.9 oz)  Body mass index is 34.48 kg/m².    Intake/Output - Last 3 Shifts         12/12 0700  12/13 0659 12/13 0700  12/14 0659 12/14 0700  12/15 0659    I.V. (mL/kg) 1382.2 (16.2) 2389.2 (27.9)     IV Piggyback 2513.4 964.7     Total Intake(mL/kg) 3895.6 (45.6) 3353.9 (39.2)     Urine (mL/kg/hr) 200 850 (0.4)     Drains 800 865     Stool  0     Total Output 1000 1715     Net +2895.6 +1638.9            Stool Occurrence  1 x              Physical Exam  Vitals and nursing note reviewed.   Constitutional:       Appearance: She is obese. She is ill-appearing.      Comments: Patient is nonverbal.  Does not participate in conversation.   HENT:      Head: Normocephalic.      Mouth/Throat:      Mouth: Mucous membranes are dry.      Pharynx: Oropharynx is clear.   Cardiovascular:      Rate and Rhythm: Normal rate.      Pulses: Normal pulses.   Pulmonary:      Effort: Pulmonary effort is normal. No respiratory distress.      Breath sounds: No wheezing.   Abdominal:      General: There is distension.      Tenderness: There is no guarding.      Comments: Abdomen is distended but improved from  previous exam.  Patient does not respond to tactile stimulation of all 4 quadrants of her abdomen.  G-tube to gravity drainage   Musculoskeletal:         General: No swelling or tenderness. Normal range of motion.      Cervical back: Normal range of motion.   Skin:     General: Skin is warm and dry.      Coloration: Skin is not jaundiced or pale.          Significant Labs:  I have reviewed all pertinent lab results within the past 24 hours.  CBC:   Recent Labs   Lab 12/14/23  0330   WBC 18.64*   RBC 4.47   HGB 11.6*   HCT 36.2*      MCV 81*   MCH 26.0*   MCHC 32.0     CMP:   Recent Labs   Lab 12/14/23  0330   GLU 97   CALCIUM 8.7   ALBUMIN 3.0*   PROT 6.6      K 2.5*   CO2 27      BUN 39*   CREATININE 0.6   ALKPHOS 89   ALT 15   AST 22   BILITOT 0.8       Significant Diagnostics:  I have reviewed all pertinent imaging results/findings within the past 24 hours.  Assessment/Plan:     Bowel obstruction  73-year-old female with significant past medical history including hypertension, pulmonary embolism on long-term anticoagulation, cerebrovascular disease status post stroke was long-term neurologic deficits including wheelchair dependence, facial droop, and dependence upon others for activities of daily living.  Admitted to the hospital today with a 3 day history of nausea, vomiting, and abdominal distention.  Leukocytosis of 30.  Evidence of mechanical small bowel obstruction with foreign object lodged in the mid small bowel.    - I had a long discussion with the patient's daughter who is her primary decision maker regarding the patient's condition.  Given patient's significant medical comorbidities, current medications, and current clinical status, I do not recommend urgent surgery at this time.  Patient's daughter states that she agrees that her mother would likely not want to undergo surgery, and would not want to live in her current state of health.  - Continue broad-spectrum antibiotic  therapy  - Continue conservative treatment of small bowel obstruction. Manual disimpaction performed this morning by surgery team. Recommend daily enema's until her bowel function is appropriate. Will order gastrograffin administration via her G tube for today.   - Agree with palliative care  - General surgery will sign off following today as there is no surgical plans for her. If current conservative measures are unsuccessful we recommend discharge with Hospice care. Please feel free to reach out for any further questions or concerns.         Rosendo Lynch MD  General Surgery  [facility] - [place] - Intensive Care

## 2023-12-14 NOTE — PROGRESS NOTES
[facility]  Progress Note    Patient Name: Holly Kdid  MRN: [id number]  Patient Class: IP- Inpatient   Admission Date: 12/12/2023  Length of Stay: 2 days  Attending Physician: Bari Hermosillo III, MD  Primary Care Provider: Estrellita Obreogn FNP        Subjective:     Principal Problem:Sepsis        HPI:  Ms. Kidd is a 73-year-old female with a past medical history of CVA, dementia, peg tube placement, hypertension, DVT/PE on Eliquis who presents to the emergency department for evaluation of intractable nausea and vomiting.  Patient is not able to give history so all of the history was obtained from the ED physician and medical records.  The patient was recently seen at [facility] for dislodged PEG tube on 12/03/2023.  They were not able to remove it so the PEG tube was cut an umbrella left side with expectation of it to be passed in her stool.  In the emergency department, she was found to have WBC of 32,000, BUN 63 and lactate 2.3.  Urinalysis appeared brown with significant WBC greater than 100 and bacteria.  CT abdomen and pelvis with findings of acute mechanical small-bowel obstruction with point of transition in the deep midline/right paramedian lower abdomen/pelvis with proximity to intraluminal foreign body favored to represent dislodged G-tube apparatus component within small bowel loop just proximal to transition point.  She does have large volume stool distal colon/rectum.  General surgery consulted however given patient is on aspirin, Plavix and Eliquis, patient is high risk for intervention at this time.  Recommendation include supportive care, antibiotics and enema to help with stool burden.  Daughter is at bedside and discussed plan of care.  Patient is DNR at this time.    Overview/Hospital Course:  No notes on file    Interval History: Pt more awake today. Answers no when asked about pain. States she is doing fine.     Review of Systems  Objective:     Vital Signs  (Most Recent):  Temp: 98.3 °F (36.8 °C) (12/14/23 0305)  Pulse: 75 (12/14/23 0605)  Resp: 11 (12/14/23 0605)  BP: 116/69 (12/14/23 0605)  SpO2: 100 % (12/14/23 0819) Vital Signs (24h Range):  Temp:  [96.1 °F (35.6 °C)-98.3 °F (36.8 °C)] 98.3 °F (36.8 °C)  Pulse:  [] 75  Resp:  [10-20] 11  SpO2:  [96 %-100 %] 100 %  BP: ()/(54-71) 116/69     Weight: 85.5 kg (188 lb 7.9 oz)  Body mass index is 34.48 kg/m².    Intake/Output Summary (Last 24 hours) at 12/14/2023 1203  Last data filed at 12/14/2023 1100  Gross per 24 hour   Intake 3177.79 ml   Output 1435 ml   Net 1742.79 ml         Physical Exam  Constitutional:       General: She is not in acute distress.     Appearance: She is ill-appearing (chronic).   HENT:      Head: Normocephalic and atraumatic.   Cardiovascular:      Rate and Rhythm: Normal rate and regular rhythm.   Pulmonary:      Effort: Pulmonary effort is normal. No respiratory distress.   Abdominal:      General: There is distension.      Tenderness: There is no abdominal tenderness. There is no guarding.   Neurological:      Mental Status: She is alert. Mental status is at baseline.   Psychiatric:         Mood and Affect: Mood normal.         Behavior: Behavior normal.             Significant Labs: All pertinent labs within the past 24 hours have been reviewed.    Significant Imaging: I have reviewed all pertinent imaging results/findings within the past 24 hours.    Assessment/Plan:      * Sepsis  This patient does have evidence of infective focus.My overall impression is sepsis.  Source: Urinary Tract  Antibiotics given-   Antibiotics (72h ago, onward)      Start     Stop Route Frequency Ordered    12/14/23 0900  mupirocin 2 % ointment         12/19/23 0859 Nasl 2 times daily 12/14/23 0732    12/12/23 1515  piperacillin-tazobactam (ZOSYN) 4.5 g in dextrose 5 % in water (D5W) 100 mL IVPB (MB+)         -- IV Every 8 hours (non-standard times) 12/12/23 1411          Latest lactate  reviewed-  Recent Labs   Lab 12/12/23  1104 12/12/23  1422 12/13/23  0338   LACTATE 2.3* 4.1* 2.2       Organ dysfunction indicated by Encephalopathy    Fluid challenge Ideal Body Weight- The patient's ideal body weight is Ideal body weight: 50.1 kg (110 lb 7.2 oz) which will be used to calculate fluid bolus of 30 ml/kg for treatment of septic shock.      Post- resuscitation assessment Yes Perfusion exam was performed within 6 hours of septic shock presentation after bolus shows Adequate tissue perfusion assessed by non-invasive monitoring       Will Not start Pressors- Levophed for MAP of 65  Source control achieved by: IV antibiotics    Bowel obstruction  Presents with acute mechanical small-bowel obstruction with point of transition in the deep midline/right lower abdomen and pelvis.  Recently had older G-tube cut and number the left inside.  This is what most likely causing obstruction at this time.  She was not a candidate for surgery given she was on aspirin, Plavix and Eliquis.  This places her at very high risk for any intervention at this moment.  Holding all anticoagulation.  Surgery recommending enema to help with stool burden distal to object.  NG tube as needed if patient begins to have further nausea and vomiting.   -PEG to gravity with management per surgery.      Acute cystitis  UA consistent with urinary tract infection.  Initially given ceftriaxone in the emergency department however broadened to Zosyn.  Follow up urine cultures and adjusted accordingly.      Advanced care planning/counseling discussion  Advance Care Planning    Date: 12/12/2023    Code Status  In light of the patients advanced and life limiting illness,I engaged the the family in a voluntary conversation about the patient's preferences for care  at the very end of life. The patient wishes to have a natural, peaceful death.  Along those lines, the patient does not wish to have CPR or other invasive treatments performed when her  heart and/or breathing stops. I communicated to the family that a DNR order would be placed in her medical record to reflect this preference.  I spent a total of 8 minutes engaging the patient in this advance care planning discussion.    Continue with current level of care at this time.  We will continue with ICU level of care, IV antibiotics.   Has a daughter and son, her daughter Lana was at bedside and unable to get in touch with son.  12/13: spoke to son and updated. Seems to understand overall poor prognosis and our concerns         Leukocytosis  See sepsis      Acute encephalopathy  Lethargic with history of CVA and now with sepsis and small-bowel obstruction.  Treat underlying issues at this time.      Cerebrovascular disease  Hx of CVA, now with PEG in place and debility  - holding ASA/Plavix with SBO      Essential hypertension  - Chronic in nature, holding home BP medications in setting of sepsis  - will add back as needed      VTE Risk Mitigation (From admission, onward)           Ordered     IP VTE HIGH RISK PATIENT  Once         12/12/23 1347     Place sequential compression device  Until discontinued         12/12/23 1347                    Discharge Planning   ANISH: 12/16/2023     Code Status: DNR   Is the patient medically ready for discharge?:     Reason for patient still in hospital (select all that apply): Treatment and Consult recommendations  Discharge Plan A: Return to nursing home              Bari Hermosillo III, MD  Department of Hospital Medicine   [facility] - Intensive Care

## 2023-12-14 NOTE — NURSING
[facility]  Nurses Note -- 4 Eyes      12/14/2023       Skin assessed on: Q Shift      [] No Pressure Injuries Present    []Prevention Measures Documented    [x] Yes LDA  for Pressure Injury Previously documented     [] Yes New Pressure Injury Discovered   [] LDA for New Pressure Injury Added      Attending RN:  Karissa Cadena RN     Second RN:  Clarence Morejon RN

## 2023-12-14 NOTE — SUBJECTIVE & OBJECTIVE
Interval History:  No significant changes overnight.  One bowel movement recorded following administration of enema.  White blood cell count downtrending.  Hypokalemic.  Manual disimpaction performed this morning with scant stool.    Medications:  Continuous Infusions:   sodium chloride 0.9% 100 mL/hr at 12/14/23 0600     Scheduled Meds:   piperacillin-tazobactam (Zosyn) IV (PEDS and ADULTS) (extended infusion is not appropriate)  4.5 g Intravenous Q8H     PRN Meds:calcium gluconate IVPB, calcium gluconate IVPB, calcium gluconate IVPB, dextrose 10%, dextrose 10%, glucagon (human recombinant), glucose, glucose, HYDROmorphone, magnesium sulfate IVPB, magnesium sulfate IVPB, metoprolol, naloxone, ondansetron, potassium chloride **AND** potassium chloride **AND** potassium chloride, sodium chloride 0.9%, sodium phosphate 15 mmol in dextrose 5 % (D5W) 250 mL IVPB, sodium phosphate 20.01 mmol in dextrose 5 % (D5W) 250 mL IVPB, sodium phosphate 30 mmol in dextrose 5 % (D5W) 250 mL IVPB     Review of patient's allergies indicates:  No Known Allergies  Objective:     Vital Signs (Most Recent):  Temp: 98.3 °F (36.8 °C) (12/14/23 0305)  Pulse: 75 (12/14/23 0605)  Resp: 11 (12/14/23 0605)  BP: 116/69 (12/14/23 0605)  SpO2: 97 % (12/14/23 0605) Vital Signs (24h Range):  Temp:  [96.1 °F (35.6 °C)-98.3 °F (36.8 °C)] 98.3 °F (36.8 °C)  Pulse:  [] 75  Resp:  [8-20] 11  SpO2:  [96 %-100 %] 97 %  BP: ()/(53-71) 116/69     Weight: 85.5 kg (188 lb 7.9 oz)  Body mass index is 34.48 kg/m².    Intake/Output - Last 3 Shifts         12/12 0700  12/13 0659 12/13 0700  12/14 0659 12/14 0700  12/15 0659    I.V. (mL/kg) 1382.2 (16.2) 2389.2 (27.9)     IV Piggyback 2513.4 964.7     Total Intake(mL/kg) 3895.6 (45.6) 3353.9 (39.2)     Urine (mL/kg/hr) 200 850 (0.4)     Drains 800 865     Stool  0     Total Output 1000 1715     Net +2895.6 +1638.9            Stool Occurrence  1 x              Physical Exam  Vitals and nursing note  reviewed.   Constitutional:       Appearance: She is obese. She is ill-appearing.      Comments: Patient is nonverbal.  Does not participate in conversation.   HENT:      Head: Normocephalic.      Mouth/Throat:      Mouth: Mucous membranes are dry.      Pharynx: Oropharynx is clear.   Cardiovascular:      Rate and Rhythm: Normal rate.      Pulses: Normal pulses.   Pulmonary:      Effort: Pulmonary effort is normal. No respiratory distress.      Breath sounds: No wheezing.   Abdominal:      General: There is distension.      Tenderness: There is no guarding.      Comments: Abdomen is distended but improved from previous exam.  Patient does not respond to tactile stimulation of all 4 quadrants of her abdomen.  G-tube to gravity drainage   Musculoskeletal:         General: No swelling or tenderness. Normal range of motion.      Cervical back: Normal range of motion.   Skin:     General: Skin is warm and dry.      Coloration: Skin is not jaundiced or pale.          Significant Labs:  I have reviewed all pertinent lab results within the past 24 hours.  CBC:   Recent Labs   Lab 12/14/23  0330   WBC 18.64*   RBC 4.47   HGB 11.6*   HCT 36.2*      MCV 81*   MCH 26.0*   MCHC 32.0     CMP:   Recent Labs   Lab 12/14/23  0330   GLU 97   CALCIUM 8.7   ALBUMIN 3.0*   PROT 6.6      K 2.5*   CO2 27      BUN 39*   CREATININE 0.6   ALKPHOS 89   ALT 15   AST 22   BILITOT 0.8       Significant Diagnostics:  I have reviewed all pertinent imaging results/findings within the past 24 hours.

## 2023-12-14 NOTE — PLAN OF CARE
The patient only has 1 bowel movement afte enema today. She had gastrograffin test. Pending surgery team's input.

## 2023-12-14 NOTE — SUBJECTIVE & OBJECTIVE
Interval History: Pt more awake today. Answers no when asked about pain. States she is doing fine.     Review of Systems  Objective:     Vital Signs (Most Recent):  Temp: 98.3 °F (36.8 °C) (12/14/23 0305)  Pulse: 75 (12/14/23 0605)  Resp: 11 (12/14/23 0605)  BP: 116/69 (12/14/23 0605)  SpO2: 100 % (12/14/23 0819) Vital Signs (24h Range):  Temp:  [96.1 °F (35.6 °C)-98.3 °F (36.8 °C)] 98.3 °F (36.8 °C)  Pulse:  [] 75  Resp:  [10-20] 11  SpO2:  [96 %-100 %] 100 %  BP: ()/(54-71) 116/69     Weight: 85.5 kg (188 lb 7.9 oz)  Body mass index is 34.48 kg/m².    Intake/Output Summary (Last 24 hours) at 12/14/2023 1203  Last data filed at 12/14/2023 1100  Gross per 24 hour   Intake 3177.79 ml   Output 1435 ml   Net 1742.79 ml         Physical Exam  Constitutional:       General: She is not in acute distress.     Appearance: She is ill-appearing (chronic).   HENT:      Head: Normocephalic and atraumatic.   Cardiovascular:      Rate and Rhythm: Normal rate and regular rhythm.   Pulmonary:      Effort: Pulmonary effort is normal. No respiratory distress.   Abdominal:      General: There is distension.      Tenderness: There is no abdominal tenderness. There is no guarding.   Neurological:      Mental Status: She is alert. Mental status is at baseline.   Psychiatric:         Mood and Affect: Mood normal.         Behavior: Behavior normal.             Significant Labs: All pertinent labs within the past 24 hours have been reviewed.    Significant Imaging: I have reviewed all pertinent imaging results/findings within the past 24 hours.

## 2023-12-15 PROBLEM — D72.829 LEUKOCYTOSIS: Status: RESOLVED | Noted: 2022-02-18 | Resolved: 2023-12-15

## 2023-12-15 PROBLEM — Z86.711 HISTORY OF PULMONARY EMBOLISM: Status: ACTIVE | Noted: 2023-12-15

## 2023-12-15 LAB
ALBUMIN SERPL BCP-MCNC: 2.7 G/DL (ref 3.5–5.2)
ALP SERPL-CCNC: 76 U/L (ref 55–135)
ALT SERPL W/O P-5'-P-CCNC: 15 U/L (ref 10–44)
ANION GAP SERPL CALC-SCNC: 9 MMOL/L (ref 8–16)
AST SERPL-CCNC: 28 U/L (ref 10–40)
BACTERIA BLD CULT: ABNORMAL
BASOPHILS # BLD AUTO: 0.02 K/UL (ref 0–0.2)
BASOPHILS NFR BLD: 0.1 % (ref 0–1.9)
BILIRUB SERPL-MCNC: 0.6 MG/DL (ref 0.1–1)
BUN SERPL-MCNC: 18 MG/DL (ref 8–23)
CALCIUM SERPL-MCNC: 8.2 MG/DL (ref 8.7–10.5)
CHLORIDE SERPL-SCNC: 108 MMOL/L (ref 95–110)
CO2 SERPL-SCNC: 33 MMOL/L (ref 23–29)
CREAT SERPL-MCNC: 0.5 MG/DL (ref 0.5–1.4)
DIFFERENTIAL METHOD: ABNORMAL
EOSINOPHIL # BLD AUTO: 0 K/UL (ref 0–0.5)
EOSINOPHIL NFR BLD: 0.2 % (ref 0–8)
ERYTHROCYTE [DISTWIDTH] IN BLOOD BY AUTOMATED COUNT: 14.8 % (ref 11.5–14.5)
EST. GFR  (NO RACE VARIABLE): >60 ML/MIN/1.73 M^2
GLUCOSE SERPL-MCNC: 90 MG/DL (ref 70–110)
HCT VFR BLD AUTO: 32.1 % (ref 37–48.5)
HGB BLD-MCNC: 10.1 G/DL (ref 12–16)
IMM GRANULOCYTES # BLD AUTO: 0.18 K/UL (ref 0–0.04)
IMM GRANULOCYTES NFR BLD AUTO: 1.2 % (ref 0–0.5)
LYMPHOCYTES # BLD AUTO: 1.6 K/UL (ref 1–4.8)
LYMPHOCYTES NFR BLD: 10.6 % (ref 18–48)
MAGNESIUM SERPL-MCNC: 2.6 MG/DL (ref 1.6–2.6)
MCH RBC QN AUTO: 25.6 PG (ref 27–31)
MCHC RBC AUTO-ENTMCNC: 31.5 G/DL (ref 32–36)
MCV RBC AUTO: 81 FL (ref 82–98)
MONOCYTES # BLD AUTO: 1 K/UL (ref 0.3–1)
MONOCYTES NFR BLD: 6.3 % (ref 4–15)
NEUTROPHILS # BLD AUTO: 12.4 K/UL (ref 1.8–7.7)
NEUTROPHILS NFR BLD: 81.6 % (ref 38–73)
NRBC BLD-RTO: 0 /100 WBC
PHOSPHATE SERPL-MCNC: 2.1 MG/DL (ref 2.7–4.5)
PLATELET # BLD AUTO: 234 K/UL (ref 150–450)
PMV BLD AUTO: 9.9 FL (ref 9.2–12.9)
POCT GLUCOSE: 72 MG/DL (ref 70–110)
POCT GLUCOSE: 83 MG/DL (ref 70–110)
POCT GLUCOSE: 87 MG/DL (ref 70–110)
POTASSIUM SERPL-SCNC: 2.8 MMOL/L (ref 3.5–5.1)
POTASSIUM SERPL-SCNC: 2.9 MMOL/L (ref 3.5–5.1)
PROT SERPL-MCNC: 6 G/DL (ref 6–8.4)
RBC # BLD AUTO: 3.95 M/UL (ref 4–5.4)
SODIUM SERPL-SCNC: 150 MMOL/L (ref 136–145)
WBC # BLD AUTO: 15.2 K/UL (ref 3.9–12.7)

## 2023-12-15 PROCEDURE — 83735 ASSAY OF MAGNESIUM: CPT | Performed by: STUDENT IN AN ORGANIZED HEALTH CARE EDUCATION/TRAINING PROGRAM

## 2023-12-15 PROCEDURE — 21400001 HC TELEMETRY ROOM

## 2023-12-15 PROCEDURE — 93010 ELECTROCARDIOGRAM REPORT: CPT | Mod: ,,, | Performed by: INTERNAL MEDICINE

## 2023-12-15 PROCEDURE — A4216 STERILE WATER/SALINE, 10 ML: HCPCS | Performed by: STUDENT IN AN ORGANIZED HEALTH CARE EDUCATION/TRAINING PROGRAM

## 2023-12-15 PROCEDURE — 84132 ASSAY OF SERUM POTASSIUM: CPT | Performed by: STUDENT IN AN ORGANIZED HEALTH CARE EDUCATION/TRAINING PROGRAM

## 2023-12-15 PROCEDURE — 99233 PR SUBSEQUENT HOSPITAL CARE,LEVL III: ICD-10-PCS | Mod: ,,, | Performed by: STUDENT IN AN ORGANIZED HEALTH CARE EDUCATION/TRAINING PROGRAM

## 2023-12-15 PROCEDURE — 84100 ASSAY OF PHOSPHORUS: CPT | Performed by: STUDENT IN AN ORGANIZED HEALTH CARE EDUCATION/TRAINING PROGRAM

## 2023-12-15 PROCEDURE — 25000003 PHARM REV CODE 250: Performed by: HOSPITALIST

## 2023-12-15 PROCEDURE — 25000003 PHARM REV CODE 250: Performed by: STUDENT IN AN ORGANIZED HEALTH CARE EDUCATION/TRAINING PROGRAM

## 2023-12-15 PROCEDURE — 36415 COLL VENOUS BLD VENIPUNCTURE: CPT | Performed by: STUDENT IN AN ORGANIZED HEALTH CARE EDUCATION/TRAINING PROGRAM

## 2023-12-15 PROCEDURE — 85025 COMPLETE CBC W/AUTO DIFF WBC: CPT | Performed by: STUDENT IN AN ORGANIZED HEALTH CARE EDUCATION/TRAINING PROGRAM

## 2023-12-15 PROCEDURE — 99497 PR ADVNCD CARE PLAN 30 MIN: ICD-10-PCS | Mod: ,,, | Performed by: STUDENT IN AN ORGANIZED HEALTH CARE EDUCATION/TRAINING PROGRAM

## 2023-12-15 PROCEDURE — 27000207 HC ISOLATION

## 2023-12-15 PROCEDURE — 99233 SBSQ HOSP IP/OBS HIGH 50: CPT | Mod: ,,, | Performed by: STUDENT IN AN ORGANIZED HEALTH CARE EDUCATION/TRAINING PROGRAM

## 2023-12-15 PROCEDURE — 63600175 PHARM REV CODE 636 W HCPCS: Performed by: STUDENT IN AN ORGANIZED HEALTH CARE EDUCATION/TRAINING PROGRAM

## 2023-12-15 PROCEDURE — 80053 COMPREHEN METABOLIC PANEL: CPT | Performed by: STUDENT IN AN ORGANIZED HEALTH CARE EDUCATION/TRAINING PROGRAM

## 2023-12-15 PROCEDURE — 93005 ELECTROCARDIOGRAM TRACING: CPT

## 2023-12-15 PROCEDURE — 99497 ADVNCD CARE PLAN 30 MIN: CPT | Mod: ,,, | Performed by: STUDENT IN AN ORGANIZED HEALTH CARE EDUCATION/TRAINING PROGRAM

## 2023-12-15 PROCEDURE — 93010 EKG 12-LEAD: ICD-10-PCS | Mod: ,,, | Performed by: INTERNAL MEDICINE

## 2023-12-15 RX ADMIN — APIXABAN 5 MG: 5 TABLET, FILM COATED ORAL at 09:12

## 2023-12-15 RX ADMIN — SODIUM PHOSPHATE, MONOBASIC, MONOHYDRATE AND SODIUM PHOSPHATE, DIBASIC, ANHYDROUS 20.01 MMOL: 142; 276 INJECTION, SOLUTION INTRAVENOUS at 04:12

## 2023-12-15 RX ADMIN — SODIUM CHLORIDE, POTASSIUM CHLORIDE, SODIUM LACTATE AND CALCIUM CHLORIDE: 600; 310; 30; 20 INJECTION, SOLUTION INTRAVENOUS at 04:12

## 2023-12-15 RX ADMIN — MUPIROCIN: 20 OINTMENT TOPICAL at 09:12

## 2023-12-15 RX ADMIN — POTASSIUM CHLORIDE 40 MEQ: 7.46 INJECTION, SOLUTION INTRAVENOUS at 03:12

## 2023-12-15 RX ADMIN — PIPERACILLIN AND TAZOBACTAM 4.5 G: 4; .5 INJECTION, POWDER, LYOPHILIZED, FOR SOLUTION INTRAVENOUS; PARENTERAL at 11:12

## 2023-12-15 RX ADMIN — Medication 10 ML: at 03:12

## 2023-12-15 RX ADMIN — PIPERACILLIN AND TAZOBACTAM 4.5 G: 4; .5 INJECTION, POWDER, LYOPHILIZED, FOR SOLUTION INTRAVENOUS; PARENTERAL at 03:12

## 2023-12-15 RX ADMIN — PIPERACILLIN AND TAZOBACTAM 4.5 G: 4; .5 INJECTION, POWDER, LYOPHILIZED, FOR SOLUTION INTRAVENOUS; PARENTERAL at 07:12

## 2023-12-15 NOTE — PROGRESS NOTES
[facility] Medicine  Progress Note    Patient Name: Holly Kidd  MRN: [id number]  Patient Class: IP- Inpatient   Admission Date: 12/12/2023  Length of Stay: 3 days  Attending Physician: Bari Hermosillo III, MD  Primary Care Provider: Estrellita Obregon FNP        Subjective:     Principal Problem:Sepsis        HPI:  Ms. Kidd is a 73-year-old female with a past medical history of CVA, dementia, peg tube placement, hypertension, DVT/PE on Eliquis who presents to the emergency department for evaluation of intractable nausea and vomiting.  Patient is not able to give history so all of the history was obtained from the ED physician and medical records.  The patient was recently seen at [facility] for dislodged PEG tube on 12/03/2023.  They were not able to remove it so the PEG tube was cut an umbrella left side with expectation of it to be passed in her stool.  In the emergency department, she was found to have WBC of 32,000, BUN 63 and lactate 2.3.  Urinalysis appeared brown with significant WBC greater than 100 and bacteria.  CT abdomen and pelvis with findings of acute mechanical small-bowel obstruction with point of transition in the deep midline/right paramedian lower abdomen/pelvis with proximity to intraluminal foreign body favored to represent dislodged G-tube apparatus component within small bowel loop just proximal to transition point.  She does have large volume stool distal colon/rectum.  General surgery consulted however given patient is on aspirin, Plavix and Eliquis, patient is high risk for intervention at this time.  Recommendation include supportive care, antibiotics and enema to help with stool burden.  Daughter is at bedside and discussed plan of care.  Patient is DNR at this time.    Overview/Hospital Course:  73F with pmh of cva, dementa, peg tube, dvt/pe on eliquis who presented to due intractable N/V.   The patient was recently seen at [facility] for dislodged PEG tube on  12/03/2023.  They were not able to remove it so the PEG tube was cut an umbrella left side with expectation of it to be passed in her stool. CT abdomen and pelvis with findings of acute mechanical small-bowel obstruction with point of transition in the deep midline/right paramedian lower abdomen/pelvis with proximity to intraluminal foreign body favored to represent dislodged G-tube apparatus component within small bowel loop just proximal to transition point.  She does have large volume stool distal colon/rectum.  General surgery consulted however given patient is on aspirin, Plavix and Eliquis, patient is high risk for intervention at this time.  Recommendation include supportive care, antibiotics and enema to help with stool burden. Continuing with conservative tx. GGC on 12/14 and enema with some stool passed, but no foreign body noted.     Interval History: Pt opens eyes and tracks in the room. Had bm overnight, but no foreign body noted    Review of Systems  Objective:     Vital Signs (Most Recent):  Temp: 97.4 °F (36.3 °C) (12/15/23 0800)  Pulse: 71 (12/15/23 0900)  Resp: 14 (12/15/23 0900)  BP: (!) 155/77 (12/15/23 0900)  SpO2: 98 % (12/15/23 0900) Vital Signs (24h Range):  Temp:  [97.4 °F (36.3 °C)-98.6 °F (37 °C)] 97.4 °F (36.3 °C)  Pulse:  [] 71  Resp:  [10-20] 14  SpO2:  [97 %-100 %] 98 %  BP: (104-155)/(53-89) 155/77     Weight: 85.3 kg (188 lb)  Body mass index is 34.39 kg/m².    Intake/Output Summary (Last 24 hours) at 12/15/2023 1033  Last data filed at 12/15/2023 0934  Gross per 24 hour   Intake 3282.51 ml   Output 1555 ml   Net 1727.51 ml         Physical Exam      Constitutional:       General: She is not in acute distress.     Appearance: She is ill-appearing (chronic).   HENT:      Head: Normocephalic and atraumatic.   Cardiovascular:      Rate and Rhythm: Normal rate and regular rhythm.   Pulmonary:      Effort: Pulmonary effort is normal. No respiratory distress.   Abdominal:       General: There is distension.      Tenderness: There is no abdominal tenderness. There is no guarding.   Neurological:      Mental Status: She is alert. Mental status is at baseline.   Significant Labs: All pertinent labs within the past 24 hours have been reviewed.    Significant Imaging: I have reviewed all pertinent imaging results/findings within the past 24 hours.    Assessment/Plan:      * Sepsis  This patient does have evidence of infective focus.My overall impression is sepsis.  Source: Urinary Tract  Antibiotics given-   Antibiotics (72h ago, onward)      Start     Stop Route Frequency Ordered    12/14/23 0900  mupirocin 2 % ointment         12/19/23 0859 Nasl 2 times daily 12/14/23 0732    12/12/23 1515  piperacillin-tazobactam (ZOSYN) 4.5 g in dextrose 5 % in water (D5W) 100 mL IVPB (MB+)         -- IV Every 8 hours (non-standard times) 12/12/23 1411          Latest lactate reviewed-  Recent Labs   Lab 12/12/23  1104 12/12/23  1422 12/13/23  0338   LACTATE 2.3* 4.1* 2.2       Organ dysfunction indicated by Encephalopathy    Fluid challenge Ideal Body Weight- The patient's ideal body weight is Ideal body weight: 50.1 kg (110 lb 7.2 oz) which will be used to calculate fluid bolus of 30 ml/kg for treatment of septic shock.      Post- resuscitation assessment Yes Perfusion exam was performed within 6 hours of septic shock presentation after bolus shows Adequate tissue perfusion assessed by non-invasive monitoring       Will Not start Pressors- Levophed for MAP of 65  Source control achieved by: IV antibiotics with zosyn. Urine culture growing ESBL and providencia stuartii both sensitive to zosyn    Bowel obstruction  Presents with acute mechanical small-bowel obstruction with point of transition in the deep midline/right lower abdomen and pelvis.  Recently had older G-tube cut and number the left inside.  This is what most likely causing obstruction at this time.  She was not a candidate for surgery given she  was on aspirin, Plavix and Eliquis.  This places her at very high risk for any intervention at this moment.  Holding all anticoagulation.  Surgery recommending enema to help with stool burden distal to object.    -Has had multiple enemas and GGC with 1 bm so far. No foreign body noted.     -PEG to gravity with management per surgery.      Acute cystitis  UA consistent with urinary tract infection.  Initially given ceftriaxone in the emergency department however broadened to Zosyn.  Follow up urine cultures and adjusted accordingly.      Advanced care planning/counseling discussion  Advance Care Planning    Date: 12/12/2023    Code Status  In light of the patients advanced and life limiting illness,I engaged the the family in a voluntary conversation about the patient's preferences for care  at the very end of life. The patient wishes to have a natural, peaceful death.  Along those lines, the patient does not wish to have CPR or other invasive treatments performed when her heart and/or breathing stops. I communicated to the family that a DNR order would be placed in her medical record to reflect this preference.  I spent a total of 8 minutes engaging the patient in this advance care planning discussion.    Continue with current level of care at this time.     Has a daughter and son, her daughter Lana was at bedside and unable to get in touch with son.  12/13: spoke to son and updated. Seems to understand overall poor prognosis and our concerns            Acute encephalopathy  Lethargic with history of CVA and now with sepsis and small-bowel obstruction.  Treat underlying issues at this time.  -more alert now and closer to baseline    Cerebrovascular disease  Hx of CVA, now with PEG in place and debility  - holding ASA/Plavix with SBO      Essential hypertension  - Chronic in nature, holding home BP medications in setting of sepsis  - will add back as needed      VTE Risk Mitigation (From admission, onward)            Ordered     IP VTE HIGH RISK PATIENT  Once         12/12/23 1347     Place sequential compression device  Until discontinued         12/12/23 1347                    Discharge Planning   ANISH: 12/16/2023     Code Status: DNR   Is the patient medically ready for discharge?:     Reason for patient still in hospital (select all that apply): Treatment and Consult recommendations  Discharge Plan A: Return to nursing home              Bari Hermosillo III, MD  Department of Hospital Medicine   [facility] [place] - Intensive Care

## 2023-12-15 NOTE — PLAN OF CARE
Patient will return back to [facility] upon discharge from the hospital. Daughter stated that she is thinking about hospice.     12/15/23 1218   Discharge Reassessment   Assessment Type Discharge Planning Reassessment   Did the patient's condition or plan change since previous assessment? No   Discharge Plan discussed with: Adult children   Communicated ANISH with patient/caregiver Date not available/Unable to determine   Discharge Plan A Return to nursing home   Discharge Plan B Hospice/home   DME Needed Upon Discharge  none   Transition of Care Barriers None   Why the patient remains in the hospital Requires continued medical care   Post-Acute Status   Coverage Medicare   Discharge Delays None known at this time

## 2023-12-15 NOTE — NURSING
12 Hour chart check complete.  Midnight Nurse Orders reviewed/ No Omitted orders, labs / No duplicate orders / No Benzo's, Opioids or narcotics which are NOT in use and should be considered for DC.  No LDA's w/o orders.

## 2023-12-15 NOTE — ASSESSMENT & PLAN NOTE
"- noted   - "The patient was recently seen at [facility] for dislodged PEG tube on 12/03/2023.  They were not able to remove it so the PEG tube was cut and umbrella left inside with expectation of it to be passed in her stool."  - has had BM but foreign body from peg not yet visualized  - advancement of diet/Tfs is per primary team/surgery along with nutrition guidance  - management per surgery and primary team   "

## 2023-12-15 NOTE — ASSESSMENT & PLAN NOTE
- noted hx of stroke 2022   - bedbound since and with PEG  - dependent for ADLs  - was living at [facility]

## 2023-12-15 NOTE — ASSESSMENT & PLAN NOTE
12/15/2023:  - interval history and chart reviewed  - discussed with primary team  - patient seen at bedside with eyes closed and did not participate in conversation. Similar to before, although this time it didn't seem to be because she was over tired/somnolent, but perhaps rather per her own preference, but difficult to know for sure. Family had reported her extreme selectiveness in who she opens her eyes and talks with.   - proceeded to give daughter Lana a call  - she had just talked with primary team too and had a good understanding of all the updates. She is glad her mother had a BM and is hopeful that foreign object is seen out of her soon.   - she was glad her mother was going to be transferred from the ICU to the floors  - we talked through her hopes for her mothers care on eventual discharge  - she is going to discuss further with case management regarding other nursing home options prior to discharge  - we talked through hospice and what it entails and its philosophy.   - Lana made it clear she would like to establish hospice support at whichever nursing home her mother gets discharged to and will discuss this further with case management to get more information regarding hospice agency options to decide from. Wants focus to be on her mothers comfort and QOL overall once she is medically optimized as possible during this hospital stay. She feels the extra hospice eyes and support would go a long way at the nursing home.   - she was very appreciative of the care her mother has gotten here at [facility] from all members of the team.   - updated primary team regarding above conversation  [] daughter to discuss nursing home options other than [facility] with case management. She will further discuss options for hospice agencies at nursing home, further with case management/sw too.   [] dispo per primary team and case management/sw

## 2023-12-15 NOTE — PLAN OF CARE
Recommendations    1. When medically acceptable, advance diet as tolerated to clear liquids. Continue advancing diet as tolerated to cardiac diet texture per SLP.    2. At diet advancements, Consider Nik to promote wound healing and Boost to help meet EEN/EPN.   3. If patient requires TF advancement; consider TF Impact 1.5 @ gr 35 mL/hr providing 1260 kcal, 79 g protein and 647 mL free water meeting 100% EEN/EPN.   4. Monitor weights and labs.   5. Collaboration with medical providers    Goals: 1. Pt to meet % EEN/EPN by RD follow up  Nutrition Goal Status: new  Communication of RD Recs:  (POC)    Assessment and Plan    Nutrition Problem  Inadequate oral intake     Related to (etiology):   Diagnosis related symptoms    Signs and Symptoms (as evidenced by):   NPO status    Interventions/Recommendations (treatment strategy):  Tube Feeding  Collaboration with medical providers    Nutrition Diagnosis Status:   New

## 2023-12-15 NOTE — ASSESSMENT & PLAN NOTE
Presents with acute mechanical small-bowel obstruction with point of transition in the deep midline/right lower abdomen and pelvis.  Recently had older G-tube cut and number the left inside.  This is what most likely causing obstruction at this time.  She was not a candidate for surgery given she was on aspirin, Plavix and Eliquis.  This places her at very high risk for any intervention at this moment.  Holding all anticoagulation.  Surgery recommending enema to help with stool burden distal to object.    -Has had multiple enemas and GGC with 1 bm so far. No foreign body noted.     -PEG to gravity with management per surgery.

## 2023-12-15 NOTE — NURSING
[facility]  Nurses Note -- 4 Eyes      12/14/2023       Skin assessed on: Q Shift      [] No Pressure Injuries Present    []Prevention Measures Documented    [x] Yes LDA  for Pressure Injury Previously documented     [x] Yes New Pressure Injury Discovered   [x] LDA for New Pressure Injury Added    R gluteal cleft skin tear and DTI to left gluteal cleft/right ear added to LDA     Attending RN:  RAJENDRA HERZOG      Second RN:  CHERIE RN

## 2023-12-15 NOTE — PLAN OF CARE
SW contacted patient daughter to discussed nursing home placement. Daughter stated that she wants her mother to go to a different nursing home because she is not satisfied  the with the way they take care of her.    KATARZYNA informed patient that she will sent referral out, but when it's time for patient to discharge if the new referrals do not have an answer patient will be discharged to [place]. SW also inform daughter that can work with the  at [facility] to have her mother transferred. Daughter told SW that she does not need to send referrals she will work with the  at [place] [facility]. Daughter is interested in Hospice. SW sent referral to [facility] via [facility].

## 2023-12-15 NOTE — PROGRESS NOTES
[facility]  Palliative Medicine  Progress Note    Patient Name: Holly Kidd  MRN: [id number]  Admission Date: 12/12/2023  Hospital Length of Stay: 3 days  Code Status: DNR   Attending Provider: Bari Hermosillo III, MD  Consulting Provider: Donald Sandoval MD  Primary Care Physician: Estrellita Obregon FNP  Principal Problem:Sepsis    Patient information was obtained from relative(s), past medical records, and primary team.      Assessment/Plan:     Neuro  Cerebrovascular disease  - noted hx of stroke 2022   - bedbound since and with PEG  - dependent for ADLs  - was living at [facility]    Renal/  Acute cystitis  - noted  - abx per primary team     GI  Bowel obstruction  - noted   - "The patient was recently seen at [facility] for dislodged PEG tube on 12/03/2023.  They were not able to remove it so the PEG tube was cut and umbrella left inside with expectation of it to be passed in her stool."  - has had BM but foreign body from peg not yet visualized  - advancement of diet/Tfs is per primary team/surgery along with nutrition guidance  - management per surgery and primary team     Palliative Care  Advanced care planning/counseling discussion  12/15/2023:  - interval history and chart reviewed  - discussed with primary team  - patient seen at bedside with eyes closed and did not participate in conversation. Similar to before, although this time it didn't seem to be because she was over tired/somnolent, but perhaps rather per her own preference, but difficult to know for sure. Family had reported her extreme selectiveness in who she opens her eyes and talks with.   - proceeded to give daughter Lana a call  - she had just talked with primary team too and had a good understanding of all the updates. She is glad her mother had a BM and is hopeful that foreign object is seen out of her soon.   - she was glad her mother was going to be transferred from the ICU to the floors  - we talked through " "her hopes for her mothers care on eventual discharge  - she is going to discuss further with case management regarding other nursing home options prior to discharge  - we talked through hospice and what it entails and its philosophy.   - Lana made it clear she would like to establish hospice support at whichever nursing home her mother gets discharged to and will discuss this further with case management to get more information regarding hospice agency options to decide from. Wants focus to be on her mothers comfort and QOL overall once she is medically optimized as possible during this hospital stay. She feels the extra hospice eyes and support would go a long way at the nursing home.   - she was very appreciative of the care her mother has gotten here at [facility] from all members of the team.   - discussed lapost and what it entails. Proceeded to complete it with her as wishes have been clear for DNR/DNI  - updated primary team regarding above conversation  - reached out to  on file via epic chat and updated regarding above conversation  [] completed lapost  [] daughter to discuss nursing home options other than [facility] with case management. She will further discuss options for hospice agencies at nursing home, further with case management/sw too.   [] dispo per primary team and case management/sw        I will follow-up with patient. Please contact us if you have any additional questions.    Subjective:     Chief Complaint:   Chief Complaint   Patient presents with    Vomiting     EMS called to 72yo female that staff at [facility] reports has had N/V x2 days. Also reported to Ems that she is at her baseline mentally from previous CVA.        HPI:   Per H&P: "Ms. Kidd is a 73-year-old female with a past medical history of CVA, dementia, peg tube placement, hypertension, DVT/PE on Eliquis who presents to the emergency department for evaluation of intractable nausea and vomiting.  Patient is not " "able to give history so all of the history was obtained from the ED physician and medical records.  The patient was recently seen at [facility] for dislodged PEG tube on 12/03/2023.  They were not able to remove it so the PEG tube was cut an umbrella left side with expectation of it to be passed in her stool.  In the emergency department, she was found to have WBC of 32,000, BUN 63 and lactate 2.3.  Urinalysis appeared brown with significant WBC greater than 100 and bacteria.  CT abdomen and pelvis with findings of acute mechanical small-bowel obstruction with point of transition in the deep midline/right paramedian lower abdomen/pelvis with proximity to intraluminal foreign body favored to represent dislodged G-tube apparatus component within small bowel loop just proximal to transition point.  She does have large volume stool distal colon/rectum.  General surgery consulted however given patient is on aspirin, Plavix and Eliquis, patient is high risk for intervention at this time.  Recommendation include supportive care, antibiotics and enema to help with stool burden.  Daughter is at bedside and discussed plan of care.  Patient is DNR at this time."    Palliative medicine consulted for assistance with GOC.     Hospital Course:  No notes on file      Medications:  Continuous Infusions:   lactated ringers 100 mL/hr at 12/15/23 0500     Scheduled Meds:   apixaban  5 mg Oral BID    mupirocin   Nasal BID    piperacillin-tazobactam (Zosyn) IV (PEDS and ADULTS) (extended infusion is not appropriate)  4.5 g Intravenous Q8H     PRN Meds:calcium gluconate IVPB, calcium gluconate IVPB, calcium gluconate IVPB, dextrose 10%, dextrose 10%, glucagon (human recombinant), glucose, glucose, HYDROmorphone, magnesium sulfate IVPB, magnesium sulfate IVPB, metoprolol, naloxone, ondansetron, potassium chloride **AND** potassium chloride **AND** potassium chloride, sodium chloride 0.9%, sodium phosphate 15 mmol in dextrose 5 % (D5W) 250 mL " IVPB, sodium phosphate 20.01 mmol in dextrose 5 % (D5W) 250 mL IVPB, sodium phosphate 30 mmol in dextrose 5 % (D5W) 250 mL IVPB    Objective:     Vital Signs (Most Recent):  Temp: 97.4 °F (36.3 °C) (12/15/23 0800)  Pulse: 70 (12/15/23 1100)  Resp: 10 (12/15/23 1100)  BP: (!) 152/78 (12/15/23 1100)  SpO2: 100 % (12/15/23 1100) Vital Signs (24h Range):  Temp:  [97.4 °F (36.3 °C)-98.6 °F (37 °C)] 97.4 °F (36.3 °C)  Pulse:  [] 70  Resp:  [10-20] 10  SpO2:  [97 %-100 %] 100 %  BP: (104-155)/(53-89) 152/78     Weight: 85.3 kg (188 lb)  Body mass index is 34.39 kg/m².       Physical Exam  Vitals and nursing note reviewed.   Constitutional:       Comments: Kept eyes closed   HENT:      Head: Normocephalic and atraumatic.   Eyes:      Comments: Eyes remained closed   Pulmonary:      Effort: No respiratory distress.   Neurological:      Comments: Did not open eyes. Did not participate with me at this time            Review of Symptoms        Living Arrangements:  Lives in nursing home    Psychosocial/Cultural:   See Palliative Psychosocial Note: Yes  - was living at [facility]   - has 3 children of whom Mik and Clementina are involved   **Primary  to Follow**  Palliative Care  Consult: No        Advance Care Planning  Advance Directives:   Do Not Resuscitate Status: Yes      Decision Making:  Patient unable to communicate due to disease severity/cognitive impairment and Family answered questions  Goals of Care: What is most important right now is to focus on improvement in condition but with limits to invasive therapies. Accordingly, we have decided that the best plan to meet the patient's goals includes continuing with treatment and enroll in nursing home with hospice upon discharge.          Significant Labs: reviewed  CBC:   Recent Labs   Lab 12/15/23  0711   WBC 15.20*   HGB 10.1*   HCT 32.1*   MCV 81*        BMP:  Recent Labs   Lab 12/15/23  0711   GLU 90   *   K  2.8*      CO2 33*   BUN 18   CREATININE 0.5   CALCIUM 8.2*   MG 2.6     LFT:  Lab Results   Component Value Date    AST 28 12/15/2023    ALKPHOS 76 12/15/2023    BILITOT 0.6 12/15/2023     Albumin:   Albumin   Date Value Ref Range Status   12/15/2023 2.7 (L) 3.5 - 5.2 g/dL Final     Protein:   Total Protein   Date Value Ref Range Status   12/15/2023 6.0 6.0 - 8.4 g/dL Final     Lactic acid:   Lab Results   Component Value Date    LACTATE 2.2 12/13/2023    LACTATE 4.1 (HH) 12/12/2023       Significant Imaging: reviewed    35 minutes face to face time in discussion of symptom assessment, coordination of care, exploring burden/ benefit of various approaches of treatment and discharge planning.  This also includes non-face to face time preparing to see the patient (eg, review of tests/imaging), obtaining and/or reviewing separately obtained history, documenting clinical information in the electronic or other health record, independently interpreting results and communicating results to the patient/family/caregiver, or care coordinator.     16 minutes ACP time spent: goals of care, emotional support, formulating goals and communication of prognosis      Donald Sandoval MD  Palliative Medicine  [facility] Intensive Care

## 2023-12-15 NOTE — ASSESSMENT & PLAN NOTE
This patient does have evidence of infective focus.My overall impression is sepsis.  Source: Urinary Tract  Antibiotics given-   Antibiotics (72h ago, onward)      Start     Stop Route Frequency Ordered    12/14/23 0900  mupirocin 2 % ointment         12/19/23 0859 Nasl 2 times daily 12/14/23 0732    12/12/23 1515  piperacillin-tazobactam (ZOSYN) 4.5 g in dextrose 5 % in water (D5W) 100 mL IVPB (MB+)         -- IV Every 8 hours (non-standard times) 12/12/23 1411          Latest lactate reviewed-  Recent Labs   Lab 12/12/23  1104 12/12/23  1422 12/13/23  0338   LACTATE 2.3* 4.1* 2.2       Organ dysfunction indicated by Encephalopathy    Fluid challenge Ideal Body Weight- The patient's ideal body weight is Ideal body weight: 50.1 kg (110 lb 7.2 oz) which will be used to calculate fluid bolus of 30 ml/kg for treatment of septic shock.      Post- resuscitation assessment Yes Perfusion exam was performed within 6 hours of septic shock presentation after bolus shows Adequate tissue perfusion assessed by non-invasive monitoring       Will Not start Pressors- Levophed for MAP of 65  Source control achieved by: IV antibiotics with zosyn. Urine culture growing ESBL and providencia stuartii both sensitive to zosyn

## 2023-12-15 NOTE — ASSESSMENT & PLAN NOTE
Advance Care Planning     Date: 12/12/2023    Code Status  In light of the patients advanced and life limiting illness,I engaged the the family in a voluntary conversation about the patient's preferences for care  at the very end of life. The patient wishes to have a natural, peaceful death.  Along those lines, the patient does not wish to have CPR or other invasive treatments performed when her heart and/or breathing stops. I communicated to the family that a DNR order would be placed in her medical record to reflect this preference.  I spent a total of 8 minutes engaging the patient in this advance care planning discussion.    Continue with current level of care at this time.     Has a daughter and son, her daughter Lana was at bedside and unable to get in touch with son.  12/13: spoke to son and updated. Seems to understand overall poor prognosis and our concerns

## 2023-12-15 NOTE — NURSING
[facility] Intensive Care  ICU Shift Summary  Date: 12/14/2023      Prehospitalization: Nursing Home  Admit Date / LOS : 12/12/2023/ 2 days    Diagnosis: Sepsis    Consults:        Active: Gen Surg, Palliative, and Spiritual Care       Needed: N/A     Code Status: DNR   Advanced Directive: <no information>    LDA:  Lines/Drains/Airways       Drain  Duration                  Gastrostomy/Enterostomy Percutaneous endoscopic gastrostomy (PEG) midline feeding -- days         Urethral Catheter 12/12/23 1100 Non-latex 16 Fr. 2 days              Peripheral Intravenous Line  Duration                  Peripheral IV - Single Lumen 12/12/23 1057 20 G Left Antecubital 2 days         Peripheral IV - Single Lumen 12/12/23 1900 20 G Right;Anterior Antecubital 2 days                  Central Lines/Site/Justification:Patient Does Not Have Central Line  Urinary Cath/Order/Justification:Critically Ill in the ICU and requiring intensive monitoring    Vasopressors/Infusions:    lactated ringers 100 mL/hr at 12/14/23 2200          GOALS: Volume/ Hemodynamic: N/A                     RASS: N/A    Pain Management: IV       Pain Controlled: yes     Rhythm: NSR    Respiratory Device: Nasal Cannula                      Most Recent SBT/ SAT: N/A       MOVE Screen: FAIL  ICU Liberation: not applicable    VTE Prophylaxis: Mechanical  Mobility: Bedrest  Stress Ulcer Prophylaxis: Yes    Isolation: No active isolations    Dietary:   Current Diet Order   Procedures    Diet NPO      Tolerance: not applicable  Advancement: no    I & O (24h):    Intake/Output Summary (Last 24 hours) at 12/14/2023 2359  Last data filed at 12/14/2023 2200  Gross per 24 hour   Intake 3971.43 ml   Output 1380 ml   Net 2591.43 ml        Restraints: No    Significant Dates:  Post Op Date: N/A  Rescue Date: N/A  Imaging/ Diagnostics:  CT positive for small bowel obstruction. Ongoing electrolyte replacement. Urine positive for ESBL and MDRO     Noteworthy Labs:  See recent  "labs. Monitor Phos and K    COVID Test: (--)  CBC/Anemia Labs: Coags:    Recent Labs   Lab 12/13/23  0338 12/14/23  0330   WBC 22.47* 18.64*   HGB 12.9 11.6*   HCT 39.9 36.2*    243   MCV 80* 81*   RDW 15.0* 14.9*    No results for input(s): "PT", "INR", "APTT" in the last 168 hours.     Chemistries:   Recent Labs   Lab 12/13/23  0338 12/14/23  0330 12/14/23  1722    142  --    K 2.8* 2.5* 2.9*    106  --    CO2 28 27  --    BUN 51* 39*  --    CREATININE 0.7 0.6  --    CALCIUM 9.1 8.7  --    PROT 7.1 6.6  --    BILITOT 1.2* 0.8  --    ALKPHOS 110 89  --    ALT 18 15  --    AST 25 22  --    MG 2.4 2.6  --    PHOS 3.5 2.0* 2.1*        Cardiac Enzymes: Ejection Fractions:    No results for input(s): "CPK", "CPKMB", "MB", "TROPONINI" in the last 72 hours. EF   Date Value Ref Range Status   07/11/2022 65 % Final        POCT Glucose: HbA1c:    Recent Labs   Lab 12/14/23  0505 12/14/23  1327 12/14/23  1742   POCTGLUCOSE 100 105 112*    Hemoglobin A1C   Date Value Ref Range Status   07/21/2022 5.0 4.0 - 6.0 % Final   09/17/2021 5.9 (H) 4.0 - 5.6 % Final     Comment:     ADA Screening Guidelines:  5.7-6.4%  Consistent with prediabetes  >or=6.5%  Consistent with diabetes    High levels of fetal hemoglobin interfere with the HbA1C  assay. Heterozygous hemoglobin variants (HbS, HgC, etc)do  not significantly interfere with this assay.   However, presence of multiple variants may affect accuracy.             ICU LOS 1d 23h  Level of Care: Critical Care    Chart Check: 12 HR Done  Shift Summary/Plan for the shift: none   "

## 2023-12-15 NOTE — CONSULTS
[facility] Intensive Care  Adult Nutrition  Consult Note    SUMMARY     Recommendations    1. When medically acceptable, advance diet as tolerated to clear liquids. Continue advancing diet as tolerated to cardiac diet texture per SLP.    2. At diet advancements, Consider Nik to promote wound healing and Boost to help meet EEN/EPN.   3. If patient requires TF advancement; consider TF Impact 1.5 @ gr 35 mL/hr providing 1260 kcal, 79 g protein and 647 mL free water meeting 100% EEN/EPN.   4. Monitor weights and labs.   5. Collaboration with medical providers    Goals: 1. Pt to meet % EEN/EPN by RD follow up  Nutrition Goal Status: new  Communication of RD Recs:  (POC)    Assessment and Plan    Nutrition Problem  Inadequate oral intake     Related to (etiology):   Diagnosis related symptoms    Signs and Symptoms (as evidenced by):   NPO status    Interventions/Recommendations (treatment strategy):  Tube Feeding  Collaboration with medical providers    Nutrition Diagnosis Status:   New     Reason for Assessment    Reason For Assessment: identified at risk by screening criteria  Diagnosis: infection/sepsis  Relevant Medical History:   Past Medical History:   Diagnosis Date    COVID-19 09/09/2021    Essential hypertension 6/12/2015    Obese     Obesity (BMI 35.0-39.9) 6/12/2015    Stroke 06/2015    Left side weakness    Stroke-like symptoms 09/17/2021    NEW LEFT FACIAL DROOP, LEFT SIDE WEAKNESS & SLURRED SPEECH    Wheelchair dependence     Wheelchair dependence       Interdisciplinary Rounds: did not attend  General Information Comments: RD consult for wounds. Pt currently NPO since admit. PMHx of COVID, HTN and stroke. Admitted with eval of nausea and vomiting.  PEG tube placed (dislodged) 16% weight loss x 2 years. Current BMI 34.48, obese grade l. NFPE unable to be completed at this time; pt is on isolation precautions. Carlton Score 10. LBM 12/15/23. NKFA.  Nutrition Discharge Planning: Cardiac  "Diet    Nutrition Risk Screen    Nutrition Risk Screen: no indicators present    Nutrition/Diet History    Food Allergies: NKFA    Anthropometrics    Temp: 98 °F (36.7 °C)  Height Method: Estimated  Height: 5' 2" (157.5 cm)  Height (inches): 62 in  Weight Method: Bed Scale  Weight: 85.3 kg (188 lb)  Weight (lb): 188 lb  Ideal Body Weight (IBW), Female: 110 lb  % Ideal Body Weight, Female (lb): 170.91 %  BMI (Calculated): 34.4  BMI Grade: 30 - 34.9- obesity - grade I       Lab/Procedures/Meds    Pertinent Labs Reviewed: reviewed  BMP  Lab Results   Component Value Date     (H) 12/15/2023    K 2.8 (L) 12/15/2023     12/15/2023    CO2 33 (H) 12/15/2023    BUN 18 12/15/2023    CREATININE 0.5 12/15/2023    CALCIUM 8.2 (L) 12/15/2023    ANIONGAP 9 12/15/2023    EGFRNORACEVR >60 12/15/2023      Pertinent Medications Reviewed: reviewed  Current Outpatient Medications   Medication Instructions    acetaminophen (TYLENOL) 325 mg, Per G Tube, Every 6 hours PRN    albuterol-ipratropium (DUO-NEB) 2.5 mg-0.5 mg/3 mL nebulizer solution 3 mLs, Nebulization, Every 8 hours PRN    apixaban (ELIQUIS) 10 mg, Oral, 2 times daily, 10mg BID for 7days then switch to 5mg BID    ascorbic acid (vitamin C) (VITAMIN C) 500 mg, Oral, Daily    atorvastatin (LIPITOR) 10 mg, Per G Tube, Nightly    dextran 70/hypromellose (ARTIFICIAL TEARS, PF, OPHT) 1 drop, Both Eyes, 3 times daily PRN    ergocalciferol (ERGOCALCIFEROL) 50,000 Units, Oral, Every 7 days    EScitalopram oxalate (LEXAPRO) 20 mg, Per G Tube    esomeprazole (NEXIUM) 40 mg GrPS Per G Tube, 2 times daily before meals    fluticasone propionate (FLONASE) 50 mcg/actuation nasal spray 2 sprays, Each Nostril, Daily    lactose-reduced food/fiber (JEVITY 1.5 ELEONORA ORAL) 50 mL/hr, Oral, UP @ 4AM, DOWN @ 12 MIDNIGHT    loratadine (CLARITIN) 10 mg, Per G Tube, Daily    MAGNESIUM ORAL 400 mg, PEG Tube, Daily    metoclopramide HCl (REGLAN) 5 mg, Per G Tube, 4 times daily    metoprolol " tartrate (LOPRESSOR) 50 mg, Per G Tube, 2 times daily, HOLD FOR SBP<110    PULSE<60    multivitamin (THERAGRAN) per tablet 1 tablet, Per G Tube, Daily    ondansetron (ZOFRAN-ODT) 4 mg, Oral, Every 4 hours PRN    polyethylene glycol (GLYCOLAX) 17 g, Per G Tube, 3 times daily    senna (SENOKOT) 8.6 mg tablet 1 tablet, Per G Tube, 2 times daily    sodium chloride (SALINE NASAL) 0.65 % nasal spray 1 spray, Nasal, As needed (PRN)    varicella-zoster gE-AS01B, PF, (SHINGRIX) 50 mcg/0.5 mL injection Intramuscular    vitamin E, dl,tocopheryl acet, (VITAMIN E, DL, ACETATE,) 180 mg (400 unit) Cap Per G Tube, Daily    zinc sulfate (ZINCATE) 220 mg, Oral, Daily        Estimated/Assessed Needs    Weight Used For Calorie Calculations: 49.9 kg (110 lb)  Energy Calorie Requirements (kcal): 1205 kcal  Energy Need Method: Geyser-St Jeor (ibw x 1.3)  Protein Requirements: 65 g  Weight Used For Protein Calculations: 49.9 kg (110 lb)     Estimated Fluid Requirement Method: RDA Method  RDA Method (mL): 1205         Nutrition Prescription Ordered    Current Diet Order: NPO    Evaluation of Received Nutrient/Fluid Intake    I/O: +7 L  Energy Calories Required: not meeting needs  Protein Required: not meeting needs  Fluid Required: not meeting needs  Comments: LBM 12/15/23  % Intake of Estimated Energy Needs: 0 - 25 %  % Meal Intake: NPO    Nutrition Risk    Level of Risk/Frequency of Follow-up: low       Monitor and Evaluation    Food and Nutrient Intake: enteral nutrition intake, parenteral nutrition intake  Food and Nutrient Adminstration: enteral and parenteral nutrition administration  Knowledge/Beliefs/Attitudes: food and nutrition knowledge/skill, beliefs and attitudes  Physical Activity and Function: nutrition-related ADLs and IADLs, factors affecting access to physical activity  Anthropometric Measurements: weight, weight change, body mass index  Biochemical Data, Medical Tests and Procedures: gastrointestinal  profile  Nutrition-Focused Physical Findings: overall appearance       Nutrition Follow-Up    RD Follow-up?: Yes    Dinorah Maldonado, Registration Eligible, Provisional LDN

## 2023-12-15 NOTE — SUBJECTIVE & OBJECTIVE
Medications:  Continuous Infusions:   lactated ringers 100 mL/hr at 12/15/23 0500     Scheduled Meds:   apixaban  5 mg Oral BID    mupirocin   Nasal BID    piperacillin-tazobactam (Zosyn) IV (PEDS and ADULTS) (extended infusion is not appropriate)  4.5 g Intravenous Q8H     PRN Meds:calcium gluconate IVPB, calcium gluconate IVPB, calcium gluconate IVPB, dextrose 10%, dextrose 10%, glucagon (human recombinant), glucose, glucose, HYDROmorphone, magnesium sulfate IVPB, magnesium sulfate IVPB, metoprolol, naloxone, ondansetron, potassium chloride **AND** potassium chloride **AND** potassium chloride, sodium chloride 0.9%, sodium phosphate 15 mmol in dextrose 5 % (D5W) 250 mL IVPB, sodium phosphate 20.01 mmol in dextrose 5 % (D5W) 250 mL IVPB, sodium phosphate 30 mmol in dextrose 5 % (D5W) 250 mL IVPB    Objective:     Vital Signs (Most Recent):  Temp: 97.4 °F (36.3 °C) (12/15/23 0800)  Pulse: 70 (12/15/23 1100)  Resp: 10 (12/15/23 1100)  BP: (!) 152/78 (12/15/23 1100)  SpO2: 100 % (12/15/23 1100) Vital Signs (24h Range):  Temp:  [97.4 °F (36.3 °C)-98.6 °F (37 °C)] 97.4 °F (36.3 °C)  Pulse:  [] 70  Resp:  [10-20] 10  SpO2:  [97 %-100 %] 100 %  BP: (104-155)/(53-89) 152/78     Weight: 85.3 kg (188 lb)  Body mass index is 34.39 kg/m².       Physical Exam  Vitals and nursing note reviewed.   Constitutional:       Comments: Kept eyes closed   HENT:      Head: Normocephalic and atraumatic.   Eyes:      Comments: Eyes remained closed   Pulmonary:      Effort: No respiratory distress.   Neurological:      Comments: Did not open eyes. Did not participate with me at this time            Review of Symptoms        Living Arrangements:  Lives in nursing home    Psychosocial/Cultural:   See Palliative Psychosocial Note: Yes  - was living at [facility]   - has 3 children of whom Mik and [place] are involved   **Primary  to Follow**  Palliative Care  Consult: No        Advance Care  Planning   Advance Directives:   Do Not Resuscitate Status: Yes      Decision Making:  Patient unable to communicate due to disease severity/cognitive impairment and Family answered questions  Goals of Care: What is most important right now is to focus on improvement in condition but with limits to invasive therapies. Accordingly, we have decided that the best plan to meet the patient's goals includes continuing with treatment and enroll in nursing home with hospice upon discharge.          Significant Labs: reviewed  CBC:   Recent Labs   Lab 12/15/23  0711   WBC 15.20*   HGB 10.1*   HCT 32.1*   MCV 81*        BMP:  Recent Labs   Lab 12/15/23  0711   GLU 90   *   K 2.8*      CO2 33*   BUN 18   CREATININE 0.5   CALCIUM 8.2*   MG 2.6     LFT:  Lab Results   Component Value Date    AST 28 12/15/2023    ALKPHOS 76 12/15/2023    BILITOT 0.6 12/15/2023     Albumin:   Albumin   Date Value Ref Range Status   12/15/2023 2.7 (L) 3.5 - 5.2 g/dL Final     Protein:   Total Protein   Date Value Ref Range Status   12/15/2023 6.0 6.0 - 8.4 g/dL Final     Lactic acid:   Lab Results   Component Value Date    LACTATE 2.2 12/13/2023    LACTATE 4.1 (HH) 12/12/2023       Significant Imaging: reviewed

## 2023-12-15 NOTE — ASSESSMENT & PLAN NOTE
Lethargic with history of CVA and now with sepsis and small-bowel obstruction.  Treat underlying issues at this time.  -more alert now and closer to baseline

## 2023-12-15 NOTE — SUBJECTIVE & OBJECTIVE
Interval History: Pt opens eyes and tracks in the room. Had bm overnight, but no foreign body noted    Review of Systems  Objective:     Vital Signs (Most Recent):  Temp: 97.4 °F (36.3 °C) (12/15/23 0800)  Pulse: 71 (12/15/23 0900)  Resp: 14 (12/15/23 0900)  BP: (!) 155/77 (12/15/23 0900)  SpO2: 98 % (12/15/23 0900) Vital Signs (24h Range):  Temp:  [97.4 °F (36.3 °C)-98.6 °F (37 °C)] 97.4 °F (36.3 °C)  Pulse:  [] 71  Resp:  [10-20] 14  SpO2:  [97 %-100 %] 98 %  BP: (104-155)/(53-89) 155/77     Weight: 85.3 kg (188 lb)  Body mass index is 34.39 kg/m².    Intake/Output Summary (Last 24 hours) at 12/15/2023 1033  Last data filed at 12/15/2023 0934  Gross per 24 hour   Intake 3282.51 ml   Output 1555 ml   Net 1727.51 ml         Physical Exam      Constitutional:       General: She is not in acute distress.     Appearance: She is ill-appearing (chronic).   HENT:      Head: Normocephalic and atraumatic.   Cardiovascular:      Rate and Rhythm: Normal rate and regular rhythm.   Pulmonary:      Effort: Pulmonary effort is normal. No respiratory distress.   Abdominal:      General: There is distension.      Tenderness: There is no abdominal tenderness. There is no guarding.   Neurological:      Mental Status: She is alert. Mental status is at baseline.   Significant Labs: All pertinent labs within the past 24 hours have been reviewed.    Significant Imaging: I have reviewed all pertinent imaging results/findings within the past 24 hours.

## 2023-12-15 NOTE — PLAN OF CARE
Problem: Adult Inpatient Plan of Care  Goal: Plan of Care Review  Outcome: Ongoing, Progressing  Goal: Patient-Specific Goal (Individualized)  Outcome: Ongoing, Progressing  Goal: Absence of Hospital-Acquired Illness or Injury  Outcome: Ongoing, Progressing  Goal: Optimal Comfort and Wellbeing  Outcome: Ongoing, Progressing  Goal: Readiness for Transition of Care  Outcome: Ongoing, Progressing     Problem: Coping Ineffective  Goal: Effective Coping  Outcome: Ongoing, Progressing     Problem: Infection  Goal: Absence of Infection Signs and Symptoms  Outcome: Ongoing, Progressing     Problem: Adjustment to Illness (Sepsis/Septic Shock)  Goal: Optimal Coping  Outcome: Ongoing, Progressing     Problem: Bleeding (Sepsis/Septic Shock)  Goal: Absence of Bleeding  Outcome: Ongoing, Progressing     Problem: Glycemic Control Impaired (Sepsis/Septic Shock)  Goal: Blood Glucose Level Within Desired Range  Outcome: Ongoing, Progressing     Problem: Infection Progression (Sepsis/Septic Shock)  Goal: Absence of Infection Signs and Symptoms  Outcome: Ongoing, Progressing     Problem: Nutrition Impaired (Sepsis/Septic Shock)  Goal: Optimal Nutrition Intake  Outcome: Ongoing, Progressing     Problem: Impaired Wound Healing  Goal: Optimal Wound Healing  Outcome: Ongoing, Progressing     Problem: Skin Injury Risk Increased  Goal: Skin Health and Integrity  Outcome: Ongoing, Progressing

## 2023-12-15 NOTE — NURSING
[facility] Intensive Care  ICU Shift Summary  Date: 12/14/2023      Prehospitalization: Nursing Home  Admit Date / LOS : 12/12/2023/ 2 days    Diagnosis: Sepsis    Consults:        Active: Gen Surg and Palliative       Needed: N/A     Code Status: DNR   Advanced Directive: <no information>    LDA:  Lines/Drains/Airways       Drain  Duration                  Gastrostomy/Enterostomy Percutaneous endoscopic gastrostomy (PEG) midline feeding -- days         Urethral Catheter 12/12/23 1100 Non-latex 16 Fr. 2 days              Peripheral Intravenous Line  Duration                  Peripheral IV - Single Lumen 12/12/23 1057 20 G Left Antecubital 2 days         Peripheral IV - Single Lumen 12/12/23 1900 20 G Right;Anterior Antecubital 1 day                  Central Lines/Site/Justification:Patient Does Not Have Central Line  Urinary Cath/Order/Justification:Patient Does Not Have Urinary Catheter    Vasopressors/Infusions:    lactated ringers 100 mL/hr at 12/14/23 1800          GOALS: Volume/ Hemodynamic: N/A                     RASS: N/A    Pain Management: IV       Pain Controlled: yes     Rhythm: NSR and ST    Respiratory Device: Nasal Cannula                      Most Recent SBT/ SAT: N/A       MOVE Screen: NA  ICU Liberation: not applicable    VTE Prophylaxis: Mechanical  Mobility: Bedrest  Stress Ulcer Prophylaxis: No    Isolation: No active isolations    Dietary:   Current Diet Order   Procedures    Diet NPO      Tolerance: not applicable  Advancement: no    I & O (24h):    Intake/Output Summary (Last 24 hours) at 12/14/2023 1838  Last data filed at 12/14/2023 1800  Gross per 24 hour   Intake 3342.19 ml   Output 1675 ml   Net 1667.19 ml        Restraints: No    Significant Dates:  Post Op Date: N/A  Rescue Date: N/A  Imaging/ Diagnostics: N/A    Noteworthy Labs:  none    COVID Test: (--)  CBC/Anemia Labs: Coags:    Recent Labs   Lab 12/13/23  0338 12/14/23  0330   WBC 22.47* 18.64*   HGB 12.9 11.6*   HCT 39.9 36.2*  "   243   MCV 80* 81*   RDW 15.0* 14.9*    No results for input(s): "PT", "INR", "APTT" in the last 168 hours.     Chemistries:   Recent Labs   Lab 12/13/23  0338 12/14/23  0330 12/14/23  1722    142  --    K 2.8* 2.5* 2.9*    106  --    CO2 28 27  --    BUN 51* 39*  --    CREATININE 0.7 0.6  --    CALCIUM 9.1 8.7  --    PROT 7.1 6.6  --    BILITOT 1.2* 0.8  --    ALKPHOS 110 89  --    ALT 18 15  --    AST 25 22  --    MG 2.4 2.6  --    PHOS 3.5 2.0* 2.1*        Cardiac Enzymes: Ejection Fractions:    No results for input(s): "CPK", "CPKMB", "MB", "TROPONINI" in the last 72 hours. EF   Date Value Ref Range Status   07/11/2022 65 % Final        POCT Glucose: HbA1c:    Recent Labs   Lab 12/14/23  0505 12/14/23  1327 12/14/23  1742   POCTGLUCOSE 100 105 112*    Hemoglobin A1C   Date Value Ref Range Status   07/21/2022 5.0 4.0 - 6.0 % Final   09/17/2021 5.9 (H) 4.0 - 5.6 % Final     Comment:     ADA Screening Guidelines:  5.7-6.4%  Consistent with prediabetes  >or=6.5%  Consistent with diabetes    High levels of fetal hemoglobin interfere with the HbA1C  assay. Heterozygous hemoglobin variants (HbS, HgC, etc)do  not significantly interfere with this assay.   However, presence of multiple variants may affect accuracy.             ICU LOS 1d 17h  Level of Care: Critical Care    Chart Check: 12 HR Done  Shift Summary/Plan for the shift: The patient had only 1 small BM after enema. She had gastrograffin test today then had 475 ml PEG output after gastrograffin test.     "

## 2023-12-16 LAB
ALBUMIN SERPL BCP-MCNC: 2.6 G/DL (ref 3.5–5.2)
ALP SERPL-CCNC: 79 U/L (ref 55–135)
ALT SERPL W/O P-5'-P-CCNC: 32 U/L (ref 10–44)
ANION GAP SERPL CALC-SCNC: 6 MMOL/L (ref 8–16)
AST SERPL-CCNC: 48 U/L (ref 10–40)
BACTERIA BLD CULT: NORMAL
BASOPHILS # BLD AUTO: 0.02 K/UL (ref 0–0.2)
BASOPHILS NFR BLD: 0.2 % (ref 0–1.9)
BILIRUB SERPL-MCNC: 0.8 MG/DL (ref 0.1–1)
BUN SERPL-MCNC: 10 MG/DL (ref 8–23)
CALCIUM SERPL-MCNC: 8.2 MG/DL (ref 8.7–10.5)
CHLORIDE SERPL-SCNC: 111 MMOL/L (ref 95–110)
CO2 SERPL-SCNC: 24 MMOL/L (ref 23–29)
CREAT SERPL-MCNC: 0.5 MG/DL (ref 0.5–1.4)
DIFFERENTIAL METHOD: ABNORMAL
EOSINOPHIL # BLD AUTO: 0.1 K/UL (ref 0–0.5)
EOSINOPHIL NFR BLD: 0.6 % (ref 0–8)
ERYTHROCYTE [DISTWIDTH] IN BLOOD BY AUTOMATED COUNT: 15 % (ref 11.5–14.5)
EST. GFR  (NO RACE VARIABLE): >60 ML/MIN/1.73 M^2
GLUCOSE SERPL-MCNC: 69 MG/DL (ref 70–110)
HCT VFR BLD AUTO: 28.5 % (ref 37–48.5)
HGB BLD-MCNC: 9 G/DL (ref 12–16)
IMM GRANULOCYTES # BLD AUTO: 0.17 K/UL (ref 0–0.04)
IMM GRANULOCYTES NFR BLD AUTO: 1.6 % (ref 0–0.5)
LYMPHOCYTES # BLD AUTO: 2.1 K/UL (ref 1–4.8)
LYMPHOCYTES NFR BLD: 19.1 % (ref 18–48)
MCH RBC QN AUTO: 25.9 PG (ref 27–31)
MCHC RBC AUTO-ENTMCNC: 31.6 G/DL (ref 32–36)
MCV RBC AUTO: 82 FL (ref 82–98)
MONOCYTES # BLD AUTO: 0.7 K/UL (ref 0.3–1)
MONOCYTES NFR BLD: 6.3 % (ref 4–15)
NEUTROPHILS # BLD AUTO: 7.9 K/UL (ref 1.8–7.7)
NEUTROPHILS NFR BLD: 72.2 % (ref 38–73)
NRBC BLD-RTO: 0 /100 WBC
PHOSPHATE SERPL-MCNC: 1.9 MG/DL (ref 2.7–4.5)
PLATELET # BLD AUTO: 211 K/UL (ref 150–450)
PMV BLD AUTO: 10.6 FL (ref 9.2–12.9)
POCT GLUCOSE: 60 MG/DL (ref 70–110)
POCT GLUCOSE: 65 MG/DL (ref 70–110)
POCT GLUCOSE: 71 MG/DL (ref 70–110)
POCT GLUCOSE: 74 MG/DL (ref 70–110)
POCT GLUCOSE: 88 MG/DL (ref 70–110)
POTASSIUM SERPL-SCNC: 3.4 MMOL/L (ref 3.5–5.1)
PROT SERPL-MCNC: 5.7 G/DL (ref 6–8.4)
RBC # BLD AUTO: 3.48 M/UL (ref 4–5.4)
SODIUM SERPL-SCNC: 141 MMOL/L (ref 136–145)
WBC # BLD AUTO: 10.86 K/UL (ref 3.9–12.7)

## 2023-12-16 PROCEDURE — 25000003 PHARM REV CODE 250: Performed by: STUDENT IN AN ORGANIZED HEALTH CARE EDUCATION/TRAINING PROGRAM

## 2023-12-16 PROCEDURE — 21400001 HC TELEMETRY ROOM

## 2023-12-16 PROCEDURE — 87040 BLOOD CULTURE FOR BACTERIA: CPT | Performed by: STUDENT IN AN ORGANIZED HEALTH CARE EDUCATION/TRAINING PROGRAM

## 2023-12-16 PROCEDURE — 80053 COMPREHEN METABOLIC PANEL: CPT | Performed by: STUDENT IN AN ORGANIZED HEALTH CARE EDUCATION/TRAINING PROGRAM

## 2023-12-16 PROCEDURE — 84100 ASSAY OF PHOSPHORUS: CPT | Performed by: STUDENT IN AN ORGANIZED HEALTH CARE EDUCATION/TRAINING PROGRAM

## 2023-12-16 PROCEDURE — 36415 COLL VENOUS BLD VENIPUNCTURE: CPT | Performed by: STUDENT IN AN ORGANIZED HEALTH CARE EDUCATION/TRAINING PROGRAM

## 2023-12-16 PROCEDURE — 85025 COMPLETE CBC W/AUTO DIFF WBC: CPT | Performed by: STUDENT IN AN ORGANIZED HEALTH CARE EDUCATION/TRAINING PROGRAM

## 2023-12-16 PROCEDURE — 63600175 PHARM REV CODE 636 W HCPCS: Performed by: STUDENT IN AN ORGANIZED HEALTH CARE EDUCATION/TRAINING PROGRAM

## 2023-12-16 PROCEDURE — 27000207 HC ISOLATION

## 2023-12-16 RX ORDER — DEXTROSE MONOHYDRATE 50 MG/ML
INJECTION, SOLUTION INTRAVENOUS CONTINUOUS
Status: ACTIVE | OUTPATIENT
Start: 2023-12-16 | End: 2023-12-16

## 2023-12-16 RX ADMIN — MUPIROCIN: 20 OINTMENT TOPICAL at 08:12

## 2023-12-16 RX ADMIN — APIXABAN 5 MG: 5 TABLET, FILM COATED ORAL at 09:12

## 2023-12-16 RX ADMIN — ERTAPENEM 1 G: 1 INJECTION INTRAMUSCULAR; INTRAVENOUS at 09:12

## 2023-12-16 RX ADMIN — MUPIROCIN: 20 OINTMENT TOPICAL at 09:12

## 2023-12-16 RX ADMIN — DEXTROSE MONOHYDRATE 125 ML: 100 INJECTION, SOLUTION INTRAVENOUS at 06:12

## 2023-12-16 RX ADMIN — SODIUM CHLORIDE, POTASSIUM CHLORIDE, SODIUM LACTATE AND CALCIUM CHLORIDE: 600; 310; 30; 20 INJECTION, SOLUTION INTRAVENOUS at 09:12

## 2023-12-16 RX ADMIN — APIXABAN 5 MG: 5 TABLET, FILM COATED ORAL at 08:12

## 2023-12-16 RX ADMIN — DEXTROSE MONOHYDRATE: 50 INJECTION, SOLUTION INTRAVENOUS at 09:12

## 2023-12-16 RX ADMIN — SODIUM CHLORIDE, POTASSIUM CHLORIDE, SODIUM LACTATE AND CALCIUM CHLORIDE: 600; 310; 30; 20 INJECTION, SOLUTION INTRAVENOUS at 10:12

## 2023-12-16 NOTE — PLAN OF CARE
Pt remained free of falls during current shift. Pt turned q2h.  Pt appeared to be in no distress and did not receive any prn pain medications.  Haskins intact draining red urine. IV fluids and oral antibiotic was given per MD order. Plan of care and fall precautions reviewed with pt and verbalized understanding. Bed locked, lowered, SR up x2 and call light placed within reach.          Problem: Adult Inpatient Plan of Care  Goal: Plan of Care Review  Outcome: Ongoing, Progressing     Problem: Infection  Goal: Absence of Infection Signs and Symptoms  Outcome: Ongoing, Progressing     Problem: Adjustment to Illness (Sepsis/Septic Shock)  Goal: Optimal Coping  Outcome: Ongoing, Progressing     Problem: Impaired Wound Healing  Goal: Optimal Wound Healing  Outcome: Ongoing, Progressing     Problem: Skin Injury Risk Increased  Goal: Skin Health and Integrity  Outcome: Ongoing, Progressing

## 2023-12-16 NOTE — NURSING
Given handoff report to incoming shift. Patient lying on bed, no acute distress noted,breathing even and unlabored. Chart checked completed.

## 2023-12-16 NOTE — PLAN OF CARE
Problem: Adult Inpatient Plan of Care  Goal: Absence of Hospital-Acquired Illness or Injury  Intervention: Identify and Manage Fall Risk  Flowsheets (Taken 12/16/2023 0630)  Safety Promotion/Fall Prevention:   assistive device/personal item within reach   bed alarm set   medications reviewed   nonskid shoes/socks when out of bed   side rails raised x 3  Intervention: Prevent Skin Injury  Flowsheets (Taken 12/16/2023 0630)  Body Position: turned  Skin Protection:   adhesive use limited   incontinence pads utilized  Intervention: Prevent and Manage VTE (Venous Thromboembolism) Risk  Flowsheets (Taken 12/16/2023 0630)  Activity Management: Rolling - L1  Intervention: Prevent Infection  Flowsheets (Taken 12/16/2023 0630)  Infection Prevention:   environmental surveillance performed   hand hygiene promoted   single patient room provided  Goal: Optimal Comfort and Wellbeing  Intervention: Monitor Pain and Promote Comfort  Flowsheets (Taken 12/16/2023 0630)  Pain Management Interventions: care clustered  Intervention: Provide Person-Centered Care  Flowsheets (Taken 12/16/2023 0630)  Trust Relationship/Rapport: care explained     Problem: Adjustment to Illness (Sepsis/Septic Shock)  Goal: Optimal Coping  Intervention: Optimize Psychosocial Adjustment to Illness  Flowsheets (Taken 12/16/2023 0630)  Supportive Measures: active listening utilized     Problem: Bleeding (Sepsis/Septic Shock)  Goal: Absence of Bleeding  Intervention: Monitor and Manage Bleeding  Flowsheets (Taken 12/16/2023 0630)  Bleeding Precautions: blood pressure closely monitored     Problem: Glycemic Control Impaired (Sepsis/Septic Shock)  Goal: Blood Glucose Level Within Desired Range  Intervention: Optimize Glycemic Control  Flowsheets (Taken 12/16/2023 0630)  Glycemic Management: blood glucose monitored     Problem: Infection Progression (Sepsis/Septic Shock)  Goal: Absence of Infection Signs and Symptoms  Intervention: Initiate Sepsis  Management  Flowsheets (Taken 12/16/2023 0630)  Infection Prevention:   environmental surveillance performed   hand hygiene promoted   single patient room provided  Infection Management: aseptic technique maintained  Isolation Precautions:   precautions maintained   contact  Intervention: Promote Recovery  Flowsheets (Taken 12/16/2023 0630)  Activity Management: Rolling - L1     Problem: Nutrition Impaired (Sepsis/Septic Shock)  Goal: Optimal Nutrition Intake  Intervention: Promote and Optimize Nutrition Delivery  Flowsheets (Taken 12/16/2023 0630)  Nutrition Support Management: tube feeding held     Problem: Impaired Wound Healing  Goal: Optimal Wound Healing  Intervention: Promote Wound Healing  Flowsheets (Taken 12/16/2023 0630)  Activity Management: Rolling - L1  Pain Management Interventions: care clustered

## 2023-12-16 NOTE — NURSING
Received patient from ICU. Patient lying on bed, awake, oriented only to person, on O2 @ 1lpm via NC, with KCL 10 mEq ( 2 more bags of 10mEq KCL at bedside ), Sodium phosphate 15mmol, and zosyn 4.5 g infusing, with PEG tube and Urinary catheter with bloody urine, 60 ml in the drainage bag noted. Large amount of brown watery stool noted upon checking for the skin integrity, cleaned and diaper, underpad changed. Safety precautions initiated

## 2023-12-16 NOTE — NURSING
[facility] - [place]  Nurses Note -- 4 Eyes      12/16/2023       Skin assessed on: Q Shift      [] No Pressure Injuries Present    []Prevention Measures Documented    [x] Yes LDA  for Pressure Injury Previously documented     [] Yes New Pressure Injury Discovered   [] LDA for New Pressure Injury Added      Attending RN:  Milady Abdi LPN     Second RN:  Becky FloresRN

## 2023-12-16 NOTE — NURSING
[facility], [facility] - [place]  Nurses Note -- 4 Eyes      12/15/2023       Skin assessed on: Transfer      [] No Pressure Injuries Present    []Prevention Measures Documented    [x] Yes LDA  for Pressure Injury Previously documented     [] Yes New Pressure Injury Discovered   [] LDA for New Pressure Injury Added      Attending RN:  Souleymane Velazquez RN     Second RN:  RAJENDRA Connell

## 2023-12-16 NOTE — NURSING
Nurses Note -- 4 Eyes      12/15/2023   7:49 PM      Skin assessed during: Q Shift Change      [] No Altered Skin Integrity Present    []Prevention Measures Documented      [x] Yes LDA for pressure Injury Previously documented      [] LDA Added if Not in Epic (Describe Wound)   [] New Altered Skin Integrity was Present on Admit and Documented in LDA   [] Wound Image Taken        Attending Nurse:  Becky Dior RN/Staff Member: Souleymane

## 2023-12-16 NOTE — PROGRESS NOTES
[facility]  Progress Note    Patient Name: Holly Kidd  MRN: [id number]  Patient Class: IP- Inpatient   Admission Date: 12/12/2023  Length of Stay: 4 days  Attending Physician: Jose Enrique Goode MD  Primary Care Provider: Estrellita Obregon FNP        Subjective:     Principal Problem:Sepsis        HPI:  Ms. Kidd is a 73-year-old female with a past medical history of CVA, dementia, peg tube placement, hypertension, DVT/PE on Eliquis who presents to the emergency department for evaluation of intractable nausea and vomiting.  Patient is not able to give history so all of the history was obtained from the ED physician and medical records.  The patient was recently seen at [facility] for dislodged PEG tube on 12/03/2023.  They were not able to remove it so the PEG tube was cut an umbrella left side with expectation of it to be passed in her stool.  In the emergency department, she was found to have WBC of 32,000, BUN 63 and lactate 2.3.  Urinalysis appeared brown with significant WBC greater than 100 and bacteria.  CT abdomen and pelvis with findings of acute mechanical small-bowel obstruction with point of transition in the deep midline/right paramedian lower abdomen/pelvis with proximity to intraluminal foreign body favored to represent dislodged G-tube apparatus component within small bowel loop just proximal to transition point.  She does have large volume stool distal colon/rectum.  General surgery consulted however given patient is on aspirin, Plavix and Eliquis, patient is high risk for intervention at this time.  Recommendation include supportive care, antibiotics and enema to help with stool burden.  Daughter is at bedside and discussed plan of care.  Patient is DNR at this time.    Overview/Hospital Course:  73F with pmh of cva, dementa, peg tube, dvt/pe on eliquis who presented to due intractable N/V.   The patient was recently seen at [facility] for dislodged PEG tube on 12/03/2023.   They were not able to remove it so the PEG tube was cut an umbrella left side with expectation of it to be passed in her stool. CT abdomen and pelvis with findings of acute mechanical small-bowel obstruction with point of transition in the deep midline/right paramedian lower abdomen/pelvis with proximity to intraluminal foreign body favored to represent dislodged G-tube apparatus component within small bowel loop just proximal to transition point.  She does have large volume stool distal colon/rectum.  General surgery consulted however given patient is on aspirin, Plavix and Eliquis, patient is high risk for intervention at this time.  Recommendation include supportive care, antibiotics and enema to help with stool burden. Continuing with conservative tx. GGC on 12/14 and enema with some stool passed, but no foreign body noted.     Interval History:  NAEON.  No new issues.   All questions answered and updates on care given.       ROS:  No fever     Vitals:    12/15/23 1945 12/15/23 2348 12/16/23 0558 12/16/23 0753   BP: 136/64 (!) 120/56 (!) 126/58 113/60   BP Location: Left arm Left arm Left arm    Patient Position: Lying Lying Lying    Pulse: 64 66 62 64   Resp: 20 18 20 18   Temp: 98.6 °F (37 °C) 98.2 °F (36.8 °C) 98.3 °F (36.8 °C) 99.6 °F (37.6 °C)   TempSrc: Oral Axillary Axillary    SpO2: 100% 100% 96% 97%   Weight:       Height:              Body mass index is 34.39 kg/m².      PHYSICAL EXAM:  Constitutional:       General: She is not in acute distress.     Appearance: She is ill-appearing (chronic).   HENT:      Head: Normocephalic and atraumatic.   Cardiovascular:      Rate and Rhythm: Normal rate and regular rhythm.   Pulmonary:      Effort: Pulmonary effort is normal. No respiratory distress.   Abdominal:      General: There is distension.      Tenderness: There is no abdominal tenderness. There is no guarding.   Neurological:      Mental Status: She is alert. Mental status is at baseline.   Significant  Labs: All pertinent labs within the past 24 hours have been reviewed.          Recent Results (from the past 24 hour(s))   POCT glucose    Collection Time: 12/15/23 11:55 AM   Result Value Ref Range    POCT Glucose 87 70 - 110 mg/dL   Potassium    Collection Time: 12/15/23  2:51 PM   Result Value Ref Range    Potassium 2.9 (L) 3.5 - 5.1 mmol/L   POCT glucose    Collection Time: 12/15/23  7:43 PM   Result Value Ref Range    POCT Glucose 83 70 - 110 mg/dL   POCT glucose    Collection Time: 12/15/23 11:53 PM   Result Value Ref Range    POCT Glucose 72 70 - 110 mg/dL   POCT glucose    Collection Time: 12/16/23  5:59 AM   Result Value Ref Range    POCT Glucose 65 (L) 70 - 110 mg/dL   POCT glucose    Collection Time: 12/16/23  7:55 AM   Result Value Ref Range    POCT Glucose 71 70 - 110 mg/dL       Microbiology Results (last 7 days)       Procedure Component Value Units Date/Time    Blood Culture #1 **CANNOT BE ORDERED STAT** [[id number]] Collected: 12/12/23 1422    Order Status: Completed Specimen: Blood from Peripheral, Antecubital, Right Updated: 12/15/23 1503     Blood Culture, Routine No Growth to date      No Growth to date      No Growth to date      No Growth to date    Blood Culture #2 **CANNOT BE ORDERED STAT** [[id number]]  (Abnormal) Collected: 12/12/23 1422    Order Status: Completed Specimen: Blood from Peripheral, Antecubital, Left Updated: 12/15/23 1449     Blood Culture, Routine Gram stain kia bottle: Gram positive cocci in clusters resembling Staph      Results called to and read back by: Stan Cadena- ICU 12/13/2023  10:26      COAGULASE-NEGATIVE STAPHYLOCOCCUS SPECIES  Organism is a probable contaminant      Urine culture [[id number]]  (Abnormal)  (Susceptibility) Collected: 12/12/23 1043    Order Status: Completed Specimen: Urine Updated: 12/14/23 1030     Urine Culture, Routine ESCHERICHIA COLI ESBL  >100,000 cfu/ml        PROVIDENCIA STUARTII  50,000 - 99,999 cfu/ml      Narrative:      Specimen  Source->Urine             Imaging Results               CT Abdomen Pelvis With IV Contrast NO Oral Contrast (Final result)  Result time 12/12/23 13:21:39      Final result by Shiraz Isbell MD (12/12/23 13:21:39)                   Impression:      1. Findings in keeping with acute mechanical small bowel obstruction, with point of transition in the deep midline/right paramedian lower abdomen/pelvis with proximity to intraluminal foreign body favored to represent dislodged gastrostomy tube apparatus component within small bowel loop just proximal to transition point.  2. There is a staghorn type calculus measuring on the order of 20 mm in aggregate at the mid to lower pole of the right kidney contributing to mild hydronephrosis.  There is urothelial thickening hyperenhancement in keeping with age indeterminate inflammation.  3. Urinary bladder decompressed about Haskins catheter.  Nondependent air within the bladder could relate to recent Haskins catheter placement and/or manipulation.  Nonspecific mucosal hyperemia of the urinary bladder wall could reflect acute inflammation in appropriate clinical context.  4. Large volume stool within the distal colon rectum.  Nonspecific mild wall thickening and inflammatory changes in the adjoining fat planes could indicate stercoral proctitis in the appropriate clinical context.  5. Mild wedging of the L1 vertebral body superior endplate appears new when compared to the 02/18/2022 CT of the abdomen and pelvis, but is otherwise age indeterminate.  Recommend clinical correlation.  MRI could be considered for more precise characterization of this fracture.  6. Additional details, as provided in the body of the report.  This report was flagged in Epic as abnormal.      Electronically signed by: Shiraz Isbell  Date:    12/12/2023  Time:    13:21               Narrative:    EXAMINATION:  CT ABDOMEN PELVIS WITH IV CONTRAST    CLINICAL HISTORY:  Abdominal pain, acute,  nonlocalized;    TECHNIQUE:  Low dose axial images, sagittal and coronal reformations were obtained from the lung bases to the pubic symphysis following the IV administration of 100 mL of Omnipaque 350    COMPARISON:  CT of the abdomen and pelvis performed 02/18/2022.    FINDINGS:  Lower chest: Trace bilateral pleural effusions.  Atelectasis and scar is noted in both lungs.    Liver: Unremarkable.    Gallbladder and bile ducts: Unremarkable. No biliary ductal dilatation.    Pancreas: Unremarkable.    Spleen: Unremarkable.    Adrenals: Unremarkable.    Kidneys: On the right, there is a staghorn type calculus measuring on the order of 20 mm in aggregate at the mid to lower pole contributing to mild hydronephrosis.  There is urothelial thickening hyperenhancement in keeping with age indeterminate inflammation.    On the left, no definite radiopaque urolithiasis or obstructive uropathy.  Nonspecific curvilinear cortically based calcifications at the lateral mid to upper pole left kidney.    Lymph nodes: No abdominal or pelvic lymphadenopathy.    Bowel and mesentery: Dilated fluid-filled small bowel loops are noted measuring up to 45 mm there is marked gastric distension of the stomach as well.  There appears to be a transition to decompressed small bowel in the deep midline/right paramedian lower pelvis (axial series 2, image 135).In this region, there appears to be intraluminal foreign body within dilated small bowel just proximal to the transition point, which is favored related to gastrostomy tube apparatus.    Large volume stool noted within the distal colon and rectum with mild rectal wall thickening.  Relatively modest inflammatory changes are noted in the perirectal fat planes.  Colonic diverticulosis is noted.  Unremarkable appearance of the appendix.    Abdominal aorta: Nonaneurysmal.  Moderate atherosclerotic calcifications.    Inferior vena cava: Unremarkable.    Free fluid or free air: No definite free air.   Small volume simple appearing free fluid dependently within the pelvis.    Pelvis: Urinary bladder decompressed about Haskins catheter.  Nondependent air within the bladder could relate to recent Haskins catheter placement and/or manipulation.  Nonspecific mucosal hyperemia of the urinary bladder wall could reflect acute inflammation in appropriate clinical context.    Body wall: No definite drainable fluid collection or inflammatory changes noted in the anterior abdominal wall about the extra-portion of the gastrostomy tube.  The gastrostomy tube balloon appears deflated.    Bones: Query osseous demineralization.  Degenerative changes are noted involving the spine.    There is new mild wedging of the L1 vertebral body superior endplate without discrete fracture lines, when compared to the 02/18/2022 CT of the abdomen pelvis.  No definite retropulsion.                                           Assessment/Plan:      * Sepsis  This patient does have evidence of infective focus.My overall impression is sepsis.  Source: Urinary Tract  Antibiotics given-   Antibiotics (72h ago, onward)      Start     Stop Route Frequency Ordered    12/14/23 0900  mupirocin 2 % ointment         12/19/23 0859 Nasl 2 times daily 12/14/23 0732    12/12/23 1515  piperacillin-tazobactam (ZOSYN) 4.5 g in dextrose 5 % in water (D5W) 100 mL IVPB (MB+)         -- IV Every 8 hours (non-standard times) 12/12/23 1411          Latest lactate reviewed-  Recent Labs   Lab 12/12/23  1104 12/12/23  1422 12/13/23  0338   LACTATE 2.3* 4.1* 2.2       Organ dysfunction indicated by Encephalopathy    Fluid challenge Ideal Body Weight- The patient's ideal body weight is Ideal body weight: 50.1 kg (110 lb 7.2 oz) which will be used to calculate fluid bolus of 30 ml/kg for treatment of septic shock.      Post- resuscitation assessment Yes Perfusion exam was performed within 6 hours of septic shock presentation after bolus shows Adequate tissue perfusion assessed by  non-invasive monitoring       Will Not start Pressors- Levophed for MAP of 65  Source control achieved by: IV antibiotics with zosyn. Urine culture growing ESBL and providencia stuartii both sensitive to zosyn    History of pulmonary embolism  Eliquis 5 mg bid    Bowel obstruction  Presents with acute mechanical small-bowel obstruction with point of transition in the deep midline/right lower abdomen and pelvis.  Recently had older G-tube cut and number the left inside.  This is what most likely causing obstruction at this time.  She was not a candidate for surgery given she was on aspirin, Plavix and Eliquis.  This places her at very high risk for any intervention at this moment.  Holding all anticoagulation.  Surgery recommending enema to help with stool burden distal to object.    -Has had multiple enemas and GGC with 1 bm so far. No foreign body noted.     -PEG to gravity with management per surgery.      Acute cystitis  UA consistent with urinary tract infection.  Initially given ceftriaxone in the emergency department however broadened to Zosyn.  Follow up urine cultures and adjusted accordingly.      Advanced care planning/counseling discussion  Advance Care Planning    Date: 12/12/2023    Code Status  In light of the patients advanced and life limiting illness,I engaged the the family in a voluntary conversation about the patient's preferences for care  at the very end of life. The patient wishes to have a natural, peaceful death.  Along those lines, the patient does not wish to have CPR or other invasive treatments performed when her heart and/or breathing stops. I communicated to the family that a DNR order would be placed in her medical record to reflect this preference.  I spent a total of 8 minutes engaging the patient in this advance care planning discussion.    Continue with current level of care at this time.     Has a daughter and son, her daughter Lana was at bedside and unable to get in touch  with son.  12/13: spoke to son and updated. Seems to understand overall poor prognosis and our concerns            Acute encephalopathy  Lethargic with history of CVA and now with sepsis and small-bowel obstruction.  Treat underlying issues at this time.  -more alert now and closer to baseline    Cerebrovascular disease  Hx of CVA, now with PEG in place and debility  - holding ASA/Plavix with SBO      Essential hypertension  - Chronic in nature, holding home BP medications in setting of sepsis  - will add back as needed      VTE Risk Mitigation (From admission, onward)           Ordered     apixaban tablet 5 mg  2 times daily         12/15/23 1341     IP VTE HIGH RISK PATIENT  Once         12/12/23 1347     Place sequential compression device  Until discontinued         12/12/23 1347                    Discharge Planning   ANISH: 12/16/2023     Code Status: DNR   Is the patient medically ready for discharge?:     Reason for patient still in hospital (select all that apply): Treatment and Consult recommendations  Discharge Plan A: Return to nursing home   Discharge Delays: None known at this time          Jose Enrique Goode MD  Department of Hospital Medicine   [facility] [place] - Intensive Care

## 2023-12-17 LAB
ALBUMIN SERPL BCP-MCNC: 2.7 G/DL (ref 3.5–5.2)
ALP SERPL-CCNC: 77 U/L (ref 55–135)
ALT SERPL W/O P-5'-P-CCNC: 45 U/L (ref 10–44)
ANION GAP SERPL CALC-SCNC: 12 MMOL/L (ref 8–16)
AST SERPL-CCNC: 56 U/L (ref 10–40)
BASOPHILS # BLD AUTO: 0.02 K/UL (ref 0–0.2)
BASOPHILS NFR BLD: 0.2 % (ref 0–1.9)
BILIRUB SERPL-MCNC: 0.7 MG/DL (ref 0.1–1)
BUN SERPL-MCNC: 5 MG/DL (ref 8–23)
CALCIUM SERPL-MCNC: 8.1 MG/DL (ref 8.7–10.5)
CHLORIDE SERPL-SCNC: 104 MMOL/L (ref 95–110)
CO2 SERPL-SCNC: 24 MMOL/L (ref 23–29)
CREAT SERPL-MCNC: 0.5 MG/DL (ref 0.5–1.4)
DIFFERENTIAL METHOD: ABNORMAL
EOSINOPHIL # BLD AUTO: 0.1 K/UL (ref 0–0.5)
EOSINOPHIL NFR BLD: 0.6 % (ref 0–8)
ERYTHROCYTE [DISTWIDTH] IN BLOOD BY AUTOMATED COUNT: 14.7 % (ref 11.5–14.5)
EST. GFR  (NO RACE VARIABLE): >60 ML/MIN/1.73 M^2
GLUCOSE SERPL-MCNC: 61 MG/DL (ref 70–110)
HCT VFR BLD AUTO: 32.6 % (ref 37–48.5)
HGB BLD-MCNC: 10.6 G/DL (ref 12–16)
IMM GRANULOCYTES # BLD AUTO: 0.18 K/UL (ref 0–0.04)
IMM GRANULOCYTES NFR BLD AUTO: 1.9 % (ref 0–0.5)
LYMPHOCYTES # BLD AUTO: 2.2 K/UL (ref 1–4.8)
LYMPHOCYTES NFR BLD: 23.4 % (ref 18–48)
MCH RBC QN AUTO: 26.1 PG (ref 27–31)
MCHC RBC AUTO-ENTMCNC: 32.5 G/DL (ref 32–36)
MCV RBC AUTO: 80 FL (ref 82–98)
MONOCYTES # BLD AUTO: 0.8 K/UL (ref 0.3–1)
MONOCYTES NFR BLD: 8.1 % (ref 4–15)
NEUTROPHILS # BLD AUTO: 6.2 K/UL (ref 1.8–7.7)
NEUTROPHILS NFR BLD: 65.8 % (ref 38–73)
NRBC BLD-RTO: 0 /100 WBC
PLATELET # BLD AUTO: 256 K/UL (ref 150–450)
PMV BLD AUTO: 10.1 FL (ref 9.2–12.9)
POCT GLUCOSE: 115 MG/DL (ref 70–110)
POCT GLUCOSE: 57 MG/DL (ref 70–110)
POCT GLUCOSE: 63 MG/DL (ref 70–110)
POCT GLUCOSE: 64 MG/DL (ref 70–110)
POCT GLUCOSE: 79 MG/DL (ref 70–110)
POCT GLUCOSE: 81 MG/DL (ref 70–110)
POTASSIUM SERPL-SCNC: 3.3 MMOL/L (ref 3.5–5.1)
PROT SERPL-MCNC: 6 G/DL (ref 6–8.4)
RBC # BLD AUTO: 4.06 M/UL (ref 4–5.4)
SODIUM SERPL-SCNC: 140 MMOL/L (ref 136–145)
WBC # BLD AUTO: 9.43 K/UL (ref 3.9–12.7)

## 2023-12-17 PROCEDURE — 36415 COLL VENOUS BLD VENIPUNCTURE: CPT | Performed by: STUDENT IN AN ORGANIZED HEALTH CARE EDUCATION/TRAINING PROGRAM

## 2023-12-17 PROCEDURE — 63600175 PHARM REV CODE 636 W HCPCS: Performed by: STUDENT IN AN ORGANIZED HEALTH CARE EDUCATION/TRAINING PROGRAM

## 2023-12-17 PROCEDURE — 80053 COMPREHEN METABOLIC PANEL: CPT | Performed by: STUDENT IN AN ORGANIZED HEALTH CARE EDUCATION/TRAINING PROGRAM

## 2023-12-17 PROCEDURE — 25000003 PHARM REV CODE 250: Performed by: STUDENT IN AN ORGANIZED HEALTH CARE EDUCATION/TRAINING PROGRAM

## 2023-12-17 PROCEDURE — 27000207 HC ISOLATION

## 2023-12-17 PROCEDURE — 21400001 HC TELEMETRY ROOM

## 2023-12-17 PROCEDURE — 85025 COMPLETE CBC W/AUTO DIFF WBC: CPT | Performed by: STUDENT IN AN ORGANIZED HEALTH CARE EDUCATION/TRAINING PROGRAM

## 2023-12-17 RX ADMIN — DEXTROSE MONOHYDRATE 125 ML: 100 INJECTION, SOLUTION INTRAVENOUS at 05:12

## 2023-12-17 RX ADMIN — APIXABAN 5 MG: 5 TABLET, FILM COATED ORAL at 09:12

## 2023-12-17 RX ADMIN — SODIUM CHLORIDE, POTASSIUM CHLORIDE, SODIUM LACTATE AND CALCIUM CHLORIDE: 600; 310; 30; 20 INJECTION, SOLUTION INTRAVENOUS at 12:12

## 2023-12-17 RX ADMIN — DEXTROSE MONOHYDRATE 125 ML: 100 INJECTION, SOLUTION INTRAVENOUS at 12:12

## 2023-12-17 RX ADMIN — ERTAPENEM 1 G: 1 INJECTION INTRAMUSCULAR; INTRAVENOUS at 09:12

## 2023-12-17 RX ADMIN — DEXTROSE MONOHYDRATE 125 ML: 100 INJECTION, SOLUTION INTRAVENOUS at 04:12

## 2023-12-17 RX ADMIN — MUPIROCIN: 20 OINTMENT TOPICAL at 09:12

## 2023-12-17 RX ADMIN — SODIUM CHLORIDE, POTASSIUM CHLORIDE, SODIUM LACTATE AND CALCIUM CHLORIDE: 600; 310; 30; 20 INJECTION, SOLUTION INTRAVENOUS at 10:12

## 2023-12-17 NOTE — ASSESSMENT & PLAN NOTE
Presents with acute mechanical small-bowel obstruction with point of transition in the deep midline/right lower abdomen and pelvis.  Recently had older G-tube cut and number the left inside.  This is what most likely causing obstruction at this time.  She was not a candidate for surgery given she was on aspirin, Plavix and Eliquis.  This places her at very high risk for any intervention at this moment.  Holding all anticoagulation.  Surgery recommending enema to help with stool burden distal to object.    PEG to gravity with management per surgery.  Having BM.  Passed gastrograffin challenge.  Start tube feeds.

## 2023-12-17 NOTE — NURSING
[facility], [facility] - [place]  Nurses Note -- 4 Eyes      12/17/2023       Skin assessed on: Q Shift      [] No Pressure Injuries Present    []Prevention Measures Documented    [x] Yes LDA  for Pressure Injury Previously documented     [] Yes New Pressure Injury Discovered   [] LDA for New Pressure Injury Added      Attending RN:  Milady Abdi LPN     Second RN:  Becky FloresRN

## 2023-12-17 NOTE — NURSING
Bedside Report given to night nurse RAJENDRA Pena. Walking rounds completed. Visualized and assessed patient NAD noted. Safety precautions maintained and call light within reach.     Chart check completed.

## 2023-12-17 NOTE — PROGRESS NOTES
[facility] Medicine  Progress Note    Patient Name: Holly Kidd  MRN: [id number]  Patient Class: IP- Inpatient   Admission Date: 12/12/2023  Length of Stay: 5 days  Attending Physician: Chris Bright MD  Primary Care Provider: Estrlelita Obregon FNP        Subjective:     Principal Problem:Sepsis        HPI:  Ms. Kidd is a 73-year-old female with a past medical history of CVA, dementia, peg tube placement, hypertension, DVT/PE on Eliquis who presents to the emergency department for evaluation of intractable nausea and vomiting.  Patient is not able to give history so all of the history was obtained from the ED physician and medical records.  The patient was recently seen at [facility] for dislodged PEG tube on 12/03/2023.  They were not able to remove it so the PEG tube was cut an umbrella left side with expectation of it to be passed in her stool.  In the emergency department, she was found to have WBC of 32,000, BUN 63 and lactate 2.3.  Urinalysis appeared brown with significant WBC greater than 100 and bacteria.  CT abdomen and pelvis with findings of acute mechanical small-bowel obstruction with point of transition in the deep midline/right paramedian lower abdomen/pelvis with proximity to intraluminal foreign body favored to represent dislodged G-tube apparatus component within small bowel loop just proximal to transition point.  She does have large volume stool distal colon/rectum.  General surgery consulted however given patient is on aspirin, Plavix and Eliquis, patient is high risk for intervention at this time.  Recommendation include supportive care, antibiotics and enema to help with stool burden.  Daughter is at bedside and discussed plan of care.  Patient is DNR at this time.    Overview/Hospital Course:  73F with pmh of cva, dementa, peg tube, dvt/pe on eliquis who presented to due intractable N/V.   The patient was recently seen at [facility] for dislodged PEG tube on 12/03/2023.   They were not able to remove it so the PEG tube was cut an umbrella left side with expectation of it to be passed in her stool. CT abdomen and pelvis with findings of acute mechanical small-bowel obstruction with point of transition in the deep midline/right paramedian lower abdomen/pelvis with proximity to intraluminal foreign body favored to represent dislodged G-tube apparatus component within small bowel loop just proximal to transition point.  She does have large volume stool distal colon/rectum.  General surgery consulted however given patient is on aspirin, Plavix and Eliquis, patient is high risk for intervention at this time.  Recommendation include supportive care, antibiotics and enema to help with stool burden. Continuing with conservative tx. GGC on 12/14 and enema with some stool passed, but no foreign body noted.     Interval History: no new events overnight.    Review of Systems   Unable to perform ROS: Patient nonverbal     Objective:     Vital Signs (Most Recent):  Temp: 99.6 °F (37.6 °C) (12/17/23 1124)  Pulse: 76 (12/17/23 1124)  Resp: 18 (12/17/23 1124)  BP: 134/72 (12/17/23 1124)  SpO2: 95 % (12/17/23 1124) Vital Signs (24h Range):  Temp:  [98.4 °F (36.9 °C)-99.6 °F (37.6 °C)] 99.6 °F (37.6 °C)  Pulse:  [] 76  Resp:  [18-20] 18  SpO2:  [95 %-100 %] 95 %  BP: (117-134)/(60-72) 134/72     Weight: 85.3 kg (188 lb)  Body mass index is 34.39 kg/m².    Intake/Output Summary (Last 24 hours) at 12/17/2023 1304  Last data filed at 12/17/2023 0527  Gross per 24 hour   Intake 125 ml   Output 2300 ml   Net -2175 ml         Physical Exam  Vitals and nursing note reviewed.   Constitutional:       Appearance: She is obese. She is ill-appearing.      Comments: Patient is nonverbal.  Does not participate in conversation.   HENT:      Head: Normocephalic.      Mouth/Throat:      Mouth: Mucous membranes are dry.      Pharynx: Oropharynx is clear.   Cardiovascular:      Rate and Rhythm: Normal rate.       "Pulses: Normal pulses.   Pulmonary:      Effort: Pulmonary effort is normal. No respiratory distress.      Breath sounds: No wheezing.   Abdominal:      General: There is distension.      Tenderness: There is no guarding.      Comments: Abdomen is distended but improved from previous exam.  Patient does not respond to tactile stimulation of all 4 quadrants of her abdomen.  G-tube to gravity drainage   Musculoskeletal:         General: No swelling or tenderness. Normal range of motion.      Cervical back: Normal range of motion.   Skin:     General: Skin is warm and dry.      Coloration: Skin is not jaundiced or pale.             Significant Labs: All pertinent labs within the past 24 hours have been reviewed.  BMP:   Recent Labs   Lab 12/17/23  0452   GLU 61*      K 3.3*      CO2 24   BUN 5*   CREATININE 0.5   CALCIUM 8.1*     CBC:   Recent Labs   Lab 12/16/23  0903 12/17/23  0452   WBC 10.86 9.43   HGB 9.0* 10.6*   HCT 28.5* 32.6*    256       Significant Imaging: I have reviewed all pertinent imaging results/findings within the past 24 hours.    Assessment/Plan:      * Sepsis  This patient does have evidence of infective focus.My overall impression is sepsis.  Source: Urinary Tract  Antibiotics given-   Antibiotics (72h ago, onward)      Start     Stop Route Frequency Ordered    12/16/23 0836  ertapenem (INVANZ) 1 g in sodium chloride 0.9 % 100 mL IVPB (MB+)         -- IV Every 24 hours (non-standard times) 12/16/23 0836    12/14/23 0900  mupirocin 2 % ointment         12/19/23 0859 Nasl 2 times daily 12/14/23 0732          Latest lactate reviewed-  No results for input(s): "LACTATE" in the last 72 hours.    Organ dysfunction indicated by Encephalopathy  On Ertapenem.    History of pulmonary embolism  Eliquis 5 mg bid    Bowel obstruction  Presents with acute mechanical small-bowel obstruction with point of transition in the deep midline/right lower abdomen and pelvis.  Recently had older G-tube " cut and number the left inside.  This is what most likely causing obstruction at this time.  She was not a candidate for surgery given she was on aspirin, Plavix and Eliquis.  This places her at very high risk for any intervention at this moment.  Holding all anticoagulation.  Surgery recommending enema to help with stool burden distal to object.    PEG to gravity with management per surgery.  Having BM.  Passed gastrograffin challenge.  Start tube feeds.      Acute cystitis  UA consistent with urinary tract infection.  Initially given ceftriaxone in the emergency department however broadened to Zosyn.  Now on Ertapenem based on cultures.  ESBL E coli UTI      Advanced care planning/counseling discussion  Advance Care Planning    Date: 12/12/2023    Code Status  In light of the patients advanced and life limiting illness,I engaged the the family in a voluntary conversation about the patient's preferences for care  at the very end of life. The patient wishes to have a natural, peaceful death.  Along those lines, the patient does not wish to have CPR or other invasive treatments performed when her heart and/or breathing stops. I communicated to the family that a DNR order would be placed in her medical record to reflect this preference.  I spent a total of 8 minutes engaging the patient in this advance care planning discussion.    Continue with current level of care at this time.     Has a daughter and son, her daughter Lana was at bedside and unable to get in touch with son.  12/13: spoke to son and updated. Seems to understand overall poor prognosis and our concerns            Acute encephalopathy  Lethargic with history of CVA and now with sepsis and small-bowel obstruction.  Treat underlying issues at this time.  -more alert now and closer to baseline    Cerebrovascular disease  Hx of CVA, now with PEG in place and debility      Essential hypertension  - Chronic in nature, holding home BP medications in  setting of sepsis  - will add back as needed      VTE Risk Mitigation (From admission, onward)           Ordered     apixaban tablet 5 mg  2 times daily         12/15/23 1341     IP VTE HIGH RISK PATIENT  Once         12/12/23 1347     Place sequential compression device  Until discontinued         12/12/23 1347                    Discharge Planning   ANISH: 12/16/2023     Code Status: DNR   Is the patient medically ready for discharge?:     Reason for patient still in hospital (select all that apply): Patient trending condition  Discharge Plan A: Return to nursing home   Discharge Delays: None known at this time              Chris Bright MD  Department of Hospital Medicine   [facility]

## 2023-12-17 NOTE — NURSING
Tube feedings resumed per Dr. Bright at 10mls per hour. Tube feedings to increase by 10mls per hour until goal of 30mls is reached. Flushed with 100mls of water. Pt tolerated well.

## 2023-12-17 NOTE — PLAN OF CARE
Problem: Adult Inpatient Plan of Care  Goal: Absence of Hospital-Acquired Illness or Injury  Intervention: Identify and Manage Fall Risk  Flowsheets (Taken 12/17/2023 0417)  Safety Promotion/Fall Prevention:   assistive device/personal item within reach   medications reviewed   nonskid shoes/socks when out of bed   room near unit station   side rails raised x 3  Intervention: Prevent Skin Injury  Flowsheets (Taken 12/17/2023 0417)  Body Position: turned  Intervention: Prevent and Manage VTE (Venous Thromboembolism) Risk  Flowsheets (Taken 12/17/2023 0417)  Activity Management: Rolling - L1  Intervention: Prevent Infection  Flowsheets (Taken 12/17/2023 0417)  Infection Prevention: hand hygiene promoted  Goal: Optimal Comfort and Wellbeing  Intervention: Monitor Pain and Promote Comfort  Flowsheets (Taken 12/17/2023 0417)  Pain Management Interventions: care clustered  Intervention: Provide Person-Centered Care  Flowsheets (Taken 12/17/2023 0417)  Trust Relationship/Rapport: care explained     Problem: Coping Ineffective  Goal: Effective Coping  Intervention: Support and Enhance Coping Strategies  Flowsheets (Taken 12/17/2023 0417)  Supportive Measures: active listening utilized  Environmental Support: calm environment promoted     Problem: Infection  Goal: Absence of Infection Signs and Symptoms  Intervention: Prevent or Manage Infection  Flowsheets (Taken 12/17/2023 0417)  Isolation Precautions:   precautions maintained   contact     Problem: Adjustment to Illness (Sepsis/Septic Shock)  Goal: Optimal Coping  Intervention: Optimize Psychosocial Adjustment to Illness  Flowsheets (Taken 12/17/2023 0417)  Supportive Measures: active listening utilized     Problem: Bleeding (Sepsis/Septic Shock)  Goal: Absence of Bleeding  Intervention: Monitor and Manage Bleeding  Flowsheets (Taken 12/17/2023 0417)  Bleeding Precautions: blood pressure closely monitored     Problem: Glycemic Control Impaired (Sepsis/Septic  Shock)  Goal: Blood Glucose Level Within Desired Range  Intervention: Optimize Glycemic Control  Flowsheets (Taken 12/17/2023 0417)  Glycemic Management: blood glucose monitored     Problem: Infection Progression (Sepsis/Septic Shock)  Goal: Absence of Infection Signs and Symptoms  Intervention: Initiate Sepsis Management  Flowsheets (Taken 12/17/2023 0417)  Infection Prevention: hand hygiene promoted  Isolation Precautions:   precautions maintained   contact  Intervention: Promote Recovery  Flowsheets (Taken 12/17/2023 0417)  Activity Management: Rolling - L1

## 2023-12-17 NOTE — CONSULTS
Patient's family desires a new nursing home placement; they are not satisfied with [facility]. The family will also be considering hospice at nursing home providers. Patient's family was provided with lists of nursing homes and hospice agencies.

## 2023-12-17 NOTE — ASSESSMENT & PLAN NOTE
"This patient does have evidence of infective focus.My overall impression is sepsis.  Source: Urinary Tract  Antibiotics given-   Antibiotics (72h ago, onward)      Start     Stop Route Frequency Ordered    12/16/23 0836  ertapenem (INVANZ) 1 g in sodium chloride 0.9 % 100 mL IVPB (MB+)         -- IV Every 24 hours (non-standard times) 12/16/23 0836    12/14/23 0900  mupirocin 2 % ointment         12/19/23 0859 Nasl 2 times daily 12/14/23 0732          Latest lactate reviewed-  No results for input(s): "LACTATE" in the last 72 hours.    Organ dysfunction indicated by Encephalopathy  On Ertapenem.  "

## 2023-12-17 NOTE — SUBJECTIVE & OBJECTIVE
Interval History: no new events overnight.    Review of Systems   Unable to perform ROS: Patient nonverbal     Objective:     Vital Signs (Most Recent):  Temp: 99.6 °F (37.6 °C) (12/17/23 1124)  Pulse: 76 (12/17/23 1124)  Resp: 18 (12/17/23 1124)  BP: 134/72 (12/17/23 1124)  SpO2: 95 % (12/17/23 1124) Vital Signs (24h Range):  Temp:  [98.4 °F (36.9 °C)-99.6 °F (37.6 °C)] 99.6 °F (37.6 °C)  Pulse:  [] 76  Resp:  [18-20] 18  SpO2:  [95 %-100 %] 95 %  BP: (117-134)/(60-72) 134/72     Weight: 85.3 kg (188 lb)  Body mass index is 34.39 kg/m².    Intake/Output Summary (Last 24 hours) at 12/17/2023 1304  Last data filed at 12/17/2023 0527  Gross per 24 hour   Intake 125 ml   Output 2300 ml   Net -2175 ml         Physical Exam  Vitals and nursing note reviewed.   Constitutional:       Appearance: She is obese. She is ill-appearing.      Comments: Patient is nonverbal.  Does not participate in conversation.   HENT:      Head: Normocephalic.      Mouth/Throat:      Mouth: Mucous membranes are dry.      Pharynx: Oropharynx is clear.   Cardiovascular:      Rate and Rhythm: Normal rate.      Pulses: Normal pulses.   Pulmonary:      Effort: Pulmonary effort is normal. No respiratory distress.      Breath sounds: No wheezing.   Abdominal:      General: There is distension.      Tenderness: There is no guarding.      Comments: Abdomen is distended but improved from previous exam.  Patient does not respond to tactile stimulation of all 4 quadrants of her abdomen.  G-tube to gravity drainage   Musculoskeletal:         General: No swelling or tenderness. Normal range of motion.      Cervical back: Normal range of motion.   Skin:     General: Skin is warm and dry.      Coloration: Skin is not jaundiced or pale.             Significant Labs: All pertinent labs within the past 24 hours have been reviewed.  BMP:   Recent Labs   Lab 12/17/23  0452   GLU 61*      K 3.3*      CO2 24   BUN 5*   CREATININE 0.5   CALCIUM 8.1*      CBC:   Recent Labs   Lab 12/16/23  0903 12/17/23  0452   WBC 10.86 9.43   HGB 9.0* 10.6*   HCT 28.5* 32.6*    256       Significant Imaging: I have reviewed all pertinent imaging results/findings within the past 24 hours.

## 2023-12-17 NOTE — ASSESSMENT & PLAN NOTE
UA consistent with urinary tract infection.  Initially given ceftriaxone in the emergency department however broadened to Zosyn.  Now on Ertapenem based on cultures.  ESBL E coli UTI

## 2023-12-17 NOTE — PLAN OF CARE
Problem: Adult Inpatient Plan of Care  Goal: Plan of Care Review  Outcome: Ongoing, Progressing     Problem: Coping Ineffective  Goal: Effective Coping  Outcome: Ongoing, Progressing     Problem: Infection  Goal: Absence of Infection Signs and Symptoms  Outcome: Ongoing, Progressing     Problem: Impaired Wound Healing  Goal: Optimal Wound Healing  Outcome: Ongoing, Progressing     Problem: Skin Injury Risk Increased  Goal: Skin Health and Integrity  Outcome: Ongoing, Progressing

## 2023-12-18 LAB
ALBUMIN SERPL BCP-MCNC: 3 G/DL (ref 3.5–5.2)
ALP SERPL-CCNC: 90 U/L (ref 55–135)
ALT SERPL W/O P-5'-P-CCNC: 63 U/L (ref 10–44)
ANION GAP SERPL CALC-SCNC: 9 MMOL/L (ref 8–16)
AST SERPL-CCNC: 64 U/L (ref 10–40)
BASOPHILS # BLD AUTO: 0.02 K/UL (ref 0–0.2)
BASOPHILS NFR BLD: 0.2 % (ref 0–1.9)
BILIRUB SERPL-MCNC: 0.6 MG/DL (ref 0.1–1)
BUN SERPL-MCNC: 3 MG/DL (ref 8–23)
CALCIUM SERPL-MCNC: 8.6 MG/DL (ref 8.7–10.5)
CHLORIDE SERPL-SCNC: 105 MMOL/L (ref 95–110)
CO2 SERPL-SCNC: 25 MMOL/L (ref 23–29)
CREAT SERPL-MCNC: 0.5 MG/DL (ref 0.5–1.4)
DIFFERENTIAL METHOD: ABNORMAL
EOSINOPHIL # BLD AUTO: 0.1 K/UL (ref 0–0.5)
EOSINOPHIL NFR BLD: 0.6 % (ref 0–8)
ERYTHROCYTE [DISTWIDTH] IN BLOOD BY AUTOMATED COUNT: 14.7 % (ref 11.5–14.5)
EST. GFR  (NO RACE VARIABLE): >60 ML/MIN/1.73 M^2
GLUCOSE SERPL-MCNC: 99 MG/DL (ref 70–110)
HCT VFR BLD AUTO: 35.6 % (ref 37–48.5)
HGB BLD-MCNC: 11.6 G/DL (ref 12–16)
IMM GRANULOCYTES # BLD AUTO: 0.11 K/UL (ref 0–0.04)
IMM GRANULOCYTES NFR BLD AUTO: 1.3 % (ref 0–0.5)
LYMPHOCYTES # BLD AUTO: 2.5 K/UL (ref 1–4.8)
LYMPHOCYTES NFR BLD: 28.9 % (ref 18–48)
MCH RBC QN AUTO: 25.6 PG (ref 27–31)
MCHC RBC AUTO-ENTMCNC: 32.6 G/DL (ref 32–36)
MCV RBC AUTO: 79 FL (ref 82–98)
MONOCYTES # BLD AUTO: 0.7 K/UL (ref 0.3–1)
MONOCYTES NFR BLD: 8.4 % (ref 4–15)
NEUTROPHILS # BLD AUTO: 5.3 K/UL (ref 1.8–7.7)
NEUTROPHILS NFR BLD: 60.6 % (ref 38–73)
NRBC BLD-RTO: 0 /100 WBC
PLATELET # BLD AUTO: 281 K/UL (ref 150–450)
PMV BLD AUTO: 9.8 FL (ref 9.2–12.9)
POCT GLUCOSE: 100 MG/DL (ref 70–110)
POCT GLUCOSE: 101 MG/DL (ref 70–110)
POCT GLUCOSE: 106 MG/DL (ref 70–110)
POCT GLUCOSE: 108 MG/DL (ref 70–110)
POCT GLUCOSE: 99 MG/DL (ref 70–110)
POCT GLUCOSE: 99 MG/DL (ref 70–110)
POTASSIUM SERPL-SCNC: 3 MMOL/L (ref 3.5–5.1)
PROT SERPL-MCNC: 6.5 G/DL (ref 6–8.4)
RBC # BLD AUTO: 4.53 M/UL (ref 4–5.4)
SODIUM SERPL-SCNC: 139 MMOL/L (ref 136–145)
WBC # BLD AUTO: 8.73 K/UL (ref 3.9–12.7)

## 2023-12-18 PROCEDURE — 25000003 PHARM REV CODE 250: Performed by: STUDENT IN AN ORGANIZED HEALTH CARE EDUCATION/TRAINING PROGRAM

## 2023-12-18 PROCEDURE — 25000003 PHARM REV CODE 250: Performed by: HOSPITALIST

## 2023-12-18 PROCEDURE — 27000207 HC ISOLATION

## 2023-12-18 PROCEDURE — 63600175 PHARM REV CODE 636 W HCPCS: Performed by: STUDENT IN AN ORGANIZED HEALTH CARE EDUCATION/TRAINING PROGRAM

## 2023-12-18 PROCEDURE — 80053 COMPREHEN METABOLIC PANEL: CPT | Performed by: STUDENT IN AN ORGANIZED HEALTH CARE EDUCATION/TRAINING PROGRAM

## 2023-12-18 PROCEDURE — 21400001 HC TELEMETRY ROOM

## 2023-12-18 PROCEDURE — 85025 COMPLETE CBC W/AUTO DIFF WBC: CPT | Performed by: STUDENT IN AN ORGANIZED HEALTH CARE EDUCATION/TRAINING PROGRAM

## 2023-12-18 PROCEDURE — 36415 COLL VENOUS BLD VENIPUNCTURE: CPT | Performed by: STUDENT IN AN ORGANIZED HEALTH CARE EDUCATION/TRAINING PROGRAM

## 2023-12-18 PROCEDURE — 94761 N-INVAS EAR/PLS OXIMETRY MLT: CPT

## 2023-12-18 RX ORDER — AMOXICILLIN 250 MG
1 CAPSULE ORAL 2 TIMES DAILY
Status: DISCONTINUED | OUTPATIENT
Start: 2023-12-18 | End: 2023-12-20 | Stop reason: HOSPADM

## 2023-12-18 RX ORDER — POLYETHYLENE GLYCOL 3350 17 G/17G
17 POWDER, FOR SOLUTION ORAL DAILY
Status: DISCONTINUED | OUTPATIENT
Start: 2023-12-18 | End: 2023-12-20 | Stop reason: HOSPADM

## 2023-12-18 RX ADMIN — DOCUSATE SODIUM AND SENNOSIDES 1 TABLET: 8.6; 5 TABLET, FILM COATED ORAL at 09:12

## 2023-12-18 RX ADMIN — ERTAPENEM 1 G: 1 INJECTION INTRAMUSCULAR; INTRAVENOUS at 08:12

## 2023-12-18 RX ADMIN — APIXABAN 5 MG: 5 TABLET, FILM COATED ORAL at 09:12

## 2023-12-18 RX ADMIN — POTASSIUM BICARBONATE 25 MEQ: 977.5 TABLET, EFFERVESCENT ORAL at 09:12

## 2023-12-18 RX ADMIN — POLYETHYLENE GLYCOL 3350 17 G: 17 POWDER, FOR SOLUTION ORAL at 08:12

## 2023-12-18 RX ADMIN — APIXABAN 5 MG: 5 TABLET, FILM COATED ORAL at 08:12

## 2023-12-18 RX ADMIN — MUPIROCIN: 20 OINTMENT TOPICAL at 09:12

## 2023-12-18 RX ADMIN — MUPIROCIN: 20 OINTMENT TOPICAL at 08:12

## 2023-12-18 NOTE — NURSING
Peg residual 30 cc. Peg flushed with 100 cc of water and rate increased to 20 ml/h. No nausea, vomiting or other distress noted.

## 2023-12-18 NOTE — PLAN OF CARE
12/18/23 1617   Medicare Message   Important Message from Medicare regarding Discharge Appeal Rights Given to patient/caregiver;Explained to patient/caregiver   Date IMM was signed 12/18/23   Time IMM was signed 1604     KATARZYNA explained IMM to patient daughter via phone. Copy of IMM emailed to patient daughter Lana at [email or web address]

## 2023-12-18 NOTE — NURSING
Residual 15 cc, returned. PEG flushed with 100 ml of water and rate increased to 30 ml/h. Tolerating tube feeding well. No nausea vomiting or other distress.

## 2023-12-18 NOTE — PLAN OF CARE
Problem: Adult Inpatient Plan of Care  Goal: Absence of Hospital-Acquired Illness or Injury  Intervention: Identify and Manage Fall Risk  Flowsheets (Taken 12/18/2023 0407)  Safety Promotion/Fall Prevention:   assistive device/personal item within reach   medications reviewed   room near unit station   side rails raised x 3  Intervention: Prevent Skin Injury  Flowsheets (Taken 12/18/2023 0407)  Body Position: turned  Skin Protection: incontinence pads utilized  Intervention: Prevent and Manage VTE (Venous Thromboembolism) Risk  Flowsheets (Taken 12/18/2023 0407)  Activity Management: Rolling - L1  Intervention: Prevent Infection  Flowsheets (Taken 12/18/2023 0407)  Infection Prevention:   environmental surveillance performed   hand hygiene promoted   single patient room provided  Goal: Optimal Comfort and Wellbeing  Intervention: Monitor Pain and Promote Comfort  Flowsheets (Taken 12/18/2023 0407)  Pain Management Interventions: care clustered  Intervention: Provide Person-Centered Care  Flowsheets (Taken 12/18/2023 0407)  Trust Relationship/Rapport: care explained     Problem: Coping Ineffective  Goal: Effective Coping  Intervention: Support and Enhance Coping Strategies  Flowsheets (Taken 12/18/2023 0407)  Supportive Measures: active listening utilized  Environmental Support: calm environment promoted     Problem: Infection  Goal: Absence of Infection Signs and Symptoms  Intervention: Prevent or Manage Infection  Flowsheets (Taken 12/18/2023 0407)  Infection Management: aseptic technique maintained     Problem: Adjustment to Illness (Sepsis/Septic Shock)  Goal: Optimal Coping  Intervention: Optimize Psychosocial Adjustment to Illness  Flowsheets (Taken 12/18/2023 0407)  Supportive Measures: active listening utilized     Problem: Bleeding (Sepsis/Septic Shock)  Goal: Absence of Bleeding  Intervention: Monitor and Manage Bleeding  Flowsheets (Taken 12/18/2023 0407)  Bleeding Precautions: blood pressure closely  monitored     Problem: Glycemic Control Impaired (Sepsis/Septic Shock)  Goal: Blood Glucose Level Within Desired Range  Intervention: Optimize Glycemic Control  Flowsheets (Taken 12/18/2023 0407)  Glycemic Management: blood glucose monitored     Problem: Infection Progression (Sepsis/Septic Shock)  Goal: Absence of Infection Signs and Symptoms  Intervention: Initiate Sepsis Management  Flowsheets (Taken 12/18/2023 0407)  Infection Prevention:   environmental surveillance performed   hand hygiene promoted   single patient room provided  Infection Management: aseptic technique maintained  Intervention: Promote Recovery  Flowsheets (Taken 12/18/2023 0407)  Activity Management: Rolling - L1     Problem: Impaired Wound Healing  Goal: Optimal Wound Healing  Intervention: Promote Wound Healing  Flowsheets (Taken 12/18/2023 0407)  Activity Management: Rolling - L1  Pain Management Interventions: care clustered     Problem: Skin Injury Risk Increased  Goal: Skin Health and Integrity  Intervention: Optimize Skin Protection  Flowsheets (Taken 12/18/2023 0407)  Skin Protection: incontinence pads utilized  Head of Bed (HOB) Positioning: HOB elevated

## 2023-12-18 NOTE — NURSING
[facility] [place]  Nurses Note -- 4 Eyes      12/18/2023       Skin assessed on: Q Shift      [] No Pressure Injuries Present    []Prevention Measures Documented    [x] Yes LDA  for Pressure Injury Previously documented     [] Yes New Pressure Injury Discovered   [] LDA for New Pressure Injury Added      Attending RN:  Milady Abdi LPN     Second RN:  Becky FloresRN

## 2023-12-18 NOTE — PLAN OF CARE
KATARZYNA spoke with patient daughter via phone to discuss NH choices. Patient daughter stated she did not have any nursing homes in mind and that MD had called, saying patient plans to be discharged to NH tomorrow. KATARZYNA explained to patient daughter that patient would have to discharge back to [place] as patient has a safe place to return to. Patient daughter voiced understanding and stated that she will be looking at nursing home once patient discharges to get patient to another nursing home.      12/18/23 7940   Discharge Reassessment   Assessment Type Discharge Planning Reassessment   Discharge Plan discussed with: Adult children   Communicated ANISH with patient/caregiver Yes   Discharge Plan A Return to nursing home   Discharge Plan B Return to Nursing Home   DME Needed Upon Discharge  other (see comments)  (TBD)   Transition of Care Barriers None   Why the patient remains in the hospital Requires continued medical care   Post-Acute Status   Coverage Medicare A/B   Discharge Delays None known at this time

## 2023-12-18 NOTE — CONSULTS
Nutrition consult received for TF recs. See nutrition note 12/15/23.  TF Impact 1.5 @ 10 ml/hr advancing to goal of 35 mL/hr  + 90 ml flush q 4 hr or per MD discretion.

## 2023-12-18 NOTE — PLAN OF CARE
Problem: Adult Inpatient Plan of Care  Goal: Plan of Care Review  Outcome: Ongoing, Progressing     Problem: Coping Ineffective  Goal: Effective Coping  Outcome: Ongoing, Progressing     Problem: Infection  Goal: Absence of Infection Signs and Symptoms  Outcome: Ongoing, Progressing     Problem: Adjustment to Illness (Sepsis/Septic Shock)  Goal: Optimal Coping  Outcome: Ongoing, Progressing     Problem: Impaired Wound Healing  Goal: Optimal Wound Healing  Outcome: Ongoing, Progressing     Problem: Skin Injury Risk Increased  Goal: Skin Health and Integrity  Outcome: Ongoing, Progressing

## 2023-12-19 VITALS
TEMPERATURE: 98 F | WEIGHT: 188 LBS | DIASTOLIC BLOOD PRESSURE: 79 MMHG | HEART RATE: 87 BPM | HEIGHT: 62 IN | OXYGEN SATURATION: 95 % | RESPIRATION RATE: 16 BRPM | SYSTOLIC BLOOD PRESSURE: 132 MMHG | BODY MASS INDEX: 34.6 KG/M2

## 2023-12-19 PROBLEM — N30.00 ACUTE CYSTITIS: Status: RESOLVED | Noted: 2022-07-08 | Resolved: 2023-12-19

## 2023-12-19 PROBLEM — A41.9 SEPSIS: Status: RESOLVED | Noted: 2023-12-12 | Resolved: 2023-12-19

## 2023-12-19 PROBLEM — K56.609 BOWEL OBSTRUCTION: Status: RESOLVED | Noted: 2023-12-12 | Resolved: 2023-12-19

## 2023-12-19 PROBLEM — G93.40 ACUTE ENCEPHALOPATHY: Status: RESOLVED | Noted: 2021-09-10 | Resolved: 2023-12-19

## 2023-12-19 LAB
ALBUMIN SERPL BCP-MCNC: 2.9 G/DL (ref 3.5–5.2)
ALP SERPL-CCNC: 91 U/L (ref 55–135)
ALT SERPL W/O P-5'-P-CCNC: 51 U/L (ref 10–44)
ANION GAP SERPL CALC-SCNC: 9 MMOL/L (ref 8–16)
AST SERPL-CCNC: 39 U/L (ref 10–40)
BASOPHILS # BLD AUTO: 0.01 K/UL (ref 0–0.2)
BASOPHILS NFR BLD: 0.1 % (ref 0–1.9)
BILIRUB SERPL-MCNC: 0.5 MG/DL (ref 0.1–1)
BUN SERPL-MCNC: 5 MG/DL (ref 8–23)
CALCIUM SERPL-MCNC: 8.7 MG/DL (ref 8.7–10.5)
CHLORIDE SERPL-SCNC: 104 MMOL/L (ref 95–110)
CO2 SERPL-SCNC: 25 MMOL/L (ref 23–29)
CREAT SERPL-MCNC: 0.5 MG/DL (ref 0.5–1.4)
DIFFERENTIAL METHOD: ABNORMAL
EOSINOPHIL # BLD AUTO: 0.1 K/UL (ref 0–0.5)
EOSINOPHIL NFR BLD: 0.5 % (ref 0–8)
ERYTHROCYTE [DISTWIDTH] IN BLOOD BY AUTOMATED COUNT: 14.8 % (ref 11.5–14.5)
EST. GFR  (NO RACE VARIABLE): >60 ML/MIN/1.73 M^2
GLUCOSE SERPL-MCNC: 117 MG/DL (ref 70–110)
HCT VFR BLD AUTO: 33.2 % (ref 37–48.5)
HGB BLD-MCNC: 11.1 G/DL (ref 12–16)
IMM GRANULOCYTES # BLD AUTO: 0.09 K/UL (ref 0–0.04)
IMM GRANULOCYTES NFR BLD AUTO: 0.9 % (ref 0–0.5)
LYMPHOCYTES # BLD AUTO: 2.1 K/UL (ref 1–4.8)
LYMPHOCYTES NFR BLD: 21.3 % (ref 18–48)
MCH RBC QN AUTO: 26 PG (ref 27–31)
MCHC RBC AUTO-ENTMCNC: 33.4 G/DL (ref 32–36)
MCV RBC AUTO: 78 FL (ref 82–98)
MONOCYTES # BLD AUTO: 0.8 K/UL (ref 0.3–1)
MONOCYTES NFR BLD: 8.1 % (ref 4–15)
NEUTROPHILS # BLD AUTO: 6.8 K/UL (ref 1.8–7.7)
NEUTROPHILS NFR BLD: 69.1 % (ref 38–73)
NRBC BLD-RTO: 0 /100 WBC
PLATELET # BLD AUTO: 269 K/UL (ref 150–450)
PMV BLD AUTO: 9.6 FL (ref 9.2–12.9)
POCT GLUCOSE: 111 MG/DL (ref 70–110)
POCT GLUCOSE: 115 MG/DL (ref 70–110)
POCT GLUCOSE: 117 MG/DL (ref 70–110)
POCT GLUCOSE: 122 MG/DL (ref 70–110)
POTASSIUM SERPL-SCNC: 3.4 MMOL/L (ref 3.5–5.1)
PROT SERPL-MCNC: 6.5 G/DL (ref 6–8.4)
RBC # BLD AUTO: 4.27 M/UL (ref 4–5.4)
SARS-COV-2 RDRP RESP QL NAA+PROBE: NEGATIVE
SODIUM SERPL-SCNC: 138 MMOL/L (ref 136–145)
WBC # BLD AUTO: 9.78 K/UL (ref 3.9–12.7)

## 2023-12-19 PROCEDURE — U0002 COVID-19 LAB TEST NON-CDC: HCPCS | Performed by: HOSPITALIST

## 2023-12-19 PROCEDURE — 85025 COMPLETE CBC W/AUTO DIFF WBC: CPT | Performed by: STUDENT IN AN ORGANIZED HEALTH CARE EDUCATION/TRAINING PROGRAM

## 2023-12-19 PROCEDURE — 63600175 PHARM REV CODE 636 W HCPCS: Performed by: STUDENT IN AN ORGANIZED HEALTH CARE EDUCATION/TRAINING PROGRAM

## 2023-12-19 PROCEDURE — 25000003 PHARM REV CODE 250: Performed by: STUDENT IN AN ORGANIZED HEALTH CARE EDUCATION/TRAINING PROGRAM

## 2023-12-19 PROCEDURE — 36415 COLL VENOUS BLD VENIPUNCTURE: CPT | Performed by: STUDENT IN AN ORGANIZED HEALTH CARE EDUCATION/TRAINING PROGRAM

## 2023-12-19 PROCEDURE — 25000003 PHARM REV CODE 250: Performed by: HOSPITALIST

## 2023-12-19 PROCEDURE — 80053 COMPREHEN METABOLIC PANEL: CPT | Performed by: STUDENT IN AN ORGANIZED HEALTH CARE EDUCATION/TRAINING PROGRAM

## 2023-12-19 RX ORDER — ASCORBIC ACID 500 MG
500 TABLET ORAL DAILY
Start: 2023-12-19

## 2023-12-19 RX ORDER — ERGOCALCIFEROL 1.25 MG/1
50000 CAPSULE ORAL
Start: 2023-12-19

## 2023-12-19 RX ORDER — ZINC SULFATE 50(220)MG
220 CAPSULE ORAL DAILY
Start: 2023-12-19

## 2023-12-19 RX ADMIN — POLYETHYLENE GLYCOL 3350 17 G: 17 POWDER, FOR SOLUTION ORAL at 09:12

## 2023-12-19 RX ADMIN — DOCUSATE SODIUM AND SENNOSIDES 1 TABLET: 8.6; 5 TABLET, FILM COATED ORAL at 09:12

## 2023-12-19 RX ADMIN — ERTAPENEM 1 G: 1 INJECTION INTRAMUSCULAR; INTRAVENOUS at 09:12

## 2023-12-19 RX ADMIN — APIXABAN 5 MG: 5 TABLET, FILM COATED ORAL at 09:12

## 2023-12-19 NOTE — ASSESSMENT & PLAN NOTE
Presents with acute mechanical small-bowel obstruction with point of transition in the deep midline/right lower abdomen and pelvis.  Recently had older G-tube cut and number the left inside.  This is what most likely causing obstruction at this time.  She was not a candidate for surgery given she was on aspirin, Plavix and Eliquis.  This places her at very high risk for any intervention at this moment.  Holding all anticoagulation.  Surgery recommending enema to help with stool burden distal to object.    PEG to gravity with management per surgery.  Having BM.  Passed gastrograffin challenge.  Started tube feeds.

## 2023-12-19 NOTE — PLAN OF CARE
[facility] - Telemetry  Discharge Final Note    Primary Care Provider: Estrellita Obregon FNP    Expected Discharge Date: 12/19/2023    Final Discharge Note (most recent)       Final Note - 12/19/23 1400          Final Note    Assessment Type Final Discharge Note     Anticipated Discharge Disposition Intermediate Care Facility for long-term or Supportive Care     What phone number can be called within the next 1-3 days to see how you are doing after discharge? [phone]     Hospital Resources/Appts/Education Provided Post-Acute resouces added to AVS        Post-Acute Status    Coverage Medicare A/B     Discharge Delays None known at this time                     Important Message from Medicare  Important Message from Medicare regarding Discharge Appeal Rights: Given to patient/caregiver, Explained to patient/caregiver     Date IMM was signed: 12/18/23  Time IMM was signed: 1604    Contact Info       Estrellita Obregon FNP   Specialty: Family Medicine   Relationship: PCP - General    [address] [place] [address]  [address]  [place] [address]   Phone: [phone]       Next Steps: Follow up in 1 week(s)    Jorden Jimenez MD   Specialty: General Surgery, Surgery    [address]  [address]  [facility] [address]   Phone: [phone]       Next Steps: Schedule an appointment as soon as possible for a visit in 1 week(s)          ADT 30 order placed for Van Transportation.  Requested  time: 3:30 pm   If transportation does not arrive at ETA time nurse will be instructed to follow protocol for transportation below:  How can I get in touch directly with dispatch, if needed?                 Non-emergent dispatch: [phone]      +++NURSING:  If Van does not arrive at requested time please call the above Non Emergent Dispatcher.  If issue not resolved please escalate to your charge nurse for further instructions.

## 2023-12-19 NOTE — SUBJECTIVE & OBJECTIVE
Interval History: started tube feeds and tolerated    Review of Systems   Unable to perform ROS: Patient nonverbal     Objective:     Vital Signs (Most Recent):  Temp: 98.3 °F (36.8 °C) (12/18/23 1607)  Pulse: 84 (12/18/23 1607)  Resp: 19 (12/18/23 1607)  BP: 124/74 (12/18/23 1607)  SpO2: 98 % (12/18/23 1607) Vital Signs (24h Range):  Temp:  [98.1 °F (36.7 °C)-98.8 °F (37.1 °C)] 98.3 °F (36.8 °C)  Pulse:  [75-85] 84  Resp:  [16-19] 19  SpO2:  [94 %-98 %] 98 %  BP: (124-189)/(70-91) 124/74     Weight: 85.3 kg (188 lb)  Body mass index is 34.39 kg/m².    Intake/Output Summary (Last 24 hours) at 12/18/2023 1833  Last data filed at 12/18/2023 1739  Gross per 24 hour   Intake 390 ml   Output 1445 ml   Net -1055 ml           Physical Exam  Vitals and nursing note reviewed.   Constitutional:       Appearance: She is obese. She is ill-appearing.      Comments: Patient is nonverbal.  Does not participate in conversation.   HENT:      Head: Normocephalic.      Mouth/Throat:      Mouth: Mucous membranes are dry.      Pharynx: Oropharynx is clear.   Cardiovascular:      Rate and Rhythm: Normal rate.      Pulses: Normal pulses.   Pulmonary:      Effort: Pulmonary effort is normal. No respiratory distress.      Breath sounds: No wheezing.   Abdominal:      General: There is distension.      Tenderness: There is no guarding.      Comments: Abdomen is distended but improved from previous exam.  Patient does not respond to tactile stimulation of all 4 quadrants of her abdomen.  G-tube to gravity drainage   Musculoskeletal:         General: No swelling or tenderness. Normal range of motion.      Cervical back: Normal range of motion.   Skin:     General: Skin is warm and dry.      Coloration: Skin is not jaundiced or pale.             Significant Labs: All pertinent labs within the past 24 hours have been reviewed.  BMP:   Recent Labs   Lab 12/18/23  0400   GLU 99      K 3.0*      CO2 25   BUN 3*   CREATININE 0.5    CALCIUM 8.6*       CBC:   Recent Labs   Lab 12/17/23  0452 12/18/23  0400   WBC 9.43 8.73   HGB 10.6* 11.6*   HCT 32.6* 35.6*    281         Significant Imaging: I have reviewed all pertinent imaging results/findings within the past 24 hours.

## 2023-12-19 NOTE — PROGRESS NOTES
[facility] Medicine  Progress Note    Patient Name: Holly Kidd  MRN: [id number]  Patient Class: IP- Inpatient   Admission Date: 12/12/2023  Length of Stay: 6 days  Attending Physician: Chris Bright MD  Primary Care Provider: Estrellita Obregon FNP        Subjective:     Principal Problem:Sepsis        HPI:  Ms. Kidd is a 73-year-old female with a past medical history of CVA, dementia, peg tube placement, hypertension, DVT/PE on Eliquis who presents to the emergency department for evaluation of intractable nausea and vomiting.  Patient is not able to give history so all of the history was obtained from the ED physician and medical records.  The patient was recently seen at [facility] for dislodged PEG tube on 12/03/2023.  They were not able to remove it so the PEG tube was cut an umbrella left side with expectation of it to be passed in her stool.  In the emergency department, she was found to have WBC of 32,000, BUN 63 and lactate 2.3.  Urinalysis appeared brown with significant WBC greater than 100 and bacteria.  CT abdomen and pelvis with findings of acute mechanical small-bowel obstruction with point of transition in the deep midline/right paramedian lower abdomen/pelvis with proximity to intraluminal foreign body favored to represent dislodged G-tube apparatus component within small bowel loop just proximal to transition point.  She does have large volume stool distal colon/rectum.  General surgery consulted however given patient is on aspirin, Plavix and Eliquis, patient is high risk for intervention at this time.  Recommendation include supportive care, antibiotics and enema to help with stool burden.  Daughter is at bedside and discussed plan of care.  Patient is DNR at this time.    Overview/Hospital Course:  73F with pmh of cva, dementa, peg tube, dvt/pe on eliquis who presented to due intractable N/V.   The patient was recently seen at [facility] for dislodged PEG tube on 12/03/2023.   They were not able to remove it so the PEG tube was cut an umbrella left side with expectation of it to be passed in her stool. CT abdomen and pelvis with findings of acute mechanical small-bowel obstruction with point of transition in the deep midline/right paramedian lower abdomen/pelvis with proximity to intraluminal foreign body favored to represent dislodged G-tube apparatus component within small bowel loop just proximal to transition point.  She does have large volume stool distal colon/rectum.  General surgery consulted however given patient is on aspirin, Plavix and Eliquis, patient is high risk for intervention at this time.  Recommendation include supportive care, antibiotics and enema to help with stool burden. Continuing with conservative tx. GGC on 12/14 and enema with some stool passed, but no foreign body noted.     Interval History: started tube feeds and tolerated    Review of Systems   Unable to perform ROS: Patient nonverbal     Objective:     Vital Signs (Most Recent):  Temp: 98.3 °F (36.8 °C) (12/18/23 1607)  Pulse: 84 (12/18/23 1607)  Resp: 19 (12/18/23 1607)  BP: 124/74 (12/18/23 1607)  SpO2: 98 % (12/18/23 1607) Vital Signs (24h Range):  Temp:  [98.1 °F (36.7 °C)-98.8 °F (37.1 °C)] 98.3 °F (36.8 °C)  Pulse:  [75-85] 84  Resp:  [16-19] 19  SpO2:  [94 %-98 %] 98 %  BP: (124-189)/(70-91) 124/74     Weight: 85.3 kg (188 lb)  Body mass index is 34.39 kg/m².    Intake/Output Summary (Last 24 hours) at 12/18/2023 1833  Last data filed at 12/18/2023 1739  Gross per 24 hour   Intake 390 ml   Output 1445 ml   Net -1055 ml           Physical Exam  Vitals and nursing note reviewed.   Constitutional:       Appearance: She is obese. She is ill-appearing.      Comments: Patient is nonverbal.  Does not participate in conversation.   HENT:      Head: Normocephalic.      Mouth/Throat:      Mouth: Mucous membranes are dry.      Pharynx: Oropharynx is clear.   Cardiovascular:      Rate and Rhythm: Normal rate.  "     Pulses: Normal pulses.   Pulmonary:      Effort: Pulmonary effort is normal. No respiratory distress.      Breath sounds: No wheezing.   Abdominal:      General: There is distension.      Tenderness: There is no guarding.      Comments: Abdomen is distended but improved from previous exam.  Patient does not respond to tactile stimulation of all 4 quadrants of her abdomen.  G-tube to gravity drainage   Musculoskeletal:         General: No swelling or tenderness. Normal range of motion.      Cervical back: Normal range of motion.   Skin:     General: Skin is warm and dry.      Coloration: Skin is not jaundiced or pale.             Significant Labs: All pertinent labs within the past 24 hours have been reviewed.  BMP:   Recent Labs   Lab 12/18/23  0400   GLU 99      K 3.0*      CO2 25   BUN 3*   CREATININE 0.5   CALCIUM 8.6*       CBC:   Recent Labs   Lab 12/17/23  0452 12/18/23  0400   WBC 9.43 8.73   HGB 10.6* 11.6*   HCT 32.6* 35.6*    281         Significant Imaging: I have reviewed all pertinent imaging results/findings within the past 24 hours.    Assessment/Plan:      * Sepsis  This patient does have evidence of infective focus.My overall impression is sepsis.  Source: Urinary Tract  Antibiotics given-   Antibiotics (72h ago, onward)      Start     Stop Route Frequency Ordered    12/16/23 0836  ertapenem (INVANZ) 1 g in sodium chloride 0.9 % 100 mL IVPB (MB+)         -- IV Every 24 hours (non-standard times) 12/16/23 0836    12/14/23 0900  mupirocin 2 % ointment         12/19/23 0859 Nasl 2 times daily 12/14/23 0732          Latest lactate reviewed-  No results for input(s): "LACTATE" in the last 72 hours.    Organ dysfunction indicated by Encephalopathy  On Ertapenem.  Probably back to NH tomorrow.    History of pulmonary embolism  Eliquis 5 mg bid    Bowel obstruction  Presents with acute mechanical small-bowel obstruction with point of transition in the deep midline/right lower abdomen " and pelvis.  Recently had older G-tube cut and number the left inside.  This is what most likely causing obstruction at this time.  She was not a candidate for surgery given she was on aspirin, Plavix and Eliquis.  This places her at very high risk for any intervention at this moment.  Holding all anticoagulation.  Surgery recommending enema to help with stool burden distal to object.    PEG to gravity with management per surgery.  Having BM.  Passed gastrograffin challenge.  Started tube feeds.      Acute cystitis  UA consistent with urinary tract infection.  Initially given ceftriaxone in the emergency department however broadened to Zosyn.  Now on Ertapenem based on cultures.  ESBL E coli UTI      Advanced care planning/counseling discussion  Advance Care Planning    Date: 12/12/2023    Code Status  In light of the patients advanced and life limiting illness,I engaged the the family in a voluntary conversation about the patient's preferences for care  at the very end of life. The patient wishes to have a natural, peaceful death.  Along those lines, the patient does not wish to have CPR or other invasive treatments performed when her heart and/or breathing stops. I communicated to the family that a DNR order would be placed in her medical record to reflect this preference.  I spent a total of 8 minutes engaging the patient in this advance care planning discussion.    Continue with current level of care at this time.     Has a daughter and son, her daughter Lana was at bedside and unable to get in touch with son.  12/13: spoke to son and updated. Seems to understand overall poor prognosis and our concerns            Acute encephalopathy  Lethargic with history of CVA and now with sepsis and small-bowel obstruction.  Treat underlying issues at this time.  -more alert now and closer to baseline    Cerebrovascular disease  Hx of CVA, now with PEG in place and debility      Essential hypertension  - Chronic in  nature, holding home BP medications in setting of sepsis  - will add back as needed      VTE Risk Mitigation (From admission, onward)           Ordered     apixaban tablet 5 mg  2 times daily         12/15/23 1341     IP VTE HIGH RISK PATIENT  Once         12/12/23 1347     Place sequential compression device  Until discontinued         12/12/23 1347                    Discharge Planning   ANISH: 12/16/2023     Code Status: DNR   Is the patient medically ready for discharge?:     Reason for patient still in hospital (select all that apply): Patient trending condition  Discharge Plan A: Return to nursing home   Discharge Delays: None known at this time              Chris Bright MD  Department of Hospital Medicine   [facility] [place] [facility]

## 2023-12-19 NOTE — NURSING
[facility] [place]  Nurses Note -- 4 Eyes      12/18/2023       Skin assessed on: Q Shift      [x] No Pressure Injuries Present    [x]Prevention Measures Documented    [] Yes LDA  for Pressure Injury Previously documented     [] Yes New Pressure Injury Discovered   [] LDA for New Pressure Injury Added      Attending RN:  Sarbjit Crowley RN     Second RN:  Milady EVERETT LPN

## 2023-12-19 NOTE — ASSESSMENT & PLAN NOTE
"This patient does have evidence of infective focus.My overall impression is sepsis.  Source: Urinary Tract  Antibiotics given-   Antibiotics (72h ago, onward)      Start     Stop Route Frequency Ordered    12/16/23 0836  ertapenem (INVANZ) 1 g in sodium chloride 0.9 % 100 mL IVPB (MB+)         -- IV Every 24 hours (non-standard times) 12/16/23 0836    12/14/23 0900  mupirocin 2 % ointment         12/19/23 0859 Nasl 2 times daily 12/14/23 0732          Latest lactate reviewed-  No results for input(s): "LACTATE" in the last 72 hours.    Organ dysfunction indicated by Encephalopathy  On Ertapenem.  Probably back to NH tomorrow.  "

## 2023-12-19 NOTE — PLAN OF CARE
KATARZYNA faxed patient NH POC to [facility].   KATARZYNA will follow up with Silvana ([facility]) re: NH POC and report information.    KATARZYNA spoke with Tamy ([facility]) re: patient discharging today. Tamy stated she will contact patient daughter.     KATARZYNA left message for patient daughter (Lana).      KATARZYNA informed by Silvana ([facility]) that patient would need to come back with NH with consult to hospice as patient family did not complete paperwork. KATARZYNA informed MD for changes to NH POC.     Updated NH POC sent to [facility] ([facility]).     KATARZYNA received report information from Silvana ([facility]). [facility] nurse to call report to [phone]. KATARZYNA secure chat nurse Tamy with report information and transportation for 3:00 pm.

## 2023-12-19 NOTE — CONSULTS
[facility] [place] - Telemetry  Wound Care  WOC MARCY    Patient Name:  Holly Kidd   MRN:  [id number]  Date: 12/19/2023  Diagnosis: Sepsis    History:     Past Medical History:   Diagnosis Date    COVID-19 09/09/2021    Essential hypertension 6/12/2015    Obese     Obesity (BMI 35.0-39.9) 6/12/2015    Stroke 06/2015    Left side weakness    Stroke-like symptoms 09/17/2021    NEW LEFT FACIAL DROOP, LEFT SIDE WEAKNESS & SLURRED SPEECH    Wheelchair dependence     Wheelchair dependence        Social History     Socioeconomic History    Marital status: Single   Tobacco Use    Smoking status: Never    Smokeless tobacco: Never   Substance and Sexual Activity    Alcohol use: No    Drug use: No       Precautions:     Allergies as of 12/12/2023    (No Known Allergies)       WO Assessment Details/Treatment     Active Problem List with Overview Notes    Diagnosis Date Noted    History of pulmonary embolism 12/15/2023    Acute deep vein thrombosis (DVT) of femoral vein of right lower extremity 07/11/2022    Pulmonary embolism on right 07/11/2022    Debility 07/04/2022    Iron deficiency anemia 07/03/2022    Advanced care planning/counseling discussion 07/03/2022    GI bleed due to NSAIDs 07/02/2022    Chest pain 09/30/2021    Vitamin D deficiency 09/10/2021    Syncope 09/09/2021    Acute ischemic stroke 05/23/2019    Wheelchair dependence 05/23/2019    Cerebrovascular disease 05/23/2019    History of stroke 05/23/2019    Essential hypertension 06/12/2015    Hypokalemia 06/12/2015    Obesity (BMI 30.0-34.9) 06/12/2015      Consulted per WOGs for altered skin integrity buttocks, thighs, and right ear  A 73 year old female admitted 12/12/23 from [facility] with complaint of intractable nausea and vomiting. Recently seen at [facility] for dislodged PEG tube 12/3 and not able to remove so PEG tube was cut with expectation of it to be passed in her stool.   Plan:  Assess wounds 12/20 and modify treatment plan as needed  Nursing to continue PIP  and WOGs.    12/19/2023

## 2023-12-19 NOTE — DISCHARGE SUMMARY
[facility] Medicine  Discharge Summary      Patient Name: Holly Kidd  MRN: [id number]  [facility]: [id number]  Patient Class: IP- Inpatient  Admission Date: 12/12/2023  Hospital Length of Stay: 7 days  Discharge Date and Time:  12/19/2023 9:30 AM  Attending Physician: Chris Bright MD   Discharging Provider: Chris Bright MD  Primary Care Provider: Estrellita Obregon FNP    Primary Care Team: Networked reference to record PCT     HPI:   Ms. Kidd is a 73-year-old female with a past medical history of CVA, dementia, peg tube placement, hypertension, DVT/PE on Eliquis who presents to the emergency department for evaluation of intractable nausea and vomiting.  Patient is not able to give history so all of the history was obtained from the ED physician and medical records.  The patient was recently seen at [facility] for dislodged PEG tube on 12/03/2023.  They were not able to remove it so the PEG tube was cut an umbrella left side with expectation of it to be passed in her stool.  In the emergency department, she was found to have WBC of 32,000, BUN 63 and lactate 2.3.  Urinalysis appeared brown with significant WBC greater than 100 and bacteria.  CT abdomen and pelvis with findings of acute mechanical small-bowel obstruction with point of transition in the deep midline/right paramedian lower abdomen/pelvis with proximity to intraluminal foreign body favored to represent dislodged G-tube apparatus component within small bowel loop just proximal to transition point.  She does have large volume stool distal colon/rectum.  General surgery consulted however given patient is on aspirin, Plavix and Eliquis, patient is high risk for intervention at this time.  Recommendation include supportive care, antibiotics and enema to help with stool burden.  Daughter is at bedside and discussed plan of care.  Patient is DNR at this time.    * No surgery found *      Hospital Course:   74 y/o F with pmh of cva,  dementa, peg tube, dvt/pe on eliquis who presented to due intractable N/V.   The patient was recently seen at [facility] for dislodged PEG tube on 12/03/2023.  They were not able to remove it so the PEG tube was cut an umbrella left side with expectation of it to be passed in her stool. CT abdomen and pelvis with findings of acute mechanical small-bowel obstruction with point of transition in the deep midline/right paramedian lower abdomen/pelvis with proximity to intraluminal foreign body favored to represent dislodged G-tube apparatus component within small bowel loop just proximal to transition point.  She does have large volume stool distal colon/rectum.  General surgery consulted however given patient is on aspirin, Plavix and Eliquis, patient is high risk for intervention at this time.  Recommendation include supportive care, antibiotics for UTI and enema to help with stool burden. Continuing with conservative tx. GGC on 12/14 and enema with some stool passed, but no foreign body noted.  No evidence of obstruction on gastrograffin challenge.  Patient treated for ESBL Ecoli and Providencia UTI with IV ABx's during hospital stay.  She has completed course of ABX's.  Patient started on tube feeds and tolerating without issue.  She has remained afebrile and hemodynamically stable.  Patient is at her baseline mental status.  She will be discharged back to NH.     Goals of Care Treatment Preferences:  Code Status: DNR       LaPOST: Yes  What is most important right now is to focus on improvement in condition but with limits to invasive therapies.  Accordingly, we have decided that the best plan to meet the patient's goals includes continuing with treatment.      Consults:   Consults (From admission, onward)          Status Ordering Provider     Inpatient consult to Registered Dietitian/Nutritionist  Once        Provider:  (Not yet assigned)    Completed LEMUEL WATERMAN     Inpatient consult to Social Work  Once         Provider:  (Not yet assigned)    Completed DONALD RAMAN     Inpatient consult to Registered Dietitian/Nutritionist  Once        Provider:  (Not yet assigned)    Completed BENTON THOMAS III     Inpatient consult to Palliative Care  Once        Provider:  Donald Raman MD    Completed BENTON THOMAS III     Inpatient consult to Palliative Care  Once        Provider:  Donald Raman MD    Completed LORNA ZAMUDIO JR     Inpatient consult to Spiritual Care  Once        Provider:  (Not yet assigned)    Completed LORNA ZAMUDIO JR     Inpatient consult to General Surgery  Once        Provider:  Jorden Jimenez MD    Completed LORNA ZAMUDIO JR            No new Assessment & Plan notes have been filed under this hospital service since the last note was generated.  Service: Hospital Medicine    Final Active Diagnoses:    Diagnosis Date Noted POA    History of pulmonary embolism [Z86.711] 12/15/2023 Yes    Advanced care planning/counseling discussion [Z71.89] 07/03/2022 Not Applicable    Cerebrovascular disease [I67.9] 05/23/2019 Yes    Essential hypertension [I10] 06/12/2015 Yes     Chronic      Problems Resolved During this Admission:    Diagnosis Date Noted Date Resolved POA    PRINCIPAL PROBLEM:  Sepsis [A41.9] 12/12/2023 12/19/2023 Yes    Bowel obstruction [K56.609] 12/12/2023 12/19/2023 Yes    Acute cystitis [N30.00] 07/08/2022 12/19/2023 Yes    Leukocytosis [D72.829] 02/18/2022 12/15/2023 Yes    Acute encephalopathy [G93.40] 09/10/2021 12/19/2023 Yes       Discharged Condition: stable    Disposition: Home or Self Care/Nursing Home    Follow Up:   Follow-up Information       Estrellita Obregon FNP Follow up in 1 week(s).    Specialty: Family Medicine  Contact information:  [address] [address]  [address]  [employer] [id number] [phone]               Jorden Jimenez MD Follow up in 2 week(s).    Specialties: General Surgery, Surgery  Contact information:  [address]  [address]  [place]  [place] [address]  [phone]                           Patient Instructions:      Diet NPO     Notify your health care provider if you experience any of the following:  temperature >100.4     Notify your health care provider if you experience any of the following:  persistent nausea and vomiting or diarrhea     Notify your health care provider if you experience any of the following:  severe uncontrolled pain     Notify your health care provider if you experience any of the following:  difficulty breathing or increased cough     Notify your health care provider if you experience any of the following:  persistent dizziness, light-headedness, or visual disturbances     Notify your health care provider if you experience any of the following:  increased confusion or weakness     Activity as tolerated       Significant Diagnostic Studies: N/A    Pending Diagnostic Studies:       None           Medications:  Reconciled Home Medications:      Medication List        CHANGE how you take these medications      apixaban 5 mg Tab  Commonly known as: ELIQUIS  2 tablets (10 mg total) by Per G Tube route 2 (two) times daily. 10mg BID for 7days then switch to 5mg BID  What changed: how to take this     ascorbic acid (vitamin C) 500 MG tablet  Commonly known as: VITAMIN C  1 tablet (500 mg total) by Per G Tube route once daily.  What changed: how to take this     zinc sulfate 50 mg zinc (220 mg) capsule  Commonly known as: ZINCATE  1 capsule (220 mg total) by Per G Tube route once daily.  What changed: how to take this            CONTINUE taking these medications      acetaminophen 325 MG tablet  Commonly known as: TYLENOL  325 mg by Per G Tube route every 6 (six) hours as needed for Pain.     albuterol-ipratropium 2.5 mg-0.5 mg/3 mL nebulizer solution  Commonly known as: DUO-NEB  Take 3 mLs by nebulization every 8 (eight) hours as needed for Wheezing or Shortness of Breath.     ARTIFICIAL TEARS (PF) OPHT  Place 1 drop into both eyes 3  (three) times daily as needed.     atorvastatin 10 MG tablet  Commonly known as: LIPITOR  10 mg by Per G Tube route every evening.     ergocalciferol 50,000 unit Cap  Commonly known as: ERGOCALCIFEROL  1 capsule (50,000 Units total) by Per G Tube route every 7 days.     EScitalopram oxalate 20 MG tablet  Commonly known as: LEXAPRO  20 mg by Per G Tube route.     esomeprazole 40 mg Grps  Commonly known as: NEXIUM  by Per G Tube route 2 (two) times daily before meals.     fluticasone propionate 50 mcg/actuation nasal spray  Commonly known as: FLONASE  2 sprays by Each Nostril route once daily.     JEVITY 1.5 ELEONORA ORAL  Take 50 mL/hr by mouth. UP @ 4AM, DOWN @ 12 MIDNIGHT     loratadine 10 mg tablet  Commonly known as: CLARITIN  10 mg by Per G Tube route once daily.     MAGNESIUM ORAL  400 mg by PEG Tube route once daily.     metoclopramide HCl 5 MG tablet  Commonly known as: REGLAN  5 mg by Per G Tube route 4 (four) times daily.     metoprolol tartrate 50 MG tablet  Commonly known as: LOPRESSOR  50 mg by Per G Tube route 2 (two) times daily. HOLD FOR SBP<110    PULSE<60     multivitamin per tablet  Commonly known as: THERAGRAN  1 tablet by Per G Tube route once daily.     polyethylene glycol 17 gram Pwpk  Commonly known as: GLYCOLAX  17 g by Per G Tube route 3 (three) times daily.     Saline NasaL 0.65 % nasal spray  Generic drug: sodium chloride  1 spray by Nasal route as needed for Congestion.     senna 8.6 mg tablet  Commonly known as: SENOKOT  1 tablet by Per G Tube route 2 (two) times daily.     varicella-zoster gE-AS01B (PF) 50 mcg/0.5 mL injection  Commonly known as: SHINGRIX  Inject into the muscle.     vitamin E (dl, acetate) 180 mg (400 unit) Cap  by Per G Tube route once daily.            STOP taking these medications      ondansetron 4 MG Tbdl  Commonly known as: ZOFRAN-ODT              Indwelling Lines/Drains at time of discharge:   Lines/Drains/Airways       Drain  Duration                   Gastrostomy/Enterostomy Percutaneous endoscopic gastrostomy (PEG) midline feeding -- days     <1 day                    Time spent on the discharge of patient: >30 minutes         Chris Bright MD  Department of Hospital Medicine  [facility]

## 2023-12-19 NOTE — NURSING
Bedside Report given to night nurse RAJENDRA Schaffer. Walking rounds completed. Visualized and assessed patient NAD noted. Safety precautions maintained and call light within reach.     Chart check completed.

## 2023-12-19 NOTE — PLAN OF CARE
[facility] [address] [place] [address]  [phone]        Facility Transfer Orders                        12/19/2023    Admit to: NH with Hospice Consult    Diagnoses:  Active Hospital Problems    Diagnosis  POA    History of pulmonary embolism [Z86.711]  Yes    Advanced care planning/counseling discussion [Z71.89]  Not Applicable    Cerebrovascular disease [I67.9]  Yes    Essential hypertension [I10]  Yes     Chronic      Resolved Hospital Problems    Diagnosis Date Resolved POA    *Sepsis [A41.9] 12/19/2023 Yes     Priority: 1 - High    Bowel obstruction [K56.609] 12/19/2023 Yes    Acute cystitis [N30.00] 12/19/2023 Yes    Leukocytosis [D72.829] 12/15/2023 Yes    Acute encephalopathy [G93.40] 12/19/2023 Yes       Allergies:Review of patient's allergies indicates:  No Known Allergies    Vitals:     Once weekly      Diet: NPO     Tube Feeding:  Jevity 1.5 @ 50 ml/hr from 4 AM to Midnight    - Flush tube with 150 mL water every 8 hours    Activity:     - Up in a chair each morning as tolerated      Nursing Precautions:    - Aspiration precautions:             -  Suction at bedside          - Fall precautions   - Decubitus precautions:        -  for positioning   - Pressure reducing foam mattress   - Turn patient every two hours. Use wedge pillows to anchor patient      CONSULTS:         Nutrition to evaluate and recommend diet        Medications: Discontinue all previous medication orders, if any. See new list below.        Medication List          apixaban 5 mg Tab  Commonly known as: ELIQUIS  1 tab per PEG bid       ascorbic acid (vitamin C) 500 MG tablet  Commonly known as: VITAMIN C  1 tablet (500 mg total) by Per G Tube route once daily.       zinc sulfate 50 mg zinc (220 mg) capsule  Commonly known as: ZINCATE  1 capsule (220 mg total) by Per G Tube route once daily.           acetaminophen 325 MG tablet  Commonly known as: TYLENOL  Take 1 tab per PEG every 6  hours prn pain     albuterol-ipratropium 2.5 mg-0.5 mg/3 mL nebulizer solution  Commonly known as: DUO-NEB  Take 3 mls by nebulization every 8 hours as needed for wheezing.     ARTIFICIAL TEARS (PF) OPHT  Place 1 drop in both eyes tid prn dry eyes     atorvastatin 10 MG tablet  Commonly known as: LIPITOR  Take 1 tab per PEG daily     ergocalciferol 50,000 unit Cap  Commonly known as: ERGOCALCIFEROL  1 capsule (50,000 Units total) by Per G Tube route every 7 days on Sundays.     EScitalopram oxalate 20 MG tablet  Commonly known as: LEXAPRO  Take 1 tab per PEG daily     esomeprazole 40 mg Grps  Commonly known as: NEXIUM  Take 1 tab per PEG bid before meals.     fluticasone propionate 50 mcg/actuation nasal spray  Commonly known as: FLONASE  2 sprays by each nostril once daily        loratadine 10 mg tablet  Commonly known as: CLARITIN  1 tab per PEG daily.     MAGNESIUM ORAL  Take 400 mg per PEG once daily     metoclopramide HCl 5 MG tablet  Commonly known as: REGLAN  Take 1 tab per PEG 4 times daily     metoprolol tartrate 50 MG tablet  Commonly known as: LOPRESSOR  Take 1 tab per PEG bid.  Hold for SBP<110     multivitamin per tablet  Commonly known as: THERAGRAN  Take 1 tab per PEG daily     polyethylene glycol 17 gram Pwpk  Commonly known as: GLYCOLAX  Take 17g per PEG tid      Saline NasaL 0.65 % nasal spray  Generic drug: sodium chloride  1 spray by nasal route daily prn congestion     senna 8.6 mg tablet  Commonly known as: SENOKOT  Take 1 tab per PEG bid        vitamin E (dl, acetate) 180 mg (400 unit) Cap  Take 1 tab per PEG daily           _________________________________  Chris Bright MD  12/19/2023

## 2023-12-20 LAB — BACTERIA BLD CULT: NORMAL

## 2023-12-20 NOTE — PLAN OF CARE
Problem: Adult Inpatient Plan of Care  Goal: Plan of Care Review  Outcome: Unable to Meet, Plan Revised

## 2023-12-20 NOTE — NURSING
Pt D/C to [facility] via EMS. No distress noted. Tube feeding stopped. PEG tube flushed. H&P and face sheet given to EMS.

## 2023-12-20 NOTE — NURSING
[facility]  Nurses Note -- 4 Eyes      12/19/2023       Skin assessed on: Q Shift      [] No Pressure Injuries Present    []Prevention Measures Documented    [x] Yes LDA  for Pressure Injury Previously documented     [] Yes New Pressure Injury Discovered   [] LDA for New Pressure Injury Added      Attending RN:  Sarbjit Crowley RN     Second RN:  Tamy ANGEL RN

## 2025-01-07 NOTE — PT/OT/SLP PROGRESS
Patient has an appointment scheduled on 1-7-25.    Speech Language Pathology Treatment    Patient Name:  Holly Kidd   MRN:  [id number]  Admitting Diagnosis: GI bleed due to NSAIDs    Recommendations:                 General Recommendations:  Follow-up not indicated  Diet recommendations:  Mechanical soft, Liquid Diet Level: Thin   Aspiration Precautions: Assistance with meals, HOB to 90 degrees, Remain upright 30 minutes post meal and Small bites/sips   General Precautions: Standard,    Communication strategies:  provide increased time to answer    Subjective     Pt was fully alert and awake, lying in bed upon ST entrance. Pt with increased alertness, compared to previous session yesterday. Pt's MD entered pt's room retirement through tx session and notified the pt that she has a UTI which could be contributing to dcr alertness.     Pain/Comfort:  · Pain Rating 1: 0/10    Respiratory Status: Room air    Objective:     Has the patient been evaluated by SLP for swallowing?   Yes  Keep patient NPO? No   Current Respiratory Status:        ST utilized pt's mechanical soft lunch tray to assess cont safety with diet. ST provided total A for the pt to consume ~8 bites of mechanical soft items which included chopped fish, greens,and bread, as well as single straw sips of lemonade. Pt with slightly prolonged mastication, however, with increased time and cued liquid wash, the pt was able to clear all boluses from the oral cavity. No overt s/s of aspiration noted across [facility] soft and thin liquids consistencies. Pt deferred remaining items on lunch tray 2/2 expressing fatigue and wanting to lay down. ST removed tray table from across pt and repositioned pt in bed (remaining with HOB elevated to A with digestion). Pt's nurse notified of minimal intake during lunch, however, pt stating she is agreeable to cont eating after getting some rest.     Assessment:     Holly Kidd is a 71 y.o. female with a GI bleed due to NSAIDs who presents with mild oral dysphagia which is  negatively impacted by general fatigue. Pt is safe to cont current mech soft diet with thin liquids. No further ST is warranted at this time as the pt is consuming the least restrictive and safest diet.     Goals:   Multidisciplinary Problems     SLP Goals     Not on file          Multidisciplinary Problems (Resolved)        Problem: SLP    Goal Priority Disciplines Outcome   SLP Goal   (Resolved)    Low SLP Met   Description: Pt will tolerate mech soft with thin liquids X2 consecutive sessions (2 of 2 met 7/8)                   Plan:     · Patient to be seen:  2 x/week   · Plan of Care expires:  07/08/22  · Plan of Care reviewed with:      · SLP Follow-Up:  No       Discharge recommendations:      Barriers to Discharge:  None    Time Tracking:     SLP Treatment Date:   07/08/22  Speech Start Time:  1219  Speech Stop Time:  1245     Speech Total Time (min):  26 min    Billable Minutes: Treatment Swallowing Dysfunction 16 and Self Care/Home Management Training 10    07/08/2022